# Patient Record
Sex: FEMALE | Race: WHITE | Employment: OTHER
[De-identification: names, ages, dates, MRNs, and addresses within clinical notes are randomized per-mention and may not be internally consistent; named-entity substitution may affect disease eponyms.]

---

## 2017-01-03 RX ORDER — METFORMIN HYDROCHLORIDE 500 MG/1
TABLET, EXTENDED RELEASE ORAL
Qty: 30 TABLET | Refills: 5 | Status: SHIPPED | OUTPATIENT
Start: 2017-01-03 | End: 2017-07-15 | Stop reason: SDUPTHER

## 2017-01-13 DIAGNOSIS — M15.9 PRIMARY OSTEOARTHRITIS INVOLVING MULTIPLE JOINTS: ICD-10-CM

## 2017-01-13 RX ORDER — HYDROCODONE BITARTRATE AND ACETAMINOPHEN 7.5; 325 MG/1; MG/1
1 TABLET ORAL EVERY 4 HOURS PRN
Qty: 120 TABLET | Refills: 0 | Status: SHIPPED | OUTPATIENT
Start: 2017-01-13 | End: 2017-01-30 | Stop reason: SDUPTHER

## 2017-01-18 ENCOUNTER — OFFICE VISIT (OUTPATIENT)
Dept: FAMILY MEDICINE CLINIC | Facility: CLINIC | Age: 72
End: 2017-01-18

## 2017-01-18 VITALS
WEIGHT: 191 LBS | BODY MASS INDEX: 41.33 KG/M2 | OXYGEN SATURATION: 88 % | HEART RATE: 100 BPM | DIASTOLIC BLOOD PRESSURE: 90 MMHG | SYSTOLIC BLOOD PRESSURE: 122 MMHG

## 2017-01-18 DIAGNOSIS — J41.0 SIMPLE CHRONIC BRONCHITIS (HCC): Primary | ICD-10-CM

## 2017-01-18 DIAGNOSIS — M15.9 PRIMARY OSTEOARTHRITIS INVOLVING MULTIPLE JOINTS: ICD-10-CM

## 2017-01-18 DIAGNOSIS — E11.8 TYPE 2 DIABETES MELLITUS WITH COMPLICATION, WITHOUT LONG-TERM CURRENT USE OF INSULIN (HCC): ICD-10-CM

## 2017-01-18 PROCEDURE — 99213 OFFICE O/P EST LOW 20 MIN: CPT | Performed by: FAMILY MEDICINE

## 2017-01-18 RX ORDER — SIMVASTATIN 40 MG
TABLET ORAL
Qty: 90 TABLET | Refills: 3 | Status: SHIPPED | OUTPATIENT
Start: 2017-01-18 | End: 2018-01-26 | Stop reason: SDUPTHER

## 2017-01-18 ASSESSMENT — ENCOUNTER SYMPTOMS
COUGH: 0
SHORTNESS OF BREATH: 1
SORE THROAT: 0
SINUS PRESSURE: 0
DIARRHEA: 0
NAUSEA: 0
BACK PAIN: 0
VOMITING: 0

## 2017-01-30 ENCOUNTER — OFFICE VISIT (OUTPATIENT)
Dept: FAMILY MEDICINE CLINIC | Facility: CLINIC | Age: 72
End: 2017-01-30

## 2017-01-30 VITALS — SYSTOLIC BLOOD PRESSURE: 124 MMHG | HEART RATE: 100 BPM | DIASTOLIC BLOOD PRESSURE: 80 MMHG | OXYGEN SATURATION: 95 %

## 2017-01-30 DIAGNOSIS — M15.9 PRIMARY OSTEOARTHRITIS INVOLVING MULTIPLE JOINTS: ICD-10-CM

## 2017-01-30 DIAGNOSIS — R04.0 EPISTAXIS: Primary | ICD-10-CM

## 2017-01-30 PROCEDURE — 99213 OFFICE O/P EST LOW 20 MIN: CPT | Performed by: FAMILY MEDICINE

## 2017-01-30 RX ORDER — HYDROCODONE BITARTRATE AND ACETAMINOPHEN 7.5; 325 MG/1; MG/1
TABLET ORAL
Qty: 160 TABLET | Refills: 0 | Status: SHIPPED | OUTPATIENT
Start: 2017-01-30 | End: 2017-02-23 | Stop reason: SDUPTHER

## 2017-01-30 ASSESSMENT — ENCOUNTER SYMPTOMS
DIARRHEA: 0
NAUSEA: 0
SHORTNESS OF BREATH: 0
SORE THROAT: 0
BACK PAIN: 1
COUGH: 0
SINUS PRESSURE: 0
VOMITING: 0

## 2017-02-01 ENCOUNTER — TELEPHONE (OUTPATIENT)
Dept: FAMILY MEDICINE CLINIC | Facility: CLINIC | Age: 72
End: 2017-02-01

## 2017-02-07 ENCOUNTER — TELEPHONE (OUTPATIENT)
Dept: FAMILY MEDICINE CLINIC | Facility: CLINIC | Age: 72
End: 2017-02-07

## 2017-02-23 DIAGNOSIS — M15.9 PRIMARY OSTEOARTHRITIS INVOLVING MULTIPLE JOINTS: ICD-10-CM

## 2017-02-24 ENCOUNTER — TELEPHONE (OUTPATIENT)
Dept: FAMILY MEDICINE CLINIC | Facility: CLINIC | Age: 72
End: 2017-02-24

## 2017-02-24 RX ORDER — HYDROCODONE BITARTRATE AND ACETAMINOPHEN 7.5; 325 MG/1; MG/1
TABLET ORAL
Qty: 160 TABLET | Refills: 0 | Status: SHIPPED | OUTPATIENT
Start: 2017-02-24 | End: 2017-03-10 | Stop reason: SDUPTHER

## 2017-03-09 ENCOUNTER — TELEPHONE (OUTPATIENT)
Dept: FAMILY MEDICINE CLINIC | Facility: CLINIC | Age: 72
End: 2017-03-09

## 2017-03-10 DIAGNOSIS — M15.9 PRIMARY OSTEOARTHRITIS INVOLVING MULTIPLE JOINTS: ICD-10-CM

## 2017-03-13 DIAGNOSIS — M15.9 PRIMARY OSTEOARTHRITIS INVOLVING MULTIPLE JOINTS: ICD-10-CM

## 2017-03-13 RX ORDER — HYDROCODONE BITARTRATE AND ACETAMINOPHEN 7.5; 325 MG/1; MG/1
TABLET ORAL
Qty: 160 TABLET | Refills: 0 | Status: SHIPPED | OUTPATIENT
Start: 2017-03-13 | End: 2017-04-06 | Stop reason: SDUPTHER

## 2017-03-13 RX ORDER — HYDROCODONE BITARTRATE AND ACETAMINOPHEN 7.5; 325 MG/1; MG/1
TABLET ORAL
Qty: 160 TABLET | Refills: 0 | Status: SHIPPED | OUTPATIENT
Start: 2017-03-13 | End: 2017-03-13 | Stop reason: SDUPTHER

## 2017-03-14 ENCOUNTER — OFFICE VISIT (OUTPATIENT)
Dept: FAMILY MEDICINE CLINIC | Age: 72
End: 2017-03-14
Payer: COMMERCIAL

## 2017-03-14 VITALS
SYSTOLIC BLOOD PRESSURE: 124 MMHG | HEART RATE: 102 BPM | BODY MASS INDEX: 41.33 KG/M2 | DIASTOLIC BLOOD PRESSURE: 70 MMHG | WEIGHT: 191 LBS

## 2017-03-14 DIAGNOSIS — F41.1 GAD (GENERALIZED ANXIETY DISORDER): ICD-10-CM

## 2017-03-14 DIAGNOSIS — E11.8 TYPE 2 DIABETES MELLITUS WITH COMPLICATION, WITHOUT LONG-TERM CURRENT USE OF INSULIN (HCC): ICD-10-CM

## 2017-03-14 DIAGNOSIS — J44.1 COPD EXACERBATION (HCC): ICD-10-CM

## 2017-03-14 DIAGNOSIS — J41.0 SIMPLE CHRONIC BRONCHITIS (HCC): Primary | ICD-10-CM

## 2017-03-14 PROCEDURE — 99213 OFFICE O/P EST LOW 20 MIN: CPT | Performed by: FAMILY MEDICINE

## 2017-03-14 ASSESSMENT — ENCOUNTER SYMPTOMS
SINUS PRESSURE: 0
SORE THROAT: 0
DIARRHEA: 0
COUGH: 0
NAUSEA: 0
VOMITING: 0
TROUBLE SWALLOWING: 1
SHORTNESS OF BREATH: 1
BACK PAIN: 0

## 2017-03-14 ASSESSMENT — PATIENT HEALTH QUESTIONNAIRE - PHQ9
1. LITTLE INTEREST OR PLEASURE IN DOING THINGS: 0
SUM OF ALL RESPONSES TO PHQ9 QUESTIONS 1 & 2: 1
2. FEELING DOWN, DEPRESSED OR HOPELESS: 1
SUM OF ALL RESPONSES TO PHQ QUESTIONS 1-9: 1

## 2017-04-06 ENCOUNTER — TELEPHONE (OUTPATIENT)
Dept: FAMILY MEDICINE CLINIC | Age: 72
End: 2017-04-06

## 2017-04-06 DIAGNOSIS — M15.9 PRIMARY OSTEOARTHRITIS INVOLVING MULTIPLE JOINTS: ICD-10-CM

## 2017-04-06 RX ORDER — HYDROCODONE BITARTRATE AND ACETAMINOPHEN 7.5; 325 MG/1; MG/1
TABLET ORAL
Qty: 160 TABLET | Refills: 0 | Status: SHIPPED | OUTPATIENT
Start: 2017-04-06 | End: 2017-05-01 | Stop reason: SDUPTHER

## 2017-04-06 RX ORDER — ALPRAZOLAM 1 MG/1
TABLET ORAL
Qty: 90 TABLET | Refills: 3 | Status: SHIPPED | OUTPATIENT
Start: 2017-04-06 | End: 2017-09-21 | Stop reason: SDUPTHER

## 2017-04-06 RX ORDER — IBUPROFEN 800 MG/1
TABLET ORAL
Qty: 90 TABLET | Refills: 3 | Status: SHIPPED | OUTPATIENT
Start: 2017-04-06 | End: 2018-01-26 | Stop reason: SDUPTHER

## 2017-04-19 ENCOUNTER — TELEPHONE (OUTPATIENT)
Dept: FAMILY MEDICINE CLINIC | Age: 72
End: 2017-04-19

## 2017-05-01 DIAGNOSIS — M15.9 PRIMARY OSTEOARTHRITIS INVOLVING MULTIPLE JOINTS: ICD-10-CM

## 2017-05-01 RX ORDER — HYDROCODONE BITARTRATE AND ACETAMINOPHEN 7.5; 325 MG/1; MG/1
TABLET ORAL
Qty: 160 TABLET | Refills: 0 | Status: SHIPPED | OUTPATIENT
Start: 2017-05-01 | End: 2017-05-24 | Stop reason: SDUPTHER

## 2017-05-15 ENCOUNTER — OFFICE VISIT (OUTPATIENT)
Dept: FAMILY MEDICINE CLINIC | Age: 72
End: 2017-05-15
Payer: COMMERCIAL

## 2017-05-15 VITALS
WEIGHT: 192 LBS | SYSTOLIC BLOOD PRESSURE: 124 MMHG | HEART RATE: 84 BPM | DIASTOLIC BLOOD PRESSURE: 76 MMHG | BODY MASS INDEX: 41.55 KG/M2

## 2017-05-15 DIAGNOSIS — E66.01 MORBID OBESITY WITH BMI OF 40.0-44.9, ADULT (HCC): ICD-10-CM

## 2017-05-15 DIAGNOSIS — E11.8 TYPE 2 DIABETES MELLITUS WITH COMPLICATION, WITHOUT LONG-TERM CURRENT USE OF INSULIN (HCC): Primary | ICD-10-CM

## 2017-05-15 LAB
CREATININE URINE POCT: 300
MICROALBUMIN/CREAT 24H UR: 30 MG/G{CREAT}
MICROALBUMIN/CREAT UR-RTO: <30

## 2017-05-15 PROCEDURE — 82044 UR ALBUMIN SEMIQUANTITATIVE: CPT | Performed by: FAMILY MEDICINE

## 2017-05-15 PROCEDURE — 99213 OFFICE O/P EST LOW 20 MIN: CPT | Performed by: FAMILY MEDICINE

## 2017-05-15 ASSESSMENT — ENCOUNTER SYMPTOMS
SORE THROAT: 0
SHORTNESS OF BREATH: 0
NAUSEA: 0
SINUS PRESSURE: 0
DIARRHEA: 0
COUGH: 1
BACK PAIN: 1
VOMITING: 0

## 2017-05-24 ENCOUNTER — TELEPHONE (OUTPATIENT)
Dept: FAMILY MEDICINE CLINIC | Age: 72
End: 2017-05-24

## 2017-05-24 DIAGNOSIS — M15.9 PRIMARY OSTEOARTHRITIS INVOLVING MULTIPLE JOINTS: ICD-10-CM

## 2017-05-24 RX ORDER — HYDROCODONE BITARTRATE AND ACETAMINOPHEN 7.5; 325 MG/1; MG/1
TABLET ORAL
Qty: 160 TABLET | Refills: 0 | Status: SHIPPED | OUTPATIENT
Start: 2017-05-24 | End: 2017-06-19 | Stop reason: SDUPTHER

## 2017-05-26 ENCOUNTER — NURSE ONLY (OUTPATIENT)
Dept: FAMILY MEDICINE CLINIC | Age: 72
End: 2017-05-26
Payer: COMMERCIAL

## 2017-05-26 DIAGNOSIS — R35.0 FREQUENT URINATION: Primary | ICD-10-CM

## 2017-05-26 LAB
BILIRUBIN, POC: NORMAL
BLOOD URINE, POC: NORMAL
CLARITY, POC: CLEAR
COLOR, POC: NORMAL
GLUCOSE URINE, POC: NORMAL
KETONES, POC: NORMAL
LEUKOCYTE EST, POC: NORMAL
NITRITE, POC: NORMAL
PH, POC: 5
PROTEIN, POC: NORMAL
SPECIFIC GRAVITY, POC: >1.03
UROBILINOGEN, POC: 0.2

## 2017-05-26 PROCEDURE — 81003 URINALYSIS AUTO W/O SCOPE: CPT | Performed by: FAMILY MEDICINE

## 2017-05-26 RX ORDER — PHENAZOPYRIDINE HYDROCHLORIDE 200 MG/1
200 TABLET, FILM COATED ORAL 3 TIMES DAILY PRN
Qty: 15 TABLET | Refills: 0 | Status: SHIPPED | OUTPATIENT
Start: 2017-05-26 | End: 2017-05-29

## 2017-05-26 RX ORDER — TAMSULOSIN HYDROCHLORIDE 0.4 MG/1
0.8 CAPSULE ORAL DAILY
Qty: 20 CAPSULE | Refills: 0 | Status: SHIPPED | OUTPATIENT
Start: 2017-05-26 | End: 2018-01-26 | Stop reason: ALTCHOICE

## 2017-05-26 RX ORDER — CIPROFLOXACIN 500 MG/1
500 TABLET, FILM COATED ORAL 2 TIMES DAILY
Qty: 10 TABLET | Refills: 0 | Status: SHIPPED | OUTPATIENT
Start: 2017-05-26 | End: 2017-05-31

## 2017-06-12 ENCOUNTER — TELEPHONE (OUTPATIENT)
Dept: FAMILY MEDICINE CLINIC | Age: 72
End: 2017-06-12

## 2017-06-19 ENCOUNTER — TELEPHONE (OUTPATIENT)
Dept: FAMILY MEDICINE CLINIC | Age: 72
End: 2017-06-19

## 2017-06-19 DIAGNOSIS — M15.9 PRIMARY OSTEOARTHRITIS INVOLVING MULTIPLE JOINTS: ICD-10-CM

## 2017-06-19 RX ORDER — HYDROCODONE BITARTRATE AND ACETAMINOPHEN 7.5; 325 MG/1; MG/1
TABLET ORAL
Qty: 160 TABLET | Refills: 0 | Status: SHIPPED | OUTPATIENT
Start: 2017-06-19 | End: 2017-07-13 | Stop reason: SDUPTHER

## 2017-07-04 DIAGNOSIS — E11.8 TYPE 2 DIABETES MELLITUS WITH COMPLICATION, WITHOUT LONG-TERM CURRENT USE OF INSULIN (HCC): ICD-10-CM

## 2017-07-05 RX ORDER — LISINOPRIL 10 MG/1
TABLET ORAL
Qty: 30 TABLET | Refills: 5 | Status: SHIPPED | OUTPATIENT
Start: 2017-07-05 | End: 2018-01-05 | Stop reason: SDUPTHER

## 2017-07-13 DIAGNOSIS — M15.9 PRIMARY OSTEOARTHRITIS INVOLVING MULTIPLE JOINTS: ICD-10-CM

## 2017-07-13 RX ORDER — HYDROCODONE BITARTRATE AND ACETAMINOPHEN 7.5; 325 MG/1; MG/1
TABLET ORAL
Qty: 160 TABLET | Refills: 0 | Status: SHIPPED | OUTPATIENT
Start: 2017-07-13 | End: 2017-08-08 | Stop reason: SDUPTHER

## 2017-07-17 RX ORDER — METFORMIN HYDROCHLORIDE 500 MG/1
TABLET, EXTENDED RELEASE ORAL
Qty: 30 TABLET | Refills: 5 | Status: SHIPPED | OUTPATIENT
Start: 2017-07-17 | End: 2018-01-26 | Stop reason: SDUPTHER

## 2017-07-20 ENCOUNTER — HOSPITAL ENCOUNTER (OUTPATIENT)
Age: 72
Setting detail: SPECIMEN
Discharge: HOME | End: 2017-07-20
Payer: COMMERCIAL

## 2017-07-20 ENCOUNTER — OFFICE VISIT (OUTPATIENT)
Dept: FAMILY MEDICINE CLINIC | Age: 72
End: 2017-07-20
Payer: COMMERCIAL

## 2017-07-20 VITALS
DIASTOLIC BLOOD PRESSURE: 74 MMHG | BODY MASS INDEX: 42.41 KG/M2 | WEIGHT: 196 LBS | HEART RATE: 121 BPM | SYSTOLIC BLOOD PRESSURE: 130 MMHG | OXYGEN SATURATION: 90 %

## 2017-07-20 DIAGNOSIS — M15.9 PRIMARY OSTEOARTHRITIS INVOLVING MULTIPLE JOINTS: ICD-10-CM

## 2017-07-20 DIAGNOSIS — E11.8 TYPE 2 DIABETES MELLITUS WITH COMPLICATION, WITHOUT LONG-TERM CURRENT USE OF INSULIN (HCC): ICD-10-CM

## 2017-07-20 DIAGNOSIS — J41.0 SIMPLE CHRONIC BRONCHITIS (HCC): Primary | ICD-10-CM

## 2017-07-20 LAB
ABSOLUTE EOS #: 0.55 K/UL (ref 0–0.4)
ABSOLUTE LYMPH #: 10.67 K/UL (ref 1–4.8)
ABSOLUTE MONO #: 1.29 K/UL (ref 0.1–0.8)
ALBUMIN SERPL-MCNC: 4.3 G/DL (ref 3.5–5.2)
ALBUMIN/GLOBULIN RATIO: 1.4 (ref 1–2.5)
ALP BLD-CCNC: 64 U/L (ref 35–104)
ALT SERPL-CCNC: 16 U/L (ref 5–33)
ANION GAP SERPL CALCULATED.3IONS-SCNC: 17 MMOL/L (ref 9–17)
AST SERPL-CCNC: 19 U/L
ATYPICAL LYMPHOCYTE ABSOLUTE COUNT: 0.74 K/UL
ATYPICAL LYMPHOCYTES: 4 %
BASOPHILS # BLD: 0 %
BASOPHILS ABSOLUTE: 0 K/UL (ref 0–0.2)
BILIRUB SERPL-MCNC: 0.2 MG/DL (ref 0.3–1.2)
BUN BLDV-MCNC: 19 MG/DL (ref 8–23)
BUN/CREAT BLD: ABNORMAL (ref 9–20)
CALCIUM SERPL-MCNC: 9.4 MG/DL (ref 8.6–10.4)
CHLORIDE BLD-SCNC: 103 MMOL/L (ref 98–107)
CHOLESTEROL/HDL RATIO: 2.9
CHOLESTEROL: 174 MG/DL
CO2: 23 MMOL/L (ref 20–31)
CREAT SERPL-MCNC: 0.54 MG/DL (ref 0.5–0.9)
DIFFERENTIAL TYPE: ABNORMAL
EOSINOPHILS RELATIVE PERCENT: 3 %
ESTIMATED AVERAGE GLUCOSE: 128 MG/DL
GFR AFRICAN AMERICAN: >60 ML/MIN
GFR NON-AFRICAN AMERICAN: >60 ML/MIN
GFR SERPL CREATININE-BSD FRML MDRD: ABNORMAL ML/MIN/{1.73_M2}
GFR SERPL CREATININE-BSD FRML MDRD: ABNORMAL ML/MIN/{1.73_M2}
GLUCOSE BLD-MCNC: 161 MG/DL (ref 70–99)
HBA1C MFR BLD: 6.1 % (ref 4–6)
HCT VFR BLD CALC: 42.2 % (ref 36–46)
HDLC SERPL-MCNC: 60 MG/DL
HEMOGLOBIN: 13.7 G/DL (ref 12–16)
LDL CHOLESTEROL: 73 MG/DL (ref 0–130)
LYMPHOCYTES # BLD: 58 %
MCH RBC QN AUTO: 29.8 PG (ref 26–34)
MCHC RBC AUTO-ENTMCNC: 32.4 G/DL (ref 31–37)
MCV RBC AUTO: 91.8 FL (ref 80–100)
MONOCYTES # BLD: 7 %
MORPHOLOGY: ABNORMAL
PDW BLD-RTO: 14.9 % (ref 12.5–15.4)
PLATELET # BLD: 78 K/UL (ref 140–450)
PLATELET ESTIMATE: ABNORMAL
PMV BLD AUTO: 9.5 FL (ref 6–12)
POTASSIUM SERPL-SCNC: 5 MMOL/L (ref 3.7–5.3)
RBC # BLD: 4.6 M/UL (ref 4–5.2)
RBC # BLD: ABNORMAL 10*6/UL
SEG NEUTROPHILS: 28 %
SEGMENTED NEUTROPHILS ABSOLUTE COUNT: 5.15 K/UL (ref 1.8–7.7)
SODIUM BLD-SCNC: 143 MMOL/L (ref 135–144)
TOTAL PROTEIN: 7.3 G/DL (ref 6.4–8.3)
TRIGL SERPL-MCNC: 203 MG/DL
VLDLC SERPL CALC-MCNC: ABNORMAL MG/DL (ref 1–30)
WBC # BLD: 18.4 K/UL (ref 3.5–11)
WBC # BLD: ABNORMAL 10*3/UL

## 2017-07-20 PROCEDURE — 99213 OFFICE O/P EST LOW 20 MIN: CPT | Performed by: FAMILY MEDICINE

## 2017-07-20 ASSESSMENT — ENCOUNTER SYMPTOMS
NAUSEA: 0
COUGH: 0
BACK PAIN: 0
SINUS PRESSURE: 0
VOMITING: 0
SHORTNESS OF BREATH: 0
SORE THROAT: 0
DIARRHEA: 0

## 2017-07-26 ENCOUNTER — TELEPHONE (OUTPATIENT)
Dept: FAMILY MEDICINE CLINIC | Age: 72
End: 2017-07-26

## 2017-07-27 ENCOUNTER — TELEPHONE (OUTPATIENT)
Dept: FAMILY MEDICINE CLINIC | Age: 72
End: 2017-07-27

## 2017-08-08 DIAGNOSIS — M15.9 PRIMARY OSTEOARTHRITIS INVOLVING MULTIPLE JOINTS: ICD-10-CM

## 2017-08-08 RX ORDER — HYDROCODONE BITARTRATE AND ACETAMINOPHEN 7.5; 325 MG/1; MG/1
TABLET ORAL
Qty: 160 TABLET | Refills: 0 | Status: SHIPPED | OUTPATIENT
Start: 2017-08-08 | End: 2017-08-31 | Stop reason: SDUPTHER

## 2017-08-22 ENCOUNTER — HOSPITAL ENCOUNTER (OUTPATIENT)
Age: 72
Setting detail: SPECIMEN
Discharge: HOME | End: 2017-08-22
Payer: COMMERCIAL

## 2017-08-22 LAB
ANION GAP SERPL CALCULATED.3IONS-SCNC: 15 MMOL/L (ref 9–17)
CHLORIDE BLD-SCNC: 94 MMOL/L (ref 98–107)
CO2: 23 MMOL/L (ref 20–31)
POTASSIUM SERPL-SCNC: 4.7 MMOL/L (ref 3.7–5.3)
SODIUM BLD-SCNC: 132 MMOL/L (ref 135–144)

## 2017-08-31 DIAGNOSIS — M15.9 PRIMARY OSTEOARTHRITIS INVOLVING MULTIPLE JOINTS: ICD-10-CM

## 2017-08-31 RX ORDER — HYDROCODONE BITARTRATE AND ACETAMINOPHEN 7.5; 325 MG/1; MG/1
TABLET ORAL
Qty: 160 TABLET | Refills: 0 | Status: SHIPPED | OUTPATIENT
Start: 2017-08-31 | End: 2017-09-27 | Stop reason: SDUPTHER

## 2017-09-21 ENCOUNTER — OFFICE VISIT (OUTPATIENT)
Dept: FAMILY MEDICINE CLINIC | Age: 72
End: 2017-09-21
Payer: COMMERCIAL

## 2017-09-21 VITALS
DIASTOLIC BLOOD PRESSURE: 74 MMHG | BODY MASS INDEX: 43.15 KG/M2 | HEIGHT: 57 IN | OXYGEN SATURATION: 95 % | WEIGHT: 200 LBS | HEART RATE: 84 BPM | SYSTOLIC BLOOD PRESSURE: 124 MMHG

## 2017-09-21 DIAGNOSIS — M15.9 PRIMARY OSTEOARTHRITIS INVOLVING MULTIPLE JOINTS: ICD-10-CM

## 2017-09-21 DIAGNOSIS — K21.9 GASTROESOPHAGEAL REFLUX DISEASE, ESOPHAGITIS PRESENCE NOT SPECIFIED: ICD-10-CM

## 2017-09-21 DIAGNOSIS — F41.1 GAD (GENERALIZED ANXIETY DISORDER): ICD-10-CM

## 2017-09-21 DIAGNOSIS — R35.0 URINARY FREQUENCY: ICD-10-CM

## 2017-09-21 DIAGNOSIS — E11.8 TYPE 2 DIABETES MELLITUS WITH COMPLICATION, WITHOUT LONG-TERM CURRENT USE OF INSULIN (HCC): Primary | ICD-10-CM

## 2017-09-21 DIAGNOSIS — Z23 IMMUNIZATION DUE: ICD-10-CM

## 2017-09-21 DIAGNOSIS — J96.00 ACUTE RESPIRATORY FAILURE, UNSPECIFIED WHETHER WITH HYPOXIA OR HYPERCAPNIA (HCC): ICD-10-CM

## 2017-09-21 DIAGNOSIS — J41.0 SIMPLE CHRONIC BRONCHITIS (HCC): ICD-10-CM

## 2017-09-21 LAB
BILIRUBIN, POC: ABNORMAL
BLOOD URINE, POC: ABNORMAL
CLARITY, POC: ABNORMAL
COLOR, POC: YELLOW
GLUCOSE URINE, POC: ABNORMAL
KETONES, POC: ABNORMAL
LEUKOCYTE EST, POC: ABNORMAL
NITRITE, POC: ABNORMAL
PH, POC: 5
PROTEIN, POC: ABNORMAL
SPECIFIC GRAVITY, POC: >1.03
UROBILINOGEN, POC: 0.2

## 2017-09-21 PROCEDURE — 81003 URINALYSIS AUTO W/O SCOPE: CPT | Performed by: FAMILY MEDICINE

## 2017-09-21 PROCEDURE — 99213 OFFICE O/P EST LOW 20 MIN: CPT | Performed by: FAMILY MEDICINE

## 2017-09-21 PROCEDURE — G0008 ADMIN INFLUENZA VIRUS VAC: HCPCS | Performed by: FAMILY MEDICINE

## 2017-09-21 PROCEDURE — 90688 IIV4 VACCINE SPLT 0.5 ML IM: CPT | Performed by: FAMILY MEDICINE

## 2017-09-21 RX ORDER — ALPRAZOLAM 1 MG/1
TABLET ORAL
Qty: 90 TABLET | Refills: 0 | Status: SHIPPED | OUTPATIENT
Start: 2017-09-21 | End: 2017-11-05 | Stop reason: SDUPTHER

## 2017-09-21 RX ORDER — ESOMEPRAZOLE MAGNESIUM 40 MG/1
40 CAPSULE, DELAYED RELEASE ORAL DAILY
Qty: 30 CAPSULE | Refills: 3 | Status: SHIPPED | OUTPATIENT
Start: 2017-09-21 | End: 2018-10-22 | Stop reason: SDUPTHER

## 2017-09-21 ASSESSMENT — ENCOUNTER SYMPTOMS
SHORTNESS OF BREATH: 0
NAUSEA: 0
SORE THROAT: 0
SINUS PRESSURE: 0
VOMITING: 0
COUGH: 0
BACK PAIN: 1
DIARRHEA: 0

## 2017-09-27 DIAGNOSIS — M15.9 PRIMARY OSTEOARTHRITIS INVOLVING MULTIPLE JOINTS: ICD-10-CM

## 2017-09-27 RX ORDER — HYDROCODONE BITARTRATE AND ACETAMINOPHEN 7.5; 325 MG/1; MG/1
TABLET ORAL
Qty: 160 TABLET | Refills: 0 | Status: SHIPPED | OUTPATIENT
Start: 2017-09-27 | End: 2017-10-19 | Stop reason: SDUPTHER

## 2017-10-03 ENCOUNTER — TELEPHONE (OUTPATIENT)
Dept: FAMILY MEDICINE CLINIC | Age: 72
End: 2017-10-03

## 2017-10-03 RX ORDER — DOCUSATE SODIUM 100 MG/1
100 CAPSULE, LIQUID FILLED ORAL 2 TIMES DAILY PRN
Qty: 60 CAPSULE | Refills: 5 | Status: SHIPPED | OUTPATIENT
Start: 2017-10-03 | End: 2018-01-26 | Stop reason: ALTCHOICE

## 2017-10-03 RX ORDER — POLYETHYLENE GLYCOL 3350 17 G/17G
17 POWDER, FOR SOLUTION ORAL DAILY
Qty: 510 G | Refills: 0 | Status: SHIPPED | OUTPATIENT
Start: 2017-10-03 | End: 2017-11-02

## 2017-10-19 DIAGNOSIS — M15.9 PRIMARY OSTEOARTHRITIS INVOLVING MULTIPLE JOINTS: ICD-10-CM

## 2017-10-19 RX ORDER — HYDROCODONE BITARTRATE AND ACETAMINOPHEN 7.5; 325 MG/1; MG/1
TABLET ORAL
Qty: 160 TABLET | Refills: 0 | Status: SHIPPED | OUTPATIENT
Start: 2017-10-19 | End: 2017-11-13 | Stop reason: SDUPTHER

## 2017-10-19 NOTE — TELEPHONE ENCOUNTER
Last refill 9-   Health Maintenance   Topic Date Due    Hepatitis C screen  1945    DTaP/Tdap/Td vaccine (1 - Tdap) 04/30/1966    Zostavax vaccine  04/30/2007    Diabetic retinal exam  04/03/2018    Breast cancer screen  04/20/2018    Diabetic foot exam  05/15/2018    Diabetic microalbuminuria test  05/15/2018    Diabetic hemoglobin A1C test  07/20/2018    Lipid screen  07/20/2018    Colon cancer screen colonoscopy  06/07/2019    DEXA (modify frequency per FRAX score)  Completed    Flu vaccine  Completed    Pneumococcal low/med risk  Completed       Hemoglobin A1C (%)   Date Value   07/20/2017 6.1 (H)   01/18/2017 5.8   12/14/2015 6.4 (H)             ( goal A1C is < 7)   No results found for: LABMICR  LDL Cholesterol (mg/dL)   Date Value   07/20/2017 73       (goal LDL is <100)   AST (U/L)   Date Value   07/20/2017 19     ALT (U/L)   Date Value   07/20/2017 16     BUN (mg/dL)   Date Value   07/20/2017 19     BP Readings from Last 3 Encounters:   09/21/17 124/74   07/20/17 130/74   05/15/17 124/76          (goal 120/80)    All Future Testing planned in [facility]  Lab Frequency Next Occurrence       Next Visit Date:  Future Appointments  Date Time Provider Thad Jose   11/21/2017 1:20 PM MD EVANS Ríos            Patient Active Problem List:     Obesity     COPD (chronic obstructive pulmonary disease) ([facility] [address]     Colon polyps     Post traumatic stress disorder (PTSD)     Osteoarthritis     Diabetes mellitus ([facility] [address]     Acute respiratory failure (HCC)     COPD exacerbation (HCC)     Pneumonia     Tubulovillous adenoma     Oral thrush

## 2017-10-27 ENCOUNTER — HOSPITAL ENCOUNTER (OUTPATIENT)
Age: 72
Setting detail: SPECIMEN
Discharge: HOME | End: 2017-10-27
Payer: COMMERCIAL

## 2017-10-27 LAB
ANION GAP SERPL CALCULATED.3IONS-SCNC: 16 MMOL/L (ref 9–17)
CHLORIDE BLD-SCNC: 94 MMOL/L (ref 98–107)
CO2: 22 MMOL/L (ref 20–31)
POTASSIUM SERPL-SCNC: 4.8 MMOL/L (ref 3.7–5.3)
SODIUM BLD-SCNC: 132 MMOL/L (ref 135–144)

## 2017-11-05 DIAGNOSIS — F41.1 GAD (GENERALIZED ANXIETY DISORDER): ICD-10-CM

## 2017-11-06 RX ORDER — ALPRAZOLAM 1 MG/1
TABLET ORAL
Qty: 90 TABLET | Refills: 0 | Status: SHIPPED | OUTPATIENT
Start: 2017-11-06 | End: 2017-11-29 | Stop reason: SDUPTHER

## 2017-11-06 NOTE — TELEPHONE ENCOUNTER
Last refill 9-   Health Maintenance   Topic Date Due    Hepatitis C screen  1945    DTaP/Tdap/Td vaccine (1 - Tdap) 04/30/1966    Zostavax vaccine  04/30/2007    Diabetic retinal exam  04/03/2018    Breast cancer screen  04/20/2018    Diabetic foot exam  05/15/2018    Diabetic microalbuminuria test  05/15/2018    Diabetic hemoglobin A1C test  07/20/2018    Lipid screen  07/20/2018    Colon cancer screen colonoscopy  06/07/2019    DEXA (modify frequency per FRAX score)  Completed    Flu vaccine  Completed    Pneumococcal low/med risk  Completed       Hemoglobin A1C (%)   Date Value   07/20/2017 6.1 (H)   01/18/2017 5.8   12/14/2015 6.4 (H)             ( goal A1C is < 7)   No results found for: LABMICR  LDL Cholesterol (mg/dL)   Date Value   07/20/2017 73       (goal LDL is <100)   AST (U/L)   Date Value   07/20/2017 19     ALT (U/L)   Date Value   07/20/2017 16     BUN (mg/dL)   Date Value   07/20/2017 19     BP Readings from Last 3 Encounters:   09/21/17 124/74   07/20/17 130/74   05/15/17 124/76          (goal 120/80)    All Future Testing planned in [facility]  Lab Frequency Next Occurrence       Next Visit Date:  Future Appointments  Date Time Provider Thad Jose   11/21/2017 1:20 PM Erendira Brownlee MD [facility] [address] [facility]            Patient Active Problem List:     Obesity     COPD (chronic obstructive pulmonary disease) ([facility] [address]     Colon polyps     Post traumatic stress disorder (PTSD)     Osteoarthritis     Diabetes mellitus ([facility] [address]     Acute respiratory failure (HCC)     COPD exacerbation (HCC)     Pneumonia     Tubulovillous adenoma     Oral thrush

## 2017-11-13 DIAGNOSIS — M15.9 PRIMARY OSTEOARTHRITIS INVOLVING MULTIPLE JOINTS: ICD-10-CM

## 2017-11-13 RX ORDER — HYDROCODONE BITARTRATE AND ACETAMINOPHEN 7.5; 325 MG/1; MG/1
TABLET ORAL
Qty: 160 TABLET | Refills: 0 | Status: SHIPPED | OUTPATIENT
Start: 2017-11-13 | End: 2017-11-29 | Stop reason: SDUPTHER

## 2017-11-13 NOTE — TELEPHONE ENCOUNTER
Last refill 10-   Health Maintenance   Topic Date Due    Hepatitis C screen  1945    DTaP/Tdap/Td vaccine (1 - Tdap) 04/30/1966    Zostavax vaccine  04/30/2007    Diabetic retinal exam  04/03/2018    Breast cancer screen  04/20/2018    Diabetic foot exam  05/15/2018    Diabetic microalbuminuria test  05/15/2018    Diabetic hemoglobin A1C test  07/20/2018    Lipid screen  07/20/2018    Colon cancer screen colonoscopy  06/07/2019    DEXA (modify frequency per FRAX score)  Completed    Flu vaccine  Completed    Pneumococcal low/med risk  Completed       Hemoglobin A1C (%)   Date Value   07/20/2017 6.1 (H)   01/18/2017 5.8   12/14/2015 6.4 (H)             ( goal A1C is < 7)   No results found for: LABMICR  LDL Cholesterol (mg/dL)   Date Value   07/20/2017 73       (goal LDL is <100)   AST (U/L)   Date Value   07/20/2017 19     ALT (U/L)   Date Value   07/20/2017 16     BUN (mg/dL)   Date Value   07/20/2017 19     BP Readings from Last 3 Encounters:   09/21/17 124/74   07/20/17 130/74   05/15/17 124/76          (goal 120/80)    All Future Testing planned in [facility]  Lab Frequency Next Occurrence       Next Visit Date:  Future Appointments  Date Time Provider Thad Jose   11/21/2017 1:20 PM MD EVANS Fermin            Patient Active Problem List:     Obesity     COPD (chronic obstructive pulmonary disease) ([facility] [address]     Colon polyps     Post traumatic stress disorder (PTSD)     Osteoarthritis     Diabetes mellitus ([facility] [address]     Acute respiratory failure (HCC)     COPD exacerbation (HCC)     Pneumonia     Tubulovillous adenoma     Oral thrush

## 2017-11-29 ENCOUNTER — TELEPHONE (OUTPATIENT)
Dept: FAMILY MEDICINE CLINIC | Age: 72
End: 2017-11-29

## 2017-11-29 ENCOUNTER — OFFICE VISIT (OUTPATIENT)
Dept: FAMILY MEDICINE CLINIC | Age: 72
End: 2017-11-29
Payer: COMMERCIAL

## 2017-11-29 VITALS
HEART RATE: 108 BPM | WEIGHT: 195 LBS | BODY MASS INDEX: 42.2 KG/M2 | DIASTOLIC BLOOD PRESSURE: 74 MMHG | SYSTOLIC BLOOD PRESSURE: 130 MMHG

## 2017-11-29 DIAGNOSIS — E11.8 TYPE 2 DIABETES MELLITUS WITH COMPLICATION, WITHOUT LONG-TERM CURRENT USE OF INSULIN (HCC): Primary | ICD-10-CM

## 2017-11-29 DIAGNOSIS — M15.9 PRIMARY OSTEOARTHRITIS INVOLVING MULTIPLE JOINTS: ICD-10-CM

## 2017-11-29 DIAGNOSIS — F41.1 GAD (GENERALIZED ANXIETY DISORDER): ICD-10-CM

## 2017-11-29 PROCEDURE — 99213 OFFICE O/P EST LOW 20 MIN: CPT | Performed by: FAMILY MEDICINE

## 2017-11-29 RX ORDER — HYDROCODONE BITARTRATE AND ACETAMINOPHEN 7.5; 325 MG/1; MG/1
TABLET ORAL
Qty: 180 TABLET | Refills: 0 | Status: SHIPPED | OUTPATIENT
Start: 2017-11-29 | End: 2017-12-22 | Stop reason: SDUPTHER

## 2017-11-29 RX ORDER — ALPRAZOLAM 1 MG/1
TABLET ORAL
Qty: 90 TABLET | Refills: 3 | Status: SHIPPED | OUTPATIENT
Start: 2017-11-29 | End: 2018-05-11 | Stop reason: SDUPTHER

## 2017-11-29 ASSESSMENT — ENCOUNTER SYMPTOMS
DIARRHEA: 0
SORE THROAT: 0
BACK PAIN: 1
VOMITING: 0
COUGH: 0
SINUS PRESSURE: 0
NAUSEA: 0
SHORTNESS OF BREATH: 0

## 2017-11-29 NOTE — PROGRESS NOTES
Lymphadenopathy:     She has no cervical adenopathy. Neurological: She is alert and oriented to person, place, and time. No cranial nerve deficit. She exhibits normal muscle tone. Coordination abnormal.   Skin: No rash noted. She is not diaphoretic. Psychiatric: She has a normal mood and affect. Her behavior is normal. Judgment and thought content normal.       Assessment:      1. Type 2 diabetes mellitus with complication, without long-term current use of insulin ([facility]     2. SUSANA (generalized anxiety disorder)  ALPRAZolam (XANAX) 1 MG tablet   3. Primary osteoarthritis involving multiple joints  HYDROcodone-acetaminophen (NORCO) 7.5-325 MG per tablet         Plan:      No Follow-up on file. No orders of the defined types were placed in this encounter. Orders Placed This Encounter   Medications    ALPRAZolam (XANAX) 1 MG tablet     Sig: TAKE ONE TABLET BY MOUTH THREE TIMES A DAY AS NEEDED. Dispense:  90 tablet     Refill:  3    HYDROcodone-acetaminophen (NORCO) 7.5-325 MG per tablet     Sig: One to two tabs every six hours as needed for pain. Gevena Cheli      Dispense:  180 tablet     Refill:  0

## 2017-11-29 NOTE — TELEPHONE ENCOUNTER
Patient called and said that her oxygen company is     [address] [facility] [place] [address].  [address]    P: [phone]

## 2017-11-29 NOTE — TELEPHONE ENCOUNTER
We need to contact them and see if it can be arranged for her to have a O2 concentrator because of improvement in mobility.

## 2017-11-30 NOTE — TELEPHONE ENCOUNTER
Spoke with the [address] [place] [address]. Patient needs a 6 min walking test, letter of medical necessity and prescription for O2 Concentrator. :) Would you like this as a nurse visit or on the schedule?

## 2017-12-07 RX ORDER — FLUOXETINE HYDROCHLORIDE 20 MG/1
CAPSULE ORAL
Qty: 30 CAPSULE | Refills: 10 | Status: SHIPPED | OUTPATIENT
Start: 2017-12-07 | End: 2018-11-18 | Stop reason: SDUPTHER

## 2017-12-07 NOTE — TELEPHONE ENCOUNTER
Next Visit Date:  Future Appointments  Date Time Provider Thad Magallanesi   1/26/2018 1:20 PM Ed Coles  [address] [facility]   Topic Date Due    Hepatitis C screen  1945    DTaP/Tdap/Td vaccine (1 - Tdap) 04/30/1966    Zostavax vaccine  04/30/2007    Diabetic retinal exam  04/03/2018    Breast cancer screen  04/20/2018    Diabetic foot exam  05/15/2018    Diabetic microalbuminuria test  05/15/2018    Diabetic hemoglobin A1C test  07/20/2018    Lipid screen  07/20/2018    Colon cancer screen colonoscopy  06/07/2019    DEXA (modify frequency per FRAX score)  Completed    Flu vaccine  Completed    Pneumococcal low/med risk  Completed       Hemoglobin A1C (%)   Date Value   07/20/2017 6.1 (H)   01/18/2017 5.8   12/14/2015 6.4 (H)             ( goal A1C is < 7)   No results found for: LABMICR  LDL Cholesterol (mg/dL)   Date Value   07/20/2017 73       (goal LDL is <100)   AST (U/L)   Date Value   07/20/2017 19     ALT (U/L)   Date Value   07/20/2017 16     BUN (mg/dL)   Date Value   07/20/2017 19     BP Readings from Last 3 Encounters:   11/29/17 130/74   09/21/17 124/74   07/20/17 130/74          (goal 120/80)    All Future Testing planned in [facility]  Lab Frequency Next Occurrence               Patient Active Problem List:     Obesity     COPD (chronic obstructive pulmonary disease) (HCC)     Colon polyps     Post traumatic stress disorder (PTSD)     Osteoarthritis     Diabetes mellitus ([address]     Acute respiratory failure (HCC)     COPD exacerbation (HCC)     Pneumonia     Tubulovillous adenoma     Oral thrush

## 2017-12-22 DIAGNOSIS — M15.9 PRIMARY OSTEOARTHRITIS INVOLVING MULTIPLE JOINTS: ICD-10-CM

## 2017-12-22 RX ORDER — HYDROCODONE BITARTRATE AND ACETAMINOPHEN 7.5; 325 MG/1; MG/1
TABLET ORAL
Qty: 180 TABLET | Refills: 0 | Status: SHIPPED | OUTPATIENT
Start: 2017-12-22 | End: 2018-01-15 | Stop reason: SDUPTHER

## 2017-12-22 NOTE — TELEPHONE ENCOUNTER
Lv, 11/29/2017  Oarrs, 11/13/2017    Next Visit Date:  Future Appointments  Date Time Provider Thad Magallanesi   1/26/2018 1:20 PM Braulio Guardado  [address] [facility]   Topic Date Due    Hepatitis C screen  1945    DTaP/Tdap/Td vaccine (1 - Tdap) 04/30/1966    Zostavax vaccine  04/30/2007    Diabetic retinal exam  04/03/2018    Breast cancer screen  04/20/2018    Diabetic foot exam  05/15/2018    Diabetic microalbuminuria test  05/15/2018    Diabetic hemoglobin A1C test  07/20/2018    Lipid screen  07/20/2018    Colon cancer screen colonoscopy  06/07/2019    DEXA (modify frequency per FRAX score)  Completed    Flu vaccine  Completed    Pneumococcal low/med risk  Completed       Hemoglobin A1C (%)   Date Value   07/20/2017 6.1 (H)   01/18/2017 5.8   12/14/2015 6.4 (H)             ( goal A1C is < 7)   No results found for: LABMICR  LDL Cholesterol (mg/dL)   Date Value   07/20/2017 73       (goal LDL is <100)   AST (U/L)   Date Value   07/20/2017 19     ALT (U/L)   Date Value   07/20/2017 16     BUN (mg/dL)   Date Value   07/20/2017 19     BP Readings from Last 3 Encounters:   11/29/17 130/74   09/21/17 124/74   07/20/17 130/74          (goal 120/80)    All Future Testing planned in [facility]  Lab Frequency Next Occurrence               Patient Active Problem List:     Obesity     COPD (chronic obstructive pulmonary disease) (HCC)     Colon polyps     Post traumatic stress disorder (PTSD)     Osteoarthritis     Diabetes mellitus ([facility] [address]     Acute respiratory failure (HCC)     COPD exacerbation (HCC)     Pneumonia     Tubulovillous adenoma     Oral thrush

## 2018-01-04 ENCOUNTER — TELEPHONE (OUTPATIENT)
Dept: FAMILY MEDICINE CLINIC | Age: 73
End: 2018-01-04

## 2018-01-04 RX ORDER — AZITHROMYCIN 250 MG/1
TABLET, FILM COATED ORAL
Qty: 1 PACKET | Refills: 0 | Status: SHIPPED | OUTPATIENT
Start: 2018-01-04 | End: 2018-01-26 | Stop reason: ALTCHOICE

## 2018-01-04 NOTE — TELEPHONE ENCOUNTER
She has a cough, sore throat, headache and a slight fever 100.0, she has had for about 1 week. Fever just came today.  She would like something     Send to [address] [place] [address]

## 2018-01-05 DIAGNOSIS — E11.8 TYPE 2 DIABETES MELLITUS WITH COMPLICATION, WITHOUT LONG-TERM CURRENT USE OF INSULIN (HCC): ICD-10-CM

## 2018-01-08 ENCOUNTER — TELEPHONE (OUTPATIENT)
Dept: FAMILY MEDICINE CLINIC | Age: 73
End: 2018-01-08

## 2018-01-08 RX ORDER — LISINOPRIL 10 MG/1
TABLET ORAL
Qty: 30 TABLET | Refills: 4 | Status: SHIPPED | OUTPATIENT
Start: 2018-01-08 | End: 2018-06-13 | Stop reason: SDUPTHER

## 2018-01-09 ENCOUNTER — TELEPHONE (OUTPATIENT)
Dept: FAMILY MEDICINE CLINIC | Age: 73
End: 2018-01-09

## 2018-01-09 RX ORDER — AZITHROMYCIN 250 MG/1
TABLET, FILM COATED ORAL
Qty: 1 PACKET | Refills: 0 | Status: SHIPPED | OUTPATIENT
Start: 2018-01-09 | End: 2018-01-26 | Stop reason: ALTCHOICE

## 2018-01-10 ENCOUNTER — TELEPHONE (OUTPATIENT)
Dept: FAMILY MEDICINE CLINIC | Age: 73
End: 2018-01-10

## 2018-01-15 DIAGNOSIS — M15.9 PRIMARY OSTEOARTHRITIS INVOLVING MULTIPLE JOINTS: ICD-10-CM

## 2018-01-15 RX ORDER — HYDROCODONE BITARTRATE AND ACETAMINOPHEN 7.5; 325 MG/1; MG/1
TABLET ORAL
Qty: 180 TABLET | Refills: 0 | Status: SHIPPED | OUTPATIENT
Start: 2018-01-15 | End: 2018-02-08 | Stop reason: SDUPTHER

## 2018-01-26 ENCOUNTER — OFFICE VISIT (OUTPATIENT)
Dept: FAMILY MEDICINE CLINIC | Age: 73
End: 2018-01-26
Payer: COMMERCIAL

## 2018-01-26 VITALS
BODY MASS INDEX: 41.98 KG/M2 | HEART RATE: 95 BPM | WEIGHT: 194 LBS | SYSTOLIC BLOOD PRESSURE: 126 MMHG | DIASTOLIC BLOOD PRESSURE: 70 MMHG | OXYGEN SATURATION: 92 %

## 2018-01-26 DIAGNOSIS — B96.89 ACUTE BACTERIAL SINUSITIS: ICD-10-CM

## 2018-01-26 DIAGNOSIS — J01.90 ACUTE BACTERIAL SINUSITIS: ICD-10-CM

## 2018-01-26 DIAGNOSIS — J41.0 SIMPLE CHRONIC BRONCHITIS (HCC): ICD-10-CM

## 2018-01-26 DIAGNOSIS — F41.1 GAD (GENERALIZED ANXIETY DISORDER): ICD-10-CM

## 2018-01-26 DIAGNOSIS — E11.8 TYPE 2 DIABETES MELLITUS WITH COMPLICATION, WITHOUT LONG-TERM CURRENT USE OF INSULIN (HCC): Primary | ICD-10-CM

## 2018-01-26 PROCEDURE — 99213 OFFICE O/P EST LOW 20 MIN: CPT | Performed by: FAMILY MEDICINE

## 2018-01-26 RX ORDER — ALPRAZOLAM 1 MG/1
TABLET ORAL
Qty: 90 TABLET | Refills: 3 | Status: CANCELLED | OUTPATIENT
Start: 2018-01-26

## 2018-01-26 RX ORDER — METFORMIN HYDROCHLORIDE 500 MG/1
TABLET, EXTENDED RELEASE ORAL
Qty: 90 TABLET | Refills: 3 | Status: ON HOLD | OUTPATIENT
Start: 2018-01-26 | End: 2018-07-30 | Stop reason: HOSPADM

## 2018-01-26 RX ORDER — IBUPROFEN 800 MG/1
TABLET ORAL
Qty: 90 TABLET | Refills: 3 | Status: ON HOLD | OUTPATIENT
Start: 2018-01-26 | End: 2019-08-25 | Stop reason: HOSPADM

## 2018-01-26 RX ORDER — CEFUROXIME AXETIL 250 MG/1
250 TABLET ORAL 2 TIMES DAILY
Qty: 20 TABLET | Refills: 0 | Status: SHIPPED | OUTPATIENT
Start: 2018-01-26 | End: 2018-02-05

## 2018-01-26 RX ORDER — SIMVASTATIN 40 MG
TABLET ORAL
Qty: 90 TABLET | Refills: 3 | Status: SHIPPED | OUTPATIENT
Start: 2018-01-26 | End: 2018-11-12 | Stop reason: SDUPTHER

## 2018-01-26 ASSESSMENT — ENCOUNTER SYMPTOMS
BACK PAIN: 1
COUGH: 1
SINUS PRESSURE: 1
SINUS PAIN: 1
VOMITING: 0
SHORTNESS OF BREATH: 1
SORE THROAT: 0
NAUSEA: 0
DIARRHEA: 0

## 2018-01-26 NOTE — PROGRESS NOTES
Suppl HARRY use check blood sugar as directed 1 Device 0    COMBIVENT RESPIMAT  MCG/ACT AERS inhaler INHALE ONE INHALATION BY MOUTH FOUR TIMES A DAY 1 Inhaler 5    glucose blood VI test strips (ASCENSIA AUTODISC VI;ONE TOUCH ULTRA TEST VI) strip Indications: [employer] Glucose Meter Test bid 100 strip 3    Lancets MISC 1 each by Other route 2 times daily Indications: [employer] Glucose Meter 100 each 3    albuterol (PROAIR HFA) 108 (90 BASE) MCG/ACT inhaler Inhale 2 puffs into the lungs every 6 hours as needed for Wheezing 1 Inhaler 3    ipratropium-albuterol (DUONEB) 0.5-2.5 (3) MG/3ML SOLN nebulizer solution Inhale 3 mLs into the lungs every 6 hours as needed. 360 mL 6    meclizine (ANTIVERT) 25 MG tablet Take 1 tablet by mouth 3 times daily as needed for Dizziness or Nausea. 30 tablet 1     No current facility-administered medications for this visit. No Known Allergies      Subjective:   Review of Systems   Constitutional: Positive for fatigue. Negative for chills, diaphoresis and fever. HENT: Positive for mouth sores, sinus pain and sinus pressure. Negative for congestion and sore throat. Eyes: Negative for visual disturbance. Respiratory: Positive for cough and shortness of breath. Cardiovascular: Negative for chest pain and leg swelling. Gastrointestinal: Negative for diarrhea, nausea and vomiting. Genitourinary: Negative for dysuria, menstrual problem, urgency and vaginal discharge. Musculoskeletal: Positive for arthralgias, back pain and gait problem. Negative for myalgias. Skin: Negative for rash. Neurological: Negative for weakness, numbness and headaches. Psychiatric/Behavioral: Negative for sleep disturbance. Objective:   /70   Pulse 95   Wt 194 lb (88 kg)   SpO2 92%   BMI 41.98 kg/m²     Physical Exam   Constitutional: She is oriented to person, place, and time. She appears well-developed and well-nourished. No distress.    HENT:   Head: Normocephalic and atraumatic. Mouth/Throat: No oropharyngeal exudate. Eyes: No scleral icterus. Neck: Neck supple. Carotid bruit is not present. Cardiovascular: Exam reveals no gallop and no friction rub. No murmur heard. Pulmonary/Chest: No respiratory distress. She has decreased breath sounds. She has no wheezes. She has no rales. She exhibits no tenderness. Musculoskeletal: She exhibits no edema. Lymphadenopathy:     She has no cervical adenopathy. Neurological: She is alert and oriented to person, place, and time. No cranial nerve deficit. Skin: No rash noted. She is not diaphoretic. Psychiatric: She has a normal mood and affect. Her behavior is normal. Judgment and thought content normal.       Assessment:      1. Type 2 diabetes mellitus with complication, without long-term current use of insulin ([facility] [address]     2. SUSANA (generalized anxiety disorder)     3. Simple chronic bronchitis ([facility] [address]     4. Acute bacterial sinusitis  cefUROXime (CEFTIN) 250 MG tablet         Plan:      Return in about 2 months (around 3/26/2018). No orders of the defined types were placed in this encounter.     Orders Placed This Encounter   Medications    simvastatin (ZOCOR) 40 MG tablet     Sig: TAKE ONE TABLET BY MOUTH DAILY     Dispense:  90 tablet     Refill:  3    metFORMIN (GLUCOPHAGE-XR) 500 MG extended release tablet     Sig: TAKE ONE TABLET BY MOUTH DAILY     Dispense:  90 tablet     Refill:  3    ibuprofen (ADVIL;MOTRIN) 800 MG tablet     Sig: TAKE ONE TABLET BY MOUTH EVERY 8 HOURS AS NEEDED FOR PAIN     Dispense:  90 tablet     Refill:  3    cefUROXime (CEFTIN) 250 MG tablet     Sig: Take 1 tablet by mouth 2 times daily for 10 days     Dispense:  20 tablet     Refill:  0

## 2018-02-07 ENCOUNTER — TELEPHONE (OUTPATIENT)
Dept: FAMILY MEDICINE CLINIC | Age: 73
End: 2018-02-07

## 2018-02-07 NOTE — TELEPHONE ENCOUNTER
Calling with sinus issues, Lots of drainage, facial pressure on right side, headaches. Her right eye aches. Also has the burning mouth syndrome again. Will you send a antibiotic to her kassandra Jorgensen/Maria Luisa.

## 2018-02-08 DIAGNOSIS — M15.9 PRIMARY OSTEOARTHRITIS INVOLVING MULTIPLE JOINTS: ICD-10-CM

## 2018-02-08 RX ORDER — HYDROCODONE BITARTRATE AND ACETAMINOPHEN 7.5; 325 MG/1; MG/1
TABLET ORAL
Qty: 180 TABLET | Refills: 0 | Status: SHIPPED | OUTPATIENT
Start: 2018-02-08 | End: 2018-02-28 | Stop reason: SDUPTHER

## 2018-02-23 ENCOUNTER — OFFICE VISIT (OUTPATIENT)
Dept: FAMILY MEDICINE CLINIC | Age: 73
End: 2018-02-23
Payer: COMMERCIAL

## 2018-02-23 VITALS
DIASTOLIC BLOOD PRESSURE: 70 MMHG | WEIGHT: 197 LBS | HEART RATE: 79 BPM | SYSTOLIC BLOOD PRESSURE: 104 MMHG | BODY MASS INDEX: 42.63 KG/M2 | OXYGEN SATURATION: 92 %

## 2018-02-23 DIAGNOSIS — J44.1 CHRONIC OBSTRUCTIVE PULMONARY DISEASE WITH ACUTE EXACERBATION (HCC): Primary | ICD-10-CM

## 2018-02-23 PROCEDURE — 99214 OFFICE O/P EST MOD 30 MIN: CPT | Performed by: FAMILY MEDICINE

## 2018-02-23 RX ORDER — DOXYCYCLINE 100 MG/1
100 CAPSULE ORAL 2 TIMES DAILY WITH MEALS
Qty: 20 CAPSULE | Refills: 0 | Status: SHIPPED | OUTPATIENT
Start: 2018-02-23 | End: 2018-03-09 | Stop reason: ALTCHOICE

## 2018-02-23 RX ORDER — PREDNISONE 20 MG/1
TABLET ORAL
Qty: 18 TABLET | Refills: 0 | Status: SHIPPED | OUTPATIENT
Start: 2018-02-23 | End: 2018-03-05

## 2018-02-28 ENCOUNTER — TELEPHONE (OUTPATIENT)
Dept: FAMILY MEDICINE CLINIC | Age: 73
End: 2018-02-28

## 2018-02-28 DIAGNOSIS — J44.1 COPD WITH ACUTE EXACERBATION (HCC): Primary | ICD-10-CM

## 2018-02-28 DIAGNOSIS — M15.9 PRIMARY OSTEOARTHRITIS INVOLVING MULTIPLE JOINTS: ICD-10-CM

## 2018-02-28 RX ORDER — HYDROCODONE BITARTRATE AND ACETAMINOPHEN 7.5; 325 MG/1; MG/1
TABLET ORAL
Qty: 180 TABLET | Refills: 0 | Status: SHIPPED | OUTPATIENT
Start: 2018-02-28 | End: 2018-03-26 | Stop reason: SDUPTHER

## 2018-02-28 NOTE — TELEPHONE ENCOUNTER
Last oarrs 11/13/17            Next Visit Date:  Future Appointments  Date Time Provider Thad Rebeca   3/9/2018 2:30 PM MD EVANS Chacon   3/23/2018 1:00 PM Antonella Elaine  [address] [facility]   Topic Date Due    Hepatitis C screen  1945    DTaP/Tdap/Td vaccine (1 - Tdap) 04/30/1966    Shingles Vaccine (1 of 2 - 2 Dose Series) 04/30/1997    Diabetic retinal exam  04/03/2018    Breast cancer screen  04/20/2018    Diabetic foot exam  05/15/2018    Diabetic microalbuminuria test  05/15/2018    A1C test (Diabetic or Prediabetic)  07/20/2018    Lipid screen  07/20/2018    Creatinine monitoring  07/20/2018    Potassium monitoring  10/27/2018    Colon cancer screen colonoscopy  06/07/2019    DEXA (modify frequency per FRAX score)  Completed    Flu vaccine  Completed    Pneumococcal low/med risk  Completed       Hemoglobin A1C (%)   Date Value   07/20/2017 6.1 (H)   01/18/2017 5.8   12/14/2015 6.4 (H)             ( goal A1C is < 7)   No results found for: LABMICR  LDL Cholesterol (mg/dL)   Date Value   07/20/2017 73       (goal LDL is <100)   AST (U/L)   Date Value   07/20/2017 19     ALT (U/L)   Date Value   07/20/2017 16     BUN (mg/dL)   Date Value   07/20/2017 19     BP Readings from Last 3 Encounters:   02/23/18 104/70   01/26/18 126/70   11/29/17 130/74          (goal 120/80)    All Future Testing planned in [facility]  Lab Frequency Next Occurrence               Patient Active Problem List:     Obesity     COPD (chronic obstructive pulmonary disease) (HCC)     Colon polyps     Post traumatic stress disorder (PTSD)     Osteoarthritis     Diabetes mellitus ([address]     Acute respiratory failure (HCC)     COPD exacerbation (HCC)     Pneumonia     Tubulovillous adenoma     Oral thrush

## 2018-03-01 DIAGNOSIS — J44.1 COPD WITH ACUTE EXACERBATION (HCC): ICD-10-CM

## 2018-03-08 ENCOUNTER — TELEPHONE (OUTPATIENT)
Dept: FAMILY MEDICINE CLINIC | Age: 73
End: 2018-03-08

## 2018-03-09 ENCOUNTER — OFFICE VISIT (OUTPATIENT)
Dept: FAMILY MEDICINE CLINIC | Age: 73
End: 2018-03-09
Payer: COMMERCIAL

## 2018-03-09 VITALS
BODY MASS INDEX: 41.76 KG/M2 | DIASTOLIC BLOOD PRESSURE: 70 MMHG | HEART RATE: 117 BPM | SYSTOLIC BLOOD PRESSURE: 126 MMHG | WEIGHT: 193 LBS | OXYGEN SATURATION: 93 %

## 2018-03-09 DIAGNOSIS — J41.0 SIMPLE CHRONIC BRONCHITIS (HCC): Primary | ICD-10-CM

## 2018-03-09 DIAGNOSIS — E11.8 TYPE 2 DIABETES MELLITUS WITH COMPLICATION, WITHOUT LONG-TERM CURRENT USE OF INSULIN (HCC): ICD-10-CM

## 2018-03-09 DIAGNOSIS — M15.9 PRIMARY OSTEOARTHRITIS INVOLVING MULTIPLE JOINTS: ICD-10-CM

## 2018-03-09 PROCEDURE — 99213 OFFICE O/P EST LOW 20 MIN: CPT | Performed by: FAMILY MEDICINE

## 2018-03-09 RX ORDER — GABAPENTIN 300 MG/1
300 CAPSULE ORAL 4 TIMES DAILY
COMMUNITY
End: 2018-07-11 | Stop reason: SDUPTHER

## 2018-03-09 ASSESSMENT — ENCOUNTER SYMPTOMS
DIARRHEA: 0
NAUSEA: 0
SHORTNESS OF BREATH: 1
VOMITING: 0
COUGH: 1
SORE THROAT: 0
BACK PAIN: 1
SINUS PRESSURE: 0

## 2018-03-09 NOTE — PROGRESS NOTES
times daily Indications: [employer] Glucose Meter 100 each 3    albuterol (PROAIR HFA) 108 (90 BASE) MCG/ACT inhaler Inhale 2 puffs into the lungs every 6 hours as needed for Wheezing 1 Inhaler 3    ipratropium-albuterol (DUONEB) 0.5-2.5 (3) MG/3ML SOLN nebulizer solution Inhale 3 mLs into the lungs every 6 hours as needed. 360 mL 6    meclizine (ANTIVERT) 25 MG tablet Take 1 tablet by mouth 3 times daily as needed for Dizziness or Nausea. 30 tablet 1     No current facility-administered medications for this visit. No Known Allergies      Subjective:   Review of Systems   Constitutional: Positive for fatigue. Negative for chills, diaphoresis and fever. HENT: Positive for mouth sores. Negative for congestion, sinus pressure and sore throat. Eyes: Negative for visual disturbance. Respiratory: Positive for cough and shortness of breath. Cardiovascular: Negative for chest pain and leg swelling. Gastrointestinal: Negative for diarrhea, nausea and vomiting. Genitourinary: Negative for dysuria, menstrual problem, urgency and vaginal discharge. Musculoskeletal: Positive for arthralgias and back pain. Negative for myalgias. Skin: Negative for rash. Neurological: Negative for weakness, numbness and headaches. Psychiatric/Behavioral: Positive for sleep disturbance. Objective:   /70   Pulse 117   Wt 193 lb (87.5 kg)   SpO2 93%   BMI 41.76 kg/m²     Physical Exam   Constitutional: She is oriented to person, place, and time. She appears well-developed and well-nourished. No distress. HENT:   Head: Normocephalic and atraumatic. Mouth/Throat: No oropharyngeal exudate. Eyes: No scleral icterus. Neck: Neck supple. Carotid bruit is not present. Cardiovascular: Exam reveals no gallop and no friction rub. No murmur heard. Pulmonary/Chest: No respiratory distress. She has decreased breath sounds. She has wheezes. She has no rales. She exhibits no tenderness.    Musculoskeletal: She exhibits no edema. Lymphadenopathy:     She has no cervical adenopathy. Neurological: She is alert and oriented to person, place, and time. No cranial nerve deficit. Skin: No rash noted. She is not diaphoretic. Psychiatric: She has a normal mood and affect. Her behavior is normal. Judgment and thought content normal.       Assessment:      1. Simple chronic bronchitis ([facility] [address]     2. Type 2 diabetes mellitus with complication, without long-term current use of insulin ([facility] [address]     3. Primary osteoarthritis involving multiple joints           Plan:      Return in about 2 months (around 5/9/2018), or if symptoms worsen or fail to improve. No orders of the defined types were placed in this encounter. No orders of the defined types were placed in this encounter.

## 2018-03-14 ENCOUNTER — TELEPHONE (OUTPATIENT)
Dept: FAMILY MEDICINE CLINIC | Age: 73
End: 2018-03-14

## 2018-03-22 ENCOUNTER — TELEPHONE (OUTPATIENT)
Dept: FAMILY MEDICINE CLINIC | Age: 73
End: 2018-03-22

## 2018-03-26 DIAGNOSIS — M15.9 PRIMARY OSTEOARTHRITIS INVOLVING MULTIPLE JOINTS: ICD-10-CM

## 2018-03-26 RX ORDER — HYDROCODONE BITARTRATE AND ACETAMINOPHEN 7.5; 325 MG/1; MG/1
TABLET ORAL
Qty: 180 TABLET | Refills: 0 | Status: SHIPPED | OUTPATIENT
Start: 2018-03-26 | End: 2018-04-20 | Stop reason: SDUPTHER

## 2018-04-05 ENCOUNTER — TELEPHONE (OUTPATIENT)
Dept: FAMILY MEDICINE CLINIC | Age: 73
End: 2018-04-05

## 2018-04-10 ENCOUNTER — TELEPHONE (OUTPATIENT)
Dept: FAMILY MEDICINE CLINIC | Age: 73
End: 2018-04-10

## 2018-04-12 ENCOUNTER — TELEPHONE (OUTPATIENT)
Dept: FAMILY MEDICINE CLINIC | Age: 73
End: 2018-04-12

## 2018-04-20 DIAGNOSIS — M15.9 PRIMARY OSTEOARTHRITIS INVOLVING MULTIPLE JOINTS: ICD-10-CM

## 2018-04-20 RX ORDER — HYDROCODONE BITARTRATE AND ACETAMINOPHEN 7.5; 325 MG/1; MG/1
TABLET ORAL
Qty: 180 TABLET | Refills: 0 | Status: SHIPPED | OUTPATIENT
Start: 2018-04-20 | End: 2018-05-07 | Stop reason: SDUPTHER

## 2018-05-07 DIAGNOSIS — M15.9 PRIMARY OSTEOARTHRITIS INVOLVING MULTIPLE JOINTS: ICD-10-CM

## 2018-05-07 RX ORDER — HYDROCODONE BITARTRATE AND ACETAMINOPHEN 7.5; 325 MG/1; MG/1
TABLET ORAL
Qty: 180 TABLET | Refills: 0 | Status: SHIPPED | OUTPATIENT
Start: 2018-05-07 | End: 2018-06-04 | Stop reason: SDUPTHER

## 2018-05-11 ENCOUNTER — OFFICE VISIT (OUTPATIENT)
Dept: FAMILY MEDICINE CLINIC | Age: 73
End: 2018-05-11
Payer: COMMERCIAL

## 2018-05-11 VITALS
HEART RATE: 112 BPM | OXYGEN SATURATION: 94 % | SYSTOLIC BLOOD PRESSURE: 136 MMHG | DIASTOLIC BLOOD PRESSURE: 82 MMHG | BODY MASS INDEX: 41.76 KG/M2 | WEIGHT: 193 LBS

## 2018-05-11 DIAGNOSIS — J41.0 SIMPLE CHRONIC BRONCHITIS (HCC): ICD-10-CM

## 2018-05-11 DIAGNOSIS — F41.1 GAD (GENERALIZED ANXIETY DISORDER): ICD-10-CM

## 2018-05-11 DIAGNOSIS — E11.8 TYPE 2 DIABETES MELLITUS WITH COMPLICATION, WITHOUT LONG-TERM CURRENT USE OF INSULIN (HCC): Primary | ICD-10-CM

## 2018-05-11 DIAGNOSIS — M15.9 PRIMARY OSTEOARTHRITIS INVOLVING MULTIPLE JOINTS: ICD-10-CM

## 2018-05-11 PROCEDURE — 99213 OFFICE O/P EST LOW 20 MIN: CPT | Performed by: FAMILY MEDICINE

## 2018-05-11 RX ORDER — ALPRAZOLAM 1 MG/1
TABLET ORAL
Qty: 90 TABLET | Refills: 3 | Status: SHIPPED | OUTPATIENT
Start: 2018-05-11 | End: 2018-09-18 | Stop reason: SDUPTHER

## 2018-05-11 ASSESSMENT — PATIENT HEALTH QUESTIONNAIRE - PHQ9
SUM OF ALL RESPONSES TO PHQ QUESTIONS 1-9: 3
2. FEELING DOWN, DEPRESSED OR HOPELESS: 3
1. LITTLE INTEREST OR PLEASURE IN DOING THINGS: 0
SUM OF ALL RESPONSES TO PHQ9 QUESTIONS 1 & 2: 3

## 2018-05-11 ASSESSMENT — ENCOUNTER SYMPTOMS
DIARRHEA: 0
SORE THROAT: 0
NAUSEA: 0
VOMITING: 0
SHORTNESS OF BREATH: 0
BACK PAIN: 0
COUGH: 0
SINUS PRESSURE: 0

## 2018-06-13 DIAGNOSIS — E11.8 TYPE 2 DIABETES MELLITUS WITH COMPLICATION, WITHOUT LONG-TERM CURRENT USE OF INSULIN (HCC): ICD-10-CM

## 2018-06-13 RX ORDER — LISINOPRIL 10 MG/1
TABLET ORAL
Qty: 30 TABLET | Refills: 11 | Status: SHIPPED | OUTPATIENT
Start: 2018-06-13 | End: 2018-11-12 | Stop reason: SDUPTHER

## 2018-06-29 DIAGNOSIS — M15.9 PRIMARY OSTEOARTHRITIS INVOLVING MULTIPLE JOINTS: ICD-10-CM

## 2018-06-29 RX ORDER — HYDROCODONE BITARTRATE AND ACETAMINOPHEN 7.5; 325 MG/1; MG/1
TABLET ORAL
Qty: 180 TABLET | Refills: 0 | Status: SHIPPED | OUTPATIENT
Start: 2018-06-29 | End: 2018-07-23 | Stop reason: SDUPTHER

## 2018-07-11 ENCOUNTER — HOSPITAL ENCOUNTER (OUTPATIENT)
Dept: GENERAL RADIOLOGY | Age: 73
Discharge: HOME | End: 2018-07-13
Payer: COMMERCIAL

## 2018-07-11 ENCOUNTER — OFFICE VISIT (OUTPATIENT)
Dept: FAMILY MEDICINE CLINIC | Age: 73
End: 2018-07-11
Payer: COMMERCIAL

## 2018-07-11 ENCOUNTER — HOSPITAL ENCOUNTER (OUTPATIENT)
Age: 73
Setting detail: SPECIMEN
Discharge: HOME | End: 2018-07-11
Payer: COMMERCIAL

## 2018-07-11 ENCOUNTER — HOSPITAL ENCOUNTER (OUTPATIENT)
Age: 73
Discharge: HOME | End: 2018-07-13
Payer: COMMERCIAL

## 2018-07-11 VITALS
OXYGEN SATURATION: 93 % | WEIGHT: 195 LBS | HEART RATE: 113 BPM | BODY MASS INDEX: 42.2 KG/M2 | SYSTOLIC BLOOD PRESSURE: 132 MMHG | DIASTOLIC BLOOD PRESSURE: 76 MMHG

## 2018-07-11 DIAGNOSIS — M15.9 PRIMARY OSTEOARTHRITIS INVOLVING MULTIPLE JOINTS: ICD-10-CM

## 2018-07-11 DIAGNOSIS — E11.8 TYPE 2 DIABETES MELLITUS WITH COMPLICATION, WITHOUT LONG-TERM CURRENT USE OF INSULIN (HCC): Primary | ICD-10-CM

## 2018-07-11 DIAGNOSIS — J41.0 SIMPLE CHRONIC BRONCHITIS (HCC): ICD-10-CM

## 2018-07-11 DIAGNOSIS — G62.9 NEUROPATHY: ICD-10-CM

## 2018-07-11 DIAGNOSIS — K12.1 STOMATITIS: ICD-10-CM

## 2018-07-11 DIAGNOSIS — E11.8 TYPE 2 DIABETES MELLITUS WITH COMPLICATION, WITHOUT LONG-TERM CURRENT USE OF INSULIN (HCC): ICD-10-CM

## 2018-07-11 LAB
ABSOLUTE EOS #: 0.23 K/UL (ref 0–0.4)
ABSOLUTE IMMATURE GRANULOCYTE: 0.23 K/UL (ref 0–0.3)
ABSOLUTE LYMPH #: 12.6 K/UL (ref 1–4.8)
ABSOLUTE MONO #: 1.6 K/UL (ref 0.1–0.8)
ALBUMIN SERPL-MCNC: 4.5 G/DL (ref 3.5–5.2)
ALBUMIN/GLOBULIN RATIO: 1.4 (ref 1–2.5)
ALP BLD-CCNC: 77 U/L (ref 35–104)
ALT SERPL-CCNC: 15 U/L (ref 5–33)
ANION GAP SERPL CALCULATED.3IONS-SCNC: 18 MMOL/L (ref 9–17)
AST SERPL-CCNC: 18 U/L
BASOPHILS # BLD: 0 % (ref 0–2)
BASOPHILS ABSOLUTE: 0 K/UL (ref 0–0.2)
BILIRUB SERPL-MCNC: 0.26 MG/DL (ref 0.3–1.2)
BUN BLDV-MCNC: 17 MG/DL (ref 8–23)
BUN/CREAT BLD: ABNORMAL (ref 9–20)
CALCIUM SERPL-MCNC: 9.5 MG/DL (ref 8.6–10.4)
CHLORIDE BLD-SCNC: 103 MMOL/L (ref 98–107)
CHOLESTEROL/HDL RATIO: 3
CHOLESTEROL: 163 MG/DL
CO2: 20 MMOL/L (ref 20–31)
CREAT SERPL-MCNC: 0.58 MG/DL (ref 0.5–0.9)
DIFFERENTIAL TYPE: ABNORMAL
EOSINOPHILS RELATIVE PERCENT: 1 % (ref 1–4)
ESTIMATED AVERAGE GLUCOSE: 137 MG/DL
GFR AFRICAN AMERICAN: >60 ML/MIN
GFR NON-AFRICAN AMERICAN: >60 ML/MIN
GFR SERPL CREATININE-BSD FRML MDRD: ABNORMAL ML/MIN/{1.73_M2}
GFR SERPL CREATININE-BSD FRML MDRD: ABNORMAL ML/MIN/{1.73_M2}
GLUCOSE BLD-MCNC: 151 MG/DL (ref 70–99)
HBA1C MFR BLD: 6.4 % (ref 4–6)
HCT VFR BLD CALC: 49.2 % (ref 36.3–47.1)
HDLC SERPL-MCNC: 55 MG/DL
HEMOGLOBIN: 14.6 G/DL (ref 11.9–15.1)
IMMATURE GRANULOCYTES: 1 %
LDL CHOLESTEROL: 67 MG/DL (ref 0–130)
LYMPHOCYTES # BLD: 55 % (ref 24–44)
MCH RBC QN AUTO: 27.9 PG (ref 25.2–33.5)
MCHC RBC AUTO-ENTMCNC: 29.7 G/DL (ref 28.4–34.8)
MCV RBC AUTO: 93.9 FL (ref 82.6–102.9)
MONOCYTES # BLD: 7 % (ref 1–7)
MORPHOLOGY: ABNORMAL
NRBC AUTOMATED: 0 PER 100 WBC
PDW BLD-RTO: 15.2 % (ref 11.8–14.4)
PLATELET # BLD: ABNORMAL K/UL (ref 138–453)
PLATELET ESTIMATE: ABNORMAL
PLATELET, FLUORESCENCE: 79 K/UL (ref 138–453)
PLATELET, IMMATURE FRACTION: 13 % (ref 1.1–10.3)
PMV BLD AUTO: ABNORMAL FL (ref 8.1–13.5)
POTASSIUM SERPL-SCNC: 4.7 MMOL/L (ref 3.7–5.3)
RBC # BLD: 5.24 M/UL (ref 3.95–5.11)
RBC # BLD: ABNORMAL 10*6/UL
SEG NEUTROPHILS: 36 % (ref 36–66)
SEGMENTED NEUTROPHILS ABSOLUTE COUNT: 8.24 K/UL (ref 1.8–7.7)
SODIUM BLD-SCNC: 141 MMOL/L (ref 135–144)
TOTAL PROTEIN: 7.7 G/DL (ref 6.4–8.3)
TRIGL SERPL-MCNC: 205 MG/DL
VLDLC SERPL CALC-MCNC: ABNORMAL MG/DL (ref 1–30)
WBC # BLD: 22.9 K/UL (ref 3.5–11.3)
WBC # BLD: ABNORMAL 10*3/UL

## 2018-07-11 PROCEDURE — 99213 OFFICE O/P EST LOW 20 MIN: CPT | Performed by: FAMILY MEDICINE

## 2018-07-11 PROCEDURE — 73562 X-RAY EXAM OF KNEE 3: CPT

## 2018-07-11 RX ORDER — GABAPENTIN 300 MG/1
300 CAPSULE ORAL 4 TIMES DAILY
Qty: 120 CAPSULE | Refills: 2 | Status: SHIPPED | OUTPATIENT
Start: 2018-07-11 | End: 2019-01-14 | Stop reason: CLARIF

## 2018-07-11 ASSESSMENT — ENCOUNTER SYMPTOMS
SHORTNESS OF BREATH: 0
VOMITING: 0
SINUS PRESSURE: 0
COUGH: 0
ABDOMINAL PAIN: 0
DIARRHEA: 0
NAUSEA: 0
BACK PAIN: 1
SORE THROAT: 0

## 2018-07-11 NOTE — PROGRESS NOTES
for myalgias. Skin: Negative for rash. Neurological: Negative for weakness, numbness and headaches. Psychiatric/Behavioral: Negative for sleep disturbance. Objective:   /76   Pulse 113   Wt 195 lb (88.5 kg)   SpO2 93%   BMI 42.20 kg/m²     Physical Exam   Constitutional: She is oriented to person, place, and time. She appears well-developed and well-nourished. No distress. HENT:   Head: Normocephalic and atraumatic. Mouth/Throat: No oropharyngeal exudate. Eyes: No scleral icterus. Neck: Neck supple. Carotid bruit is not present. Cardiovascular: Exam reveals no gallop and no friction rub. No murmur heard. Pulmonary/Chest: No respiratory distress. She has no wheezes. She has no rales. She exhibits no tenderness. Musculoskeletal: She exhibits no edema. Left knee: She exhibits normal range of motion, no swelling, no effusion and normal patellar mobility. Tenderness found. Lateral joint line and patellar tendon tenderness noted. Lymphadenopathy:     She has no cervical adenopathy. Neurological: She is alert and oriented to person, place, and time. No cranial nerve deficit. Skin: No rash noted. She is not diaphoretic. Psychiatric: She has a normal mood and affect. Her behavior is normal. Judgment and thought content normal.       Assessment:       Diagnosis Orders   1. Type 2 diabetes mellitus with complication, without long-term current use of insulin ([facility])   DIABETES FOOT EXAM    CBC Auto Differential    Comprehensive Metabolic Panel    Lipid Panel    Hemoglobin A1C   2. Simple chronic bronchitis ([facility])  CBC Auto Differential    Comprehensive Metabolic Panel    Lipid Panel   3. Neuropathy ([facility])  gabapentin (NEURONTIN) 300 MG capsule   4. Primary osteoarthritis involving multiple joints  XR KNEE LEFT (3 VIEWS)   5. Stomatitis  Magic Mouthwash (MIRACLE MOUTHWASH)         Plan:      Return in about 2 months (around 9/11/2018).     Orders Placed This Encounter

## 2018-07-12 ENCOUNTER — TELEPHONE (OUTPATIENT)
Dept: FAMILY MEDICINE CLINIC | Age: 73
End: 2018-07-12

## 2018-07-16 DIAGNOSIS — R79.89 ABNORMAL CBC: Primary | ICD-10-CM

## 2018-07-23 DIAGNOSIS — M15.9 PRIMARY OSTEOARTHRITIS INVOLVING MULTIPLE JOINTS: ICD-10-CM

## 2018-07-23 RX ORDER — HYDROCODONE BITARTRATE AND ACETAMINOPHEN 7.5; 325 MG/1; MG/1
TABLET ORAL
Qty: 180 TABLET | Refills: 0 | Status: SHIPPED | OUTPATIENT
Start: 2018-07-23 | End: 2018-08-13 | Stop reason: SDUPTHER

## 2018-07-24 ENCOUNTER — HOSPITAL ENCOUNTER (OUTPATIENT)
Facility: MEDICAL CENTER | Age: 73
End: 2018-07-24
Payer: COMMERCIAL

## 2018-07-26 ENCOUNTER — HOSPITAL ENCOUNTER (INPATIENT)
Age: 73
LOS: 4 days | Discharge: HOME | DRG: 193 | End: 2018-07-30
Attending: EMERGENCY MEDICINE | Admitting: INTERNAL MEDICINE
Payer: COMMERCIAL

## 2018-07-26 ENCOUNTER — APPOINTMENT (OUTPATIENT)
Dept: GENERAL RADIOLOGY | Age: 73
DRG: 193 | End: 2018-07-26
Payer: COMMERCIAL

## 2018-07-26 DIAGNOSIS — J44.1 COPD WITH ACUTE EXACERBATION (HCC): ICD-10-CM

## 2018-07-26 DIAGNOSIS — I50.9 ACUTE ON CHRONIC CONGESTIVE HEART FAILURE, UNSPECIFIED CONGESTIVE HEART FAILURE TYPE: ICD-10-CM

## 2018-07-26 DIAGNOSIS — D72.828 GRANULOCYTOSIS: ICD-10-CM

## 2018-07-26 DIAGNOSIS — Z91.89 AT RISK FOR SEPSIS: ICD-10-CM

## 2018-07-26 DIAGNOSIS — R06.89 ACUTE RESPIRATORY INSUFFICIENCY: Primary | ICD-10-CM

## 2018-07-26 LAB
% CKMB: 1.7 % (ref 0–3)
ABSOLUTE EOS #: 0 K/UL (ref 0–0.4)
ABSOLUTE IMMATURE GRANULOCYTE: ABNORMAL K/UL (ref 0–0.3)
ABSOLUTE LYMPH #: 13.07 K/UL (ref 1–4.8)
ABSOLUTE MONO #: 2.01 K/UL (ref 0.2–0.8)
ANION GAP SERPL CALCULATED.3IONS-SCNC: 14 MMOL/L (ref 9–17)
BASOPHILS # BLD: 0 %
BASOPHILS ABSOLUTE: 0 K/UL (ref 0–0.2)
BILIRUBIN URINE: NEGATIVE
BNP INTERPRETATION: ABNORMAL
BUN BLDV-MCNC: 22 MG/DL (ref 8–23)
BUN/CREAT BLD: 36 (ref 9–20)
CALCIUM SERPL-MCNC: 9.3 MG/DL (ref 8.6–10.4)
CHLORIDE BLD-SCNC: 98 MMOL/L (ref 98–107)
CHP ED QC CHECK: YES
CK MB: 2.6 NG/ML
CKMB INTERPRETATION: NORMAL
CO2: 26 MMOL/L (ref 20–31)
COLOR: YELLOW
COMMENT UA: NORMAL
CREAT SERPL-MCNC: 0.61 MG/DL (ref 0.5–0.9)
D-DIMER QUANTITATIVE: 0.69 MG/L FEU
DIFFERENTIAL TYPE: ABNORMAL
EKG ATRIAL RATE: 97 BPM
EKG P AXIS: 52 DEGREES
EKG P-R INTERVAL: 164 MS
EKG Q-T INTERVAL: 330 MS
EKG QRS DURATION: 74 MS
EKG QTC CALCULATION (BAZETT): 419 MS
EKG R AXIS: 59 DEGREES
EKG T AXIS: 62 DEGREES
EKG VENTRICULAR RATE: 97 BPM
EOSINOPHILS RELATIVE PERCENT: 0 % (ref 1–4)
FIO2: 28
FIO2: 36
GFR AFRICAN AMERICAN: >60 ML/MIN
GFR NON-AFRICAN AMERICAN: >60 ML/MIN
GFR SERPL CREATININE-BSD FRML MDRD: ABNORMAL ML/MIN/{1.73_M2}
GFR SERPL CREATININE-BSD FRML MDRD: ABNORMAL ML/MIN/{1.73_M2}
GLUCOSE BLD-MCNC: 139 MG/DL
GLUCOSE BLD-MCNC: 139 MG/DL (ref 65–105)
GLUCOSE BLD-MCNC: 159 MG/DL (ref 70–99)
GLUCOSE BLD-MCNC: 322 MG/DL (ref 65–105)
GLUCOSE BLD-MCNC: 325 MG/DL (ref 65–105)
GLUCOSE BLD-MCNC: 358 MG/DL (ref 65–105)
GLUCOSE URINE: NEGATIVE
HCT VFR BLD CALC: 43.1 % (ref 36–46)
HEMOGLOBIN: 13.8 G/DL (ref 12–16)
IMMATURE GRANULOCYTES: ABNORMAL %
KETONES, URINE: NEGATIVE
LACTIC ACID: 2 MMOL/L (ref 0.5–2.2)
LACTIC ACID: 2.3 MMOL/L (ref 0.5–2.2)
LEUKOCYTE ESTERASE, URINE: NEGATIVE
LV EF: 60 %
LVEF MODALITY: NORMAL
LYMPHOCYTES # BLD: 39 % (ref 24–44)
MAGNESIUM: 2 MG/DL (ref 1.6–2.6)
MCH RBC QN AUTO: 28.9 PG (ref 26–34)
MCHC RBC AUTO-ENTMCNC: 31.9 G/DL (ref 31–37)
MCV RBC AUTO: 90.6 FL (ref 80–100)
MONOCYTES # BLD: 6 % (ref 1–7)
MORPHOLOGY: ABNORMAL
MYOGLOBIN: 201 NG/ML (ref 25–58)
NEGATIVE BASE EXCESS, ART: ABNORMAL (ref 0–2)
NEGATIVE BASE EXCESS, ART: ABNORMAL (ref 0–2)
NITRITE, URINE: NEGATIVE
NRBC AUTOMATED: ABNORMAL PER 100 WBC
O2 DEVICE/FLOW/%: ABNORMAL
O2 DEVICE/FLOW/%: ABNORMAL
PATIENT TEMP: 37
PATIENT TEMP: 38
PDW BLD-RTO: 15.3 % (ref 11.5–14.5)
PH UA: 6 (ref 5–8)
PLATELET # BLD: 69 K/UL (ref 130–400)
PLATELET ESTIMATE: ABNORMAL
PMV BLD AUTO: ABNORMAL FL (ref 6–12)
POC HCO3: 29.6 MMOL/L (ref 22–27)
POC HCO3: 31.9 MMOL/L (ref 22–27)
POC O2 SATURATION: 91 %
POC O2 SATURATION: 95 %
POC PCO2 TEMP: ABNORMAL MM HG
POC PCO2 TEMP: ABNORMAL MM HG
POC PCO2: 56 MM HG (ref 32–45)
POC PCO2: 59 MM HG (ref 32–45)
POC PH TEMP: ABNORMAL
POC PH TEMP: ABNORMAL
POC PH: 7.31 (ref 7.35–7.45)
POC PH: 7.36 (ref 7.35–7.45)
POC PO2 TEMP: ABNORMAL MM HG
POC PO2 TEMP: ABNORMAL MM HG
POC PO2: 65 MM HG (ref 75–95)
POC PO2: 82 MM HG (ref 75–95)
POSITIVE BASE EXCESS, ART: 3 (ref 0–2)
POSITIVE BASE EXCESS, ART: 7 (ref 0–2)
POTASSIUM SERPL-SCNC: 5.4 MMOL/L (ref 3.7–5.3)
PRO-BNP: 432 PG/ML
PROTEIN UA: NEGATIVE
RBC # BLD: 4.76 M/UL (ref 4–5.2)
RBC # BLD: ABNORMAL 10*6/UL
SEG NEUTROPHILS: 55 % (ref 36–66)
SEGMENTED NEUTROPHILS ABSOLUTE COUNT: 18.42 K/UL (ref 1.8–7.7)
SODIUM BLD-SCNC: 138 MMOL/L (ref 135–144)
SPECIFIC GRAVITY UA: 1.01 (ref 1–1.03)
TCO2 (CALC), ART: 31 MMOL/L (ref 23–28)
TCO2 (CALC), ART: 34 MMOL/L (ref 23–28)
TOTAL CK: 156 U/L (ref 26–192)
TROPONIN INTERP: ABNORMAL
TROPONIN T: <0.03 NG/ML
TURBIDITY: CLEAR
URINE HGB: NEGATIVE
UROBILINOGEN, URINE: NORMAL
WBC # BLD: 33.5 K/UL (ref 3.5–11)
WBC # BLD: ABNORMAL 10*3/UL

## 2018-07-26 PROCEDURE — 97110 THERAPEUTIC EXERCISES: CPT

## 2018-07-26 PROCEDURE — 6360000002 HC RX W HCPCS: Performed by: NURSE PRACTITIONER

## 2018-07-26 PROCEDURE — 2580000003 HC RX 258: Performed by: EMERGENCY MEDICINE

## 2018-07-26 PROCEDURE — 82803 BLOOD GASES ANY COMBINATION: CPT

## 2018-07-26 PROCEDURE — 6370000000 HC RX 637 (ALT 250 FOR IP): Performed by: INTERNAL MEDICINE

## 2018-07-26 PROCEDURE — 94660 CPAP INITIATION&MGMT: CPT

## 2018-07-26 PROCEDURE — 6370000000 HC RX 637 (ALT 250 FOR IP): Performed by: NURSE PRACTITIONER

## 2018-07-26 PROCEDURE — 6360000002 HC RX W HCPCS: Performed by: EMERGENCY MEDICINE

## 2018-07-26 PROCEDURE — 87205 SMEAR GRAM STAIN: CPT

## 2018-07-26 PROCEDURE — 82553 CREATINE MB FRACTION: CPT

## 2018-07-26 PROCEDURE — 94761 N-INVAS EAR/PLS OXIMETRY MLT: CPT

## 2018-07-26 PROCEDURE — 87086 URINE CULTURE/COLONY COUNT: CPT

## 2018-07-26 PROCEDURE — 97535 SELF CARE MNGMENT TRAINING: CPT

## 2018-07-26 PROCEDURE — 36600 WITHDRAWAL OF ARTERIAL BLOOD: CPT

## 2018-07-26 PROCEDURE — 87149 DNA/RNA DIRECT PROBE: CPT

## 2018-07-26 PROCEDURE — 2580000003 HC RX 258: Performed by: NURSE PRACTITIONER

## 2018-07-26 PROCEDURE — 97116 GAIT TRAINING THERAPY: CPT

## 2018-07-26 PROCEDURE — 99285 EMERGENCY DEPT VISIT HI MDM: CPT

## 2018-07-26 PROCEDURE — 93306 TTE W/DOPPLER COMPLETE: CPT

## 2018-07-26 PROCEDURE — 83880 ASSAY OF NATRIURETIC PEPTIDE: CPT

## 2018-07-26 PROCEDURE — 82947 ASSAY GLUCOSE BLOOD QUANT: CPT

## 2018-07-26 PROCEDURE — 94640 AIRWAY INHALATION TREATMENT: CPT

## 2018-07-26 PROCEDURE — 97165 OT EVAL LOW COMPLEX 30 MIN: CPT

## 2018-07-26 PROCEDURE — 96367 TX/PROPH/DG ADDL SEQ IV INF: CPT

## 2018-07-26 PROCEDURE — 81003 URINALYSIS AUTO W/O SCOPE: CPT

## 2018-07-26 PROCEDURE — 83874 ASSAY OF MYOGLOBIN: CPT

## 2018-07-26 PROCEDURE — 85025 COMPLETE CBC W/AUTO DIFF WBC: CPT

## 2018-07-26 PROCEDURE — 80048 BASIC METABOLIC PNL TOTAL CA: CPT

## 2018-07-26 PROCEDURE — 97161 PT EVAL LOW COMPLEX 20 MIN: CPT

## 2018-07-26 PROCEDURE — G8988 SELF CARE GOAL STATUS: HCPCS

## 2018-07-26 PROCEDURE — 84484 ASSAY OF TROPONIN QUANT: CPT

## 2018-07-26 PROCEDURE — 83735 ASSAY OF MAGNESIUM: CPT

## 2018-07-26 PROCEDURE — 96375 TX/PRO/DX INJ NEW DRUG ADDON: CPT

## 2018-07-26 PROCEDURE — 96365 THER/PROPH/DIAG IV INF INIT: CPT

## 2018-07-26 PROCEDURE — G8979 MOBILITY GOAL STATUS: HCPCS

## 2018-07-26 PROCEDURE — 2060000000 HC ICU INTERMEDIATE R&B

## 2018-07-26 PROCEDURE — 94664 DEMO&/EVAL PT USE INHALER: CPT

## 2018-07-26 PROCEDURE — 82550 ASSAY OF CK (CPK): CPT

## 2018-07-26 PROCEDURE — 2700000000 HC OXYGEN THERAPY PER DAY

## 2018-07-26 PROCEDURE — 93005 ELECTROCARDIOGRAM TRACING: CPT

## 2018-07-26 PROCEDURE — 83605 ASSAY OF LACTIC ACID: CPT

## 2018-07-26 PROCEDURE — 51702 INSERT TEMP BLADDER CATH: CPT

## 2018-07-26 PROCEDURE — 71045 X-RAY EXAM CHEST 1 VIEW: CPT

## 2018-07-26 PROCEDURE — 87040 BLOOD CULTURE FOR BACTERIA: CPT

## 2018-07-26 PROCEDURE — 97530 THERAPEUTIC ACTIVITIES: CPT

## 2018-07-26 PROCEDURE — 85379 FIBRIN DEGRADATION QUANT: CPT

## 2018-07-26 PROCEDURE — 94150 VITAL CAPACITY TEST: CPT

## 2018-07-26 PROCEDURE — G8987 SELF CARE CURRENT STATUS: HCPCS

## 2018-07-26 PROCEDURE — G8978 MOBILITY CURRENT STATUS: HCPCS

## 2018-07-26 PROCEDURE — 36415 COLL VENOUS BLD VENIPUNCTURE: CPT

## 2018-07-26 PROCEDURE — 99223 1ST HOSP IP/OBS HIGH 75: CPT | Performed by: INTERNAL MEDICINE

## 2018-07-26 RX ORDER — ONDANSETRON 2 MG/ML
4 INJECTION INTRAMUSCULAR; INTRAVENOUS EVERY 6 HOURS PRN
Status: DISCONTINUED | OUTPATIENT
Start: 2018-07-26 | End: 2018-07-26 | Stop reason: SDUPTHER

## 2018-07-26 RX ORDER — ONDANSETRON 4 MG/1
4 TABLET, ORALLY DISINTEGRATING ORAL EVERY 6 HOURS PRN
Status: DISCONTINUED | OUTPATIENT
Start: 2018-07-26 | End: 2018-07-30 | Stop reason: HOSPADM

## 2018-07-26 RX ORDER — HYDROCODONE BITARTRATE AND ACETAMINOPHEN 7.5; 325 MG/1; MG/1
1 TABLET ORAL EVERY 6 HOURS PRN
Status: DISCONTINUED | OUTPATIENT
Start: 2018-07-26 | End: 2018-07-26

## 2018-07-26 RX ORDER — IPRATROPIUM BROMIDE AND ALBUTEROL SULFATE 2.5; .5 MG/3ML; MG/3ML
1 SOLUTION RESPIRATORY (INHALATION) EVERY 4 HOURS PRN
Status: DISCONTINUED | OUTPATIENT
Start: 2018-07-26 | End: 2018-07-30 | Stop reason: HOSPADM

## 2018-07-26 RX ORDER — ALBUTEROL SULFATE 2.5 MG/3ML
5 SOLUTION RESPIRATORY (INHALATION)
Status: DISCONTINUED | OUTPATIENT
Start: 2018-07-26 | End: 2018-07-26

## 2018-07-26 RX ORDER — NICOTINE POLACRILEX 4 MG
15 LOZENGE BUCCAL PRN
Status: DISCONTINUED | OUTPATIENT
Start: 2018-07-26 | End: 2018-07-30 | Stop reason: HOSPADM

## 2018-07-26 RX ORDER — LISINOPRIL 10 MG/1
10 TABLET ORAL DAILY
Status: DISCONTINUED | OUTPATIENT
Start: 2018-07-26 | End: 2018-07-30 | Stop reason: HOSPADM

## 2018-07-26 RX ORDER — METHYLPREDNISOLONE SODIUM SUCCINATE 125 MG/2ML
80 INJECTION, POWDER, LYOPHILIZED, FOR SOLUTION INTRAMUSCULAR; INTRAVENOUS EVERY 8 HOURS
Status: DISCONTINUED | OUTPATIENT
Start: 2018-07-26 | End: 2018-07-29

## 2018-07-26 RX ORDER — AZITHROMYCIN 250 MG/1
500 TABLET, FILM COATED ORAL DAILY
Status: COMPLETED | OUTPATIENT
Start: 2018-07-26 | End: 2018-07-26

## 2018-07-26 RX ORDER — GABAPENTIN 300 MG/1
300 CAPSULE ORAL 4 TIMES DAILY
Status: DISCONTINUED | OUTPATIENT
Start: 2018-07-26 | End: 2018-07-30 | Stop reason: HOSPADM

## 2018-07-26 RX ORDER — ALPRAZOLAM 1 MG/1
1 TABLET ORAL 3 TIMES DAILY PRN
Status: DISCONTINUED | OUTPATIENT
Start: 2018-07-26 | End: 2018-07-30 | Stop reason: HOSPADM

## 2018-07-26 RX ORDER — FUROSEMIDE 10 MG/ML
20 INJECTION INTRAMUSCULAR; INTRAVENOUS ONCE
Status: COMPLETED | OUTPATIENT
Start: 2018-07-26 | End: 2018-07-26

## 2018-07-26 RX ORDER — FAMOTIDINE 20 MG/1
20 TABLET, FILM COATED ORAL 2 TIMES DAILY
Status: DISCONTINUED | OUTPATIENT
Start: 2018-07-26 | End: 2018-07-26 | Stop reason: ALTCHOICE

## 2018-07-26 RX ORDER — AZITHROMYCIN 250 MG/1
250 TABLET, FILM COATED ORAL DAILY
Status: COMPLETED | OUTPATIENT
Start: 2018-07-27 | End: 2018-07-30

## 2018-07-26 RX ORDER — BISACODYL 10 MG
10 SUPPOSITORY, RECTAL RECTAL DAILY PRN
Status: DISCONTINUED | OUTPATIENT
Start: 2018-07-26 | End: 2018-07-30 | Stop reason: HOSPADM

## 2018-07-26 RX ORDER — ACETAMINOPHEN 325 MG/1
650 TABLET ORAL EVERY 6 HOURS PRN
Status: DISCONTINUED | OUTPATIENT
Start: 2018-07-26 | End: 2018-07-30 | Stop reason: HOSPADM

## 2018-07-26 RX ORDER — DEXTROSE MONOHYDRATE 50 MG/ML
100 INJECTION, SOLUTION INTRAVENOUS PRN
Status: DISCONTINUED | OUTPATIENT
Start: 2018-07-26 | End: 2018-07-30 | Stop reason: HOSPADM

## 2018-07-26 RX ORDER — METHYLPREDNISOLONE SODIUM SUCCINATE 125 MG/2ML
125 INJECTION, POWDER, LYOPHILIZED, FOR SOLUTION INTRAMUSCULAR; INTRAVENOUS ONCE
Status: COMPLETED | OUTPATIENT
Start: 2018-07-26 | End: 2018-07-26

## 2018-07-26 RX ORDER — ONDANSETRON 2 MG/ML
4 INJECTION INTRAMUSCULAR; INTRAVENOUS EVERY 6 HOURS PRN
Status: DISCONTINUED | OUTPATIENT
Start: 2018-07-26 | End: 2018-07-30 | Stop reason: HOSPADM

## 2018-07-26 RX ORDER — IPRATROPIUM BROMIDE AND ALBUTEROL SULFATE 2.5; .5 MG/3ML; MG/3ML
1 SOLUTION RESPIRATORY (INHALATION)
Status: DISCONTINUED | OUTPATIENT
Start: 2018-07-26 | End: 2018-07-26

## 2018-07-26 RX ORDER — ALBUTEROL SULFATE 90 UG/1
2 AEROSOL, METERED RESPIRATORY (INHALATION)
Status: DISCONTINUED | OUTPATIENT
Start: 2018-07-26 | End: 2018-07-26

## 2018-07-26 RX ORDER — IBUPROFEN 800 MG/1
800 TABLET ORAL EVERY 8 HOURS PRN
Status: DISCONTINUED | OUTPATIENT
Start: 2018-07-26 | End: 2018-07-26

## 2018-07-26 RX ORDER — ONDANSETRON 2 MG/ML
4 INJECTION INTRAMUSCULAR; INTRAVENOUS ONCE
Status: COMPLETED | OUTPATIENT
Start: 2018-07-26 | End: 2018-07-26

## 2018-07-26 RX ORDER — NICOTINE 21 MG/24HR
1 PATCH, TRANSDERMAL 24 HOURS TRANSDERMAL DAILY PRN
Status: DISCONTINUED | OUTPATIENT
Start: 2018-07-26 | End: 2018-07-30 | Stop reason: HOSPADM

## 2018-07-26 RX ORDER — HYDROCODONE BITARTRATE AND ACETAMINOPHEN 5; 325 MG/1; MG/1
1 TABLET ORAL EVERY 6 HOURS PRN
Status: DISCONTINUED | OUTPATIENT
Start: 2018-07-26 | End: 2018-07-30 | Stop reason: HOSPADM

## 2018-07-26 RX ORDER — SODIUM CHLORIDE 0.9 % (FLUSH) 0.9 %
10 SYRINGE (ML) INJECTION EVERY 12 HOURS SCHEDULED
Status: DISCONTINUED | OUTPATIENT
Start: 2018-07-26 | End: 2018-07-30 | Stop reason: HOSPADM

## 2018-07-26 RX ORDER — FLUOXETINE HYDROCHLORIDE 20 MG/1
20 CAPSULE ORAL DAILY
Status: DISCONTINUED | OUTPATIENT
Start: 2018-07-26 | End: 2018-07-30 | Stop reason: HOSPADM

## 2018-07-26 RX ORDER — SODIUM CHLORIDE 0.9 % (FLUSH) 0.9 %
10 SYRINGE (ML) INJECTION PRN
Status: DISCONTINUED | OUTPATIENT
Start: 2018-07-26 | End: 2018-07-30 | Stop reason: HOSPADM

## 2018-07-26 RX ORDER — 0.9 % SODIUM CHLORIDE 0.9 %
1000 INTRAVENOUS SOLUTION INTRAVENOUS ONCE
Status: DISCONTINUED | OUTPATIENT
Start: 2018-07-26 | End: 2018-07-30 | Stop reason: HOSPADM

## 2018-07-26 RX ORDER — DEXTROSE MONOHYDRATE 25 G/50ML
12.5 INJECTION, SOLUTION INTRAVENOUS PRN
Status: DISCONTINUED | OUTPATIENT
Start: 2018-07-26 | End: 2018-07-30 | Stop reason: HOSPADM

## 2018-07-26 RX ORDER — PANTOPRAZOLE SODIUM 40 MG/1
40 TABLET, DELAYED RELEASE ORAL
Status: DISCONTINUED | OUTPATIENT
Start: 2018-07-26 | End: 2018-07-30 | Stop reason: HOSPADM

## 2018-07-26 RX ORDER — ALBUTEROL SULFATE 2.5 MG/3ML
2.5 SOLUTION RESPIRATORY (INHALATION)
Status: DISCONTINUED | OUTPATIENT
Start: 2018-07-26 | End: 2018-07-30 | Stop reason: HOSPADM

## 2018-07-26 RX ORDER — SIMVASTATIN 40 MG
40 TABLET ORAL DAILY
Status: DISCONTINUED | OUTPATIENT
Start: 2018-07-26 | End: 2018-07-30 | Stop reason: HOSPADM

## 2018-07-26 RX ADMIN — INSULIN LISPRO 5 UNITS: 100 INJECTION, SOLUTION INTRAVENOUS; SUBCUTANEOUS at 21:28

## 2018-07-26 RX ADMIN — GABAPENTIN 300 MG: 300 CAPSULE ORAL at 21:28

## 2018-07-26 RX ADMIN — IPRATROPIUM BROMIDE AND ALBUTEROL SULFATE 1 AMPULE: .5; 3 SOLUTION RESPIRATORY (INHALATION) at 07:34

## 2018-07-26 RX ADMIN — LISINOPRIL 10 MG: 10 TABLET ORAL at 21:28

## 2018-07-26 RX ADMIN — FLUOXETINE 20 MG: 20 CAPSULE ORAL at 21:28

## 2018-07-26 RX ADMIN — INSULIN LISPRO 8 UNITS: 100 INJECTION, SOLUTION INTRAVENOUS; SUBCUTANEOUS at 18:59

## 2018-07-26 RX ADMIN — ONDANSETRON HYDROCHLORIDE 4 MG: 2 INJECTION, SOLUTION INTRAMUSCULAR; INTRAVENOUS at 01:38

## 2018-07-26 RX ADMIN — Medication 10 ML: at 09:20

## 2018-07-26 RX ADMIN — AZITHROMYCIN 500 MG: 250 TABLET, FILM COATED ORAL at 09:19

## 2018-07-26 RX ADMIN — Medication 10 ML: at 21:29

## 2018-07-26 RX ADMIN — METHYLPREDNISOLONE SODIUM SUCCINATE 80 MG: 125 INJECTION, POWDER, FOR SOLUTION INTRAMUSCULAR; INTRAVENOUS at 09:18

## 2018-07-26 RX ADMIN — AZITHROMYCIN MONOHYDRATE 500 MG: 500 INJECTION, POWDER, LYOPHILIZED, FOR SOLUTION INTRAVENOUS at 02:51

## 2018-07-26 RX ADMIN — IPRATROPIUM BROMIDE AND ALBUTEROL SULFATE 1 AMPULE: .5; 3 SOLUTION RESPIRATORY (INHALATION) at 11:47

## 2018-07-26 RX ADMIN — SIMVASTATIN 40 MG: 40 TABLET, FILM COATED ORAL at 21:28

## 2018-07-26 RX ADMIN — IPRATROPIUM BROMIDE AND ALBUTEROL SULFATE 1 AMPULE: .5; 3 SOLUTION RESPIRATORY (INHALATION) at 20:10

## 2018-07-26 RX ADMIN — ALBUTEROL SULFATE 5 MG: 5 SOLUTION RESPIRATORY (INHALATION) at 01:30

## 2018-07-26 RX ADMIN — FUROSEMIDE 20 MG: 10 INJECTION, SOLUTION INTRAVENOUS at 01:38

## 2018-07-26 RX ADMIN — METHYLPREDNISOLONE SODIUM SUCCINATE 125 MG: 125 INJECTION, POWDER, FOR SOLUTION INTRAMUSCULAR; INTRAVENOUS at 01:37

## 2018-07-26 RX ADMIN — METHYLPREDNISOLONE SODIUM SUCCINATE 80 MG: 125 INJECTION, POWDER, FOR SOLUTION INTRAMUSCULAR; INTRAVENOUS at 19:01

## 2018-07-26 RX ADMIN — PANTOPRAZOLE SODIUM 40 MG: 40 TABLET, DELAYED RELEASE ORAL at 09:19

## 2018-07-26 RX ADMIN — ALPRAZOLAM 1 MG: 1 TABLET ORAL at 23:45

## 2018-07-26 RX ADMIN — IPRATROPIUM BROMIDE AND ALBUTEROL SULFATE 1 AMPULE: .5; 3 SOLUTION RESPIRATORY (INHALATION) at 15:44

## 2018-07-26 RX ADMIN — CEFTRIAXONE SODIUM 1 G: 1 INJECTION, POWDER, FOR SOLUTION INTRAMUSCULAR; INTRAVENOUS at 02:18

## 2018-07-26 ASSESSMENT — PAIN SCALES - GENERAL
PAINLEVEL_OUTOF10: 6
PAINLEVEL_OUTOF10: 0
PAINLEVEL_OUTOF10: 6
PAINLEVEL_OUTOF10: 0

## 2018-07-26 ASSESSMENT — PAIN DESCRIPTION - PAIN TYPE: TYPE: ACUTE PAIN

## 2018-07-26 ASSESSMENT — ENCOUNTER SYMPTOMS
CHOKING: 1
VOMITING: 1
WHEEZING: 1
NAUSEA: 1
CHEST TIGHTNESS: 1
COUGH: 1
SHORTNESS OF BREATH: 1

## 2018-07-26 ASSESSMENT — PAIN DESCRIPTION - LOCATION: LOCATION: FOOT;ANKLE

## 2018-07-26 ASSESSMENT — PAIN DESCRIPTION - DESCRIPTORS: DESCRIPTORS: ACHING

## 2018-07-26 ASSESSMENT — PAIN DESCRIPTION - FREQUENCY: FREQUENCY: CONTINUOUS

## 2018-07-26 NOTE — CARE COORDINATION
aggressive. She would say means things to her w/ a smile. Has a niece that checks on her but works long hours  Her niece Pebbles Adkins # [phone]  Will continue to follow    luan initiated    Pt asked to call the registration and cancel her lab work appointment for tomorrow  Called registration and spoke to Muna Morgan who said pt does have a consult w/ Dr Thomas Fortune but no blood work  Notified her pt is inpt.        Electronically signed by Gasper Smith RN on 7/26/18 at 11:32 AM

## 2018-07-26 NOTE — ED NOTES
Pt presents to the er per ems for c/o a two day history of fatigue and shortness of breath pt arrives on a nrm mask pts respirations are tachy and labored pt is audibly wheezy pt lungs are with diminished breath sounds and crackles in both lungs bilaterally pt is flushed and warm to the touch     Divina Pablo RN  07/26/18 [id number]

## 2018-07-26 NOTE — PROGRESS NOTES
is limiting factor with mobility at this time and patient demos significant deficits as compared to prior level of function. At current level of function, patient will benefit from continued inpatient PT services and will likely be safe to go home with assist as needed and home PT to promote improved safety and IND with all functional mobility after discharge. Prognosis: Good  Decision Making: Low Complexity  Patient Education: PT POC, LE exercises  REQUIRES PT FOLLOW UP: Yes  Activity Tolerance  Activity Tolerance: Patient Tolerated treatment well;Patient limited by endurance         Plan   Plan  Times per week: 1-2 times a day, 5-6 days a week  Current Treatment Recommendations: Strengthening, Balance Training, Functional Mobility Training, Endurance Training, Gait Training, Stair training, Home Exercise Program, Safety Education & Training  Safety Devices  Type of devices: All fall risk precautions in place, Call light within reach, Gait belt, Left in bed, Nurse notified    G-Code  PT G-Codes  Functional Assessment Tool Used: [place] Am-PAC  Score: 15   Functional Limitation: Mobility: Walking and moving around  Mobility: Walking and Moving Around Current Status (): At least 60 percent but less than 80 percent impaired, limited or restricted  Mobility: Walking and Moving Around Goal Status ():  At least 40 percent but less than 60 percent impaired, limited or restricted  OutComes Score                                           AM-PAC Score             Goals  Short term goals  Time Frame for Short term goals: 10 visits  Short term goal 1: Patient will be IND with bed mobility   Short term goal 2: Patient will be IND with transfers to promote safe access to bathroom  Short term goal 3: Patient will amb IND with or without device on level surfaces without LOB  Short term goal 4: Patient will demo 'good' sitting and standing balance to promote increased safety with ambulation  Patient Goals   Patient goals :  To get strength back and get back home       Therapy Time   Individual Concurrent Group Co-treatment   Time In 1211         Time Out 1246         Minutes [address] [place], [place]

## 2018-07-26 NOTE — PROGRESS NOTES
Pt transfer to room 1106 per wheelchair from ICU, pt oriented to room and assessment and vitals completed

## 2018-07-26 NOTE — PROGRESS NOTES
· Bronchodilator assessment   [x]    Bronchodilator Assessment    FEV1 % PREDICTED unable(on Bipap)FEV1 actual: unable  PEFR  unabe  PEFR % Predicted na  RR 14  Bronchodilator assessment at level  3  BRONCHODILATOR ASSESSMENT SCORE  Score 1 2 3 4   Breath Sounds   []  Clear []  Mild Wheezing with good aeration [x]  Moderate I/E wheezing with adequate aeration []  Poor Aeration or diffuse wheezing   Respiratory Rate [x]  Less than 20 []  20-25 []  Greater than 25  []  Greater than 35    Dyspnea []  No SOB  [x]  SOB with minimal activity []  Speaking in partial sentences []  Acute/ At rest   Peakflow (asthma) []  80 % or greater predicted/PB  []  Unable []  70% or greater predicted/PB  []  Unable []  51%-70% predicted/PB  []  Unable []  Less than 50% predicted/PB  []  Unable due to distress   FEV1 % Predicted []  Greater than 69%  []  Unable  []  Less than 50%-69%  []  Unable  []  Less than 35%-49%  [x]  Unable  []  Less than 35%  []  Unable due to distress     · Bronchodilator assessment   []    Bronchodilator Assessment    FEV1 % PREDICTED naFEV1 actual: na  PEFR  na  PEFR % Predicted na  RR na  Bronchodilator assessment at level  na  BRONCHODILATOR ASSESSMENT SCORE  Score 1 2 3 4   Breath Sounds   []  Clear []  Mild Wheezing with good aeration []  Moderate I/E wheezing with adequate aeration []  Poor Aeration or diffuse wheezing   Respiratory Rate []  Less than 20 []  20-25 []  Greater than 25  []  Greater than 35    Dyspnea []  No SOB  []  SOB with minimal activity []  Speaking in partial sentences []  Acute/ At rest   Peakflow (asthma) []  80 % or greater predicted/PB  []  Unable []  70% or greater predicted/PB  []  Unable []  51%-70% predicted/PB  []  Unable []  Less than 50% predicted/PB  []  Unable due to distress   FEV1 % Predicted []  Greater than 69%  []  Unable  []  Less than 50%-69%  []  Unable  []  Less than 35%-49%  []  Unable  []  Less than 35%  []  Unable due to distress   · Bronchodilator assessment []    Bronchodilator Assessment    FEV1 % PREDICTED na  FEV1 actual: na  PEFR  na  PEFR % Predicted na  RR na  Bronchodilator assessment at level  na  BRONCHODILATOR ASSESSMENT SCORE  Score 1 2 3 4   Breath Sounds   []  Clear []  Mild Wheezing with good aeration []  Moderate I/E wheezing with adequate aeration []  Poor Aeration or diffuse wheezing   Respiratory Rate []  Less than 20 []  20-25 []  Greater than 25  []  Greater than 35    Dyspnea []  No SOB  []  SOB with minimal activity []  Speaking in partial sentences []  Acute/ At rest   Peakflow (asthma) []  80 % or greater predicted/PB  []  Unable []  70% or greater predicted/PB  []  Unable []  51%-70% predicted/PB  []  Unable []  Less than 50% predicted/PB  []  Unable due to distress   FEV1 % Predicted []  Greater than 69%  []  Unable  []  Less than 50%-69%  []  Unable  []  Less than 35%-49%  []  Unable  []  Less than 35%  []  Unable due to distress     MDI Instruction na

## 2018-07-26 NOTE — PROGRESS NOTES
Occupational Therapy   Occupational Therapy Initial Assessment  Date: 2018   Patient Name: Eros Chauhan  MRN: [id number]     : 1945    RN reports patient is medically stable for therapy treatment this date. Chart reviewed prior to treatment and patient is agreeable for therapy. All lines intact and patient positioned comfortably at end of treatment. All patient needs addressed prior to ending therapy session. Date of Service: 2018    Discharge Recommendations:  Home with assist PRN, Home with nursing aide, Home with Home health OT  OT Equipment Recommendations  Equipment Needed: Yes  Mobility Devices: ADL Assistive Devices  ADL Assistive Devices: Long-handled Sponge;Reacher;Long-handled Shoe Horn;Emergency Alert System      Patient Diagnosis(es): The primary encounter diagnosis was Acute respiratory insufficiency. Diagnoses of COPD with acute exacerbation ([address] Acute on chronic congestive heart failure, unspecified congestive heart failure type ([address] Granulocytosis, and At risk for sepsis were also pertinent to this visit. has a past medical history of COPD (chronic obstructive pulmonary disease) ([address] Depression; Diabetes mellitus ([address] Hyperlipidemia; PTSD (post-traumatic stress disorder); and Tubulovillous adenoma. has a past surgical history that includes Hysterectomy; Colonoscopy; and Colonoscopy (2016). PER ER notes: Eros Chauhan is a 72 y.o. female who presents to the emergency department Via EMS due to increasing shortness of breath. Patient resides in a private dwelling she has a long history of COPD and multiple comorbidities. She has had fevers chills the last 24 hours. She became increasingly short of breath tonight. She called paramedics. On arrival here she is on facemask. I'm told she was quite hypoxic at the scene. She states she just can't move air.   She has had nausea although no vomiting. Restrictions  Restrictions/Precautions  Restrictions/Precautions: Fall Risk, Up Ad Renata  Position Activity Restriction  Other position/activity restrictions: ngo, L hand IV, 2 L O2 per NC    Subjective   General  Chart Reviewed: Yes  Patient assessed for rehabilitation services?: Yes  Family / Caregiver Present: No  Pain Assessment  Patient Currently in Pain: Yes  Pain Assessment: 0-10  Pain Level: 6  Pain Type: Acute pain  Pain Location: Foot, Ankle (B LE's L> R )  Pain Descriptors: Aching  Pain Frequency: Continuous (increases with movement )  *Pt's nurse was informed of pt's c/o pain      Social/Functional History  Social/Functional History  Lives With: Alone (pt has a cat )  Type of Home: Apartment (2nd floor with elevator )  Home Access:  (level entry )  Bathroom Shower/Tub: Tub/Shower unit, Shower chair with back  [employer]: Grab bars in shower, Shower chair, Grab bars around toilet  Bathroom Accessibility: Walker accessible  Home Equipment: Rolling walker, 4 wheeled walker  ADL Assistance: [address] [place] [address] Avenue: Independent  Homemaking Responsibilities: Yes  Ambulation Assistance: Independent  Transfer Assistance: Independent  Active : Yes  Mode of Transportation: Car, Bus  Occupation: Retired  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    Edema: B ankles   Balance  Sitting Balance: Contact guard assistance (CG to SBA )  Standing Balance: Minimal assistance (min to CG with RW

## 2018-07-26 NOTE — ED PROVIDER NOTES
for nausea and vomiting. Genitourinary: Negative. Skin: Positive for pallor. Neurological: Positive for dizziness and weakness. Hematological: Negative. Psychiatric/Behavioral: Positive for agitation and confusion. Except as noted above the remainder of the review of systems was reviewed and negative. PHYSICAL EXAM    (up to 7 for level 4, 8 or more for level 5)     Vitals:    07/26/18 0115 07/26/18 0128 07/26/18 0131 07/26/18 0150   BP: 124/63   106/69   Pulse: 101   100   Resp: 19 25 17 11   Temp: 100.4 °F (38 °C)      SpO2: 95%  94% 94%   Weight: 190 lb (86.2 kg)      Height: 4' 9\" (1.448 m)          Physical exam flex a moderately obese female who does appear uncomfortable. She does have low-grade temp noted. Pulse ox is 97% on facemask at 10 L. Blood gases are pending. She is alert and conversive. Integument currently warm and dry. She does speak in short sentences. Oropharyngeal exam without lesion. Trachea midline no stridor. Mild JVD noted. Heart regular rate and rhythm normal S1-S2 no murmurs rubs gallops. Lungs are very diminished. Abdomen is obese soft and nontender. She has chronic 3+ edema to bilateral lower extremities. Integument otherwise without acute rash or lesion. No neurovascular deficits are noted      DIAGNOSTIC RESULTS     EKG: All EKG's are interpreted by the Emergency Department Physician who either signs or Co-signs this chart in the absence of a cardiologist.    EKG demonstrates a sinus rhythm rate of 97. No ectopy or ischemic change noted. Tracing is consistent with prior.     RADIOLOGY:   Non-plain film images such as CT, Ultrasound and MRI are read by the radiologist. Plain radiographic images are visualized and preliminarily interpreted by the emergency physician with the below findings:    XR CHEST PORTABLE   Status: Preliminary result   Order Providers     Hugo Morales MD          Reading Radiologists     Read Date Phone Range 7/26/2018 01:07 7/26/2018 01:16 7/26/2018 01:25 7/26/2018 01:29 7/26/2018 01:36   Sodium Latest Ref Range: 135 - 144 mmol/L 138       Potassium Latest Ref Range: 3.7 - 5.3 mmol/L 5.4 (H)       Chloride Latest Ref Range: 98 - 107 mmol/L 98       CO2 Latest Ref Range: 20 - 31 mmol/L 26       BUN Latest Ref Range: 8 - 23 mg/dL 22       Creatinine Latest Ref Range: 0.50 - 0.90 mg/dL 0.61       Bun/Cre Ratio Latest Ref Range: 9 - 20  36 (H)       Anion Gap Latest Ref Range: 9 - 17 mmol/L 14       GFR Non- Latest Ref Range: >60 mL/min >60       GFR  Latest Ref Range: >60 mL/min >60       Magnesium Latest Ref Range: 1.6 - 2.6 mg/dL 2.0       Lactic Acid Latest Ref Range: 0.5 - 2.2 mmol/L 2.0       Glucose Latest Units: mg/dL 159 (H)   139    POC Glucose Latest Ref Range: 65 - 105 mg/dL   139 (H)     Calcium Latest Ref Range: 8.6 - 10.4 mg/dL 9.3       GFR Comment Unknown Pend       GFR Staging Unknown NOT REPORTED       POC HCO3 Latest Ref Range: 22 - 27 mmol/L  29.6 (H)      POC O2 SAT Latest Units: %  95      POC pCO2 Latest Ref Range: 32 - 45 mm Hg  59 (H)      POC pCO2 Temp Latest Units: mm Hg  NOT REPORTED      POC pH Latest Ref Range: 7.35 - 7.45   7.31 (L)      POC PO2 Latest Ref Range: 75 - 95 mm Hg  82      QC OK? Unknown    yes    Total CK Latest Ref Range: 26 - 192 U/L 156       % CKMB Latest Ref Range: 0.0 - 3.0 % 1.7       CK-MB Latest Ref Range: <5.4 ng/mL 2.6       CKMB Interpretation Unknown NORMAL ISOENZYME . Lovetta Blaze Lovetta Blaze        Myoglobin Latest Ref Range: 25 - 58 ng/mL 201 (H)       Pro-BNP Latest Ref Range: <300 pg/mL 432 (H)       Troponin T Latest Ref Range: <0.03 ng/mL <0.03       Troponin Interp Unknown Pend       BNP Interpretation Unknown Pend       WBC Latest Ref Range: 3.5 - 11.0 k/uL 33.5 (HH)       RBC Latest Ref Range: 4.0 - 5.2 m/uL 4.76       Hemoglobin Quant Latest Ref Range: 12.0 - 16.0 g/dL 13.8       Hematocrit Latest Ref Range: 36 - 46 % 43.1       MCV Latest Ref Range: 80 - 100 fL 90.6       MCH Latest Ref Range: 26 - 34 pg 28.9       MCHC Latest Ref Range: 31 - 37 g/dL 31.9       MPV Latest Ref Range: 6.0 - 12.0 fL NOT REPORTED       RDW Latest Ref Range: 11.5 - 14.5 % 15.3 (H)       Platelet Count Latest Ref Range: 130 - 400 k/uL 69 (L)       Platelet Estimate Unknown NOT REPORTED       Absolute Mono # Latest Units: k/uL Pending       Eosinophils % Latest Units: % Pending       Basophils # Latest Ref Range: 0.0 - 0.2 k/uL Pending       Differential Type Unknown NOT REPORTED       Seg Neutrophils Latest Units: % Pending       Segs Absolute Latest Units: k/uL Pending       Lymphocytes Latest Units: % Pending       Absolute Lymph # Latest Units: k/uL Pending       Monocytes Latest Units: % Pending       Absolute Eos # Latest Units: k/uL Pending       Basophils Latest Units: % Pending       Immature Granulocytes Latest Ref Range: 0 % NOT REPORTED       WBC Morphology Unknown NOT REPORTED       RBC Morphology Unknown NOT REPORTED       D-Dimer, Quant Latest Units: mg/L FEU 0.69       Results for Sterling Peguero (MRN [id number]) as of 7/26/2018 02:14   Ref. Range 7/26/2018 01:49   Color, UA Latest Ref Range: YEL  YELLOW   Turbidity UA Latest Ref Range: CLEAR  CLEAR   Glucose, UA Latest Ref Range: NEG  NEGATIVE   Bilirubin, Urine Latest Ref Range: NEG  NEGATIVE   Ketones, Urine Latest Ref Range: NEG  NEGATIVE   Specific Gravity, UA Latest Ref Range: 1.005 - 1.030  1.010   pH, UA Latest Ref Range: 5.0 - 8.0  6.0   Protein, UA Latest Ref Range: NEG  NEGATIVE   Urobilinogen, Urine Latest Ref Range: NORM  Normal   Nitrite, Urine Latest Ref Range: NEG  NEGATIVE   Leukocyte Esterase, Urine Latest Ref Range: NEG  NEGATIVE   Urinalysis Comments Unknown Microscopic exam ... Urine Hgb Latest Ref Range: NEG  NEGATIVE     All other labs were within normal range or not returned as of this dictation.     EMERGENCY DEPARTMENT COURSE and DIFFERENTIAL DIAGNOSIS/MDM:   Vitals:    Vitals:

## 2018-07-26 NOTE — H&P
daily 9/21/17  Yes Arik Valero MD   COMBIVENT RESPIMAT  MCG/ACT AERS inhaler INHALE ONE INHALATION BY MOUTH FOUR TIMES A DAY 4/28/16  Yes Arik Valero MD   glucose blood VI test strips (ASCENSIA AUTODISC VI;ONE TOUCH ULTRA TEST VI) strip Indications: [employer] Glucose Meter Test bid 12/30/15  Yes Kenisha Michel MD   Lancets MISC 1 each by Other route 2 times daily Indications: [employer] Glucose Meter 12/30/15  Yes Arik Valero MD   albuterol (PROAIR HFA) 108 (90 BASE) MCG/ACT inhaler Inhale 2 puffs into the lungs every 6 hours as needed for Wheezing 7/30/15  Yes Sophia Miller MD   ipratropium-albuterol (DUONEB) 0.5-2.5 (3) MG/3ML SOLN nebulizer solution Inhale 3 mLs into the lungs every 6 hours as needed. 12/16/14  Yes Arik Lilly MD   meclizine (ANTIVERT) 25 MG tablet Take 1 tablet by mouth 3 times daily as needed for Dizziness or Nausea. 6/12/14  Yes Kenisha Michel MD   Blood Glucose Monitoring Suppl HARRY use check blood sugar as directed 7/19/16 7/19/17  Kenisha Michel MD        Allergies:     Patient has no known allergies. Social History:     Tobacco:    reports that she quit smoking about 10 years ago. She has never used smokeless tobacco.  Alcohol:      reports that she does not drink alcohol. Drug Use:  reports that she does not use drugs. Family History:     Family History   Problem Relation Age of Onset    Heart Disease Mother     Heart Disease Brother        Review of Systems:     Negative except for those highlighted:    CONSTITUTIONAL:  fevers, chills, sweats, fatigue, weight loss, generalized weakness  HEENT:  Vision changes, hearing changes, runny nose, throat pain  RESPIRATORY:  shortness of breath, cough, congestion, wheezing.   CARDIOVASCULAR:  chest pain, palpitations  GASTROINTESTINAL:  nausea, vomiting, diarrhea, constipation, change in bowel habits, abdominal pain   GENITOURINARY:  difficulty of urination, burning with urination, frequency   INTEGUMENT: with palpation  Psych: normal affect    Investigations:      Laboratory Testing:  Recent Results (from the past 24 hour(s))   EKG 12 Lead    Collection Time: 07/26/18  1:00 AM   Result Value Ref Range    Ventricular Rate 97 BPM    Atrial Rate 97 BPM    P-R Interval 164 ms    QRS Duration 74 ms    Q-T Interval 330 ms    QTc Calculation (Bazett) 419 ms    P Axis 52 degrees    R Axis 59 degrees    T Axis 62 degrees   Basic Metabolic Panel    Collection Time: 07/26/18  1:07 AM   Result Value Ref Range    Glucose 159 (H) 70 - 99 mg/dL    BUN 22 8 - 23 mg/dL    CREATININE 0.61 0.50 - 0.90 mg/dL    Bun/Cre Ratio 36 (H) 9 - 20    Calcium 9.3 8.6 - 10.4 mg/dL    Sodium 138 135 - 144 mmol/L    Potassium 5.4 (H) 3.7 - 5.3 mmol/L    Chloride 98 98 - 107 mmol/L    CO2 26 20 - 31 mmol/L    Anion Gap 14 9 - 17 mmol/L    GFR Non-African American >60 >60 mL/min    GFR African American >60 >60 mL/min    GFR Comment          GFR Staging NOT REPORTED    Brain Natriuretic Peptide    Collection Time: 07/26/18  1:07 AM   Result Value Ref Range    Pro- (H) <300 pg/mL    BNP Interpretation         CBC Auto Differential    Collection Time: 07/26/18  1:07 AM   Result Value Ref Range    WBC 33.5 (HH) 3.5 - 11.0 k/uL    RBC 4.76 4.0 - 5.2 m/uL    Hemoglobin 13.8 12.0 - 16.0 g/dL    Hematocrit 43.1 36 - 46 %    MCV 90.6 80 - 100 fL    MCH 28.9 26 - 34 pg    MCHC 31.9 31 - 37 g/dL    RDW 15.3 (H) 11.5 - 14.5 %    Platelets 69 (L) 625 - 400 k/uL    MPV NOT REPORTED 6.0 - 12.0 fL    NRBC Automated NOT REPORTED per 100 WBC    Differential Type NOT REPORTED     Immature Granulocytes NOT REPORTED 0 %    Absolute Immature Granulocyte NOT REPORTED 0.00 - 0.30 k/uL    WBC Morphology NOT REPORTED     RBC Morphology NOT REPORTED     Platelet Estimate NOT REPORTED     Seg Neutrophils 55 36 - 66 %    Lymphocytes 39 24 - 44 %    Monocytes 6 1 - 7 %    Eosinophils % 0 (L) 1 - 4 %    Basophils 0 %    Segs Absolute 18.42 (H) 1.8 - 7.7 k/uL    Absolute Urine Hgb NEGATIVE NEG    pH, UA 6.0 5.0 - 8.0    Protein, UA NEGATIVE NEG    Urobilinogen, Urine Normal NORM    Nitrite, Urine NEGATIVE NEG    Leukocyte Esterase, Urine NEGATIVE NEG    Urinalysis Comments       Microscopic exam not performed based on chemical results unless requested in   Lactic Acid    Collection Time: 07/26/18  4:14 AM   Result Value Ref Range    Lactic Acid 2.3 (H) 0.5 - 2.2 mmol/L       Imaging/Diagnostics:    SINGLE XRAY VIEW OF THE CHEST, 7/26/2018 1:18 am  Impression No significant change compared to prior study. Stable nonspecific interstitial prominence with probable bibasilar atelectasis. Otherwise no definite acute cardiopulmonary findings. Assessment :      Primary Problem  <principal problem not specified>    Active Hospital Problems    Diagnosis Date Noted    COPD exacerbation ([facility] [J44.1] 07/30/2015       Plan:     Patient status Admit as inpatient in the  Medical ICU    Active Problems:    COPD exacerbation ([facility]  Resolved Problems:    * No resolved hospital problems. *    Acute COPD exacerbation: stable Respiratory care, inhalers, oxygen ,continous pulse ox, spirometer , steroids, antibiotics    Elevated ProBNP with mild interstitial prominence. Getting Echo. One dose of IV lasix given. Monitor I/O. Salt and fluid restriction. Possible PNA with Leukocytosis and Lactic Acidosis. Cont cef and azithro. Hyperkalemia. Monitor on tele. If worsens, will treat with insulin/dextrose    DM: Insulin sliding scale, Hypoglycemia protocol, Resume home doses of antidiabetic medications, diabetic diet once patient is not NPO. HTN: stable,resume home meds    Check Electrolytes and Electrolytes replacement as needed     Morbid obesity complicating factor. DVT prophylaxis: Lovenox 40 mg SC    PT, OT as needed.       IV Fluids: ,    Nutrition:  DIET CARDIAC;      Consultations:   IP CONSULT TO INTERNAL MEDICINE    Patient is admitted as inpatient status because of

## 2018-07-27 ENCOUNTER — APPOINTMENT (OUTPATIENT)
Dept: CT IMAGING | Age: 73
DRG: 193 | End: 2018-07-27
Payer: COMMERCIAL

## 2018-07-27 ENCOUNTER — APPOINTMENT (OUTPATIENT)
Dept: GENERAL RADIOLOGY | Age: 73
DRG: 193 | End: 2018-07-27
Payer: COMMERCIAL

## 2018-07-27 LAB
ANION GAP SERPL CALCULATED.3IONS-SCNC: 12 MMOL/L (ref 9–17)
BUN BLDV-MCNC: 19 MG/DL (ref 8–23)
BUN/CREAT BLD: 35 (ref 9–20)
CALCIUM SERPL-MCNC: 9.7 MG/DL (ref 8.6–10.4)
CHLORIDE BLD-SCNC: 100 MMOL/L (ref 98–107)
CO2: 26 MMOL/L (ref 20–31)
CREAT SERPL-MCNC: 0.55 MG/DL (ref 0.5–0.9)
CULTURE: NO GROWTH
GFR AFRICAN AMERICAN: >60 ML/MIN
GFR NON-AFRICAN AMERICAN: >60 ML/MIN
GFR SERPL CREATININE-BSD FRML MDRD: ABNORMAL ML/MIN/{1.73_M2}
GFR SERPL CREATININE-BSD FRML MDRD: ABNORMAL ML/MIN/{1.73_M2}
GLUCOSE BLD-MCNC: 261 MG/DL (ref 65–105)
GLUCOSE BLD-MCNC: 283 MG/DL (ref 65–105)
GLUCOSE BLD-MCNC: 293 MG/DL (ref 70–99)
GLUCOSE BLD-MCNC: 312 MG/DL (ref 65–105)
GLUCOSE BLD-MCNC: 440 MG/DL (ref 65–105)
HCT VFR BLD CALC: 38.1 % (ref 36–46)
HEMOGLOBIN: 12.1 G/DL (ref 12–16)
LACTIC ACID, WHOLE BLOOD: NORMAL MMOL/L (ref 0.7–2.1)
LACTIC ACID: 2.9 MMOL/L (ref 0.5–2.2)
Lab: NORMAL
MCH RBC QN AUTO: 28.5 PG (ref 26–34)
MCHC RBC AUTO-ENTMCNC: 31.8 G/DL (ref 31–37)
MCV RBC AUTO: 89.6 FL (ref 80–100)
NRBC AUTOMATED: ABNORMAL PER 100 WBC
PDW BLD-RTO: 16.1 % (ref 11.5–14.5)
PLATELET # BLD: 106 K/UL (ref 130–400)
PMV BLD AUTO: 10.3 FL (ref 6–12)
POTASSIUM SERPL-SCNC: 4.9 MMOL/L (ref 3.7–5.3)
RBC # BLD: 4.25 M/UL (ref 4–5.2)
SODIUM BLD-SCNC: 138 MMOL/L (ref 135–144)
SPECIMEN DESCRIPTION: NORMAL
STATUS: NORMAL
WBC # BLD: 20.2 K/UL (ref 3.5–11)

## 2018-07-27 PROCEDURE — 94640 AIRWAY INHALATION TREATMENT: CPT

## 2018-07-27 PROCEDURE — 6370000000 HC RX 637 (ALT 250 FOR IP): Performed by: NURSE PRACTITIONER

## 2018-07-27 PROCEDURE — 99232 SBSQ HOSP IP/OBS MODERATE 35: CPT | Performed by: INTERNAL MEDICINE

## 2018-07-27 PROCEDURE — 36415 COLL VENOUS BLD VENIPUNCTURE: CPT

## 2018-07-27 PROCEDURE — 6370000000 HC RX 637 (ALT 250 FOR IP): Performed by: INTERNAL MEDICINE

## 2018-07-27 PROCEDURE — 85027 COMPLETE CBC AUTOMATED: CPT

## 2018-07-27 PROCEDURE — 80048 BASIC METABOLIC PNL TOTAL CA: CPT

## 2018-07-27 PROCEDURE — 94760 N-INVAS EAR/PLS OXIMETRY 1: CPT

## 2018-07-27 PROCEDURE — 97116 GAIT TRAINING THERAPY: CPT

## 2018-07-27 PROCEDURE — 2060000000 HC ICU INTERMEDIATE R&B

## 2018-07-27 PROCEDURE — 82947 ASSAY GLUCOSE BLOOD QUANT: CPT

## 2018-07-27 PROCEDURE — 2580000003 HC RX 258: Performed by: NURSE PRACTITIONER

## 2018-07-27 PROCEDURE — 83605 ASSAY OF LACTIC ACID: CPT

## 2018-07-27 PROCEDURE — 6360000004 HC RX CONTRAST MEDICATION: Performed by: INTERNAL MEDICINE

## 2018-07-27 PROCEDURE — 71045 X-RAY EXAM CHEST 1 VIEW: CPT

## 2018-07-27 PROCEDURE — 71260 CT THORAX DX C+: CPT

## 2018-07-27 PROCEDURE — 97110 THERAPEUTIC EXERCISES: CPT

## 2018-07-27 PROCEDURE — 2700000000 HC OXYGEN THERAPY PER DAY

## 2018-07-27 PROCEDURE — 2580000003 HC RX 258: Performed by: INTERNAL MEDICINE

## 2018-07-27 PROCEDURE — 6360000002 HC RX W HCPCS: Performed by: NURSE PRACTITIONER

## 2018-07-27 RX ORDER — SODIUM CHLORIDE 0.9 % (FLUSH) 0.9 %
10 SYRINGE (ML) INJECTION PRN
Status: DISCONTINUED | OUTPATIENT
Start: 2018-07-27 | End: 2018-07-30 | Stop reason: HOSPADM

## 2018-07-27 RX ORDER — 0.9 % SODIUM CHLORIDE 0.9 %
80 INTRAVENOUS SOLUTION INTRAVENOUS ONCE
Status: COMPLETED | OUTPATIENT
Start: 2018-07-27 | End: 2018-07-27

## 2018-07-27 RX ADMIN — IPRATROPIUM BROMIDE AND ALBUTEROL SULFATE 1 AMPULE: .5; 3 SOLUTION RESPIRATORY (INHALATION) at 11:31

## 2018-07-27 RX ADMIN — ENOXAPARIN SODIUM 40 MG: 100 INJECTION SUBCUTANEOUS at 08:18

## 2018-07-27 RX ADMIN — GABAPENTIN 300 MG: 300 CAPSULE ORAL at 08:19

## 2018-07-27 RX ADMIN — CEFTRIAXONE SODIUM 1 G: 1 INJECTION, POWDER, FOR SOLUTION INTRAMUSCULAR; INTRAVENOUS at 02:20

## 2018-07-27 RX ADMIN — METHYLPREDNISOLONE SODIUM SUCCINATE 80 MG: 125 INJECTION, POWDER, FOR SOLUTION INTRAMUSCULAR; INTRAVENOUS at 08:18

## 2018-07-27 RX ADMIN — GABAPENTIN 300 MG: 300 CAPSULE ORAL at 21:43

## 2018-07-27 RX ADMIN — ALPRAZOLAM 1 MG: 1 TABLET ORAL at 08:34

## 2018-07-27 RX ADMIN — INSULIN LISPRO 6 UNITS: 100 INJECTION, SOLUTION INTRAVENOUS; SUBCUTANEOUS at 12:56

## 2018-07-27 RX ADMIN — SIMVASTATIN 40 MG: 40 TABLET, FILM COATED ORAL at 09:00

## 2018-07-27 RX ADMIN — GABAPENTIN 300 MG: 300 CAPSULE ORAL at 17:02

## 2018-07-27 RX ADMIN — IOPAMIDOL 75 ML: 755 INJECTION, SOLUTION INTRAVENOUS at 13:20

## 2018-07-27 RX ADMIN — AZITHROMYCIN MONOHYDRATE 250 MG: 250 TABLET ORAL at 08:19

## 2018-07-27 RX ADMIN — INSULIN LISPRO 6 UNITS: 100 INJECTION, SOLUTION INTRAVENOUS; SUBCUTANEOUS at 08:42

## 2018-07-27 RX ADMIN — IPRATROPIUM BROMIDE AND ALBUTEROL SULFATE 1 AMPULE: .5; 3 SOLUTION RESPIRATORY (INHALATION) at 21:00

## 2018-07-27 RX ADMIN — METHYLPREDNISOLONE SODIUM SUCCINATE 80 MG: 125 INJECTION, POWDER, FOR SOLUTION INTRAMUSCULAR; INTRAVENOUS at 02:19

## 2018-07-27 RX ADMIN — SODIUM CHLORIDE 50 ML: 9 INJECTION, SOLUTION INTRAVENOUS at 13:12

## 2018-07-27 RX ADMIN — INSULIN LISPRO 6 UNITS: 100 INJECTION, SOLUTION INTRAVENOUS; SUBCUTANEOUS at 21:43

## 2018-07-27 RX ADMIN — LISINOPRIL 10 MG: 10 TABLET ORAL at 08:19

## 2018-07-27 RX ADMIN — Medication 10 ML: at 08:19

## 2018-07-27 RX ADMIN — Medication 10 ML: at 13:18

## 2018-07-27 RX ADMIN — ALPRAZOLAM 1 MG: 1 TABLET ORAL at 23:52

## 2018-07-27 RX ADMIN — INSULIN LISPRO 8 UNITS: 100 INJECTION, SOLUTION INTRAVENOUS; SUBCUTANEOUS at 17:02

## 2018-07-27 RX ADMIN — Medication 10 ML: at 21:44

## 2018-07-27 RX ADMIN — FLUOXETINE 20 MG: 20 CAPSULE ORAL at 08:18

## 2018-07-27 RX ADMIN — IPRATROPIUM BROMIDE AND ALBUTEROL SULFATE 1 AMPULE: .5; 3 SOLUTION RESPIRATORY (INHALATION) at 14:56

## 2018-07-27 RX ADMIN — GABAPENTIN 300 MG: 300 CAPSULE ORAL at 12:56

## 2018-07-27 RX ADMIN — METHYLPREDNISOLONE SODIUM SUCCINATE 80 MG: 125 INJECTION, POWDER, FOR SOLUTION INTRAMUSCULAR; INTRAVENOUS at 17:02

## 2018-07-27 RX ADMIN — PANTOPRAZOLE SODIUM 40 MG: 40 TABLET, DELAYED RELEASE ORAL at 06:21

## 2018-07-27 ASSESSMENT — PAIN DESCRIPTION - ORIENTATION: ORIENTATION: RIGHT;LEFT;POSTERIOR

## 2018-07-27 ASSESSMENT — PAIN DESCRIPTION - PAIN TYPE: TYPE: CHRONIC PAIN;ACUTE PAIN

## 2018-07-27 ASSESSMENT — PAIN SCALES - GENERAL: PAINLEVEL_OUTOF10: 8

## 2018-07-27 NOTE — PROGRESS NOTES
nonspecific interstitial prominence with probable bibasilar atelectasis. Otherwise no definite acute cardiopulmonary findings. Physical Examination:        /64   Pulse 80   Temp 97.5 °F (36.4 °C) (Oral)   Resp 18   Ht 4' 9\" (1.448 m)   Wt 198 lb 12.8 oz (90.2 kg)   SpO2 90%   BMI 43.02 kg/m²   Temp (24hrs), Av.7 °F (36.5 °C), Min:97.5 °F (36.4 °C), Max:98.1 °F (36.7 °C)    Recent Labs      18   1201  18   1659  18   2020  18   0835   POCGLU  322*  325*  358*  283*       Intake/Output Summary (Last 24 hours) at 18 0926  Last data filed at 18 0618   Gross per 24 hour   Intake              550 ml   Output             2900 ml   Net            -2350 ml       General Appearance:  Drowsy but easily awoken and able to answer my questions appropriately. Mental status: oriented to person, place, and time with normal affect  Head:  normocephalic, atraumatic. Eye: no icterus, redness, pupils equal and reactive, extraocular eye movements intact, conjunctiva clear  Ear: normal external ear, no discharge, hearing intact  Nose:  no drainage noted  Mouth: mucous membranes moist  Neck: supple, no carotid bruits, thyroid not palpable  Lungs: Bilateral equal air entry, clear to ausculation, no wheezing, rales or rhonchi, normal effort  Cardiovascular: normal rate, regular rhythm, no murmur, gallop, rub.   Abdomen: Soft, nontender, nondistended, normal bowel sounds, no hepatomegaly or splenomegaly  Neurologic: There are no new focal motor or sensory deficits, normal muscle tone and bulk, no abnormal sensation, normal speech, cranial nerves II through XII grossly intact  Skin: No gross lesions, rashes, bruising or bleeding on exposed skin area  Extremities:  peripheral pulses palpable, no pedal edema or calf pain with palpation  Psych: normal affect      Assessment:        Principal Problem:    COPD exacerbation (HCC)  Active Problems:    Obesity    COPD (chronic obstructive

## 2018-07-27 NOTE — PLAN OF CARE
Problem: Activity Intolerance:  Goal: Ability to tolerate increased activity will improve  Ability to tolerate increased activity will improve  Outcome: Ongoing  VSS. No SOB noted at rest.  Cont on IV antibiotics and steroids.   Lu d/c'd and increased activity encouraged

## 2018-07-27 NOTE — PROGRESS NOTES
goal 4: Patient will demo 'good' sitting and standing balance to promote increased safety with ambulation  Patient Goals   Patient goals : To get strength back and get back home    Plan    Plan  Times per week: 1-2 times a day, 5-6 days a week  Current Treatment Recommendations: Strengthening, Balance Training, Functional Mobility Training, Endurance Training, Gait Training, Stair training, Home Exercise Program, Safety Education & Training  Safety Devices  Type of devices:  All fall risk precautions in place, Call light within reach, Gait belt, Left in bed, Nurse notified     Therapy Time   Individual Concurrent Group Co-treatment   Time In 1540         Time Out 1620         [facility] [place] [place]

## 2018-07-28 LAB
ABSOLUTE EOS #: 0 K/UL (ref 0–0.4)
ABSOLUTE IMMATURE GRANULOCYTE: ABNORMAL K/UL (ref 0–0.3)
ABSOLUTE LYMPH #: 12.7 K/UL (ref 1–4.8)
ABSOLUTE MONO #: 0.51 K/UL (ref 0.2–0.8)
ANION GAP SERPL CALCULATED.3IONS-SCNC: 15 MMOL/L (ref 9–17)
BASOPHILS # BLD: 0 % (ref 0–2)
BASOPHILS ABSOLUTE: 0 K/UL (ref 0–0.2)
BUN BLDV-MCNC: 27 MG/DL (ref 8–23)
BUN/CREAT BLD: 38 (ref 9–20)
CALCIUM SERPL-MCNC: 9.6 MG/DL (ref 8.6–10.4)
CHLORIDE BLD-SCNC: 99 MMOL/L (ref 98–107)
CO2: 24 MMOL/L (ref 20–31)
CREAT SERPL-MCNC: 0.72 MG/DL (ref 0.5–0.9)
DIFFERENTIAL TYPE: ABNORMAL
EOSINOPHILS RELATIVE PERCENT: 0 % (ref 1–4)
GFR AFRICAN AMERICAN: >60 ML/MIN
GFR NON-AFRICAN AMERICAN: >60 ML/MIN
GFR SERPL CREATININE-BSD FRML MDRD: ABNORMAL ML/MIN/{1.73_M2}
GFR SERPL CREATININE-BSD FRML MDRD: ABNORMAL ML/MIN/{1.73_M2}
GLUCOSE BLD-MCNC: 232 MG/DL (ref 65–105)
GLUCOSE BLD-MCNC: 284 MG/DL (ref 65–105)
GLUCOSE BLD-MCNC: 288 MG/DL (ref 65–105)
GLUCOSE BLD-MCNC: 293 MG/DL (ref 70–99)
HCT VFR BLD CALC: 38.6 % (ref 36–46)
HEMOGLOBIN: 12.5 G/DL (ref 12–16)
IMMATURE GRANULOCYTES: ABNORMAL %
LYMPHOCYTES # BLD: 50 % (ref 24–44)
MAGNESIUM: 2.3 MG/DL (ref 1.6–2.6)
MCH RBC QN AUTO: 28.8 PG (ref 26–34)
MCHC RBC AUTO-ENTMCNC: 32.4 G/DL (ref 31–37)
MCV RBC AUTO: 89 FL (ref 80–100)
MONOCYTES # BLD: 2 % (ref 1–7)
MORPHOLOGY: ABNORMAL
NRBC AUTOMATED: ABNORMAL PER 100 WBC
PDW BLD-RTO: 15.9 % (ref 11.5–14.5)
PLATELET # BLD: 118 K/UL (ref 130–400)
PLATELET ESTIMATE: ABNORMAL
PMV BLD AUTO: 8.9 FL (ref 6–12)
POTASSIUM SERPL-SCNC: 4.3 MMOL/L (ref 3.7–5.3)
RBC # BLD: 4.34 M/UL (ref 4–5.2)
RBC # BLD: ABNORMAL 10*6/UL
SEG NEUTROPHILS: 48 % (ref 36–66)
SEGMENTED NEUTROPHILS ABSOLUTE COUNT: 12.19 K/UL (ref 1.8–7.7)
SODIUM BLD-SCNC: 138 MMOL/L (ref 135–144)
THYROXINE, FREE: 0.85 NG/DL (ref 0.93–1.7)
TSH SERPL DL<=0.05 MIU/L-ACNC: 0.26 MIU/L (ref 0.3–5)
WBC # BLD: 25.4 K/UL (ref 3.5–11)
WBC # BLD: ABNORMAL 10*3/UL

## 2018-07-28 PROCEDURE — 6370000000 HC RX 637 (ALT 250 FOR IP): Performed by: INTERNAL MEDICINE

## 2018-07-28 PROCEDURE — 99232 SBSQ HOSP IP/OBS MODERATE 35: CPT | Performed by: INTERNAL MEDICINE

## 2018-07-28 PROCEDURE — 94640 AIRWAY INHALATION TREATMENT: CPT

## 2018-07-28 PROCEDURE — 85025 COMPLETE CBC W/AUTO DIFF WBC: CPT

## 2018-07-28 PROCEDURE — 2580000003 HC RX 258: Performed by: NURSE PRACTITIONER

## 2018-07-28 PROCEDURE — 83735 ASSAY OF MAGNESIUM: CPT

## 2018-07-28 PROCEDURE — 84443 ASSAY THYROID STIM HORMONE: CPT

## 2018-07-28 PROCEDURE — 6370000000 HC RX 637 (ALT 250 FOR IP): Performed by: NURSE PRACTITIONER

## 2018-07-28 PROCEDURE — 82947 ASSAY GLUCOSE BLOOD QUANT: CPT

## 2018-07-28 PROCEDURE — 2060000000 HC ICU INTERMEDIATE R&B

## 2018-07-28 PROCEDURE — 6360000002 HC RX W HCPCS: Performed by: NURSE PRACTITIONER

## 2018-07-28 PROCEDURE — 84439 ASSAY OF FREE THYROXINE: CPT

## 2018-07-28 PROCEDURE — 2700000000 HC OXYGEN THERAPY PER DAY

## 2018-07-28 PROCEDURE — 36415 COLL VENOUS BLD VENIPUNCTURE: CPT

## 2018-07-28 PROCEDURE — 80048 BASIC METABOLIC PNL TOTAL CA: CPT

## 2018-07-28 PROCEDURE — 94760 N-INVAS EAR/PLS OXIMETRY 1: CPT

## 2018-07-28 RX ORDER — ZOLPIDEM TARTRATE 5 MG/1
5 TABLET ORAL NIGHTLY PRN
Status: DISCONTINUED | OUTPATIENT
Start: 2018-07-28 | End: 2018-07-29

## 2018-07-28 RX ADMIN — INSULIN LISPRO 6 UNITS: 100 INJECTION, SOLUTION INTRAVENOUS; SUBCUTANEOUS at 17:54

## 2018-07-28 RX ADMIN — METHYLPREDNISOLONE SODIUM SUCCINATE 80 MG: 125 INJECTION, POWDER, FOR SOLUTION INTRAMUSCULAR; INTRAVENOUS at 09:56

## 2018-07-28 RX ADMIN — ENOXAPARIN SODIUM 40 MG: 100 INJECTION SUBCUTANEOUS at 09:56

## 2018-07-28 RX ADMIN — GABAPENTIN 300 MG: 300 CAPSULE ORAL at 09:56

## 2018-07-28 RX ADMIN — INSULIN LISPRO 9 UNITS: 100 INJECTION, SOLUTION INTRAVENOUS; SUBCUTANEOUS at 13:27

## 2018-07-28 RX ADMIN — SIMVASTATIN 40 MG: 40 TABLET, FILM COATED ORAL at 09:56

## 2018-07-28 RX ADMIN — IPRATROPIUM BROMIDE AND ALBUTEROL SULFATE 1 AMPULE: .5; 3 SOLUTION RESPIRATORY (INHALATION) at 11:50

## 2018-07-28 RX ADMIN — Medication 10 ML: at 09:56

## 2018-07-28 RX ADMIN — IPRATROPIUM BROMIDE AND ALBUTEROL SULFATE 1 AMPULE: .5; 3 SOLUTION RESPIRATORY (INHALATION) at 19:36

## 2018-07-28 RX ADMIN — ALPRAZOLAM 1 MG: 1 TABLET ORAL at 09:56

## 2018-07-28 RX ADMIN — INSULIN LISPRO 9 UNITS: 100 INJECTION, SOLUTION INTRAVENOUS; SUBCUTANEOUS at 09:57

## 2018-07-28 RX ADMIN — Medication 10 ML: at 22:47

## 2018-07-28 RX ADMIN — ALPRAZOLAM 1 MG: 1 TABLET ORAL at 23:58

## 2018-07-28 RX ADMIN — GABAPENTIN 300 MG: 300 CAPSULE ORAL at 17:53

## 2018-07-28 RX ADMIN — GABAPENTIN 300 MG: 300 CAPSULE ORAL at 22:36

## 2018-07-28 RX ADMIN — INSULIN LISPRO 5 UNITS: 100 INJECTION, SOLUTION INTRAVENOUS; SUBCUTANEOUS at 22:36

## 2018-07-28 RX ADMIN — IPRATROPIUM BROMIDE AND ALBUTEROL SULFATE 1 AMPULE: .5; 3 SOLUTION RESPIRATORY (INHALATION) at 15:15

## 2018-07-28 RX ADMIN — FLUOXETINE 20 MG: 20 CAPSULE ORAL at 09:57

## 2018-07-28 RX ADMIN — METHYLPREDNISOLONE SODIUM SUCCINATE 80 MG: 125 INJECTION, POWDER, FOR SOLUTION INTRAMUSCULAR; INTRAVENOUS at 17:53

## 2018-07-28 RX ADMIN — CEFTRIAXONE SODIUM 1 G: 1 INJECTION, POWDER, FOR SOLUTION INTRAMUSCULAR; INTRAVENOUS at 01:39

## 2018-07-28 RX ADMIN — LISINOPRIL 10 MG: 10 TABLET ORAL at 09:56

## 2018-07-28 RX ADMIN — PANTOPRAZOLE SODIUM 40 MG: 40 TABLET, DELAYED RELEASE ORAL at 06:07

## 2018-07-28 RX ADMIN — AZITHROMYCIN MONOHYDRATE 250 MG: 250 TABLET ORAL at 09:56

## 2018-07-28 RX ADMIN — GABAPENTIN 300 MG: 300 CAPSULE ORAL at 13:27

## 2018-07-28 RX ADMIN — IPRATROPIUM BROMIDE AND ALBUTEROL SULFATE 1 AMPULE: .5; 3 SOLUTION RESPIRATORY (INHALATION) at 07:51

## 2018-07-28 RX ADMIN — METHYLPREDNISOLONE SODIUM SUCCINATE 80 MG: 125 INJECTION, POWDER, FOR SOLUTION INTRAMUSCULAR; INTRAVENOUS at 01:39

## 2018-07-28 NOTE — PROGRESS NOTES
Tubulovillous adenoma. Social History:   reports that she quit smoking about 10 years ago. She has never used smokeless tobacco. She reports that she does not drink alcohol or use drugs. Family History:   Family History   Problem Relation Age of Onset    Heart Disease Mother     Heart Disease Brother        Vitals:  /67   Pulse 55   Temp 97.7 °F (36.5 °C) (Oral)   Resp 16   Ht 4' 9\" (1.448 m)   Wt 198 lb 12.8 oz (90.2 kg)   SpO2 94%   BMI 43.02 kg/m²   Temp (24hrs), Av.6 °F (36.4 °C), Min:97.3 °F (36.3 °C), Max:98.3 °F (36.8 °C)    Recent Labs      18   1208  18   1651  18   2108  18   0824   POCGLU  261*  312*  440*  288*       I/O (24Hr): Intake/Output Summary (Last 24 hours) at 18 0846  Last data filed at 18 7778   Gross per 24 hour   Intake              148 ml   Output                0 ml   Net              148 ml       Labs:    Hematology:  Recent Labs      18   0107  18   0535  18   0551   WBC  33.5*  20.2*  25.4*   HGB  13.8  12.1  12.5   HCT  43.1  38.1  38.6   PLT  69*  106*  118*     Chemistry:  Recent Labs      18   0107  18   0129  18   0535  18   0551   NA  138   --   138  138   K  5.4*   --   4.9  4.3   CL  98   --   100  99   CO2  26   --   26  24   GLUCOSE  159*  139  293*  293*   BUN  22   --   19  27*   CREATININE  0.61   --   0.55  0.72   MG  2.0   --    --   2.3   ANIONGAP  14   --   12  15   LABGLOM  >60   --   >60  >60   GFRAA  >60   --   >60  >60   CALCIUM  9.3   --   9.7  9.6   CKTOTAL  156   --    --    --    CKMB  2.6   --    --    --    MYOGLOBIN  201*   --    --    --      No results for input(s): PROT, LABALBU, LABA1C, X2DJXYT, L4GSGMR, FT4, TSH, AST, ALT, LDH, GGT, ALKPHOS, BILITOT, BILIDIR, AMMONIA, AMYLASE, LIPASE, LACTATE, CHOL, HDL, LDLCHOLESTEROL, CHOLHDLRATIO, TRIG, VLDL, PHENYTOIN, PHENYF in the last 72 hours.       Lab Results   Component Value Date/Time    SPECIAL 12ML LT [facility], [place] 07/26/2018 01:36 AM     Lab Results   Component Value Date/Time    CULTURE (A) 07/26/2018 01:36 AM     POSITIVE BLOOD CULTURE, RN NOTIFIED: José Miguel Monahan DALI 7/27/18 0735    CULTURE (A) 07/26/2018 01:36 AM     DIRECT GRAM STAIN FROM BOTTLE: GRAM POSITIVE COCCI IN CLUSTERS    CULTURE (A) 07/26/2018 01:36 AM     ID by PNAFISH: STAPHYLOCOCCUS SPECIES, COAGULASE NEGATIVE    CULTURE (A) 07/26/2018 01:36 AM     STAPHYLOCOCCUS SPECIES, COAGULASE NEGATIVE A single positive blood culture of    CULTURE (A) 07/26/2018 01:36 AM      coagulase negative staphylococci, micrococci, diphtheroids or Bacillus species    CULTURE (A) 07/26/2018 01:36 AM      should be interpreted with caution and viewed as likely a skin contaminant. Lab Results   Component Value Date    POCPH 7.36 07/26/2018    POCPCO2 56 07/26/2018    POCPO2 65 07/26/2018    POCHCO3 31.9 07/26/2018    NBEA NOT REPORTED 07/26/2018    PBEA 7 07/26/2018    PIP6RQJ 34 07/26/2018    HTJF2QES 91 07/26/2018    FIO2 28.0 07/26/2018       Radiology:  CTA chest  1. There is no evidence of pulmonary AVM or post treatment findings   consistent with previous AVM.  If there is comparison outside imaging,   correlation can be made and an addendum added. 2. Subsegmental atelectasis in the right upper lobe anteriorly and right   middle lobe.  Recommend follow-up plain film or CT evaluation to ensure   resolution. 3. Moderate atherosclerotic changes in the aorta and coronary circulation. Incidental note of an aberrant origin of the right subclavian artery from the   distal aortic arch. Echocardiogram  Technically difficult study. Left ventricle is normal in size  Global left ventricular systolic function is normal  Estimated ejection fraction is 60 % . No significant valvular regurgitation or stenosis seen. No pericardial effusion seen. Normal aortic root dimension.         Physical Examination:        General appearance:  alert, cooperative and Mild distress  Mental Status:  oriented to person, place and time and Anxious  Lungs:  Air entry is diminished bilaterally, expiratory phase is prolonged, scattered wheezing all over  Heart:  regular rate and rhythm, no murmur  Abdomen:  soft, nontender, nondistended, normal bowel sounds, no masses, hepatomegaly, splenomegaly  Extremities:  no edema, redness, tenderness in the calves  Skin:  no gross lesions, rashes, induration    Assessment:        Primary Problem  COPD exacerbation (HCC)-Slow improvement    Active Hospital Problems    Diagnosis Date Noted    COPD exacerbation ([facility] [J44.1] 07/30/2015    Acute respiratory failure ([facility] [J96.00] 07/30/2015    Pneumonia [J18.9] 07/30/2015    Obesity [E66.9] 05/22/2012    COPD (chronic obstructive pulmonary disease) ([facility] [J44.9] 05/22/2012    Diabetes mellitus ([facility] [E11.9] 05/22/2012       Plan:        1. Continue same plan of treatment  2. Change insulin sliding scale to high intensity  3. Check TSH  4. Ambien 5 mg daily at bedtime when necessary for sleep  5.  Monitor vitals and labs    Paulo Zaafr MD  7/28/2018  8:46 AM

## 2018-07-29 LAB
ABSOLUTE EOS #: 0 K/UL (ref 0–0.4)
ABSOLUTE IMMATURE GRANULOCYTE: ABNORMAL K/UL (ref 0–0.3)
ABSOLUTE LYMPH #: 8.41 K/UL (ref 1–4.8)
ABSOLUTE MONO #: 1.16 K/UL (ref 0.2–0.8)
ANION GAP SERPL CALCULATED.3IONS-SCNC: 13 MMOL/L (ref 9–17)
BASOPHILS # BLD: 0 % (ref 0–2)
BASOPHILS ABSOLUTE: 0 K/UL (ref 0–0.2)
BUN BLDV-MCNC: 27 MG/DL (ref 8–23)
BUN/CREAT BLD: 44 (ref 9–20)
CALCIUM SERPL-MCNC: 9.6 MG/DL (ref 8.6–10.4)
CHLORIDE BLD-SCNC: 100 MMOL/L (ref 98–107)
CO2: 25 MMOL/L (ref 20–31)
CREAT SERPL-MCNC: 0.62 MG/DL (ref 0.5–0.9)
CULTURE: ABNORMAL
DIFFERENTIAL TYPE: ABNORMAL
EOSINOPHILS RELATIVE PERCENT: 0 % (ref 1–4)
GFR AFRICAN AMERICAN: >60 ML/MIN
GFR NON-AFRICAN AMERICAN: >60 ML/MIN
GFR SERPL CREATININE-BSD FRML MDRD: ABNORMAL ML/MIN/{1.73_M2}
GFR SERPL CREATININE-BSD FRML MDRD: ABNORMAL ML/MIN/{1.73_M2}
GLUCOSE BLD-MCNC: 201 MG/DL (ref 65–105)
GLUCOSE BLD-MCNC: 270 MG/DL (ref 65–105)
GLUCOSE BLD-MCNC: 277 MG/DL (ref 65–105)
GLUCOSE BLD-MCNC: 279 MG/DL (ref 65–105)
GLUCOSE BLD-MCNC: 282 MG/DL (ref 70–99)
GLUCOSE BLD-MCNC: 391 MG/DL (ref 65–105)
HCT VFR BLD CALC: 37 % (ref 36–46)
HEMOGLOBIN: 12.6 G/DL (ref 12–16)
IMMATURE GRANULOCYTES: ABNORMAL %
LYMPHOCYTES # BLD: 51 % (ref 24–44)
Lab: ABNORMAL
MAGNESIUM: 2.2 MG/DL (ref 1.6–2.6)
MCH RBC QN AUTO: 30.2 PG (ref 26–34)
MCHC RBC AUTO-ENTMCNC: 34.1 G/DL (ref 31–37)
MCV RBC AUTO: 88.7 FL (ref 80–100)
MONOCYTES # BLD: 7 % (ref 1–7)
MORPHOLOGY: ABNORMAL
NRBC AUTOMATED: ABNORMAL PER 100 WBC
PDW BLD-RTO: 14.9 % (ref 11.5–14.5)
PLATELET # BLD: 103 K/UL (ref 130–400)
PLATELET ESTIMATE: ABNORMAL
PMV BLD AUTO: ABNORMAL FL (ref 6–12)
POTASSIUM SERPL-SCNC: 4.5 MMOL/L (ref 3.7–5.3)
RBC # BLD: 4.18 M/UL (ref 4–5.2)
RBC # BLD: ABNORMAL 10*6/UL
SEG NEUTROPHILS: 42 % (ref 36–66)
SEGMENTED NEUTROPHILS ABSOLUTE COUNT: 6.93 K/UL (ref 1.8–7.7)
SODIUM BLD-SCNC: 138 MMOL/L (ref 135–144)
SPECIMEN DESCRIPTION: ABNORMAL
STATUS: ABNORMAL
WBC # BLD: 16.5 K/UL (ref 3.5–11)
WBC # BLD: ABNORMAL 10*3/UL

## 2018-07-29 PROCEDURE — 2060000000 HC ICU INTERMEDIATE R&B

## 2018-07-29 PROCEDURE — 82947 ASSAY GLUCOSE BLOOD QUANT: CPT

## 2018-07-29 PROCEDURE — 6360000002 HC RX W HCPCS: Performed by: NURSE PRACTITIONER

## 2018-07-29 PROCEDURE — 36415 COLL VENOUS BLD VENIPUNCTURE: CPT

## 2018-07-29 PROCEDURE — 6370000000 HC RX 637 (ALT 250 FOR IP): Performed by: NURSE PRACTITIONER

## 2018-07-29 PROCEDURE — 99232 SBSQ HOSP IP/OBS MODERATE 35: CPT | Performed by: INTERNAL MEDICINE

## 2018-07-29 PROCEDURE — 94640 AIRWAY INHALATION TREATMENT: CPT

## 2018-07-29 PROCEDURE — 2700000000 HC OXYGEN THERAPY PER DAY

## 2018-07-29 PROCEDURE — 85025 COMPLETE CBC W/AUTO DIFF WBC: CPT

## 2018-07-29 PROCEDURE — 6370000000 HC RX 637 (ALT 250 FOR IP): Performed by: INTERNAL MEDICINE

## 2018-07-29 PROCEDURE — 94760 N-INVAS EAR/PLS OXIMETRY 1: CPT

## 2018-07-29 PROCEDURE — 2580000003 HC RX 258: Performed by: NURSE PRACTITIONER

## 2018-07-29 PROCEDURE — 6360000002 HC RX W HCPCS: Performed by: INTERNAL MEDICINE

## 2018-07-29 PROCEDURE — 83735 ASSAY OF MAGNESIUM: CPT

## 2018-07-29 PROCEDURE — 80048 BASIC METABOLIC PNL TOTAL CA: CPT

## 2018-07-29 RX ORDER — METFORMIN HYDROCHLORIDE 750 MG/1
750 TABLET, EXTENDED RELEASE ORAL
Status: DISCONTINUED | OUTPATIENT
Start: 2018-07-29 | End: 2018-07-30 | Stop reason: HOSPADM

## 2018-07-29 RX ORDER — DIPHENHYDRAMINE HCL 25 MG
25 TABLET ORAL NIGHTLY PRN
Status: DISCONTINUED | OUTPATIENT
Start: 2018-07-29 | End: 2018-07-30 | Stop reason: HOSPADM

## 2018-07-29 RX ORDER — METHYLPREDNISOLONE SODIUM SUCCINATE 40 MG/ML
20 INJECTION, POWDER, LYOPHILIZED, FOR SOLUTION INTRAMUSCULAR; INTRAVENOUS EVERY 8 HOURS
Status: DISCONTINUED | OUTPATIENT
Start: 2018-07-29 | End: 2018-07-30

## 2018-07-29 RX ADMIN — IPRATROPIUM BROMIDE AND ALBUTEROL SULFATE 1 AMPULE: .5; 3 SOLUTION RESPIRATORY (INHALATION) at 19:37

## 2018-07-29 RX ADMIN — ENOXAPARIN SODIUM 40 MG: 100 INJECTION SUBCUTANEOUS at 08:32

## 2018-07-29 RX ADMIN — METFORMIN HYDROCHLORIDE 750 MG: 750 TABLET, EXTENDED RELEASE ORAL at 08:32

## 2018-07-29 RX ADMIN — METHYLPREDNISOLONE SODIUM SUCCINATE 20 MG: 40 INJECTION, POWDER, FOR SOLUTION INTRAMUSCULAR; INTRAVENOUS at 17:00

## 2018-07-29 RX ADMIN — GABAPENTIN 300 MG: 300 CAPSULE ORAL at 20:35

## 2018-07-29 RX ADMIN — SIMVASTATIN 40 MG: 40 TABLET, FILM COATED ORAL at 08:32

## 2018-07-29 RX ADMIN — METHYLPREDNISOLONE SODIUM SUCCINATE 20 MG: 40 INJECTION, POWDER, FOR SOLUTION INTRAMUSCULAR; INTRAVENOUS at 08:33

## 2018-07-29 RX ADMIN — DIPHENHYDRAMINE HCL 25 MG: 25 TABLET ORAL at 23:06

## 2018-07-29 RX ADMIN — ALPRAZOLAM 1 MG: 1 TABLET ORAL at 20:45

## 2018-07-29 RX ADMIN — LISINOPRIL 10 MG: 10 TABLET ORAL at 08:32

## 2018-07-29 RX ADMIN — CEFTRIAXONE SODIUM 1 G: 1 INJECTION, POWDER, FOR SOLUTION INTRAMUSCULAR; INTRAVENOUS at 02:40

## 2018-07-29 RX ADMIN — Medication 10 ML: at 20:36

## 2018-07-29 RX ADMIN — IPRATROPIUM BROMIDE AND ALBUTEROL SULFATE 1 AMPULE: .5; 3 SOLUTION RESPIRATORY (INHALATION) at 11:07

## 2018-07-29 RX ADMIN — ALPRAZOLAM 1 MG: 1 TABLET ORAL at 08:53

## 2018-07-29 RX ADMIN — INSULIN LISPRO 15 UNITS: 100 INJECTION, SOLUTION INTRAVENOUS; SUBCUTANEOUS at 12:17

## 2018-07-29 RX ADMIN — GABAPENTIN 300 MG: 300 CAPSULE ORAL at 17:00

## 2018-07-29 RX ADMIN — ALBUTEROL SULFATE 2.5 MG: 2.5 SOLUTION RESPIRATORY (INHALATION) at 08:28

## 2018-07-29 RX ADMIN — GABAPENTIN 300 MG: 300 CAPSULE ORAL at 12:17

## 2018-07-29 RX ADMIN — Medication 10 ML: at 08:33

## 2018-07-29 RX ADMIN — GABAPENTIN 300 MG: 300 CAPSULE ORAL at 08:32

## 2018-07-29 RX ADMIN — Medication 10 ML: at 02:43

## 2018-07-29 RX ADMIN — INSULIN LISPRO 6 UNITS: 100 INJECTION, SOLUTION INTRAVENOUS; SUBCUTANEOUS at 17:01

## 2018-07-29 RX ADMIN — INSULIN LISPRO 5 UNITS: 100 INJECTION, SOLUTION INTRAVENOUS; SUBCUTANEOUS at 20:45

## 2018-07-29 RX ADMIN — PANTOPRAZOLE SODIUM 40 MG: 40 TABLET, DELAYED RELEASE ORAL at 06:37

## 2018-07-29 RX ADMIN — METHYLPREDNISOLONE SODIUM SUCCINATE 80 MG: 125 INJECTION, POWDER, FOR SOLUTION INTRAMUSCULAR; INTRAVENOUS at 02:41

## 2018-07-29 RX ADMIN — FLUOXETINE 20 MG: 20 CAPSULE ORAL at 08:32

## 2018-07-29 RX ADMIN — INSULIN LISPRO 9 UNITS: 100 INJECTION, SOLUTION INTRAVENOUS; SUBCUTANEOUS at 08:33

## 2018-07-29 RX ADMIN — IPRATROPIUM BROMIDE AND ALBUTEROL SULFATE 1 AMPULE: .5; 3 SOLUTION RESPIRATORY (INHALATION) at 15:16

## 2018-07-29 RX ADMIN — AZITHROMYCIN MONOHYDRATE 250 MG: 250 TABLET ORAL at 08:32

## 2018-07-29 NOTE — PROGRESS NOTES
Spoke with patient about home O2  Pt wears 3L at all times  Does not remember what company it is through.  Pt was instructed to have someone bring in her O2 concentrator in case she is D/C'ed tomorrow and also look to see what company her O2 concentrator is through

## 2018-07-29 NOTE — PROGRESS NOTES
antibiotics with regard to pneumonia in conjunction with her COPD exacerbation.  Cath'd urinalysis did not demonstrate abnormality on initial dip.  D-dimer returns negative. Freddy Gallagher is discussed with internal medicine to facilitate admission for further care and support with regard to her COPD exacerbation, congestive heart disease and likely pneumonia presentation\"     Patient was admitted for further care. Review of Systems:     Constitutional:  negative for chills, fevers, sweats  Respiratory:  negative for cough, +dyspnea on exertion,  shortness of breath, wheezing-all improved  Cardiovascular:  negative for chest pain, chest pressure/discomfort, lower extremity edema, palpitations  Gastrointestinal:  negative for abdominal pain, constipation, diarrhea, nausea, vomiting  Neurological:  negative for dizziness, headache    Medications: Allergies:  No Known Allergies    Current Meds:   Scheduled Meds:    insulin lispro  0-18 Units Subcutaneous TID WC    insulin lispro  0-9 Units Subcutaneous Nightly    pantoprazole  40 mg Oral QAM AC    sodium chloride flush  10 mL Intravenous 2 times per day    enoxaparin  40 mg Subcutaneous Daily    methylPREDNISolone  80 mg Intravenous Q8H    azithromycin  250 mg Oral Daily    cefTRIAXone (ROCEPHIN) IV  1 g Intravenous Q24H    sodium chloride  1,000 mL Intravenous Once    FLUoxetine  20 mg Oral Daily    gabapentin  300 mg Oral 4x Daily    lisinopril  10 mg Oral Daily    simvastatin  40 mg Oral Daily     Continuous Infusions:    dextrose       PRN Meds: zolpidem, sodium chloride flush, sodium chloride flush, magnesium hydroxide, bisacodyl, nicotine, albuterol, ipratropium-albuterol, ondansetron **OR** ondansetron, acetaminophen, glucose, dextrose, glucagon (rDNA), dextrose, ALPRAZolam, HYDROcodone 5 mg - acetaminophen    Data:     Past Medical History:   has a past medical history of COPD (chronic obstructive pulmonary disease) ([facility]  Depression; Diabetes single positive blood culture of    CULTURE (A) 07/26/2018 01:36 AM      coagulase negative staphylococci, micrococci, diphtheroids or Bacillus species    CULTURE (A) 07/26/2018 01:36 AM      should be interpreted with caution and viewed as likely a skin contaminant. Lab Results   Component Value Date    POCPH 7.36 07/26/2018    POCPCO2 56 07/26/2018    POCPO2 65 07/26/2018    POCHCO3 31.9 07/26/2018    NBEA NOT REPORTED 07/26/2018    PBEA 7 07/26/2018    KVJ9IAX 34 07/26/2018    GJOW6BCB 91 07/26/2018    FIO2 28.0 07/26/2018       Radiology:  CTA chest  1. There is no evidence of pulmonary AVM or post treatment findings   consistent with previous AVM.  If there is comparison outside imaging,   correlation can be made and an addendum added. 2. Subsegmental atelectasis in the right upper lobe anteriorly and right   middle lobe.  Recommend follow-up plain film or CT evaluation to ensure   resolution. 3. Moderate atherosclerotic changes in the aorta and coronary circulation. Incidental note of an aberrant origin of the right subclavian artery from the   distal aortic arch. Echocardiogram  Technically difficult study. Left ventricle is normal in size  Global left ventricular systolic function is normal  Estimated ejection fraction is 60 % . No significant valvular regurgitation or stenosis seen. No pericardial effusion seen. Normal aortic root dimension.         Physical Examination:        General appearance:  alert, cooperative and No distress  Mental Status:  oriented to person, place and time and Anxious  Lungs:  Air entry is diminished bilaterally, expiratory phase is prolonged, scattered wheezing all over but much better  Heart:  regular rate and rhythm, no murmur  Abdomen:  soft, nontender, nondistended, normal bowel sounds, no masses, hepatomegaly, splenomegaly  Extremities:  no edema, redness, tenderness in the calves  Skin:  no gross lesions, rashes, induration    Assessment:        Primary Problem  COPD exacerbation (HCC)-With bronchitis -Slow improvement  Leukocytosis-trending down  Sick euthyroid  Active Hospital Problems    Diagnosis Date Noted    [facility] exacerbation ([email or web address] [J44.1] 07/30/2015    Acute respiratory failure ([place] [address] [J96.00] 07/30/2015    Pneumonia [J18.9] 07/30/2015    Obesity [E66.9] 05/22/2012    COPD (chronic obstructive pulmonary disease) ([email or web address] [J44.9] 05/22/2012    Diabetes mellitus ([email or web address] [E11.9] 05/22/2012     Less likely to be sepsis, radiology: No pneumonia    Plan:        1. Restart Glucophage  mg daily. 2. Continue insulin sliding scale high intensity  3. Reduce IV steroids to 20 mg every 8 hours, will change to oral tomorrow  4. Benadryl 25 mg daily at bedtime when necessary for sleep  5.  Monitor vitals and labs, she will need repeat thyroid function test after 1 month    We will plan to discharge tomorrow on oral antibiotics and oral steroids  She uses 3 L oxygen at home    Leonor Vazquez MD  7/29/2018  7:17 AM

## 2018-07-29 NOTE — PLAN OF CARE
Problem: Falls - Risk of:  Goal: Will remain free from falls  Will remain free from falls   Outcome: Ongoing  Patient will use call light prior to ambulating. Bed in my lowest position, wheels locked, call light within reach.  RN will continue to monitor on hourly rounds and as needed    Comments: Careplan reviewed with patient

## 2018-07-30 VITALS
DIASTOLIC BLOOD PRESSURE: 58 MMHG | OXYGEN SATURATION: 97 % | HEIGHT: 57 IN | BODY MASS INDEX: 42.89 KG/M2 | TEMPERATURE: 97.7 F | HEART RATE: 62 BPM | SYSTOLIC BLOOD PRESSURE: 125 MMHG | WEIGHT: 198.8 LBS | RESPIRATION RATE: 18 BRPM

## 2018-07-30 LAB
ABSOLUTE EOS #: 0 K/UL (ref 0–0.4)
ABSOLUTE IMMATURE GRANULOCYTE: ABNORMAL K/UL (ref 0–0.3)
ABSOLUTE LYMPH #: 9.76 K/UL (ref 1–4.8)
ABSOLUTE MONO #: 0.16 K/UL (ref 0.2–0.8)
ANION GAP SERPL CALCULATED.3IONS-SCNC: 11 MMOL/L (ref 9–17)
BASOPHILS # BLD: 0 %
BASOPHILS ABSOLUTE: 0 K/UL (ref 0–0.2)
BUN BLDV-MCNC: 25 MG/DL (ref 8–23)
BUN/CREAT BLD: 40 (ref 9–20)
CALCIUM SERPL-MCNC: 9.2 MG/DL (ref 8.6–10.4)
CHLORIDE BLD-SCNC: 102 MMOL/L (ref 98–107)
CO2: 27 MMOL/L (ref 20–31)
CREAT SERPL-MCNC: 0.63 MG/DL (ref 0.5–0.9)
DIFFERENTIAL TYPE: ABNORMAL
EOSINOPHILS RELATIVE PERCENT: 0 % (ref 1–4)
GFR AFRICAN AMERICAN: >60 ML/MIN
GFR NON-AFRICAN AMERICAN: >60 ML/MIN
GFR SERPL CREATININE-BSD FRML MDRD: ABNORMAL ML/MIN/{1.73_M2}
GFR SERPL CREATININE-BSD FRML MDRD: ABNORMAL ML/MIN/{1.73_M2}
GLUCOSE BLD-MCNC: 215 MG/DL (ref 70–99)
GLUCOSE BLD-MCNC: 236 MG/DL (ref 65–105)
GLUCOSE BLD-MCNC: 255 MG/DL (ref 65–105)
HCT VFR BLD CALC: 38 % (ref 36–46)
HEMOGLOBIN: 12.4 G/DL (ref 12–16)
IMMATURE GRANULOCYTES: ABNORMAL %
LYMPHOCYTES # BLD: 61 % (ref 24–44)
MAGNESIUM: 2.2 MG/DL (ref 1.6–2.6)
MCH RBC QN AUTO: 29 PG (ref 26–34)
MCHC RBC AUTO-ENTMCNC: 32.6 G/DL (ref 31–37)
MCV RBC AUTO: 89 FL (ref 80–100)
MONOCYTES # BLD: 1 % (ref 1–7)
MORPHOLOGY: ABNORMAL
NRBC AUTOMATED: ABNORMAL PER 100 WBC
PDW BLD-RTO: 15.9 % (ref 11.5–14.5)
PLATELET # BLD: 89 K/UL (ref 130–400)
PLATELET ESTIMATE: ABNORMAL
PMV BLD AUTO: 8.4 FL (ref 6–12)
POTASSIUM SERPL-SCNC: 4.1 MMOL/L (ref 3.7–5.3)
RBC # BLD: 4.27 M/UL (ref 4–5.2)
RBC # BLD: ABNORMAL 10*6/UL
SEG NEUTROPHILS: 38 % (ref 36–66)
SEGMENTED NEUTROPHILS ABSOLUTE COUNT: 6.08 K/UL (ref 1.8–7.7)
SODIUM BLD-SCNC: 140 MMOL/L (ref 135–144)
WBC # BLD: 16 K/UL (ref 3.5–11)
WBC # BLD: ABNORMAL 10*3/UL

## 2018-07-30 PROCEDURE — 2700000000 HC OXYGEN THERAPY PER DAY

## 2018-07-30 PROCEDURE — 82947 ASSAY GLUCOSE BLOOD QUANT: CPT

## 2018-07-30 PROCEDURE — 6370000000 HC RX 637 (ALT 250 FOR IP): Performed by: NURSE PRACTITIONER

## 2018-07-30 PROCEDURE — 85025 COMPLETE CBC W/AUTO DIFF WBC: CPT

## 2018-07-30 PROCEDURE — 94760 N-INVAS EAR/PLS OXIMETRY 1: CPT

## 2018-07-30 PROCEDURE — 99239 HOSP IP/OBS DSCHRG MGMT >30: CPT | Performed by: INTERNAL MEDICINE

## 2018-07-30 PROCEDURE — 94640 AIRWAY INHALATION TREATMENT: CPT

## 2018-07-30 PROCEDURE — 6360000002 HC RX W HCPCS: Performed by: NURSE PRACTITIONER

## 2018-07-30 PROCEDURE — 6360000002 HC RX W HCPCS: Performed by: INTERNAL MEDICINE

## 2018-07-30 PROCEDURE — 2580000003 HC RX 258: Performed by: NURSE PRACTITIONER

## 2018-07-30 PROCEDURE — 83735 ASSAY OF MAGNESIUM: CPT

## 2018-07-30 PROCEDURE — 36415 COLL VENOUS BLD VENIPUNCTURE: CPT

## 2018-07-30 PROCEDURE — 6370000000 HC RX 637 (ALT 250 FOR IP): Performed by: INTERNAL MEDICINE

## 2018-07-30 PROCEDURE — 80048 BASIC METABOLIC PNL TOTAL CA: CPT

## 2018-07-30 RX ORDER — PREDNISONE 20 MG/1
20 TABLET ORAL DAILY
Qty: 3 TABLET | Refills: 0 | Status: SHIPPED | OUTPATIENT
Start: 2018-08-05 | End: 2018-08-08

## 2018-07-30 RX ORDER — METFORMIN HYDROCHLORIDE 750 MG/1
750 TABLET, EXTENDED RELEASE ORAL
Qty: 30 TABLET | Refills: 3 | Status: SHIPPED | OUTPATIENT
Start: 2018-07-31 | End: 2018-11-29 | Stop reason: SDUPTHER

## 2018-07-30 RX ORDER — PREDNISONE 10 MG/1
10 TABLET ORAL DAILY
Status: DISCONTINUED | OUTPATIENT
Start: 2018-08-08 | End: 2018-07-30 | Stop reason: HOSPADM

## 2018-07-30 RX ORDER — PREDNISONE 10 MG/1
10 TABLET ORAL DAILY
Qty: 3 TABLET | Refills: 0 | Status: SHIPPED | OUTPATIENT
Start: 2018-08-08 | End: 2018-08-11

## 2018-07-30 RX ORDER — PREDNISONE 10 MG/1
30 TABLET ORAL DAILY
Qty: 9 TABLET | Refills: 0 | Status: SHIPPED | OUTPATIENT
Start: 2018-08-02 | End: 2018-08-05

## 2018-07-30 RX ORDER — PREDNISONE 20 MG/1
40 TABLET ORAL DAILY
Status: DISCONTINUED | OUTPATIENT
Start: 2018-07-30 | End: 2018-07-30 | Stop reason: HOSPADM

## 2018-07-30 RX ORDER — PREDNISONE 20 MG/1
20 TABLET ORAL DAILY
Status: DISCONTINUED | OUTPATIENT
Start: 2018-08-05 | End: 2018-07-30 | Stop reason: HOSPADM

## 2018-07-30 RX ORDER — DIPHENHYDRAMINE HCL 25 MG
25 TABLET ORAL NIGHTLY PRN
Qty: 14 TABLET | Refills: 0 | Status: SHIPPED | OUTPATIENT
Start: 2018-07-30 | End: 2018-08-13

## 2018-07-30 RX ORDER — PREDNISONE 20 MG/1
40 TABLET ORAL DAILY
Qty: 6 TABLET | Refills: 0 | Status: SHIPPED | OUTPATIENT
Start: 2018-07-30 | End: 2018-08-02

## 2018-07-30 RX ADMIN — PANTOPRAZOLE SODIUM 40 MG: 40 TABLET, DELAYED RELEASE ORAL at 05:49

## 2018-07-30 RX ADMIN — ENOXAPARIN SODIUM 40 MG: 100 INJECTION SUBCUTANEOUS at 08:17

## 2018-07-30 RX ADMIN — INSULIN LISPRO 9 UNITS: 100 INJECTION, SOLUTION INTRAVENOUS; SUBCUTANEOUS at 12:23

## 2018-07-30 RX ADMIN — ALPRAZOLAM 1 MG: 1 TABLET ORAL at 08:17

## 2018-07-30 RX ADMIN — METHYLPREDNISOLONE SODIUM SUCCINATE 20 MG: 40 INJECTION, POWDER, FOR SOLUTION INTRAMUSCULAR; INTRAVENOUS at 08:09

## 2018-07-30 RX ADMIN — AZITHROMYCIN MONOHYDRATE 250 MG: 250 TABLET ORAL at 08:08

## 2018-07-30 RX ADMIN — INSULIN LISPRO 6 UNITS: 100 INJECTION, SOLUTION INTRAVENOUS; SUBCUTANEOUS at 08:09

## 2018-07-30 RX ADMIN — CEFTRIAXONE SODIUM 1 G: 1 INJECTION, POWDER, FOR SOLUTION INTRAMUSCULAR; INTRAVENOUS at 01:39

## 2018-07-30 RX ADMIN — SIMVASTATIN 40 MG: 40 TABLET, FILM COATED ORAL at 08:09

## 2018-07-30 RX ADMIN — GABAPENTIN 300 MG: 300 CAPSULE ORAL at 08:09

## 2018-07-30 RX ADMIN — FLUOXETINE 20 MG: 20 CAPSULE ORAL at 08:09

## 2018-07-30 RX ADMIN — IPRATROPIUM BROMIDE AND ALBUTEROL SULFATE 1 AMPULE: .5; 3 SOLUTION RESPIRATORY (INHALATION) at 07:20

## 2018-07-30 RX ADMIN — GABAPENTIN 300 MG: 300 CAPSULE ORAL at 12:23

## 2018-07-30 RX ADMIN — METFORMIN HYDROCHLORIDE 750 MG: 750 TABLET, EXTENDED RELEASE ORAL at 08:17

## 2018-07-30 RX ADMIN — Medication 10 ML: at 08:11

## 2018-07-30 RX ADMIN — LISINOPRIL 10 MG: 10 TABLET ORAL at 08:08

## 2018-07-30 RX ADMIN — METHYLPREDNISOLONE SODIUM SUCCINATE 20 MG: 40 INJECTION, POWDER, FOR SOLUTION INTRAMUSCULAR; INTRAVENOUS at 01:39

## 2018-07-30 RX ADMIN — IPRATROPIUM BROMIDE AND ALBUTEROL SULFATE 1 AMPULE: .5; 3 SOLUTION RESPIRATORY (INHALATION) at 11:22

## 2018-07-30 NOTE — PROGRESS NOTES
0 ml   Net              208 ml       General Appearance:  Alert, in no acute distress  Mental status: oriented to person, place, and time with normal affect  Head:  normocephalic, atraumatic. Eye: no icterus, redness, pupils equal and reactive, extraocular eye movements intact, conjunctiva clear  Ear: normal external ear, no discharge, hearing intact  Nose:  no drainage noted  Mouth: mucous membranes moist  Neck: supple, no carotid bruits, thyroid not palpable  Lungs: Bilateral equal air entry, clear to ausculation, no wheezing, rales or rhonchi, normal effort  Cardiovascular: normal rate, regular rhythm, no murmur, gallop, rub. Abdomen: Soft, nontender, nondistended, normal bowel sounds, no hepatomegaly or splenomegaly  Neurologic: There are no new focal motor or sensory deficits, normal muscle tone and bulk, no abnormal sensation, normal speech, cranial nerves II through XII grossly intact  Skin: No gross lesions, rashes, bruising or bleeding on exposed skin area  Extremities:  peripheral pulses palpable, no pedal edema or calf pain with palpation  Psych: normal affect      Assessment:        Principal Problem:    COPD exacerbation (HCC)  Active Problems:    Obesity    COPD (chronic obstructive pulmonary disease) (HCC)    Diabetes mellitus ([facility]    Acute respiratory failure (HCC)    Pneumonia  Resolved Problems:    * No resolved hospital problems. *      Plan:        Principal Problem:    COPD exacerbation ([facility]  Active Problems:    Obesity    COPD (chronic obstructive pulmonary disease) (HCC)    Diabetes mellitus ([facility]    Ac[facility] ([facility]    Pneumonia  Resolved Problems:    * No resolved hospital problems. *    Acute COPD exacerbation: stable Respiratory care, inhalers, oxygen ,continous pulse ox, spirometer , steroids, antibiotics    Acute SOB on presentation with mildly increased D Dimer. CTA Chest with no PE.      Elevated ProBNP with mild interstitial prominence.  One dose of IV lasix given. Monitor I/O. Salt and fluid restriction.      Possible PNA with Leukocytosis and Lactic Acidosis. Completed course of azithro.      Hyperkalemia, resolved. Monitor on tele. If worsens, will treat with insulin/dextrose     DM, uncontrolled: Insulin sliding scale, Hypoglycemia protocol, Resume home doses of antidiabetic medications, diabetic diet once patient is not NPO. Increase SSI.      HTN: stable,resume home meds     Check Electrolytes and Electrolytes replacement as needed      Morbid obesity complicating factor.      DVT prophylaxis: Lovenox 40 mg SC     PT, OT as needed. Discharge with home health tonight.      IV Fluids: dextrose,    Nutrition:  DIET CARDIAC;      Consultations:   IP CONSULT TO INTERNAL MEDICINE      Roby العراقي MD  7/30/2018  7:18 AM

## 2018-07-31 ENCOUNTER — CARE COORDINATION (OUTPATIENT)
Dept: CASE MANAGEMENT | Age: 73
End: 2018-07-31

## 2018-07-31 ENCOUNTER — TELEPHONE (OUTPATIENT)
Dept: FAMILY MEDICINE CLINIC | Age: 73
End: 2018-07-31

## 2018-07-31 DIAGNOSIS — J96.00 ACUTE RESPIRATORY FAILURE, UNSPECIFIED WHETHER WITH HYPOXIA OR HYPERCAPNIA (HCC): ICD-10-CM

## 2018-07-31 DIAGNOSIS — J44.1 COPD EXACERBATION (HCC): ICD-10-CM

## 2018-07-31 DIAGNOSIS — J44.9 CHRONIC OBSTRUCTIVE PULMONARY DISEASE, UNSPECIFIED COPD TYPE (HCC): Primary | ICD-10-CM

## 2018-07-31 PROCEDURE — 1111F DSCHRG MED/CURRENT MED MERGE: CPT | Performed by: FAMILY MEDICINE

## 2018-07-31 NOTE — DISCHARGE SUMMARY
07/30/2018    RDW 15.9 07/30/2018    PLT 89 07/30/2018     02/21/2012     BMP:    Lab Results   Component Value Date    GLUCOSE 215 07/30/2018    GLUCOSE 149 02/21/2012     07/30/2018    K 4.1 07/30/2018     07/30/2018    CO2 27 07/30/2018    ANIONGAP 11 07/30/2018    BUN 25 07/30/2018    CREATININE 0.63 07/30/2018    BUNCRER 40 07/30/2018    CALCIUM 9.2 07/30/2018    LABGLOM >60 07/30/2018    GFRAA >60 07/30/2018    GFR      07/30/2018    GFR NOT REPORTED 07/30/2018     HFP:    Lab Results   Component Value Date    PROT 7.7 07/11/2018     CMP:    Lab Results   Component Value Date    GLUCOSE 215 07/30/2018    GLUCOSE 149 02/21/2012     07/30/2018    K 4.1 07/30/2018     07/30/2018    CO2 27 07/30/2018    BUN 25 07/30/2018    CREATININE 0.63 07/30/2018    ANIONGAP 11 07/30/2018    ALKPHOS 77 07/11/2018    ALT 15 07/11/2018    AST 18 07/11/2018    BILITOT 0.26 07/11/2018    LABALBU 4.5 07/11/2018    LABALBU 4.4 02/21/2012    ALBUMIN 1.4 07/11/2018    LABGLOM >60 07/30/2018    GFRAA >60 07/30/2018    GFR      07/30/2018    GFR NOT REPORTED 07/30/2018    PROT 7.7 07/11/2018    CALCIUM 9.2 07/30/2018     PT/INR:  No results found for: PTINR  PTT: No results found for: APTT  FLP:    Lab Results   Component Value Date    CHOL 163 07/11/2018    TRIG 205 07/11/2018    HDL 55 07/11/2018     U/A:    Lab Results   Component Value Date    COLORU YELLOW 07/26/2018    TURBIDITY CLEAR 07/26/2018    SPECGRAV 1.010 07/26/2018    HGBUR NEGATIVE 07/26/2018    PHUR 6.0 07/26/2018    PROTEINU NEGATIVE 07/26/2018    GLUCOSEU NEGATIVE 07/26/2018    KETUA NEGATIVE 07/26/2018    BILIRUBINUR NEGATIVE 07/26/2018    BILIRUBINUR neg 09/21/2017    UROBILINOGEN Normal 07/26/2018    NITRU NEGATIVE 07/26/2018    LEUKOCYTESUR NEGATIVE 07/26/2018     TSH:    Lab Results   Component Value Date    TSH 0.26 07/28/2018       Radiology:    Xr Knee Left (3 Views)    Result Date: 7/11/2018  EXAMINATION: 3 XRAY VIEWS OF THE

## 2018-07-31 NOTE — TELEPHONE ENCOUNTER
was following up to see if you had an order for the portable oxygen and medical necessity letter for the PT.

## 2018-07-31 NOTE — CARE COORDINATION
services the patient is currently using-call to [facility] about O2 delivery  Assessment and support for treatment adherence and medication management-med rec and 1111F complete    Care Transitions 24 Hour Call    Do you have any ongoing symptoms?:  No  Do you have a copy of your discharge instructions?:  Yes  Do you have all of your prescriptions and are they filled?:  Yes  Have you been contacted by a [facility] Pharmacist?:  No  Have you scheduled your follow up appointment?:  Yes  Were you discharged with any Home Care or Post Acute Services:  Yes  Post Acute Services:  Home Health (Comment:  [address])  Do you feel like you have everything you need to keep you well at home?:  Yes  Care Transitions Interventions         Follow Up  Future Appointments  Date Time Provider Thad Jose   8/3/2018 2:45 MD EVANS Jacobsen [facility]   9/11/2018 1:15 PM MD Jefry Salazar RN

## 2018-08-01 LAB
CULTURE: NORMAL
Lab: NORMAL
SPECIMEN DESCRIPTION: NORMAL
STATUS: NORMAL

## 2018-08-03 ENCOUNTER — OFFICE VISIT (OUTPATIENT)
Dept: FAMILY MEDICINE CLINIC | Age: 73
End: 2018-08-03
Payer: COMMERCIAL

## 2018-08-03 VITALS
BODY MASS INDEX: 43.93 KG/M2 | OXYGEN SATURATION: 90 % | DIASTOLIC BLOOD PRESSURE: 60 MMHG | HEART RATE: 114 BPM | SYSTOLIC BLOOD PRESSURE: 122 MMHG | WEIGHT: 203 LBS

## 2018-08-03 DIAGNOSIS — J44.9 CHRONIC OBSTRUCTIVE PULMONARY DISEASE, UNSPECIFIED COPD TYPE (HCC): Primary | ICD-10-CM

## 2018-08-03 PROCEDURE — 1111F DSCHRG MED/CURRENT MED MERGE: CPT | Performed by: FAMILY MEDICINE

## 2018-08-03 PROCEDURE — 99495 TRANSJ CARE MGMT MOD F2F 14D: CPT | Performed by: FAMILY MEDICINE

## 2018-08-03 NOTE — PROGRESS NOTES
Post-Discharge Transitional Care Management Services      Kiana Beard   YOB: 1945    Date of Office Visit:  8/3/2018  Date of Hospital Admission: 7/26/18  Date of Hospital Discharge: 7/30/18  Readmission Risk Score(high >=14%. Medium >=10%):Readmission Risk Score: 12    Care management risk score Rising risk (score 2-5) and Complex Care (Scores >=6): 6     Non face to face  following discharge, date last encounter closed (first attempt may have been earlier): 7/31/2018  3:19 PM 7/31/2018  3:19 PM    Call initiated 2 business days of discharge: Yes     Patient Active Problem List   Diagnosis    Obesity    [facility]) [facility]    Colon polyps    Post traumatic stress disorder (PTSD)    Osteoarthritis    Diabetes mellitus ([facility]    Acute respiratory failure ([facility]    COPD exacerbation ([facility]    Pneumonia    Tubulovillous adenoma    Oral thrush       No Known Allergies    Medications listed as ordered at the time of discharge from hospital   [facility], [address] [place] [address] Medication Instructions LUC:    Printed on:08/03/18 9898   Medication Information                      albuterol (PROAIR HFA) 108 (90 BASE) MCG/ACT inhaler  Inhale 2 puffs into the lungs every 6 hours as needed for Wheezing             ALPRAZolam (XANAX) 1 MG tablet  TAKE ONE TABLET BY MOUTH THREE TIMES A DAY AS NEEDED. COMBIVENT RESPIMAT  MCG/ACT AERS inhaler  INHALE ONE INHALATION BY MOUTH FOUR TIMES A DAY             diphenhydrAMINE (BENADRYL) 25 MG tablet  Take 1 tablet by mouth nightly as needed for Itching             esomeprazole (NEXIUM) 40 MG delayed release capsule  Take 1 capsule by mouth daily             FLUoxetine (PROZAC) 20 MG capsule  TAKE ONE CAPSULE BY MOUTH DAILY             gabapentin (NEURONTIN) 300 MG capsule  Take 1 capsule by mouth 4 times daily for 31 days. Marta Esquivel              glucose blood VI test strips (ASCENSIA AUTODISC VI;ONE TOUCH ULTRA TEST VI) index is 43.93 kg/m². Wt Readings from Last 3 Encounters:   08/03/18 203 lb (92.1 kg)   07/26/18 198 lb 12.8 oz (90.2 kg)   07/11/18 195 lb (88.5 kg)     BP Readings from Last 3 Encounters:   08/03/18 122/60   07/30/18 (!) 125/58   07/11/18 132/76       Physical Exam  Initially low o2 sat then quickly returned to 90%    HHENT normal  Lungs decrease in BS, prolonged exp phase no wheeze  Cardiac RRR without murmur  Abd soft non tender  Trace ankle edema  Assessment/Plan:  There are no diagnoses linked to this encounter.       Medical Decision Making: moderate complexity

## 2018-08-06 ENCOUNTER — CARE COORDINATION (OUTPATIENT)
Dept: CASE MANAGEMENT | Age: 73
End: 2018-08-06

## 2018-08-06 NOTE — CARE COORDINATION
[facility] Transitions Follow Up Call    2018    Patient: Angelina Mckeon  Patient : 1945   MRN: [id number]    Reason for Admission: acute respiratory insufficiency, COPD, CHF  Discharge Date: 18   RARS: Readmission Risk Score: 12, CMRS 6       Spoke with: Nayan Osborn     Call to pt who states she is doing better- states she is breathing easier. States she has thrown up her [employer] lunch today (has not had Sn[employer] in a very long time and just didn't sit well in her stomach). States she is just queasy now but better- advised to eat bland foods until the queasiness subsides- v/u  States she did receive the smaller oxygen tanks (states she does not qualify for portable oxygen concentrator with her insurance) but states the smaller tank is just right. States her blood sugars have been better (mornings before breakfast ). States PT/ OT going well  Denies needs  Encouraged call writer/ CTC or providers if questions or concerns- v/u       Care Transitions Subsequent and Final Call    Subsequent and Final Calls  Do you have any ongoing symptoms?:  No  Have your medications changed?:  No  Do you have any questions related to your medications?:  No  Do you currently have any active services?:  Yes  Are you currently active with any services?:  Home Health  Do you have any needs or concerns that I can assist you with?:  No  Identified Barriers:  Lack of Education  Care Transitions Interventions  Other Interventions:             Follow Up  Future Appointments  Date Time Provider Thad Jose   2018 3:00 PM Donna Villanueva MD  [address] [facility]   2018 1:15 PM MD Kel Pearce RN

## 2018-08-07 ENCOUNTER — HOSPITAL ENCOUNTER (OUTPATIENT)
Facility: MEDICAL CENTER | Age: 73
End: 2018-08-07
Payer: COMMERCIAL

## 2018-08-09 ENCOUNTER — TELEPHONE (OUTPATIENT)
Dept: ONCOLOGY | Age: 73
End: 2018-08-09

## 2018-08-09 ENCOUNTER — HOSPITAL ENCOUNTER (OUTPATIENT)
Age: 73
Discharge: HOME | End: 2018-08-09
Payer: COMMERCIAL

## 2018-08-09 ENCOUNTER — INITIAL CONSULT (OUTPATIENT)
Dept: ONCOLOGY | Age: 73
End: 2018-08-09
Payer: COMMERCIAL

## 2018-08-09 VITALS
BODY MASS INDEX: 43.5 KG/M2 | HEART RATE: 77 BPM | RESPIRATION RATE: 18 BRPM | TEMPERATURE: 97.7 F | SYSTOLIC BLOOD PRESSURE: 104 MMHG | WEIGHT: 201 LBS | DIASTOLIC BLOOD PRESSURE: 66 MMHG

## 2018-08-09 DIAGNOSIS — D69.6 THROMBOCYTOPENIA (HCC): ICD-10-CM

## 2018-08-09 DIAGNOSIS — R74.02 NONSPECIFIC ELEVATION OF LEVELS OF TRANSAMINASE AND LACTIC ACID DEHYDROGENASE (LDH): ICD-10-CM

## 2018-08-09 DIAGNOSIS — D72.825 BANDEMIA: Primary | ICD-10-CM

## 2018-08-09 DIAGNOSIS — R74.01 NONSPECIFIC ELEVATION OF LEVELS OF TRANSAMINASE AND LACTIC ACID DEHYDROGENASE (LDH): ICD-10-CM

## 2018-08-09 DIAGNOSIS — D72.825 BANDEMIA: ICD-10-CM

## 2018-08-09 LAB
ALBUMIN SERPL-MCNC: 4.2 G/DL (ref 3.5–5.2)
ALBUMIN/GLOBULIN RATIO: NORMAL (ref 1–2.5)
ALP BLD-CCNC: 47 U/L (ref 35–104)
ALT SERPL-CCNC: 25 U/L (ref 5–33)
AST SERPL-CCNC: 18 U/L
BILIRUB SERPL-MCNC: 0.34 MG/DL (ref 0.3–1.2)
BILIRUBIN DIRECT: <0.08 MG/DL
BILIRUBIN, INDIRECT: NORMAL MG/DL (ref 0–1)
GLOBULIN: NORMAL G/DL (ref 1.5–3.8)
HAV IGM SER IA-ACNC: NONREACTIVE
HEPATITIS B CORE IGM ANTIBODY: NONREACTIVE
HEPATITIS B SURFACE ANTIGEN: NONREACTIVE
HEPATITIS C ANTIBODY: NONREACTIVE
HIV AG/AB: NONREACTIVE
LACTATE DEHYDROGENASE: 194 U/L (ref 135–214)
TOTAL PROTEIN: 6.5 G/DL (ref 6.4–8.3)

## 2018-08-09 PROCEDURE — 80074 ACUTE HEPATITIS PANEL: CPT

## 2018-08-09 PROCEDURE — 88185 FLOWCYTOMETRY/TC ADD-ON: CPT

## 2018-08-09 PROCEDURE — 88184 FLOWCYTOMETRY/ TC 1 MARKER: CPT

## 2018-08-09 PROCEDURE — 86225 DNA ANTIBODY NATIVE: CPT

## 2018-08-09 PROCEDURE — 88319 ENZYME HISTOCHEMISTRY: CPT

## 2018-08-09 PROCEDURE — 86334 IMMUNOFIX E-PHORESIS SERUM: CPT

## 2018-08-09 PROCEDURE — 81270 JAK2 GENE: CPT

## 2018-08-09 PROCEDURE — 87389 HIV-1 AG W/HIV-1&-2 AB AG IA: CPT

## 2018-08-09 PROCEDURE — 36415 COLL VENOUS BLD VENIPUNCTURE: CPT

## 2018-08-09 PROCEDURE — 86038 ANTINUCLEAR ANTIBODIES: CPT

## 2018-08-09 PROCEDURE — 86235 NUCLEAR ANTIGEN ANTIBODY: CPT

## 2018-08-09 PROCEDURE — 85025 COMPLETE CBC W/AUTO DIFF WBC: CPT

## 2018-08-09 PROCEDURE — 99205 OFFICE O/P NEW HI 60 MIN: CPT | Performed by: INTERNAL MEDICINE

## 2018-08-09 PROCEDURE — 80076 HEPATIC FUNCTION PANEL: CPT

## 2018-08-09 PROCEDURE — 99201 HC NEW PT, E/M LEVEL 1: CPT | Performed by: INTERNAL MEDICINE

## 2018-08-09 PROCEDURE — 83615 LACTATE (LD) (LDH) ENZYME: CPT

## 2018-08-09 NOTE — LETTER
Trachea is centrally located. Chest:  Clear to auscultation. No wheezes or crepitations. Heart: Regular sinus rhythm. Abdomen: Soft, nontender. No hepatosplenomegaly. No masses. Extremities:  With no edema. Lymph Nodes:  No cervical, axillary or inguinal lymph node enlargement. Neurologic:  Conscious and oriented. No focal neurological deficits. Psychosocial: No depression, anxiety or stress. Skin: No rashes, bruises or ecchymoses. Review of Diagnostic data:   Recent Labs      08/09/18   1654  07/30/18   0635  07/29/18   0600   WBC  19.5*  16.0*  16.5*   HGB  12.9  12.4  12.6   HCT  40.3  38.0  37.0   MCV  91.4  89.0  88.7   PLT  62*  89*  103*         IMPRESSION:   Persistent leukocytosis  Persistent thrombocytopenia  Multiple comorbidities as listed    PLAN: I reviewed the labs available to me and discussed with the patient. For more than 60 minutes of face to face discussion, I explained to the patient the nature of this hematologic problem. I explained the significance of these abnormalities in layman language. Lab tests were reviewed and discussed with the patient. Her leukocytosis can be explained by the use of steroids and in part chronic tobacco abuse. However, there is no clear explanation for the persistent thrombocytopenia. Further workup is needed to look at possible underlying etiology. We'll check liver function and hepatitis Jimenez. I'll check LDH and blood flow cytometry and FANNY. We will look for possible underlying viral etiology or immune related thrombocytopenia. Although we expect improvement in platelets with the use of steroids when the etiology is immune mediated thrombocytopenia however this is still a consideration would make further recommendations based on the results of these tests. If necessary we will do bone marrow test.  Patient's questions were answered to the best of her satisfaction and she verbalized full understanding and agreement.

## 2018-08-10 LAB
ABSOLUTE EOS #: 0 K/UL (ref 0–0.4)
ABSOLUTE IMMATURE GRANULOCYTE: ABNORMAL K/UL (ref 0–0.3)
ABSOLUTE LYMPH #: 9.56 K/UL (ref 1–4.8)
ABSOLUTE MONO #: 0 K/UL (ref 0.2–0.8)
ANA REFERENCE RANGE:: ABNORMAL
ANTI DNA DOUBLE STRANDED: 14 IU/ML
ANTI JO-1 IGG: 50 U/ML
ANTI RNP AB: 12 U/ML
ANTI SSA: 73 U/ML
ANTI SSB: 28 U/ML
ANTI-CENTROMERE: 38 U/ML
ANTI-NUCLEAR ANTIBODY (ANA): POSITIVE
ANTI-SCLERODERMA: 10 U/ML
ANTI-SMITH: 12 U/ML
BASOPHILS # BLD: 0 % (ref 0–2)
BASOPHILS ABSOLUTE: 0 K/UL (ref 0–0.2)
BLASTS ABSOLUTE COUNT: 0 K/UL
BLASTS: 0 %
DIFFERENTIAL TYPE: ABNORMAL
EOSINOPHILS RELATIVE PERCENT: 0 % (ref 1–4)
HCT VFR BLD CALC: 40.3 % (ref 36–46)
HEMOGLOBIN: 12.9 G/DL (ref 12–16)
HISTONE ANTIBODY: 513 U/ML
IMMATURE GRANULOCYTES: ABNORMAL %
LYMPHOCYTES # BLD: 49 % (ref 24–44)
MCH RBC QN AUTO: 29.2 PG (ref 26–34)
MCHC RBC AUTO-ENTMCNC: 31.9 G/DL (ref 31–37)
MCV RBC AUTO: 91.4 FL (ref 80–100)
MONOCYTES # BLD: 0 % (ref 1–7)
MORPHOLOGY: ABNORMAL
NRBC AUTOMATED: ABNORMAL PER 100 WBC
NUCLEATED RED BLOOD CELLS: 0 PER 100 WBC
PATHOLOGIST: NORMAL
PDW BLD-RTO: 17.1 % (ref 11.5–14.5)
PLATELET # BLD: 62 K/UL (ref 130–400)
PLATELET ESTIMATE: ABNORMAL
PMV BLD AUTO: 8.5 FL (ref 6–12)
RBC # BLD: 4.41 M/UL (ref 4–5.2)
RBC # BLD: ABNORMAL 10*6/UL
SEG NEUTROPHILS: 51 % (ref 36–66)
SEGMENTED NEUTROPHILS ABSOLUTE COUNT: 9.94 K/UL (ref 1.8–7.7)
SERUM IFX INTERP: NORMAL
WBC # BLD: 19.5 K/UL (ref 3.5–11)
WBC # BLD: ABNORMAL 10*3/UL

## 2018-08-10 NOTE — PROGRESS NOTES
lymph node enlargement. Neurologic:  Conscious and oriented. No focal neurological deficits. Psychosocial: No depression, anxiety or stress. Skin: No rashes, bruises or ecchymoses. Review of Diagnostic data:   Recent Labs      08/09/18   1654  07/30/18   0635  07/29/18   0600   WBC  19.5*  16.0*  16.5*   HGB  12.9  12.4  12.6   HCT  40.3  38.0  37.0   MCV  91.4  89.0  88.7   PLT  62*  89*  103*         IMPRESSION:   Persistent leukocytosis  Persistent thrombocytopenia  Multiple comorbidities as listed    PLAN: I reviewed the labs available to me and discussed with the patient. For more than 60 minutes of face to face discussion, I explained to the patient the nature of this hematologic problem. I explained the significance of these abnormalities in layman language. Lab tests were reviewed and discussed with the patient. Her leukocytosis can be explained by the use of steroids and in part chronic tobacco abuse. However, there is no clear explanation for the persistent thrombocytopenia. Further workup is needed to look at possible underlying etiology. We'll check liver function and hepatitis Jimenez. I'll check LDH and blood flow cytometry and FANNY. We will look for possible underlying viral etiology or immune related thrombocytopenia. Although we expect improvement in platelets with the use of steroids when the etiology is immune mediated thrombocytopenia however this is still a consideration would make further recommendations based on the results of these tests. If necessary we will do bone marrow test.  Patient's questions were answered to the best of her satisfaction and she verbalized full understanding and agreement.

## 2018-08-13 ENCOUNTER — CARE COORDINATION (OUTPATIENT)
Dept: CASE MANAGEMENT | Age: 73
End: 2018-08-13

## 2018-08-13 DIAGNOSIS — M15.9 PRIMARY OSTEOARTHRITIS INVOLVING MULTIPLE JOINTS: ICD-10-CM

## 2018-08-13 LAB — V617F MUTATION, QNT: 0 %

## 2018-08-13 RX ORDER — HYDROCODONE BITARTRATE AND ACETAMINOPHEN 7.5; 325 MG/1; MG/1
TABLET ORAL
Qty: 180 TABLET | Refills: 0 | Status: SHIPPED | OUTPATIENT
Start: 2018-08-13 | End: 2018-09-06 | Stop reason: SDUPTHER

## 2018-08-13 NOTE — TELEPHONE ENCOUNTER
oarrs 6/4/18      Last visit:8/3/18  Last Med refill:  Next Visit Date: 7/23/18  Future Appointments  Date Time Provider Thad Jose   8/21/2018 11:00 AM Moon Urbina MD [facility]   9/11/2018 1:15 PM Noah Ascencio  [address] [facility] Maintenance   Topic Date Due    DTaP/Tdap/Td vaccine (1 - Tdap) 04/30/1966    Shingles Vaccine (1 of 2 - 2 Dose Series) 04/30/1997    Diabetic retinal exam  04/03/2018    Breast cancer screen  04/20/2018    Diabetic microalbuminuria test  05/15/2018    Flu vaccine (1) 09/01/2018    Colon cancer screen colonoscopy  06/07/2019    Diabetic foot exam  07/11/2019    A1C test (Diabetic or Prediabetic)  07/11/2019    Lipid screen  07/11/2019    Potassium monitoring  07/30/2019    Creatinine monitoring  07/30/2019    DEXA (modify frequency per FRAX score)  Completed    Pneumococcal low/med risk  Completed    Hepatitis C screen  Completed       Hemoglobin A1C (%)   Date Value   07/11/2018 6.4 (H)   07/20/2017 6.1 (H)   01/18/2017 5.8             ( goal A1C is < 7)   No results found for: LABMICR  LDL Cholesterol (mg/dL)   Date Value   07/11/2018 67   07/20/2017 73       (goal LDL is <100)   AST (U/L)   Date Value   08/09/2018 18     ALT (U/L)   Date Value   08/09/2018 25     BUN (mg/dL)   Date Value   07/30/2018 25 (H)     BP Readings from Last 3 Encounters:   08/09/18 104/66   08/03/18 122/60   07/30/18 (!) 125/58          (goal 120/80)    All Future Testing planned in [facility]  Lab Frequency Next Occurrence               Patient Active Problem List:     Obesity     COPD (chronic obstructive pulmonary disease) (HCC)     Colon polyps     Post traumatic stress disorder (PTSD)     Osteoarthritis     Diabetes mellitus ([address]     Acute respiratory failure (HCC)     COPD exacerbation (HCC)     Pneumonia     Tubulovillous adenoma     Oral thrush     Bandemia     Thrombocytopenia (HCC)

## 2018-08-13 NOTE — CARE COORDINATION
[facility] Transitions Follow Up Call- final call     2018    Patient: Moni Senior  Patient : 1945   MRN: [id number]    Reason for Admission: acute respiratory insufficiency, COPD, CHF   Discharge Date: 18   RARS: Readmission Risk Score: 12, CMRS 6       Spoke with: Nancy CLEMENTE    Call to pt who states her breathing has been about the same- still short of breath with activity and improves with rest. States she did call EMS the night before last because she had trouble getting out of bed/ getting up. States EMS stayed with her for a while and felt she was better and left. States she hasn't had any problems since. States she forgot to tell Dr Sea Mcqueen at appt last week that she had a bad nose bleed soaking her pillow- called EMS and it stopped while they were there so she did not go to ED for this as well. States she did have vomiting up dark what she thought was chocolate but now thinks this may have been old blood in her stomach- writer encourages her to notify Dr Sea Mcqueen next week at her next appt (18 at 80)- v/u  Advised to call 911 or go to ER if symptoms return- v/u   Denies needs  Agreeable to [facility] referral  Writer informs this is final ([facility]) phone call- v/u. Encouraged call writer/ [facility] or providers if questions or concerns- v/u. Episode closed      Message to [facility] lead for referral         Care Transitions Subsequent and Final Call    Subsequent and Final Calls  Do you have any ongoing symptoms?:  No  Have your medications changed?:  No  Do you have any questions related to your medications?:  No  Do you currently have any active services?:  Yes  Are you currently active with any services?:  Home Health  Do you have any needs or concerns that I can assist you with?:  No  Identified Barriers:  Lack of Education, Impairment  Care Transitions Interventions  Other Interventions:             Follow Up  Future Appointments  Date Time Provider Thad Jose   2018 11:00 AM Billy DÍAZ MD [facility]   9/11/2018 1:15 PM MD Carolina Hand RN

## 2018-08-14 LAB
FLOW CYTOMETRY BL: NORMAL
Lab: NORMAL
SURGICAL PATHOLOGY REPORT: NORMAL

## 2018-08-15 ENCOUNTER — HOSPITAL ENCOUNTER (OUTPATIENT)
Facility: MEDICAL CENTER | Age: 73
End: 2018-08-15
Payer: COMMERCIAL

## 2018-08-16 ENCOUNTER — CARE COORDINATION (OUTPATIENT)
Dept: CARE COORDINATION | Age: 73
End: 2018-08-16

## 2018-08-16 ENCOUNTER — OFFICE VISIT (OUTPATIENT)
Dept: FAMILY MEDICINE CLINIC | Age: 73
End: 2018-08-16
Payer: COMMERCIAL

## 2018-08-16 VITALS
HEART RATE: 73 BPM | DIASTOLIC BLOOD PRESSURE: 80 MMHG | OXYGEN SATURATION: 93 % | SYSTOLIC BLOOD PRESSURE: 126 MMHG | BODY MASS INDEX: 42.2 KG/M2 | WEIGHT: 195 LBS

## 2018-08-16 DIAGNOSIS — R07.89 RIGHT-SIDED CHEST WALL PAIN: Primary | ICD-10-CM

## 2018-08-16 DIAGNOSIS — J44.9 CHRONIC OBSTRUCTIVE PULMONARY DISEASE, UNSPECIFIED COPD TYPE (HCC): ICD-10-CM

## 2018-08-16 DIAGNOSIS — J96.00 ACUTE RESPIRATORY FAILURE, UNSPECIFIED WHETHER WITH HYPOXIA OR HYPERCAPNIA (HCC): ICD-10-CM

## 2018-08-16 PROCEDURE — 99213 OFFICE O/P EST LOW 20 MIN: CPT | Performed by: FAMILY MEDICINE

## 2018-08-16 ASSESSMENT — ENCOUNTER SYMPTOMS
NAUSEA: 0
COUGH: 0
SHORTNESS OF BREATH: 1
VOMITING: 0
DIARRHEA: 0
BACK PAIN: 0
SINUS PRESSURE: 0
SORE THROAT: 0

## 2018-08-16 NOTE — CARE COORDINATION
Take 1 capsule by mouth 4 times daily for 31 days. . 7/11/18 8/11/18  Arik Valero MD   lisinopril (PRINIVIL;ZESTRIL) 10 MG tablet TAKE ONE TABLET BY MOUTH DAILY 6/13/18   Shira Kinney MD   ALPRAZolam (XANAX) 1 MG tablet TAKE ONE TABLET BY MOUTH THREE TIMES A DAY AS NEEDED. 5/11/18 9/11/18  Arik Valero MD   simvastatin (ZOCOR) 40 MG tablet TAKE ONE TABLET BY MOUTH DAILY 1/26/18   Arki Valero MD   ibuprofen (ADVIL;MOTRIN) 800 MG tablet TAKE ONE TABLET BY MOUTH EVERY 8 HOURS AS NEEDED FOR PAIN 1/26/18   Ector Valero MD   FLUoxetine (PROZAC) 20 MG capsule TAKE ONE CAPSULE BY MOUTH DAILY 12/7/17   Shira Kinney MD   esomeprazole (NEXIUM) 40 MG delayed release capsule Take 1 capsule by mouth daily 9/21/17   Arik Valero MD   COMBIVENT RESPIMAT  MCG/ACT AERS inhaler INHALE ONE INHALATION BY MOUTH FOUR TIMES A DAY 4/28/16   Arik Valero MD   glucose blood VI test strips (ASCENSIA AUTODISC VI;ONE TOUCH ULTRA TEST VI) strip Indications: [employer] Glucose Meter Test bid 12/30/15   Shira Kinney MD   Lancets MISC 1 each by Other route 2 times daily Indications: [employer] Glucose Meter 12/30/15   Ector Valero MD   albuterol (PROAIR HFA) 108 (90 BASE) MCG/ACT inhaler Inhale 2 puffs into the lungs every 6 hours as needed for Wheezing 7/30/15   Livan Olea MD   ipratropium-albuterol (DUONEB) 0.5-2.5 (3) MG/3ML SOLN nebulizer solution Inhale 3 mLs into the lungs every 6 hours as needed. 12/16/14   Shira Kinney MD   meclizine (ANTIVERT) 25 MG tablet Take 1 tablet by mouth 3 times daily as needed for Dizziness or Nausea.  6/12/14   Shira Kinney MD       Future Appointments  Date Time Provider Thad Magallanesi   8/16/2018 1:15 PM MD EVANS [facility]   8/21/2018 11:00 AM [facility]   9/11/2018 1:15 PM Shira Kinney MD [address] [facility]     ,   Diabetes Assessment    Medic Alert ID:  No  Meal Planning:  Avoidance of concentrated

## 2018-08-16 NOTE — PROGRESS NOTES
[employer] Glucose Meter Test bid 100 strip 3    Lancets MISC 1 each by Other route 2 times daily Indications: [employer] Glucose Meter 100 each 3    albuterol (PROAIR HFA) 108 (90 BASE) MCG/ACT inhaler Inhale 2 puffs into the lungs every 6 hours as needed for Wheezing 1 Inhaler 3    ipratropium-albuterol (DUONEB) 0.5-2.5 (3) MG/3ML SOLN nebulizer solution Inhale 3 mLs into the lungs every 6 hours as needed. 360 mL 6    meclizine (ANTIVERT) 25 MG tablet Take 1 tablet by mouth 3 times daily as needed for Dizziness or Nausea. 30 tablet 1     No current facility-administered medications for this visit. No Known Allergies      Subjective:   Review of Systems   Constitutional: Negative for chills, diaphoresis and fever. HENT: Negative for congestion, sinus pressure and sore throat. Eyes: Negative for visual disturbance. Respiratory: Positive for shortness of breath. Negative for cough. Cardiovascular: Positive for chest pain. Negative for leg swelling. Gastrointestinal: Negative for diarrhea, nausea and vomiting. Genitourinary: Negative for dysuria, menstrual problem, urgency and vaginal discharge. Musculoskeletal: Negative for arthralgias, back pain and myalgias. Skin: Negative for rash. Neurological: Negative for weakness, numbness and headaches. Psychiatric/Behavioral: Positive for sleep disturbance. Objective:   /80   Pulse 73   Wt 195 lb (88.5 kg)   SpO2 93%   BMI 42.20 kg/m²     Physical Exam   Constitutional: She is oriented to person, place, and time. She appears well-developed and well-nourished. No distress. HENT:   Head: Normocephalic and atraumatic. Mouth/Throat: No oropharyngeal exudate. Eyes: No scleral icterus. Neck: Neck supple. Carotid bruit is not present. Cardiovascular: Exam reveals no gallop and no friction rub. No murmur heard. Pulmonary/Chest: No respiratory distress. She has no wheezes. She has no rales.  She exhibits tenderness (some chest wall pain no bruising etc.  right arm bruising. ). Musculoskeletal: She exhibits no edema. Lymphadenopathy:     She has no cervical adenopathy. Neurological: She is alert and oriented to person, place, and time. No cranial nerve deficit. Skin: No rash noted. She is not diaphoretic. Psychiatric: She has a normal mood and affect. Her behavior is normal. Judgment and thought content normal.       Assessment:       Diagnosis Orders   1. Right-sided chest wall pain     2. Chronic obstructive pulmonary disease, unspecified COPD type ([address]     3. Acute respiratory failure, unspecified whether with hypoxia or hypercapnia ([address]           Plan:      Return if symptoms worsen or fail to improve. No orders of the defined types were placed in this encounter. No orders of the defined types were placed in this encounter. Observe pain patterns.

## 2018-08-20 RX ORDER — IPRATROPIUM BROMIDE AND ALBUTEROL SULFATE 2.5; .5 MG/3ML; MG/3ML
1 SOLUTION RESPIRATORY (INHALATION) EVERY 6 HOURS PRN
Qty: 360 ML | Refills: 6 | Status: SHIPPED | OUTPATIENT
Start: 2018-08-20 | End: 2019-09-05 | Stop reason: ALTCHOICE

## 2018-08-21 ENCOUNTER — TELEPHONE (OUTPATIENT)
Dept: INFUSION THERAPY | Facility: MEDICAL CENTER | Age: 73
End: 2018-08-21

## 2018-08-21 NOTE — TELEPHONE ENCOUNTER
I RECEIVED A CALL FROM [place] STATING THAT SHE TOOK SOME MEDICINE LAST NIGHT AND SHE JUST WOKE UP AND MISSED HER APPT. I RESCHEDULED HER FOR Thursday.

## 2018-08-22 ENCOUNTER — HOSPITAL ENCOUNTER (OUTPATIENT)
Facility: MEDICAL CENTER | Age: 73
End: 2018-08-22
Payer: COMMERCIAL

## 2018-08-23 ENCOUNTER — TELEPHONE (OUTPATIENT)
Dept: ONCOLOGY | Age: 73
End: 2018-08-23

## 2018-08-23 ENCOUNTER — OFFICE VISIT (OUTPATIENT)
Dept: ONCOLOGY | Age: 73
End: 2018-08-23
Payer: COMMERCIAL

## 2018-08-23 VITALS
BODY MASS INDEX: 42.2 KG/M2 | TEMPERATURE: 98.4 F | WEIGHT: 195 LBS | HEART RATE: 89 BPM | DIASTOLIC BLOOD PRESSURE: 57 MMHG | RESPIRATION RATE: 20 BRPM | SYSTOLIC BLOOD PRESSURE: 111 MMHG

## 2018-08-23 DIAGNOSIS — C85.80 MARGINAL ZONE B-CELL LYMPHOMA (HCC): Primary | ICD-10-CM

## 2018-08-23 DIAGNOSIS — C85.80 MARGINAL ZONE LYMPHOMA (HCC): ICD-10-CM

## 2018-08-23 DIAGNOSIS — D69.6 THROMBOCYTOPENIA (HCC): ICD-10-CM

## 2018-08-23 PROCEDURE — 99212 OFFICE O/P EST SF 10 MIN: CPT | Performed by: INTERNAL MEDICINE

## 2018-08-23 PROCEDURE — 99214 OFFICE O/P EST MOD 30 MIN: CPT | Performed by: INTERNAL MEDICINE

## 2018-08-24 RX ORDER — SODIUM CHLORIDE 0.9 % (FLUSH) 0.9 %
10 SYRINGE (ML) INJECTION PRN
Status: CANCELLED | OUTPATIENT
Start: 2018-08-24 | End: 2019-08-24

## 2018-08-24 RX ORDER — SODIUM CHLORIDE 9 MG/ML
INJECTION, SOLUTION INTRAVENOUS CONTINUOUS
Status: CANCELLED | OUTPATIENT
Start: 2018-08-24 | End: 2019-08-24

## 2018-08-27 ENCOUNTER — HOSPITAL ENCOUNTER (OUTPATIENT)
Dept: CT IMAGING | Age: 73
Discharge: HOME | End: 2018-08-29
Payer: COMMERCIAL

## 2018-08-27 VITALS
DIASTOLIC BLOOD PRESSURE: 59 MMHG | RESPIRATION RATE: 15 BRPM | TEMPERATURE: 97.9 F | OXYGEN SATURATION: 99 % | HEART RATE: 61 BPM | WEIGHT: 192 LBS | SYSTOLIC BLOOD PRESSURE: 96 MMHG | HEIGHT: 57 IN | BODY MASS INDEX: 41.42 KG/M2

## 2018-08-27 DIAGNOSIS — D69.6 THROMBOCYTOPENIA (HCC): ICD-10-CM

## 2018-08-27 LAB
ABSOLUTE EOS #: 0.14 K/UL (ref 0–0.4)
ABSOLUTE IMMATURE GRANULOCYTE: ABNORMAL K/UL (ref 0–0.3)
ABSOLUTE LYMPH #: 10.3 K/UL (ref 1–4.8)
ABSOLUTE MONO #: 0.42 K/UL (ref 0.2–0.8)
ABSOLUTE RETIC #: 0.08 M/UL (ref 0.02–0.1)
BASOPHILS # BLD: 0 %
BASOPHILS ABSOLUTE: 0 K/UL (ref 0–0.2)
DIFFERENTIAL TYPE: ABNORMAL
EOSINOPHILS RELATIVE PERCENT: 1 % (ref 1–4)
GLUCOSE BLD-MCNC: 140 MG/DL (ref 65–105)
HCT VFR BLD CALC: 40.6 % (ref 36–46)
HEMOGLOBIN: 13 G/DL (ref 12–16)
IMMATURE GRANULOCYTES: ABNORMAL %
IMMATURE RETIC FRACT: NORMAL %
INR BLD: 1
LYMPHOCYTES # BLD: 73 % (ref 24–44)
MCH RBC QN AUTO: 29.1 PG (ref 26–34)
MCHC RBC AUTO-ENTMCNC: 32.1 G/DL (ref 31–37)
MCV RBC AUTO: 90.7 FL (ref 80–100)
MONOCYTES # BLD: 3 % (ref 1–7)
MORPHOLOGY: ABNORMAL
NRBC AUTOMATED: ABNORMAL PER 100 WBC
PARTIAL THROMBOPLASTIN TIME: 24.9 SEC (ref 23–31)
PDW BLD-RTO: 16.5 % (ref 11.5–14.5)
PLATELET # BLD: 86 K/UL (ref 130–400)
PLATELET ESTIMATE: ABNORMAL
PMV BLD AUTO: 8.1 FL (ref 6–12)
PROTHROMBIN TIME: 10.3 SEC (ref 9.7–11.6)
RBC # BLD: 4.48 M/UL (ref 4–5.2)
RBC # BLD: ABNORMAL 10*6/UL
RETIC %: 1.7 % (ref 0.5–2)
RETIC HEMOGLOBIN: NORMAL PG (ref 28.2–35.7)
SEG NEUTROPHILS: 23 % (ref 36–66)
SEGMENTED NEUTROPHILS ABSOLUTE COUNT: 3.24 K/UL (ref 1.8–7.7)
WBC # BLD: 14.1 K/UL (ref 3.5–11)
WBC # BLD: ABNORMAL 10*3/UL

## 2018-08-27 PROCEDURE — 85025 COMPLETE CBC W/AUTO DIFF WBC: CPT

## 2018-08-27 PROCEDURE — 88280 CHROMOSOME KARYOTYPE STUDY: CPT

## 2018-08-27 PROCEDURE — 2580000003 HC RX 258: Performed by: RADIOLOGY

## 2018-08-27 PROCEDURE — 77012 CT SCAN FOR NEEDLE BIOPSY: CPT

## 2018-08-27 PROCEDURE — 6360000002 HC RX W HCPCS: Performed by: RADIOLOGY

## 2018-08-27 PROCEDURE — 88264 CHROMOSOME ANALYSIS 20-25: CPT

## 2018-08-27 PROCEDURE — 88311 DECALCIFY TISSUE: CPT

## 2018-08-27 PROCEDURE — 2500000003 HC RX 250 WO HCPCS: Performed by: RADIOLOGY

## 2018-08-27 PROCEDURE — 88185 FLOWCYTOMETRY/TC ADD-ON: CPT

## 2018-08-27 PROCEDURE — 38221 DX BONE MARROW BIOPSIES: CPT

## 2018-08-27 PROCEDURE — 88184 FLOWCYTOMETRY/ TC 1 MARKER: CPT

## 2018-08-27 PROCEDURE — 82947 ASSAY GLUCOSE BLOOD QUANT: CPT

## 2018-08-27 PROCEDURE — 88342 IMHCHEM/IMCYTCHM 1ST ANTB: CPT

## 2018-08-27 PROCEDURE — 88305 TISSUE EXAM BY PATHOLOGIST: CPT

## 2018-08-27 PROCEDURE — 88237 TISSUE CULTURE BONE MARROW: CPT

## 2018-08-27 PROCEDURE — 88341 IMHCHEM/IMCYTCHM EA ADD ANTB: CPT

## 2018-08-27 PROCEDURE — 85610 PROTHROMBIN TIME: CPT

## 2018-08-27 PROCEDURE — 88313 SPECIAL STAINS GROUP 2: CPT

## 2018-08-27 PROCEDURE — 7100000011 HC PHASE II RECOVERY - ADDTL 15 MIN

## 2018-08-27 PROCEDURE — 85045 AUTOMATED RETICULOCYTE COUNT: CPT

## 2018-08-27 PROCEDURE — 85730 THROMBOPLASTIN TIME PARTIAL: CPT

## 2018-08-27 PROCEDURE — 7100000010 HC PHASE II RECOVERY - FIRST 15 MIN

## 2018-08-27 RX ORDER — 0.9 % SODIUM CHLORIDE 0.9 %
250 INTRAVENOUS SOLUTION INTRAVENOUS ONCE
Status: DISCONTINUED | OUTPATIENT
Start: 2018-08-27 | End: 2018-08-30 | Stop reason: HOSPADM

## 2018-08-27 RX ORDER — 0.9 % SODIUM CHLORIDE 0.9 %
500 INTRAVENOUS SOLUTION INTRAVENOUS ONCE
Status: DISCONTINUED | OUTPATIENT
Start: 2018-08-27 | End: 2018-08-30 | Stop reason: HOSPADM

## 2018-08-27 RX ORDER — ACETAMINOPHEN 325 MG/1
650 TABLET ORAL EVERY 4 HOURS PRN
Status: DISCONTINUED | OUTPATIENT
Start: 2018-08-27 | End: 2018-08-30 | Stop reason: HOSPADM

## 2018-08-27 RX ORDER — FENTANYL CITRATE 50 UG/ML
INJECTION, SOLUTION INTRAMUSCULAR; INTRAVENOUS
Status: COMPLETED | OUTPATIENT
Start: 2018-08-27 | End: 2018-08-27

## 2018-08-27 RX ORDER — SODIUM CHLORIDE 0.9 % (FLUSH) 0.9 %
10 SYRINGE (ML) INJECTION PRN
Status: DISCONTINUED | OUTPATIENT
Start: 2018-08-27 | End: 2018-08-30 | Stop reason: HOSPADM

## 2018-08-27 RX ORDER — LIDOCAINE HYDROCHLORIDE 10 MG/ML
INJECTION, SOLUTION EPIDURAL; INFILTRATION; INTRACAUDAL; PERINEURAL
Status: COMPLETED | OUTPATIENT
Start: 2018-08-27 | End: 2018-08-27

## 2018-08-27 RX ORDER — SODIUM CHLORIDE 9 MG/ML
INJECTION, SOLUTION INTRAVENOUS CONTINUOUS
Status: DISCONTINUED | OUTPATIENT
Start: 2018-08-27 | End: 2018-08-30 | Stop reason: HOSPADM

## 2018-08-27 RX ORDER — MIDAZOLAM HYDROCHLORIDE 1 MG/ML
INJECTION INTRAMUSCULAR; INTRAVENOUS
Status: COMPLETED | OUTPATIENT
Start: 2018-08-27 | End: 2018-08-27

## 2018-08-27 RX ADMIN — LIDOCAINE HYDROCHLORIDE 5 ML: 10 INJECTION, SOLUTION EPIDURAL; INFILTRATION; INTRACAUDAL; PERINEURAL at 11:01

## 2018-08-27 RX ADMIN — MIDAZOLAM 1 MG: 1 INJECTION INTRAMUSCULAR; INTRAVENOUS at 11:00

## 2018-08-27 RX ADMIN — SODIUM CHLORIDE: 9 INJECTION, SOLUTION INTRAVENOUS at 09:46

## 2018-08-27 RX ADMIN — FENTANYL CITRATE 50 MCG: 50 INJECTION, SOLUTION INTRAMUSCULAR; INTRAVENOUS at 11:00

## 2018-08-27 ASSESSMENT — PAIN DESCRIPTION - DESCRIPTORS: DESCRIPTORS: ACHING

## 2018-08-27 ASSESSMENT — PAIN - FUNCTIONAL ASSESSMENT: PAIN_FUNCTIONAL_ASSESSMENT: 0-10

## 2018-08-27 ASSESSMENT — PAIN SCALES - GENERAL
PAINLEVEL_OUTOF10: 1
PAINLEVEL_OUTOF10: 0
PAINLEVEL_OUTOF10: 0

## 2018-08-27 ASSESSMENT — PAIN DESCRIPTION - PAIN TYPE: TYPE: CHRONIC PAIN

## 2018-08-27 NOTE — H&P
simvastatin (ZOCOR) 40 MG tablet TAKE ONE TABLET BY MOUTH DAILY 1/26/18  Yes Theresa Armas MD   ibuprofen (ADVIL;MOTRIN) 800 MG tablet TAKE ONE TABLET BY MOUTH EVERY 8 HOURS AS NEEDED FOR PAIN 1/26/18  Yes Arik Valero MD   FLUoxetine (PROZAC) 20 MG capsule TAKE ONE CAPSULE BY MOUTH DAILY 12/7/17  Yes Theresa Armas MD   esomeprazole (NEXIUM) 40 MG delayed release capsule Take 1 capsule by mouth daily 9/21/17  Yes Arik Valero MD   COMBIVENT RESPIMAT  MCG/ACT AERS inhaler INHALE ONE INHALATION BY MOUTH FOUR TIMES A DAY 4/28/16  Yes Arik Valero MD   albuterol (PROAIR HFA) 108 (90 BASE) MCG/ACT inhaler Inhale 2 puffs into the lungs every 6 hours as needed for Wheezing 7/30/15  Yes Master Daniels MD   meclizine (ANTIVERT) 25 MG tablet Take 1 tablet by mouth 3 times daily as needed for Dizziness or Nausea. 6/12/14  Yes Arik Valero MD   glucose blood VI test strips (ASCENSIA AUTODISC VI;ONE TOUCH ULTRA TEST VI) strip Indications: [employer] Glucose Meter Test bid 12/30/15   Theresa Armas MD   Lancets MISC 1 each by Other route 2 times daily Indications: [employer] Glucose Meter 12/30/15   Theresa Armas MD       This is a 72 y.o. female who is pleasant, cooperative, alert and oriented x3, in no acute distress. Heart: Distant heart sounds. HR 79 regular rate and rhythm without obvious murmur, gallop or rub. Lungs: On O2 @3L/nc with mild dyspnea at rest.  Equal expansion with diminished breath sounds, no use of accessory muscles. Bibasilar crackles No wheezing bilaterally    Abdomen: Round, obese, soft, nontender, nondistended with bowel sounds .   Extremities: No peripheral edema No calf tenderness  Pulses intact       Labs:  Recent Labs      08/09/18   1654  07/30/18   0635   HGB  12.9  12.4   HCT  40.3  38.0   WBC  19.5*  16.0*   MCV  91.4  89.0   PLT  62*  89*   NA   --   140   K   --   4.1   CL   --   102   CO2   --   27   BUN   --   25*   CREATININE   --   0.63   GLUCOSE   -- 215*   AST  18   --    ALT  25   --    LABALBU  4.2   --        Pablito Lunch  KIRAN, MEL-BC  Electronically signed 8/27/2018 at 9:44 AM      Progress Notes  Encounter Date: 8/9/2018  Valdo Piedra MD   Oncology      []Manual[]Template  []Copied                            _               Ms. Carina Ladd is a very pleasant 72 y.o. female with history of multiple comorbidities including COPD and diabetes. She quit smoking years ago. The patient was recently hospitalized with chest infection. The patient was noted to have leukocytosis. She also had persistent thrombocytopenia. She is referred for further evaluation. Patient denies any active bleeding. She has easy bruising. No fever or night sweats. No weight loss or decreased appetite. No palpable lymph nodes. No history of repeated infections. Patient has history of smoking for more than 50 years. Denies alcohol drinking        PAST MEDICAL HISTORY: has a past medical history of COPD (chronic obstructive pulmonary disease) ([facility] Depression; Diabetes mellitus ([facility] Hyperlipidemia; PTSD (post-traumatic stress disorder); and Tubulovillous adenoma.     PAST SURGICAL HISTORY: has a past surgical history that includes Hysterectomy; Colonoscopy; and Colonoscopy (06/07/2016).    CURRENT MEDICATIONS:  has a current medication list which includes the following prescription(s): metformin, diphenhydramine, prednisone, hydrocodone-acetaminophen, gabapentin, lisinopril, alprazolam, simvastatin, ibuprofen, fluoxetine, esomeprazole, combivent respimat, blood glucose test strips, lancets, albuterol sulfate hfa, ipratropium-albuterol, and meclizine.     ALLERGIES:  has No Known Allergies.     FAMILY HISTORY: Negative for any hematological or oncological conditions.      SOCIAL HISTORY:  reports that she quit smoking about 10 years ago.  She has never used smokeless tobacco. She reports that she does not drink alcohol or use drugs.     REVIEW OF SYSTEMS: · General: Positive for weakness and fatigue. No unanticipated weight loss or decreased appetite. No fever or chills. · Eyes: No blurred vision, eye pain or double vision. · Ears: No hearing problems or drainage. No tinnitus. · Throat: No sore throat, problems with swallowing or dysphagia. · Respiratory: No cough, sputum or hemoptysis. No shortness of breath. No pleuritic chest pain. · Cardiovascular: No chest pain, orthopnea or PND. No lower extremity edema. No palpitation. · Gastrointestinal: No problems with swallowing. No abdominal pain or bloating. No nausea or vomiting. No diarrhea or constipation. No GI bleeding. · Genitourinary: No dysuria, hematuria, frequency or urgency. · Musculoskeletal: No muscle aches or pains. No limitation of movement. No back pain. No gait disturbance, No joint complaints. · Dermatologic: No skin rashes or pruritus. No skin lesions or discolorations. · Psychiatric: No depression, anxiety, or stress or signs of schizophrenia. No change in mood or affect. · Hematologic: No history of bleeding tendency. Easy bruises no ecchymosis. No history of clotting problems. · Infectious disease: No fever, chills or frequent infections. · Endocrine: No problems with obesity. No polydipsia or polyuria. No temperature intolerance. · Neurologic: No headaches or dizziness. No weakness or numbness of the extremities. No changes in balance, coordination,  memory, mentation, behavior. · Allergic/Immunologic: No nasal congestion or hives. No repeated infections.         PHYSICAL EXAM:  The patient is not in acute distress. Vital signs: Blood pressure 104/66, pulse 77, temperature 97.7 °F (36.5 °C), temperature source Oral, resp. rate 18, weight 201 lb (91.2 kg), not currently breastfeeding. HEENT:  Eyes are normal. Ears, nose and throat are normal.  Neck: Supple. No lymph node enlargement. No thyroid enlargement. Trachea is centrally located.   Chest:  Clear to auscultation. No wheezes or crepitations. Heart: Regular sinus rhythm. Abdomen: Soft, nontender. No hepatosplenomegaly. No masses. Extremities:  With no edema. Lymph Nodes:  No cervical, axillary or inguinal lymph node enlargement. Neurologic:  Conscious and oriented. No focal neurological deficits. Psychosocial: No depression, anxiety or stress. Skin: No rashes, bruises or ecchymoses.        Review of Diagnostic data:         Recent Labs      08/09/18   1654  07/30/18   0635  07/29/18   0600   WBC  19.5*  16.0*  16.5*   HGB  12.9  12.4  12.6   HCT  40.3  38.0  37.0   MCV  91.4  89.0  88.7   PLT  62*  89*  103*            IMPRESSION:   Persistent leukocytosis  Persistent thrombocytopenia  Multiple comorbidities as listed     PLAN: I reviewed the labs available to me and discussed with the patient. For more than 60 minutes of face to face discussion, I explained to the patient the nature of this hematologic problem. I explained the significance of these abnormalities in layman language. Lab tests were reviewed and discussed with the patient. Her leukocytosis can be explained by the use of steroids and in part chronic tobacco abuse. However, there is no clear explanation for the persistent thrombocytopenia. Further workup is needed to look at possible underlying etiology. We'll check liver function and hepatitis Jimenez. I'll check LDH and blood flow cytometry and FANNY. We will look for possible underlying viral etiology or immune related thrombocytopenia. Although we expect improvement in platelets with the use of steroids when the etiology is immune mediated thrombocytopenia however this is still a consideration would make further recommendations based on the results of these tests.   If necessary we will do bone marrow test.  Patient's questions were answered to the best of her satisfaction and she verbalized full understanding and agreement.                Initial consult on 8/9/2018

## 2018-08-28 PROBLEM — C85.80 MARGINAL ZONE B-CELL LYMPHOMA (HCC): Status: ACTIVE | Noted: 2018-08-28

## 2018-08-28 LAB — BONE MARROW REPORT: NORMAL

## 2018-08-28 NOTE — PROGRESS NOTES
and gabapentin, and the following Facility-Administered Medications: sodium chloride, sodium chloride flush, acetaminophen, sodium chloride, and sodium chloride. ALLERGIES:  has No Known Allergies. FAMILY HISTORY: Negative for any hematological or oncological conditions. SOCIAL HISTORY:  reports that she quit smoking about 10 years ago. She has never used smokeless tobacco. She reports that she does not drink alcohol or use drugs. REVIEW OF SYSTEMS:     · General: Positive for weakness and fatigue. No unanticipated weight loss or decreased appetite. No fever or chills. · Eyes: No blurred vision, eye pain or double vision. · Ears: No hearing problems or drainage. No tinnitus. · Throat: No sore throat, problems with swallowing or dysphagia. · Respiratory: No cough, sputum or hemoptysis. No shortness of breath. No pleuritic chest pain. · Cardiovascular: No chest pain, orthopnea or PND. No lower extremity edema. No palpitation. · Gastrointestinal: No problems with swallowing. No abdominal pain or bloating. No nausea or vomiting. No diarrhea or constipation. No GI bleeding. · Genitourinary: No dysuria, hematuria, frequency or urgency. · Musculoskeletal: No muscle aches or pains. No limitation of movement. No back pain. No gait disturbance, No joint complaints. · Dermatologic: No skin rashes or pruritus. No skin lesions or discolorations. · Psychiatric: No depression, anxiety, or stress or signs of schizophrenia. No change in mood or affect. · Hematologic: No history of bleeding tendency. Easy bruises no ecchymosis. No history of clotting problems. · Infectious disease: No fever, chills or frequent infections. · Endocrine: No problems with obesity. No polydipsia or polyuria. No temperature intolerance. · Neurologic: No headaches or dizziness. No weakness or numbness of the extremities. No changes in balance, coordination,  memory, mentation, behavior.    · Allergic/Immunologic: No nasal congestion or hives. No repeated infections. PHYSICAL EXAM:  The patient is not in acute distress. Vital signs: Blood pressure (!) 111/57, pulse 89, temperature 98.4 °F (36.9 °C), temperature source Oral, resp. rate 20, weight 195 lb (88.5 kg), not currently breastfeeding. HEENT:  Eyes are normal. Ears, nose and throat are normal.  Neck: Supple. No lymph node enlargement. No thyroid enlargement. Trachea is centrally located. Chest:  Clear to auscultation. No wheezes or crepitations. Heart: Regular sinus rhythm. Abdomen: Soft, nontender. No hepatosplenomegaly. No masses. Extremities:  With no edema. Lymph Nodes:  No cervical, axillary or inguinal lymph node enlargement. Neurologic:  Conscious and oriented. No focal neurological deficits. Psychosocial: No depression, anxiety or stress. Skin: No rashes, bruises or ecchymoses. Review of Diagnostic data:   Recent Labs      08/27/18   0945  08/09/18   1654  07/30/18   0635   WBC  14.1*  19.5*  16.0*   HGB  13.0  12.9  12.4   HCT  40.6  40.3  38.0   MCV  90.7  91.4  89.0   PLT  86*  62*  89*     Peripheral blood flow cytometry:  Peripheral blood flow cytometric immunophenotyping:         Peripheral blood is positive for a monoclonal B-cell population,   positive for CD19, CD20, CD22,   CD45, FMC7 and lambda light chain. Comment:  Morphology and immunophenotype of circulating monoclonal   B-cells are most consistent with a marginal zone lymphoma.  Bone   marrow evaluation and/or lymph node biopsy if possible are recommended   for confirmation and additional characterization. IMPRESSION:  Marginal Zone lymphoma  Persistent leukocytosis  Persistent thrombocytopenia  Positive FANNY with elevated antihistone. Multiple comorbidities as listed    PLAN: I reviewed the labs as above and discussed with the patient. I explained to the patient the nature of this hematologic problem.  I explained the significance of these abnormalities in layman

## 2018-08-30 ENCOUNTER — TELEPHONE (OUTPATIENT)
Dept: INFUSION THERAPY | Facility: MEDICAL CENTER | Age: 73
End: 2018-08-30

## 2018-08-30 ENCOUNTER — CARE COORDINATION (OUTPATIENT)
Dept: CARE COORDINATION | Age: 73
End: 2018-08-30

## 2018-08-30 LAB — FLOW CYTOMETRY, BM: NORMAL

## 2018-09-04 ENCOUNTER — HOSPITAL ENCOUNTER (OUTPATIENT)
Age: 73
Setting detail: SPECIMEN
Discharge: HOME | End: 2018-09-04
Payer: COMMERCIAL

## 2018-09-04 LAB
ABSOLUTE EOS #: 0 K/UL (ref 0–0.4)
ABSOLUTE IMMATURE GRANULOCYTE: 0 K/UL (ref 0–0.3)
ABSOLUTE LYMPH #: 9.22 K/UL (ref 1–4.8)
ABSOLUTE MONO #: 1.15 K/UL (ref 0.1–0.8)
ALBUMIN SERPL-MCNC: 4 G/DL (ref 3.5–5.2)
ALBUMIN/GLOBULIN RATIO: 1.7 (ref 1–2.5)
ALP BLD-CCNC: 64 U/L (ref 35–104)
ALT SERPL-CCNC: 12 U/L (ref 5–33)
AST SERPL-CCNC: 18 U/L
BASOPHILS # BLD: 0 % (ref 0–2)
BASOPHILS ABSOLUTE: 0 K/UL (ref 0–0.2)
BILIRUB SERPL-MCNC: 0.16 MG/DL (ref 0.3–1.2)
BILIRUBIN DIRECT: <0.08 MG/DL
BILIRUBIN, INDIRECT: ABNORMAL MG/DL (ref 0–1)
CHROMOSOME STUDY: NORMAL
DIFFERENTIAL TYPE: ABNORMAL
EOSINOPHILS RELATIVE PERCENT: 0 % (ref 1–4)
GLOBULIN: ABNORMAL G/DL (ref 1.5–3.8)
HAV IGM SER IA-ACNC: ABNORMAL
HCT VFR BLD CALC: 44.1 % (ref 36.3–47.1)
HEMOGLOBIN: 13.1 G/DL (ref 11.9–15.1)
HEPATITIS B CORE IGM ANTIBODY: NONREACTIVE
HEPATITIS B SURFACE ANTIGEN: NONREACTIVE
HEPATITIS C ANTIBODY: NONREACTIVE
HIV AG/AB: NONREACTIVE
IMMATURE GRANULOCYTES: 0 %
LACTATE DEHYDROGENASE: 190 U/L (ref 135–214)
LYMPHOCYTES # BLD: 64 % (ref 24–44)
MCH RBC QN AUTO: 29 PG (ref 25.2–33.5)
MCHC RBC AUTO-ENTMCNC: 29.7 G/DL (ref 28.4–34.8)
MCV RBC AUTO: 97.6 FL (ref 82.6–102.9)
MONOCYTES # BLD: 8 % (ref 1–7)
MORPHOLOGY: ABNORMAL
NRBC AUTOMATED: 0 PER 100 WBC
PDW BLD-RTO: 16.5 % (ref 11.8–14.4)
PLATELET # BLD: 50 K/UL (ref 138–453)
PLATELET ESTIMATE: ABNORMAL
PMV BLD AUTO: 12 FL (ref 8.1–13.5)
RBC # BLD: 4.52 M/UL (ref 3.95–5.11)
RBC # BLD: ABNORMAL 10*6/UL
SEG NEUTROPHILS: 28 % (ref 36–66)
SEGMENTED NEUTROPHILS ABSOLUTE COUNT: 4.03 K/UL (ref 1.8–7.7)
TOTAL PROTEIN: 6.3 G/DL (ref 6.4–8.3)
WBC # BLD: 14.4 K/UL (ref 3.5–11.3)
WBC # BLD: ABNORMAL 10*3/UL

## 2018-09-05 ENCOUNTER — HOSPITAL ENCOUNTER (OUTPATIENT)
Facility: MEDICAL CENTER | Age: 73
End: 2018-09-05
Payer: COMMERCIAL

## 2018-09-05 LAB
% NK (CD56): 3 % (ref 6–29)
AB NK (CD56): 276 /UL (ref 90–600)
ABSOLUTE CD 3: 4608 /UL (ref 411–2061)
ABSOLUTE CD 4 HELPER: 2857 /UL (ref 309–1571)
ABSOLUTE CD 8 (SUPP): 1567 /UL (ref 282–999)
ABSOLUTE CD19 B CELL: 4239 /UL (ref 71–567)
ANA REFERENCE RANGE:: ABNORMAL
ANTI DNA DOUBLE STRANDED: 55 IU/ML
ANTI JO-1 IGG: 60 U/ML
ANTI RNP AB: 17 U/ML
ANTI SSA: 71 U/ML
ANTI SSB: 31 U/ML
ANTI-CENTROMERE: 23 U/ML
ANTI-NUCLEAR ANTIBODY (ANA): POSITIVE
ANTI-SCLERODERMA: 16 U/ML
ANTI-SMITH: 16 U/ML
CD19 B CELL: 46 % (ref 5–25)
CD3 % TOTAL T CELLS: 50 % (ref 57–94)
CD4 % HELPER T CELL: 31 % (ref 27–64)
CD4:CD8: 1.82 (ref 0.6–2.8)
CD8 % SUPPRESSOR T CELL: 17 % (ref 17–48)
HISTONE ANTIBODY: 321 U/ML
LYMPHOCYTES # BLD: 64 % (ref 24–44)
PATHOLOGIST: NORMAL
SERUM IFX INTERP: NORMAL
WBC # BLD: 14.4 K/UL (ref 3.5–11)

## 2018-09-06 DIAGNOSIS — M15.9 PRIMARY OSTEOARTHRITIS INVOLVING MULTIPLE JOINTS: ICD-10-CM

## 2018-09-06 RX ORDER — HYDROCODONE BITARTRATE AND ACETAMINOPHEN 7.5; 325 MG/1; MG/1
2 TABLET ORAL EVERY 6 HOURS PRN
Qty: 180 TABLET | Refills: 0 | Status: SHIPPED | OUTPATIENT
Start: 2018-09-06 | End: 2018-09-28 | Stop reason: SDUPTHER

## 2018-09-06 NOTE — TELEPHONE ENCOUNTER
Health Maintenance   Topic Date Due    DTaP/Tdap/Td vaccine (1 - Tdap) 04/30/1966    Shingles Vaccine (1 of 2 - 2 Dose Series) 04/30/1997    Breast cancer screen  04/20/2018    Diabetic microalbuminuria test  05/15/2018    Flu vaccine (1) 09/01/2018    Colon cancer screen colonoscopy  06/07/2019    Diabetic foot exam  07/11/2019    A1C test (Diabetic or Prediabetic)  07/11/2019    Lipid screen  07/11/2019    Potassium monitoring  07/30/2019    Creatinine monitoring  07/30/2019    Diabetic retinal exam  08/15/2019    DEXA (modify frequency per FRAX score)  Completed    Pneumococcal low/med risk  Completed    Hepatitis C screen  Completed       Hemoglobin A1C (%)   Date Value   07/11/2018 6.4 (H)   07/20/2017 6.1 (H)   01/18/2017 5.8             ( goal A1C is < 7)   No results found for: LABMICR  LDL Cholesterol (mg/dL)   Date Value   07/11/2018 67       (goal LDL is <100)   AST (U/L)   Date Value   09/04/2018 18     ALT (U/L)   Date Value   09/04/2018 12     BUN (mg/dL)   Date Value   07/30/2018 25 (H)     BP Readings from Last 3 Encounters:   08/27/18 (!) 96/59   08/23/18 (!) 111/57   08/16/18 126/80          (goal 120/80)    All Future Testing planned in [facility]  Lab Frequency Next Occurrence   CT ABDOMEN PELVIS W IV CONTRAST Additional Contrast? Oral Once 08/23/2018       Next Visit Date:  Future Appointments  Date Time Provider Thad Magallanesi   9/11/2018 1:15 PM MD EVANS Campbell  [facility]   9/11/2018 4:20 PM Whitney Boone MD [facility]            Patient Active Problem List:     Obesity     COPD (chronic obstructive pulmonary disease) ([address]     Colon polyps     Post traumatic stress disorder (PTSD)     Osteoarthritis     Diabetes mellitus ([address]     Acute respiratory failure (HCC)     COPD exacerbation (HCC)     Pneumonia     Tubulovillous adenoma     Oral thrush     Bandemia     Thrombocytopenia (HCC)     Marginal zone B-cell lymphoma ([address]

## 2018-09-07 LAB
SEND OUT REPORT: NORMAL
TEST NAME: NORMAL

## 2018-09-08 LAB — V617F MUTATION, QNT: 0 %

## 2018-09-11 ENCOUNTER — OFFICE VISIT (OUTPATIENT)
Dept: ONCOLOGY | Age: 73
End: 2018-09-11
Payer: COMMERCIAL

## 2018-09-11 ENCOUNTER — TELEPHONE (OUTPATIENT)
Dept: ONCOLOGY | Age: 73
End: 2018-09-11

## 2018-09-11 ENCOUNTER — OFFICE VISIT (OUTPATIENT)
Dept: FAMILY MEDICINE CLINIC | Age: 73
End: 2018-09-11
Payer: COMMERCIAL

## 2018-09-11 VITALS
SYSTOLIC BLOOD PRESSURE: 118 MMHG | OXYGEN SATURATION: 93 % | DIASTOLIC BLOOD PRESSURE: 66 MMHG | BODY MASS INDEX: 41.98 KG/M2 | HEART RATE: 72 BPM | WEIGHT: 194 LBS

## 2018-09-11 VITALS
BODY MASS INDEX: 41.98 KG/M2 | RESPIRATION RATE: 18 BRPM | HEART RATE: 87 BPM | WEIGHT: 194 LBS | SYSTOLIC BLOOD PRESSURE: 78 MMHG | TEMPERATURE: 94.6 F | DIASTOLIC BLOOD PRESSURE: 45 MMHG

## 2018-09-11 DIAGNOSIS — E11.8 TYPE 2 DIABETES MELLITUS WITH COMPLICATION, WITHOUT LONG-TERM CURRENT USE OF INSULIN (HCC): ICD-10-CM

## 2018-09-11 DIAGNOSIS — C85.80 MARGINAL ZONE B-CELL LYMPHOMA (HCC): Primary | ICD-10-CM

## 2018-09-11 DIAGNOSIS — Z23 IMMUNIZATION DUE: ICD-10-CM

## 2018-09-11 DIAGNOSIS — M15.9 PRIMARY OSTEOARTHRITIS INVOLVING MULTIPLE JOINTS: ICD-10-CM

## 2018-09-11 DIAGNOSIS — J44.9 CHRONIC OBSTRUCTIVE PULMONARY DISEASE, UNSPECIFIED COPD TYPE (HCC): Primary | ICD-10-CM

## 2018-09-11 DIAGNOSIS — C85.80 MARGINAL ZONE B-CELL LYMPHOMA (HCC): ICD-10-CM

## 2018-09-11 PROCEDURE — 90662 IIV NO PRSV INCREASED AG IM: CPT | Performed by: FAMILY MEDICINE

## 2018-09-11 PROCEDURE — 99212 OFFICE O/P EST SF 10 MIN: CPT

## 2018-09-11 PROCEDURE — G0008 ADMIN INFLUENZA VIRUS VAC: HCPCS | Performed by: FAMILY MEDICINE

## 2018-09-11 PROCEDURE — 99213 OFFICE O/P EST LOW 20 MIN: CPT | Performed by: FAMILY MEDICINE

## 2018-09-11 PROCEDURE — 99214 OFFICE O/P EST MOD 30 MIN: CPT | Performed by: INTERNAL MEDICINE

## 2018-09-11 ASSESSMENT — ENCOUNTER SYMPTOMS
SORE THROAT: 0
NAUSEA: 0
SHORTNESS OF BREATH: 1
SINUS PRESSURE: 0
VOMITING: 0
COUGH: 1
BACK PAIN: 1
DIARRHEA: 0

## 2018-09-11 NOTE — PROGRESS NOTES
Kiana Beard is a 72 y.o. female who presents today for her medical conditions/complaints as noted below. Kiana Beard is c/o of Diabetes and Health Maintenance (flu, micro, stuart)  Routine follow up on PL she is under a lot of personal stress related to her recent dx provided by [facility]. She is anxious and feels this is leading to poor non restorative sleep. HPI:     Diabetic visit information    BP Readings from Last 3 Encounters:   09/11/18 118/66   08/27/18 (!) 96/59   08/23/18 (!) 111/57       Hemoglobin A1C (%)   Date Value   07/11/2018 6.4 (H)   07/20/2017 6.1 (H)   01/18/2017 5.8     LDL Cholesterol (mg/dL)   Date Value   07/11/2018 67               Have you changed or started any medications since your last visit including any over-the-counter medicines, vitamins, or herbal medicines? no   Have you stopped taking any of your medications? Is so, why? -  no  Are you having any side effects from any of your medications? - no    Have you seen any other physician or provider since your last visit?  no   Have you had any other diagnostic tests since your last visit?  no   Have you been seen in the emergency room and/or had an admission in a hospital since we last saw you?  no     Have you had your annual diabetic retinal (eye) exam? No   (ensure copy of exam is in the chart)    Have you had your routine dental cleaning in the past 6 months? no    Do you have an active MyChart account? If not, what are your barriers? No:     Patient Care Team:  Kaitlin Guerrero MD as PCP - Brian Denise MD as PCP - [facility] Attributed Provider  Onesimo Espino MD as Consulting Physician (Otolaryngology)  Erick Rodriguez, RN as [address] [place] [address], RN as Nurse Navigator (Oncology)    Medical history Review  Past Medical, Family, and Social History reviewed and  contribute to the patient presenting condition.     Health Maintenance   Topic Date Due    DTaP/Tdap/Td vaccine (1 - Tdap) 04/30/1966    Shingles Vaccine (1 of 2 - 2 Dose Series) 04/30/1997    Breast cancer screen  04/20/2018    Diabetic microalbuminuria test  05/15/2018    Flu vaccine (1) 09/01/2018    Colon cancer screen colonoscopy  06/07/2019    Diabetic foot exam  07/11/2019    A1C test (Diabetic or Prediabetic)  07/11/2019    Lipid screen  07/11/2019    Potassium monitoring  07/30/2019    Creatinine monitoring  07/30/2019    Diabetic retinal exam  08/15/2019    DEXA (modify frequency per FRAX score)  Completed    Pneumococcal low/med risk  Completed    Hepatitis C screen  Completed       Past Medical History:   Diagnosis Date    [facility]     Depression     Diabetes mellitus ([facility]     Hyperlipidemia     PTSD (post-traumatic stress disorder)     Tubulovillous adenoma       Past Surgical History:   Procedure Laterality Date    COLONOSCOPY      colon polyps    COLONOSCOPY  06/07/2016    severe diverticulosis TUBULOVILLOUS ADENOMA.       HYSTERECTOMY       Family History   Problem Relation Age of Onset    Heart Disease Mother     Heart Disease Brother      Social History   Substance Use Topics    Smoking status: Former Smoker     Quit date: 11/23/2007    Smokeless tobacco: Never Used    Alcohol use No      Current Outpatient Prescriptions   Medication Sig Dispense Refill    HYDROcodone-acetaminophen (NORCO) 7.5-325 MG per tablet Take 2 tablets by mouth every 6 hours as needed for Pain for up to 59 days. . 180 tablet 0    ipratropium-albuterol (DUONEB) 0.5-2.5 (3) MG/3ML SOLN nebulizer solution Inhale 3 mLs into the lungs every 6 hours as needed for Shortness of Breath 360 mL 6    metFORMIN (GLUCOPHAGE-XR) 750 MG extended release tablet Take 1 tablet by mouth daily (with breakfast) 30 tablet 3    gabapentin (NEURONTIN) 300 MG capsule Take 1 capsule by mouth 4 times daily for 31 days. . 120 capsule 2    lisinopril (PRINIVIL;ZESTRIL) 10 MG tablet TAKE ONE TABLET BY

## 2018-09-11 NOTE — PROGRESS NOTES
_           Chief Complaint   Patient presents with    Follow-up     patient would like to review status of disease     DIAGNOSIS:        Marginal Zone lymphoma  Persistent leukocytosis  Persistent thrombocytopenia  Multiple comorbidities as listed     CURRENT THERAPY:         Patient is seen in follow up work up for leukocytosis. BRIEF CASE HISTORY:      Ms. Satish Wright is a very pleasant 72 y.o. female with history of multiple comorbidities including COPD and diabetes. She quit smoking years ago. The patient was recently hospitalized with chest infection. The patient was noted to have leukocytosis. She also had persistent thrombocytopenia. She is referred for further evaluation. Patient denies any active bleeding. She has easy bruising. No fever or night sweats. No weight loss or decreased appetite. No palpable lymph nodes. No history of repeated infections. Patient has history of smoking for more than 50 years. Denies alcohol drinking    INTERIM HISTORY:   Patient is seen for follow up leukocytosis. She had flow cytometry And bone marrow test. Results showed evidence of marginal zone lymphoma. No fever. No lymphadenopathy. No weight loss or decreased appetite. PAST MEDICAL HISTORY: has a past medical history of COPD (chronic obstructive pulmonary disease) ([facility] Depression; Diabetes mellitus ([facility] Hyperlipidemia; PTSD (post-traumatic stress disorder); and Tubulovillous adenoma. PAST SURGICAL HISTORY: has a past surgical history that includes Hysterectomy; Colonoscopy; and Colonoscopy (06/07/2016).      CURRENT MEDICATIONS:  has a current medication list which includes the following prescription(s): hydrocodone-acetaminophen, ipratropium-albuterol, metformin, lisinopril, alprazolam, simvastatin, ibuprofen, fluoxetine, esomeprazole, combivent respimat, blood glucose test strips, lancets, albuterol signs: Blood pressure (!) 78/45, pulse 87, temperature 94.6 °F (34.8 °C), temperature source Oral, resp. rate 18, weight 194 lb (88 kg), not currently breastfeeding. HEENT:  Eyes are normal. Ears, nose and throat are normal.  Neck: Supple. No lymph node enlargement. No thyroid enlargement. Trachea is centrally located. Chest:  Clear to auscultation. No wheezes or crepitations. Heart: Regular sinus rhythm. Abdomen: Soft, nontender. No hepatosplenomegaly. No masses. Extremities:  With no edema. Lymph Nodes:  No cervical, axillary or inguinal lymph node enlargement. Neurologic:  Conscious and oriented. No focal neurological deficits. Psychosocial: No depression, anxiety or stress. Skin: No rashes, bruises or ecchymoses. Review of Diagnostic data:   Recent Labs      09/04/18   0845  08/27/18   0945   WBC  14.4*  14.4*  14.1*   HGB  13.1  13.0   HCT  44.1  40.6   MCV  97.6  90.7   PLT  50*  86*     Peripheral blood flow cytometry:  Peripheral blood flow cytometric immunophenotyping:         Peripheral blood is positive for a monoclonal B-cell population,   positive for CD19, CD20, CD22,   CD45, FMC7 and lambda light chain. Comment:  Morphology and immunophenotype of circulating monoclonal   B-cells are most consistent with a marginal zone lymphoma.  Bone   marrow evaluation and/or lymph node biopsy if possible are recommended   for confirmation and additional characterization. Bone marrow test August 27, 2018: Final Diagnosis   PERIPHERAL BLOOD:   -LYMPHOCYTOSIS WITH \" VILLOUS\" LYMPHOCYTES. -MODERATE THROMBOCYTOPENIA. BONE MARROW BIOPSY, ASPIRATE SMEAR AND CLOT SECTION:   - MARGINAL ZONE LYMPHOMA. -NORMOCELLULAR TRILINEAGE HEMATOPOIETIC MARROW WITH MARKEDLY   DECREASED IRON STORES. IMPRESSION:  Marginal Zone lymphoma  Persistent leukocytosis  Persistent thrombocytopenia  Positive FANNY with elevated antihistone.    Multiple comorbidities as listed    PLAN: I reviewed the labs and

## 2018-09-12 ENCOUNTER — TELEPHONE (OUTPATIENT)
Dept: INFUSION THERAPY | Facility: MEDICAL CENTER | Age: 73
End: 2018-09-12

## 2018-09-18 DIAGNOSIS — F41.1 GAD (GENERALIZED ANXIETY DISORDER): ICD-10-CM

## 2018-09-18 RX ORDER — ALPRAZOLAM 1 MG/1
TABLET ORAL
Qty: 90 TABLET | Refills: 2 | Status: SHIPPED | OUTPATIENT
Start: 2018-09-18 | End: 2019-04-08 | Stop reason: SDUPTHER

## 2018-09-18 NOTE — TELEPHONE ENCOUNTER
Th[facility]ocytopenia ([facility]     Marginal zone B-cell lymphoma ([facility]

## 2018-09-21 ENCOUNTER — CARE COORDINATION (OUTPATIENT)
Dept: CARE COORDINATION | Age: 73
End: 2018-09-21

## 2018-09-24 ENCOUNTER — HOSPITAL ENCOUNTER (OUTPATIENT)
Dept: CT IMAGING | Age: 73
Discharge: HOME | End: 2018-09-26
Payer: COMMERCIAL

## 2018-09-24 DIAGNOSIS — D69.6 THROMBOCYTOPENIA (HCC): ICD-10-CM

## 2018-09-24 DIAGNOSIS — C85.80 MARGINAL ZONE B-CELL LYMPHOMA (HCC): ICD-10-CM

## 2018-09-24 LAB
BUN BLDV-MCNC: 11 MG/DL (ref 8–23)
CREAT SERPL-MCNC: 0.55 MG/DL (ref 0.5–0.9)
GFR AFRICAN AMERICAN: >60 ML/MIN
GFR NON-AFRICAN AMERICAN: >60 ML/MIN
GFR SERPL CREATININE-BSD FRML MDRD: NORMAL ML/MIN/{1.73_M2}
GFR SERPL CREATININE-BSD FRML MDRD: NORMAL ML/MIN/{1.73_M2}

## 2018-09-24 PROCEDURE — 36415 COLL VENOUS BLD VENIPUNCTURE: CPT

## 2018-09-24 PROCEDURE — 6360000004 HC RX CONTRAST MEDICATION: Performed by: INTERNAL MEDICINE

## 2018-09-24 PROCEDURE — 74177 CT ABD & PELVIS W/CONTRAST: CPT

## 2018-09-24 PROCEDURE — 84520 ASSAY OF UREA NITROGEN: CPT

## 2018-09-24 PROCEDURE — 82565 ASSAY OF CREATININE: CPT

## 2018-09-24 PROCEDURE — 2580000003 HC RX 258: Performed by: INTERNAL MEDICINE

## 2018-09-24 RX ORDER — SODIUM CHLORIDE 0.9 % (FLUSH) 0.9 %
10 SYRINGE (ML) INJECTION PRN
Status: DISCONTINUED | OUTPATIENT
Start: 2018-09-24 | End: 2018-09-27 | Stop reason: HOSPADM

## 2018-09-24 RX ADMIN — SODIUM CHLORIDE, PRESERVATIVE FREE 10 ML: 5 INJECTION INTRAVENOUS at 17:26

## 2018-09-24 RX ADMIN — IOHEXOL 50 ML: 240 INJECTION, SOLUTION INTRATHECAL; INTRAVASCULAR; INTRAVENOUS; ORAL at 17:26

## 2018-09-24 RX ADMIN — IOPAMIDOL 100 ML: 755 INJECTION, SOLUTION INTRAVENOUS at 17:26

## 2018-09-28 ENCOUNTER — CARE COORDINATION (OUTPATIENT)
Dept: CARE COORDINATION | Age: 73
End: 2018-09-28

## 2018-09-28 DIAGNOSIS — M15.9 PRIMARY OSTEOARTHRITIS INVOLVING MULTIPLE JOINTS: ICD-10-CM

## 2018-09-28 RX ORDER — HYDROCODONE BITARTRATE AND ACETAMINOPHEN 7.5; 325 MG/1; MG/1
2 TABLET ORAL EVERY 6 HOURS PRN
Qty: 180 TABLET | Refills: 0 | Status: SHIPPED | OUTPATIENT
Start: 2018-09-28 | End: 2018-10-22 | Stop reason: SDUPTHER

## 2018-09-28 NOTE — CARE COORDINATION
Ambulatory Care Coordination Note  9/28/2018  CM Risk Score: 6  Kenneth Mortality Risk Score: 18.25    ACC: Christ Arteaga RN    Summary Note: RNCC received call from Nohelia Sultana requesting information regarding medications refills for xanax and narco, stated \"the pharmacy told me that I need to talk to Dr. Angelina Zambrano. \" Rosalba SHORT,  placed call to pharmacy and informed RNCC that the Xanax is 5 days early to fill. May notified and verbalizes understanding. Informed May, as well, that Standard Patrick has been received by pharmacy. May verbalizes understanding. Sophia Mckenzie informs [facility] that she has been recently diagnosed with \"bone marrow cancer\" and is surprised by this new diagnosis, but has support through family and friends, yanira base is strong. RNCC encourages May to call for any questions or concerns and informs that [facility] is also here for supportive measures, May verbalizes appreciation. RNCC Plan: Will follow up with Sophia Mckenzie for updates with DM and COPD management, and well-being, coping skills for new diagnosis        Care Coordination Interventions    Program Enrollment:  Complex Care  Referral from Primary Care Provider:  No  Suggested Interventions and [address]:  Completed  Zone Management Tools:  Not Started         Goals Addressed             Most Recent     Conditions and Symptoms   Improving (9/28/2018)             I will schedule office visits, as directed by my provider. I will keep my appointment or reschedule if I have to cancel. I will notify my provider of any barriers to my plan of care. I will follow my Zone Management tool to seek urgent or emergent care. I will notify my provider of any symptoms that indicate a worsening of my condition. Barriers: none  Plan for overcoming my barriers: N/A  Confidence: 8/10  Anticipated Goal Completion Date: 2/2019              Prior to Admission medications    Medication Sig Start Date End Date Taking?  Authorizing

## 2018-09-28 NOTE — TELEPHONE ENCOUNTER
oarrs 6/4/18      Last visit: 9/11/18  Last Med refill:9/6/18      Next Visit Date:  Future Appointments  Date Time Provider Thad Rebeca   11/12/2018 3:00 PM Zaria Ordoñez MD [facility]   12/11/2018 4:00 PM Vasyl Varner MD [address] [facility] Maintenance   Topic Date Due    DTaP/Tdap/Td vaccine (1 - Tdap) 04/30/1966    Shingles Vaccine (1 of 2 - 2 Dose Series) 04/30/1997    Breast cancer screen  04/20/2018    Diabetic microalbuminuria test  05/15/2018    Colon cancer screen colonoscopy  06/07/2019    Diabetic foot exam  07/11/2019    A1C test (Diabetic or Prediabetic)  07/11/2019    Lipid screen  07/11/2019    Potassium monitoring  07/30/2019    Diabetic retinal exam  08/15/2019    Creatinine monitoring  09/24/2019    DEXA (modify frequency per FRAX score)  Completed    Flu vaccine  Completed    Pneumococcal low/med risk  Completed    Hepatitis C screen  Completed       Hemoglobin A1C (%)   Date Value   07/11/2018 6.4 (H)   07/20/2017 6.1 (H)   01/18/2017 5.8             ( goal A1C is < 7)   No results found for: LABMICR  LDL Cholesterol (mg/dL)   Date Value   07/11/2018 67   07/20/2017 73       (goal LDL is <100)   AST (U/L)   Date Value   09/04/2018 18     ALT (U/L)   Date Value   09/04/2018 12     BUN (mg/dL)   Date Value   09/24/2018 11     BP Readings from Last 3 Encounters:   09/11/18 (!) 78/45   09/11/18 118/66   08/27/18 (!) 96/59          (goal 120/80)    All Future Testing planned in [facility]  Lab Frequency Next Occurrence   CBC Auto Differential                 Patient Active Problem List:     Obesity     COPD (chronic obstructive pulmonary disease) (HCC)     Colon polyps     Post traumatic stress disorder (PTSD)     Osteoarthritis     Diabetes mellitus (HCC)     Acute respiratory failure (HCC)     COPD exacerbation (HCC)     Pneumonia     Tubulovillous adenoma     Oral thrush     Bandemia     Thrombocytopenia (HCC)     Marginal zone B-cell lymphoma ([facility]

## 2018-10-11 ENCOUNTER — TELEPHONE (OUTPATIENT)
Dept: FAMILY MEDICINE CLINIC | Age: 73
End: 2018-10-11

## 2018-10-11 DIAGNOSIS — K12.1 STOMATITIS: ICD-10-CM

## 2018-10-11 RX ORDER — FERROUS SULFATE 325(65) MG
325 TABLET ORAL DAILY
Qty: 30 TABLET | Refills: 11 | Status: SHIPPED | OUTPATIENT
Start: 2018-10-11 | End: 2018-11-12 | Stop reason: SDUPTHER

## 2018-10-11 NOTE — TELEPHONE ENCOUNTER
Patient was told by her oncologist that she needs to take iron and zinc. She says she cannot afford vitamins. She wanted to know if you would order them so that her  Insurance will pay for them?

## 2018-10-11 NOTE — TELEPHONE ENCOUNTER
Pharmacy is calling for the magic mouthwash they need to know the exact ingredient breakdown you would like ordered and they said do you want the 240 ml?

## 2018-10-11 NOTE — TELEPHONE ENCOUNTER
I have no idea replacement of zinc dosage for replacement I would like to see any labs that indicate a need for supplementation if available.   I am ok with Fe Rx FeSO4 325 mg #30 one a day x 11 refill

## 2018-10-22 ENCOUNTER — CARE COORDINATION (OUTPATIENT)
Dept: CARE COORDINATION | Age: 73
End: 2018-10-22

## 2018-10-22 DIAGNOSIS — M15.9 PRIMARY OSTEOARTHRITIS INVOLVING MULTIPLE JOINTS: ICD-10-CM

## 2018-10-22 DIAGNOSIS — K21.9 GASTROESOPHAGEAL REFLUX DISEASE, ESOPHAGITIS PRESENCE NOT SPECIFIED: ICD-10-CM

## 2018-10-22 RX ORDER — HYDROCODONE BITARTRATE AND ACETAMINOPHEN 7.5; 325 MG/1; MG/1
2 TABLET ORAL EVERY 6 HOURS PRN
Qty: 180 TABLET | Refills: 0 | Status: SHIPPED | OUTPATIENT
Start: 2018-10-22 | End: 2018-11-12 | Stop reason: SDUPTHER

## 2018-10-22 NOTE — CARE COORDINATION
[facility]     ,   Diabetes Assessment    Medic Alert ID:  No  Meal Planning:  Avoidance of concentrated sweets   How often do you test your blood sugar?:  Daily   Do you have barriers with adherence to non-pharmacologic self-management interventions?  (Nutrition/Exercise/Self-Monitoring):  No   Have you ever had to go to the ED for symptoms of low blood sugar?:  No       No patient-reported symptoms   Do you have hyperglycemia symptoms?:  No   Do you have hypoglycemia symptoms?:  No   Last Blood Sugar Value:  100   Blood Sugar Monitoring Regimen:  Once a Day      ,   COPD Assessment    Does the patient understand envrionmental exposure?:  Yes  Is the patient able to verbalize Rescue vs. Long Acting medications?:  Yes  Does the patient use a space with inhaled medications?:  No     No patient-reported symptoms         Symptoms:          and   General Assessment    Do you have any symptoms that are causing concern?:  Yes  Progression since Onset:  Gradually Worsening  Reported Symptoms:  (Comment: Buring mouth syndrome, loss of appetite)

## 2018-10-22 NOTE — TELEPHONE ENCOUNTER
Last visit:9/11/18  Last Med refill:9/28/18    Next Visit Date:11/12/18  Future Appointments  Date Time Provider Thad Jose   11/12/2018 3:00 PM MD EVANS Augustine [facility]   12/11/2018 4:00 PM [place]chana Sevilla [place] [address] [facility] Maintenance   Topic Date Due    DTaP/Tdap/Td vaccine (1 - Tdap) 04/30/1966    Shingles Vaccine (1 of 2 - 2 Dose Series) 04/30/1997    Breast cancer screen  04/20/2018    Diabetic microalbuminuria test  05/15/2018    Colon cancer screen colonoscopy  06/07/2019    Diabetic foot exam  07/11/2019    A1C test (Diabetic or Prediabetic)  07/11/2019    Lipid screen  07/11/2019    Potassium monitoring  07/30/2019    Diabetic retinal exam  08/15/2019    Creatinine monitoring  09/24/2019    DEXA (modify frequency per FRAX score)  Completed    Flu vaccine  Completed    Pneumococcal low/med risk  Completed    Hepatitis C screen  Completed       Hemoglobin A1C (%)   Date Value   07/11/2018 6.4 (H)   07/20/2017 6.1 (H)   01/18/2017 5.8             ( goal A1C is < 7)   No results found for: LABMICR  LDL Cholesterol (mg/dL)   Date Value   07/11/2018 67   07/20/2017 73       (goal LDL is <100)   AST (U/L)   Date Value   09/04/2018 18     ALT (U/L)   Date Value   09/04/2018 12     BUN (mg/dL)   Date Value   09/24/2018 11     BP Readings from Last 3 Encounters:   09/11/18 (!) 78/45   09/11/18 118/66   08/27/18 (!) 96/59          (goal 120/80)    All Future Testing planned in [facility]  Lab Frequency Next Occurrence   CBC Auto Differential                 Patient Active Problem List:     Obesity     COPD (chronic obstructive pulmonary disease) (HCC)     Colon polyps     Post traumatic stress disorder (PTSD)     Osteoarthritis     Diabetes mellitus (HCC)     Acute respiratory failure (HCC)     COPD exacerbation (HCC)     Pneumonia     Tubulovillous adenoma     Oral thrush     Bandemia     Thrombocytopenia (HCC)     Marginal zone B-cell lymphoma ([address]

## 2018-10-23 RX ORDER — ESOMEPRAZOLE MAGNESIUM 40 MG/1
CAPSULE, DELAYED RELEASE ORAL
Qty: 30 CAPSULE | Refills: 11 | Status: SHIPPED | OUTPATIENT
Start: 2018-10-23 | End: 2019-01-14 | Stop reason: SDUPTHER

## 2018-11-06 ENCOUNTER — CARE COORDINATION (OUTPATIENT)
Dept: CARE COORDINATION | Age: 73
End: 2018-11-06

## 2018-11-12 ENCOUNTER — CARE COORDINATION (OUTPATIENT)
Dept: CARE COORDINATION | Age: 73
End: 2018-11-12

## 2018-11-12 ENCOUNTER — OFFICE VISIT (OUTPATIENT)
Dept: FAMILY MEDICINE CLINIC | Age: 73
End: 2018-11-12
Payer: COMMERCIAL

## 2018-11-12 VITALS
HEART RATE: 90 BPM | BODY MASS INDEX: 42.41 KG/M2 | DIASTOLIC BLOOD PRESSURE: 60 MMHG | OXYGEN SATURATION: 94 % | SYSTOLIC BLOOD PRESSURE: 110 MMHG | WEIGHT: 196 LBS

## 2018-11-12 DIAGNOSIS — G62.9 NEUROPATHY: ICD-10-CM

## 2018-11-12 DIAGNOSIS — M15.9 PRIMARY OSTEOARTHRITIS INVOLVING MULTIPLE JOINTS: ICD-10-CM

## 2018-11-12 DIAGNOSIS — E66.01 MORBID OBESITY WITH BMI OF 40.0-44.9, ADULT (HCC): ICD-10-CM

## 2018-11-12 DIAGNOSIS — E11.8 TYPE 2 DIABETES MELLITUS WITH COMPLICATION, WITHOUT LONG-TERM CURRENT USE OF INSULIN (HCC): Primary | ICD-10-CM

## 2018-11-12 DIAGNOSIS — F41.9 ANXIETY: ICD-10-CM

## 2018-11-12 PROCEDURE — 99213 OFFICE O/P EST LOW 20 MIN: CPT | Performed by: FAMILY MEDICINE

## 2018-11-12 RX ORDER — ALPRAZOLAM 1 MG/1
1 TABLET ORAL 3 TIMES DAILY PRN
Qty: 90 TABLET | Refills: 2 | Status: SHIPPED | OUTPATIENT
Start: 2018-11-12 | End: 2019-02-10

## 2018-11-12 RX ORDER — LISINOPRIL 10 MG/1
TABLET ORAL
Qty: 90 TABLET | Refills: 3 | Status: SHIPPED | OUTPATIENT
Start: 2018-11-12 | End: 2019-08-15 | Stop reason: SDUPTHER

## 2018-11-12 RX ORDER — SIMVASTATIN 40 MG
TABLET ORAL
Qty: 90 TABLET | Refills: 3 | Status: SHIPPED | OUTPATIENT
Start: 2018-11-12 | End: 2019-01-14 | Stop reason: SDUPTHER

## 2018-11-12 RX ORDER — ALPRAZOLAM 1 MG/1
1 TABLET ORAL 3 TIMES DAILY PRN
COMMUNITY
End: 2018-11-12 | Stop reason: SDUPTHER

## 2018-11-12 RX ORDER — FERROUS SULFATE 325(65) MG
325 TABLET ORAL DAILY
Qty: 90 TABLET | Refills: 3 | Status: SHIPPED | OUTPATIENT
Start: 2018-11-12 | End: 2019-03-19 | Stop reason: ALTCHOICE

## 2018-11-12 RX ORDER — HYDROCODONE BITARTRATE AND ACETAMINOPHEN 7.5; 325 MG/1; MG/1
2 TABLET ORAL EVERY 6 HOURS PRN
Qty: 180 TABLET | Refills: 0 | Status: SHIPPED | OUTPATIENT
Start: 2018-11-12 | End: 2018-12-03 | Stop reason: SDUPTHER

## 2018-11-12 ASSESSMENT — ENCOUNTER SYMPTOMS
COUGH: 0
DIARRHEA: 0
SHORTNESS OF BREATH: 1
NAUSEA: 0
SINUS PRESSURE: 0
SORE THROAT: 0
VOMITING: 0
BACK PAIN: 0

## 2018-11-12 NOTE — PROGRESS NOTES
Diagnosis Date    [facility]) [facility]     Depression     Diabetes mellitus ([facility]     Hyperlipidemia     PTSD (post-traumatic stress disorder)     Tubulovillous adenoma       Past Surgical History:   Procedure Laterality Date    COLONOSCOPY      colon polyps    COLONOSCOPY  06/07/2016    severe diverticulosis TUBULOVILLOUS ADENOMA.       HYSTERECTOMY       Family History   Problem Relation Age of Onset    Heart Disease Mother     Heart Disease Brother      Social History   Substance Use Topics    Smoking status: Former Smoker     Quit date: 11/23/2007    Smokeless tobacco: Never Used    Alcohol use No      Current Outpatient Prescriptions   Medication Sig Dispense Refill    HYDROcodone-acetaminophen (NORCO) 7.5-325 MG per tablet Take 2 tablets by mouth every 6 hours as needed for Pain for up to 22 days. . 180 tablet 0    lisinopril (PRINIVIL;ZESTRIL) 10 MG tablet TAKE ONE TABLET BY MOUTH DAILY 90 tablet 3    ferrous sulfate (FEI-EMILY) 325 (65 Fe) MG tablet Take 1 tablet by mouth daily 90 tablet 3    simvastatin (ZOCOR) 40 MG tablet TAKE ONE TABLET BY MOUTH DAILY 90 tablet 3    ALPRAZolam (XANAX) 1 MG tablet Take 1 tablet by mouth 3 times daily as needed for Sleep or Anxiety for up to 90 days. . 90 tablet 2    esomeprazole (NEXIUM) 40 MG delayed release capsule TAKE ONE CAPSULE BY MOUTH DAILY 30 capsule 11    ipratropium-albuterol (DUONEB) 0.5-2.5 (3) MG/3ML SOLN nebulizer solution Inhale 3 mLs into the lungs every 6 hours as needed for Shortness of Breath 360 mL 6    metFORMIN (GLUCOPHAGE-XR) 750 MG extended release tablet Take 1 tablet by mouth daily (with breakfast) 30 tablet 3    gabapentin (NEURONTIN) 300 MG capsule Take 1 capsule by mouth 4 times daily for 31 days. . 120 capsule 2    ibuprofen (ADVIL;MOTRIN) 800 MG tablet TAKE ONE TABLET BY MOUTH EVERY 8 HOURS AS NEEDED FOR PAIN 90 tablet 3    FLUoxetine (PROZAC) 20 MG capsule TAKE ONE CAPSULE BY MOUTH

## 2018-11-12 NOTE — CARE COORDINATION
Diabetes Assessment    Medic Alert ID:  No  Meal Planning:  Avoidance of concentrated sweets   How often do you test your blood sugar?:  Daily   Do you have barriers with adherence to non-pharmacologic self-management interventions?  (Nutrition/Exercise/Self-Monitoring):  No   Have you ever had to go to the ED for symptoms of low blood sugar?:  No          ,   COPD Assessment    Does the patient understand envrionmental exposure?:  Yes  Is the patient able to verbalize Rescue vs. Long Acting medications?:  Yes  Does the patient use a space with inhaled medications?:  No            Symptoms:          and   General Assessment    Do you have any symptoms that are causing concern?:  Yes  Progression since Onset:  Unchanged  Reported Symptoms:  (Comment: Anxiety, insomnia)

## 2018-11-14 ENCOUNTER — TELEPHONE (OUTPATIENT)
Dept: INFUSION THERAPY | Facility: MEDICAL CENTER | Age: 73
End: 2018-11-14

## 2018-11-14 ENCOUNTER — TELEPHONE (OUTPATIENT)
Dept: FAMILY MEDICINE CLINIC | Age: 73
End: 2018-11-14

## 2018-11-19 RX ORDER — FLUOXETINE HYDROCHLORIDE 20 MG/1
CAPSULE ORAL
Qty: 30 CAPSULE | Refills: 9 | Status: SHIPPED | OUTPATIENT
Start: 2018-11-19 | End: 2019-08-15 | Stop reason: SDUPTHER

## 2018-11-29 RX ORDER — METFORMIN HYDROCHLORIDE 750 MG/1
750 TABLET, EXTENDED RELEASE ORAL
Qty: 30 TABLET | Refills: 11 | Status: SHIPPED | OUTPATIENT
Start: 2018-11-29 | End: 2019-01-14 | Stop reason: SDUPTHER

## 2018-11-29 NOTE — TELEPHONE ENCOUNTER
Health Maintenance   Topic Date Due    DTaP/Tdap/Td vaccine (1 - Tdap) 04/30/1966    Shingles Vaccine (1 of 2 - 2 Dose Series) 04/30/1997    Breast cancer screen  04/20/2018    Diabetic microalbuminuria test  05/15/2018    Colon cancer screen colonoscopy  06/07/2019    Diabetic foot exam  07/11/2019    A1C test (Diabetic or Prediabetic)  07/11/2019    Lipid screen  07/11/2019    Potassium monitoring  07/30/2019    Diabetic retinal exam  08/15/2019    Creatinine monitoring  09/24/2019    DEXA (modify frequency per FRAX score)  Completed    Flu vaccine  Completed    Pneumococcal low/med risk  Completed    Hepatitis C screen  Completed       Hemoglobin A1C (%)   Date Value   07/11/2018 6.4 (H)   07/20/2017 6.1 (H)   01/18/2017 5.8             ( goal A1C is < 7)   No results found for: LABMICR  LDL Cholesterol (mg/dL)   Date Value   07/11/2018 67       (goal LDL is <100)   AST (U/L)   Date Value   09/04/2018 18     ALT (U/L)   Date Value   09/04/2018 12     BUN (mg/dL)   Date Value   09/24/2018 11     BP Readings from Last 3 Encounters:   11/12/18 110/60   09/11/18 (!) 78/45   09/11/18 118/66          (goal 120/80)    All Future Testing planned in [facility]  Lab Frequency Next Occurrence   CBC Auto Differential         Next Visit Date:  Future Appointments  Date Time Provider Thad Jose   12/11/2018 4:00 PM Cristina Wan MD [facility]   1/14/2019 2:00 PM Hugo Ferrera MD [facility]            Patient Active Problem List:     Obesity     COPD (chronic obstructive pulmonary disease) ([facility]     Colon polyps     Post traumatic stress disorder (PTSD)     Osteoarthritis     Diabetes mellitus ([address]     Acute respiratory failure (HCC)     COPD exacerbation (HCC)     Pneumonia     Tubulovillous adenoma     Oral thrush     Bandemia     Thrombocytopenia (HCC)     Marginal zone B-cell lymphoma ([address]

## 2018-12-03 DIAGNOSIS — K12.1 STOMATITIS: ICD-10-CM

## 2018-12-03 DIAGNOSIS — M15.9 PRIMARY OSTEOARTHRITIS INVOLVING MULTIPLE JOINTS: ICD-10-CM

## 2018-12-03 RX ORDER — HYDROCODONE BITARTRATE AND ACETAMINOPHEN 7.5; 325 MG/1; MG/1
2 TABLET ORAL EVERY 6 HOURS PRN
Qty: 180 TABLET | Refills: 0 | Status: SHIPPED | OUTPATIENT
Start: 2018-12-03 | End: 2018-12-20 | Stop reason: SDUPTHER

## 2018-12-04 ENCOUNTER — HOSPITAL ENCOUNTER (OUTPATIENT)
Facility: MEDICAL CENTER | Age: 73
End: 2018-12-04
Payer: COMMERCIAL

## 2018-12-10 ENCOUNTER — CARE COORDINATION (OUTPATIENT)
Dept: CARE COORDINATION | Age: 73
End: 2018-12-10

## 2018-12-11 ENCOUNTER — HOSPITAL ENCOUNTER (OUTPATIENT)
Facility: MEDICAL CENTER | Age: 73
Discharge: HOME | End: 2018-12-11
Payer: COMMERCIAL

## 2018-12-11 ENCOUNTER — OFFICE VISIT (OUTPATIENT)
Dept: ONCOLOGY | Age: 73
End: 2018-12-11
Payer: COMMERCIAL

## 2018-12-11 ENCOUNTER — TELEPHONE (OUTPATIENT)
Dept: ONCOLOGY | Age: 73
End: 2018-12-11

## 2018-12-11 VITALS
RESPIRATION RATE: 18 BRPM | SYSTOLIC BLOOD PRESSURE: 98 MMHG | HEIGHT: 57 IN | WEIGHT: 187.2 LBS | BODY MASS INDEX: 40.39 KG/M2 | DIASTOLIC BLOOD PRESSURE: 53 MMHG | TEMPERATURE: 97.4 F | HEART RATE: 80 BPM

## 2018-12-11 DIAGNOSIS — C85.80 MARGINAL ZONE B-CELL LYMPHOMA (HCC): Primary | ICD-10-CM

## 2018-12-11 DIAGNOSIS — D69.6 THROMBOCYTOPENIA (HCC): ICD-10-CM

## 2018-12-11 DIAGNOSIS — C85.80 MARGINAL ZONE B-CELL LYMPHOMA (HCC): ICD-10-CM

## 2018-12-11 LAB
ABSOLUTE EOS #: 0.36 K/UL (ref 0–0.4)
ABSOLUTE IMMATURE GRANULOCYTE: ABNORMAL K/UL (ref 0–0.3)
ABSOLUTE LYMPH #: 5.91 K/UL (ref 1–4.8)
ABSOLUTE MONO #: 1.07 K/UL (ref 0.2–0.8)
ATYPICAL LYMPHOCYTE ABSOLUTE COUNT: 5.19 K/UL
ATYPICAL LYMPHOCYTES: 29 %
BASOPHILS # BLD: 0 %
BASOPHILS ABSOLUTE: 0 K/UL (ref 0–0.2)
DIFFERENTIAL TYPE: ABNORMAL
EOSINOPHILS RELATIVE PERCENT: 2 % (ref 1–4)
HCT VFR BLD CALC: 43.9 % (ref 36–46)
HEMOGLOBIN: 14.5 G/DL (ref 12–16)
IMMATURE GRANULOCYTES: ABNORMAL %
LYMPHOCYTES # BLD: 33 % (ref 24–44)
MCH RBC QN AUTO: 30.5 PG (ref 26–34)
MCHC RBC AUTO-ENTMCNC: 33.1 G/DL (ref 31–37)
MCV RBC AUTO: 91.9 FL (ref 80–100)
MONOCYTES # BLD: 6 % (ref 1–7)
NRBC AUTOMATED: ABNORMAL PER 100 WBC
PDW BLD-RTO: 15.8 % (ref 11.5–14.5)
PLATELET # BLD: 77 K/UL (ref 130–400)
PLATELET ESTIMATE: ABNORMAL
PMV BLD AUTO: 9 FL (ref 6–12)
RBC # BLD: 4.77 M/UL (ref 4–5.2)
RBC # BLD: ABNORMAL 10*6/UL
SEG NEUTROPHILS: 30 % (ref 36–66)
SEGMENTED NEUTROPHILS ABSOLUTE COUNT: 5.37 K/UL (ref 1.8–7.7)
WBC # BLD: 17.9 K/UL (ref 3.5–11)
WBC # BLD: ABNORMAL 10*3/UL

## 2018-12-11 PROCEDURE — 36415 COLL VENOUS BLD VENIPUNCTURE: CPT

## 2018-12-11 PROCEDURE — 99214 OFFICE O/P EST MOD 30 MIN: CPT | Performed by: INTERNAL MEDICINE

## 2018-12-11 PROCEDURE — 99211 OFF/OP EST MAY X REQ PHY/QHP: CPT

## 2018-12-11 PROCEDURE — 85025 COMPLETE CBC W/AUTO DIFF WBC: CPT

## 2018-12-12 NOTE — PROGRESS NOTES
_           Chief Complaint   Patient presents with    Follow-up     pt wants to review disease status, leg swelling      DIAGNOSIS:        Marginal Zone lymphoma  Persistent leukocytosis  Persistent thrombocytopenia  Multiple comorbidities as listed     CURRENT THERAPY:         Close monitoring and surveillance for leukocytosis and marginal zone lymphoma. BRIEF CASE HISTORY:      Ms. Claudia Vo is a very pleasant 72 y.o. female with history of multiple comorbidities including COPD and diabetes. She quit smoking years ago. The patient was recently hospitalized with chest infection. The patient was noted to have leukocytosis. She also had persistent thrombocytopenia. She is referred for further evaluation. Patient denies any active bleeding. She has easy bruising. No fever or night sweats. No weight loss or decreased appetite. No palpable lymph nodes. No history of repeated infections. Patient has history of smoking for more than 50 years. Denies alcohol drinking    INTERIM HISTORY:   Patient is seen for follow up leukocytosis and marginal zone lymphoma. She had flow cytometry and bone marrow test. Results showed evidence of marginal zone lymphoma. No fever. No lymphadenopathy. No weight loss or decreased appetite. PAST MEDICAL HISTORY: has a past medical history of COPD (chronic obstructive pulmonary disease) ([facility] [address] Depression; Diabetes mellitus ([facility] [address] Hyperlipidemia; PTSD (post-traumatic stress disorder); and Tubulovillous adenoma. PAST SURGICAL HISTORY: has a past surgical history that includes Hysterectomy; Colonoscopy; and Colonoscopy (06/07/2016).      CURRENT MEDICATIONS:  has a current medication list which includes the following prescription(s): magic mouthwash, hydrocodone-acetaminophen, metformin, fluoxetine, lisinopril, ferrous sulfate, simvastatin, alprazolam, esomeprazole,

## 2018-12-18 DIAGNOSIS — K12.1 STOMATITIS: ICD-10-CM

## 2018-12-20 ENCOUNTER — TELEPHONE (OUTPATIENT)
Dept: FAMILY MEDICINE CLINIC | Age: 73
End: 2018-12-20

## 2018-12-20 DIAGNOSIS — M15.9 PRIMARY OSTEOARTHRITIS INVOLVING MULTIPLE JOINTS: ICD-10-CM

## 2018-12-20 DIAGNOSIS — K12.1 STOMATITIS: ICD-10-CM

## 2018-12-20 RX ORDER — HYDROCODONE BITARTRATE AND ACETAMINOPHEN 7.5; 325 MG/1; MG/1
2 TABLET ORAL EVERY 6 HOURS PRN
Qty: 180 TABLET | Refills: 0 | Status: CANCELLED | OUTPATIENT
Start: 2018-12-20 | End: 2019-01-11

## 2018-12-20 NOTE — TELEPHONE ENCOUNTER
Health Maintenance   Topic Date Due    DTaP/Tdap/Td vaccine (1 - Tdap) 04/30/1966    Shingles Vaccine (1 of 2 - 2 Dose Series) 04/30/1997    Breast cancer screen  04/20/2018    Diabetic microalbuminuria test  05/15/2018    Colon cancer screen colonoscopy  06/07/2019    Diabetic foot exam  07/11/2019    A1C test (Diabetic or Prediabetic)  07/11/2019    Lipid screen  07/11/2019    Potassium monitoring  07/30/2019    Diabetic retinal exam  08/15/2019    Creatinine monitoring  09/24/2019    DEXA (modify frequency per FRAX score)  Completed    Flu vaccine  Completed    Pneumococcal high/highest risk  Completed    Hepatitis C screen  Completed       Hemoglobin A1C (%)   Date Value   07/11/2018 6.4 (H)   07/20/2017 6.1 (H)   01/18/2017 5.8             ( goal A1C is < 7)   No results found for: LABMICR  LDL Cholesterol (mg/dL)   Date Value   07/11/2018 67       (goal LDL is <100)   AST (U/L)   Date Value   09/04/2018 18     ALT (U/L)   Date Value   09/04/2018 12     BUN (mg/dL)   Date Value   09/24/2018 11     BP Readings from Last 3 Encounters:   12/11/18 (!) 98/53   11/12/18 110/60   09/11/18 (!) 78/45          (goal 120/80)    All Future Testing planned in [facility]  Lab Frequency Next Occurrence   CBC Auto Differential         Next Visit Date:  Future Appointments  Date Time Provider Thad Jose   1/14/2019 2:00 PM Dearl MD [facility]   3/19/2019 3:00 PM Bharat Herzog MD [id number]            Patient Active Problem List:     Obesity     COPD (chronic obstructive pulmonary disease) ([address]     Colon polyps     Post traumatic stress disorder (PTSD)     Osteoarthritis     Diabetes mellitus ([address]     Acute respiratory failure (HCC)     COPD exacerbation (HCC)     Pneumonia     Tubulovillous adenoma     Oral thrush     Bandemia     Thrombocytopenia (HCC)     Marginal zone B-cell lymphoma ([address]

## 2018-12-21 RX ORDER — HYDROCODONE BITARTRATE AND ACETAMINOPHEN 7.5; 325 MG/1; MG/1
2 TABLET ORAL EVERY 6 HOURS PRN
Qty: 180 TABLET | Refills: 0 | Status: SHIPPED | OUTPATIENT
Start: 2018-12-21 | End: 2019-01-11 | Stop reason: SDUPTHER

## 2018-12-26 ENCOUNTER — CARE COORDINATION (OUTPATIENT)
Dept: CARE COORDINATION | Age: 73
End: 2018-12-26

## 2019-01-03 ENCOUNTER — TELEPHONE (OUTPATIENT)
Dept: FAMILY MEDICINE CLINIC | Age: 74
End: 2019-01-03

## 2019-01-09 DIAGNOSIS — K12.1 STOMATITIS: ICD-10-CM

## 2019-01-11 DIAGNOSIS — K12.1 STOMATITIS: ICD-10-CM

## 2019-01-11 DIAGNOSIS — M15.9 PRIMARY OSTEOARTHRITIS INVOLVING MULTIPLE JOINTS: ICD-10-CM

## 2019-01-11 RX ORDER — HYDROCODONE BITARTRATE AND ACETAMINOPHEN 7.5; 325 MG/1; MG/1
2 TABLET ORAL EVERY 6 HOURS PRN
Qty: 180 TABLET | Refills: 0 | Status: SHIPPED | OUTPATIENT
Start: 2019-01-11 | End: 2019-01-16 | Stop reason: SDUPTHER

## 2019-01-14 ENCOUNTER — OFFICE VISIT (OUTPATIENT)
Dept: FAMILY MEDICINE CLINIC | Age: 74
End: 2019-01-14
Payer: COMMERCIAL

## 2019-01-14 VITALS
BODY MASS INDEX: 42.2 KG/M2 | DIASTOLIC BLOOD PRESSURE: 68 MMHG | SYSTOLIC BLOOD PRESSURE: 116 MMHG | HEART RATE: 120 BPM | OXYGEN SATURATION: 93 % | WEIGHT: 195 LBS

## 2019-01-14 DIAGNOSIS — E11.8 TYPE 2 DIABETES MELLITUS WITH COMPLICATION, WITHOUT LONG-TERM CURRENT USE OF INSULIN (HCC): Primary | ICD-10-CM

## 2019-01-14 DIAGNOSIS — K21.9 GASTROESOPHAGEAL REFLUX DISEASE, ESOPHAGITIS PRESENCE NOT SPECIFIED: ICD-10-CM

## 2019-01-14 DIAGNOSIS — J44.9 CHRONIC OBSTRUCTIVE PULMONARY DISEASE, UNSPECIFIED COPD TYPE (HCC): ICD-10-CM

## 2019-01-14 PROCEDURE — 99213 OFFICE O/P EST LOW 20 MIN: CPT | Performed by: FAMILY MEDICINE

## 2019-01-14 RX ORDER — GABAPENTIN 600 MG/1
600 TABLET ORAL 3 TIMES DAILY
COMMUNITY
End: 2021-02-04 | Stop reason: SDUPTHER

## 2019-01-14 RX ORDER — DIPHENHYDRAMINE HCL 25 MG
25 TABLET ORAL EVERY 6 HOURS PRN
COMMUNITY
End: 2019-03-19 | Stop reason: ALTCHOICE

## 2019-01-14 RX ORDER — SIMVASTATIN 40 MG
TABLET ORAL
Qty: 90 TABLET | Refills: 3 | Status: SHIPPED | OUTPATIENT
Start: 2019-01-14 | End: 2019-08-15 | Stop reason: SDUPTHER

## 2019-01-14 RX ORDER — ESOMEPRAZOLE MAGNESIUM 40 MG/1
CAPSULE, DELAYED RELEASE ORAL
Qty: 90 CAPSULE | Refills: 3 | Status: SHIPPED | OUTPATIENT
Start: 2019-01-14 | End: 2021-11-04 | Stop reason: SDUPTHER

## 2019-01-14 RX ORDER — METFORMIN HYDROCHLORIDE 750 MG/1
750 TABLET, EXTENDED RELEASE ORAL
Qty: 90 TABLET | Refills: 3 | Status: SHIPPED | OUTPATIENT
Start: 2019-01-14 | End: 2020-04-08

## 2019-01-14 ASSESSMENT — ENCOUNTER SYMPTOMS
VOMITING: 0
COUGH: 0
SHORTNESS OF BREATH: 0
NAUSEA: 0
BACK PAIN: 1
SORE THROAT: 0
SINUS PRESSURE: 0
DIARRHEA: 0

## 2019-01-15 ENCOUNTER — TELEPHONE (OUTPATIENT)
Dept: FAMILY MEDICINE CLINIC | Age: 74
End: 2019-01-15

## 2019-01-16 DIAGNOSIS — M15.9 PRIMARY OSTEOARTHRITIS INVOLVING MULTIPLE JOINTS: ICD-10-CM

## 2019-01-16 RX ORDER — HYDROCODONE BITARTRATE AND ACETAMINOPHEN 7.5; 325 MG/1; MG/1
2 TABLET ORAL EVERY 6 HOURS PRN
Qty: 180 TABLET | Refills: 0 | Status: SHIPPED | OUTPATIENT
Start: 2019-01-16 | End: 2019-02-11 | Stop reason: SDUPTHER

## 2019-01-17 ENCOUNTER — TELEPHONE (OUTPATIENT)
Dept: FAMILY MEDICINE CLINIC | Age: 74
End: 2019-01-17

## 2019-01-18 ENCOUNTER — CARE COORDINATION (OUTPATIENT)
Dept: FAMILY MEDICINE CLINIC | Age: 74
End: 2019-01-18

## 2019-02-11 DIAGNOSIS — M15.9 PRIMARY OSTEOARTHRITIS INVOLVING MULTIPLE JOINTS: ICD-10-CM

## 2019-02-11 RX ORDER — HYDROCODONE BITARTRATE AND ACETAMINOPHEN 7.5; 325 MG/1; MG/1
2 TABLET ORAL EVERY 6 HOURS PRN
Qty: 180 TABLET | Refills: 0 | Status: SHIPPED | OUTPATIENT
Start: 2019-02-11 | End: 2019-03-06 | Stop reason: SDUPTHER

## 2019-02-18 ENCOUNTER — CARE COORDINATION (OUTPATIENT)
Dept: FAMILY MEDICINE CLINIC | Age: 74
End: 2019-02-18

## 2019-03-06 DIAGNOSIS — M15.9 PRIMARY OSTEOARTHRITIS INVOLVING MULTIPLE JOINTS: ICD-10-CM

## 2019-03-06 RX ORDER — HYDROCODONE BITARTRATE AND ACETAMINOPHEN 7.5; 325 MG/1; MG/1
2 TABLET ORAL EVERY 6 HOURS PRN
Qty: 180 TABLET | Refills: 0 | Status: SHIPPED | OUTPATIENT
Start: 2019-03-06 | End: 2019-03-27 | Stop reason: SDUPTHER

## 2019-03-14 ENCOUNTER — HOSPITAL ENCOUNTER (OUTPATIENT)
Facility: MEDICAL CENTER | Age: 74
End: 2019-03-14
Payer: COMMERCIAL

## 2019-03-19 ENCOUNTER — CARE COORDINATION (OUTPATIENT)
Dept: FAMILY MEDICINE CLINIC | Age: 74
End: 2019-03-19

## 2019-03-19 ENCOUNTER — HOSPITAL ENCOUNTER (OUTPATIENT)
Facility: MEDICAL CENTER | Age: 74
Discharge: HOME | End: 2019-03-19
Payer: COMMERCIAL

## 2019-03-19 ENCOUNTER — OFFICE VISIT (OUTPATIENT)
Dept: ONCOLOGY | Age: 74
End: 2019-03-19
Payer: COMMERCIAL

## 2019-03-19 ENCOUNTER — TELEPHONE (OUTPATIENT)
Dept: ONCOLOGY | Age: 74
End: 2019-03-19

## 2019-03-19 VITALS
WEIGHT: 198 LBS | BODY MASS INDEX: 42.85 KG/M2 | HEART RATE: 65 BPM | TEMPERATURE: 97.4 F | SYSTOLIC BLOOD PRESSURE: 121 MMHG | DIASTOLIC BLOOD PRESSURE: 60 MMHG | RESPIRATION RATE: 18 BRPM

## 2019-03-19 DIAGNOSIS — C85.80 MARGINAL ZONE B-CELL LYMPHOMA (HCC): Primary | ICD-10-CM

## 2019-03-19 DIAGNOSIS — D69.6 THROMBOCYTOPENIA (HCC): ICD-10-CM

## 2019-03-19 DIAGNOSIS — C85.80 MARGINAL ZONE B-CELL LYMPHOMA (HCC): ICD-10-CM

## 2019-03-19 LAB
ABSOLUTE EOS #: 0 K/UL (ref 0–0.4)
ABSOLUTE IMMATURE GRANULOCYTE: ABNORMAL K/UL (ref 0–0.3)
ABSOLUTE LYMPH #: 7.43 K/UL (ref 1–4.8)
ABSOLUTE MONO #: 0.29 K/UL (ref 0.2–0.8)
ATYPICAL LYMPHOCYTE ABSOLUTE COUNT: 2.86 K/UL
ATYPICAL LYMPHOCYTES: 20 %
BASOPHILS # BLD: 0 %
BASOPHILS ABSOLUTE: 0 K/UL (ref 0–0.2)
DIFFERENTIAL TYPE: ABNORMAL
EOSINOPHILS RELATIVE PERCENT: 0 % (ref 1–4)
HCT VFR BLD CALC: 43.8 % (ref 36–46)
HEMOGLOBIN: 14.4 G/DL (ref 12–16)
IMMATURE GRANULOCYTES: ABNORMAL %
LYMPHOCYTES # BLD: 52 % (ref 24–44)
MCH RBC QN AUTO: 30.3 PG (ref 26–34)
MCHC RBC AUTO-ENTMCNC: 32.9 G/DL (ref 31–37)
MCV RBC AUTO: 92 FL (ref 80–100)
MONOCYTES # BLD: 2 % (ref 1–7)
NRBC AUTOMATED: ABNORMAL PER 100 WBC
PDW BLD-RTO: 15.6 % (ref 11.5–14.5)
PLATELET # BLD: 53 K/UL (ref 130–400)
PLATELET ESTIMATE: ABNORMAL
PMV BLD AUTO: 9.4 FL (ref 6–12)
RBC # BLD: 4.76 M/UL (ref 4–5.2)
RBC # BLD: ABNORMAL 10*6/UL
SEG NEUTROPHILS: 26 % (ref 36–66)
SEGMENTED NEUTROPHILS ABSOLUTE COUNT: 3.72 K/UL (ref 1.8–7.7)
WBC # BLD: 14.3 K/UL (ref 3.5–11)
WBC # BLD: ABNORMAL 10*3/UL

## 2019-03-19 PROCEDURE — 99215 OFFICE O/P EST HI 40 MIN: CPT | Performed by: INTERNAL MEDICINE

## 2019-03-19 PROCEDURE — 36415 COLL VENOUS BLD VENIPUNCTURE: CPT

## 2019-03-19 PROCEDURE — 99212 OFFICE O/P EST SF 10 MIN: CPT | Performed by: INTERNAL MEDICINE

## 2019-03-19 PROCEDURE — 85025 COMPLETE CBC W/AUTO DIFF WBC: CPT

## 2019-03-19 RX ORDER — 0.9 % SODIUM CHLORIDE 0.9 %
10 VIAL (ML) INJECTION ONCE
Status: CANCELLED | OUTPATIENT
Start: 2019-05-23 | End: 2019-04-16

## 2019-03-19 RX ORDER — SODIUM CHLORIDE 9 MG/ML
INJECTION, SOLUTION INTRAVENOUS CONTINUOUS
Status: CANCELLED | OUTPATIENT
Start: 2019-05-09

## 2019-03-19 RX ORDER — DIPHENHYDRAMINE HYDROCHLORIDE 50 MG/ML
50 INJECTION INTRAMUSCULAR; INTRAVENOUS ONCE
Status: CANCELLED | OUTPATIENT
Start: 2019-05-16 | End: 2019-04-09

## 2019-03-19 RX ORDER — SODIUM CHLORIDE 0.9 % (FLUSH) 0.9 %
10 SYRINGE (ML) INJECTION PRN
Status: CANCELLED | OUTPATIENT
Start: 2019-05-23

## 2019-03-19 RX ORDER — HEPARIN SODIUM (PORCINE) LOCK FLUSH IV SOLN 100 UNIT/ML 100 UNIT/ML
500 SOLUTION INTRAVENOUS PRN
Status: CANCELLED | OUTPATIENT
Start: 2019-05-09

## 2019-03-19 RX ORDER — DIPHENHYDRAMINE HYDROCHLORIDE 50 MG/ML
25 INJECTION INTRAMUSCULAR; INTRAVENOUS ONCE
Status: CANCELLED | OUTPATIENT
Start: 2019-05-23

## 2019-03-19 RX ORDER — HEPARIN SODIUM (PORCINE) LOCK FLUSH IV SOLN 100 UNIT/ML 100 UNIT/ML
500 SOLUTION INTRAVENOUS PRN
Status: CANCELLED | OUTPATIENT
Start: 2019-05-23

## 2019-03-19 RX ORDER — MEPERIDINE HYDROCHLORIDE 50 MG/ML
12.5 INJECTION INTRAMUSCULAR; INTRAVENOUS; SUBCUTANEOUS ONCE
Status: CANCELLED | OUTPATIENT
Start: 2019-05-16 | End: 2019-04-09

## 2019-03-19 RX ORDER — SODIUM CHLORIDE 0.9 % (FLUSH) 0.9 %
5 SYRINGE (ML) INJECTION PRN
Status: CANCELLED | OUTPATIENT
Start: 2019-05-23

## 2019-03-19 RX ORDER — SODIUM CHLORIDE 9 MG/ML
INJECTION, SOLUTION INTRAVENOUS CONTINUOUS
Status: CANCELLED | OUTPATIENT
Start: 2019-05-23

## 2019-03-19 RX ORDER — ACETAMINOPHEN 325 MG/1
650 TABLET ORAL ONCE
Status: CANCELLED | OUTPATIENT
Start: 2019-05-16

## 2019-03-19 RX ORDER — ALPRAZOLAM 1 MG/1
1 TABLET ORAL NIGHTLY PRN
COMMUNITY
End: 2019-05-09 | Stop reason: SDUPTHER

## 2019-03-19 RX ORDER — SODIUM CHLORIDE 9 MG/ML
INJECTION, SOLUTION INTRAVENOUS CONTINUOUS
Status: CANCELLED | OUTPATIENT
Start: 2019-05-02

## 2019-03-19 RX ORDER — METHYLPREDNISOLONE SODIUM SUCCINATE 125 MG/2ML
125 INJECTION, POWDER, LYOPHILIZED, FOR SOLUTION INTRAMUSCULAR; INTRAVENOUS ONCE
Status: CANCELLED | OUTPATIENT
Start: 2019-05-16 | End: 2019-04-09

## 2019-03-19 RX ORDER — SODIUM CHLORIDE 0.9 % (FLUSH) 0.9 %
10 SYRINGE (ML) INJECTION PRN
Status: CANCELLED | OUTPATIENT
Start: 2019-05-16

## 2019-03-19 RX ORDER — MEPERIDINE HYDROCHLORIDE 50 MG/ML
12.5 INJECTION INTRAMUSCULAR; INTRAVENOUS; SUBCUTANEOUS ONCE
Status: CANCELLED | OUTPATIENT
Start: 2019-05-09 | End: 2019-04-02

## 2019-03-19 RX ORDER — SODIUM CHLORIDE 0.9 % (FLUSH) 0.9 %
5 SYRINGE (ML) INJECTION PRN
Status: CANCELLED | OUTPATIENT
Start: 2019-05-09

## 2019-03-19 RX ORDER — SODIUM CHLORIDE 9 MG/ML
INJECTION, SOLUTION INTRAVENOUS ONCE
Status: CANCELLED | OUTPATIENT
Start: 2019-05-16 | End: 2019-04-09

## 2019-03-19 RX ORDER — SODIUM CHLORIDE 9 MG/ML
INJECTION, SOLUTION INTRAVENOUS ONCE
Status: CANCELLED | OUTPATIENT
Start: 2019-05-23 | End: 2019-04-16

## 2019-03-19 RX ORDER — MEPERIDINE HYDROCHLORIDE 50 MG/ML
12.5 INJECTION INTRAMUSCULAR; INTRAVENOUS; SUBCUTANEOUS ONCE
Status: CANCELLED | OUTPATIENT
Start: 2019-05-23 | End: 2019-04-16

## 2019-03-19 RX ORDER — MEPERIDINE HYDROCHLORIDE 50 MG/ML
12.5 INJECTION INTRAMUSCULAR; INTRAVENOUS; SUBCUTANEOUS ONCE
Status: CANCELLED | OUTPATIENT
Start: 2019-05-02 | End: 2019-03-26

## 2019-03-19 RX ORDER — DIPHENHYDRAMINE HYDROCHLORIDE 50 MG/ML
50 INJECTION INTRAMUSCULAR; INTRAVENOUS ONCE
Status: CANCELLED | OUTPATIENT
Start: 2019-05-02 | End: 2019-03-26

## 2019-03-19 RX ORDER — HEPARIN SODIUM (PORCINE) LOCK FLUSH IV SOLN 100 UNIT/ML 100 UNIT/ML
500 SOLUTION INTRAVENOUS PRN
Status: CANCELLED | OUTPATIENT
Start: 2019-05-16

## 2019-03-19 RX ORDER — METHYLPREDNISOLONE SODIUM SUCCINATE 125 MG/2ML
125 INJECTION, POWDER, LYOPHILIZED, FOR SOLUTION INTRAMUSCULAR; INTRAVENOUS ONCE
Status: CANCELLED | OUTPATIENT
Start: 2019-05-23 | End: 2019-04-16

## 2019-03-19 RX ORDER — 0.9 % SODIUM CHLORIDE 0.9 %
10 VIAL (ML) INJECTION ONCE
Status: CANCELLED | OUTPATIENT
Start: 2019-05-02 | End: 2019-03-26

## 2019-03-19 RX ORDER — HEPARIN SODIUM (PORCINE) LOCK FLUSH IV SOLN 100 UNIT/ML 100 UNIT/ML
500 SOLUTION INTRAVENOUS PRN
Status: CANCELLED | OUTPATIENT
Start: 2019-05-02

## 2019-03-19 RX ORDER — DIPHENHYDRAMINE HYDROCHLORIDE 50 MG/ML
50 INJECTION INTRAMUSCULAR; INTRAVENOUS ONCE
Status: CANCELLED | OUTPATIENT
Start: 2019-05-23 | End: 2019-04-16

## 2019-03-19 RX ORDER — SODIUM CHLORIDE 0.9 % (FLUSH) 0.9 %
5 SYRINGE (ML) INJECTION PRN
Status: CANCELLED | OUTPATIENT
Start: 2019-05-02

## 2019-03-19 RX ORDER — SODIUM CHLORIDE 9 MG/ML
INJECTION, SOLUTION INTRAVENOUS CONTINUOUS
Status: CANCELLED | OUTPATIENT
Start: 2019-05-16

## 2019-03-19 RX ORDER — 0.9 % SODIUM CHLORIDE 0.9 %
10 VIAL (ML) INJECTION ONCE
Status: CANCELLED | OUTPATIENT
Start: 2019-05-09 | End: 2019-04-02

## 2019-03-19 RX ORDER — ACETAMINOPHEN 325 MG/1
650 TABLET ORAL ONCE
Status: CANCELLED | OUTPATIENT
Start: 2019-05-09

## 2019-03-19 RX ORDER — SODIUM CHLORIDE 0.9 % (FLUSH) 0.9 %
5 SYRINGE (ML) INJECTION PRN
Status: CANCELLED | OUTPATIENT
Start: 2019-05-16

## 2019-03-19 RX ORDER — SODIUM CHLORIDE 9 MG/ML
INJECTION, SOLUTION INTRAVENOUS ONCE
Status: CANCELLED | OUTPATIENT
Start: 2019-05-09 | End: 2019-04-02

## 2019-03-19 RX ORDER — ACETAMINOPHEN 325 MG/1
650 TABLET ORAL ONCE
Status: CANCELLED | OUTPATIENT
Start: 2019-05-02

## 2019-03-19 RX ORDER — METHYLPREDNISOLONE SODIUM SUCCINATE 125 MG/2ML
125 INJECTION, POWDER, LYOPHILIZED, FOR SOLUTION INTRAMUSCULAR; INTRAVENOUS ONCE
Status: CANCELLED | OUTPATIENT
Start: 2019-05-09 | End: 2019-04-02

## 2019-03-19 RX ORDER — DIPHENHYDRAMINE HYDROCHLORIDE 50 MG/ML
50 INJECTION INTRAMUSCULAR; INTRAVENOUS ONCE
Status: CANCELLED | OUTPATIENT
Start: 2019-05-09 | End: 2019-04-02

## 2019-03-19 RX ORDER — SODIUM CHLORIDE 9 MG/ML
INJECTION, SOLUTION INTRAVENOUS ONCE
Status: CANCELLED | OUTPATIENT
Start: 2019-05-02 | End: 2019-03-26

## 2019-03-19 RX ORDER — ACETAMINOPHEN 325 MG/1
650 TABLET ORAL ONCE
Status: CANCELLED | OUTPATIENT
Start: 2019-05-23

## 2019-03-19 RX ORDER — METHYLPREDNISOLONE SODIUM SUCCINATE 125 MG/2ML
125 INJECTION, POWDER, LYOPHILIZED, FOR SOLUTION INTRAMUSCULAR; INTRAVENOUS ONCE
Status: CANCELLED | OUTPATIENT
Start: 2019-05-02 | End: 2019-03-26

## 2019-03-19 RX ORDER — SODIUM CHLORIDE 0.9 % (FLUSH) 0.9 %
10 SYRINGE (ML) INJECTION PRN
Status: CANCELLED | OUTPATIENT
Start: 2019-05-09

## 2019-03-19 RX ORDER — DIPHENHYDRAMINE HYDROCHLORIDE 50 MG/ML
25 INJECTION INTRAMUSCULAR; INTRAVENOUS ONCE
Status: CANCELLED | OUTPATIENT
Start: 2019-05-16

## 2019-03-19 RX ORDER — DIPHENHYDRAMINE HYDROCHLORIDE 50 MG/ML
25 INJECTION INTRAMUSCULAR; INTRAVENOUS ONCE
Status: CANCELLED | OUTPATIENT
Start: 2019-05-09

## 2019-03-19 RX ORDER — DIPHENHYDRAMINE HYDROCHLORIDE 50 MG/ML
25 INJECTION INTRAMUSCULAR; INTRAVENOUS ONCE
Status: CANCELLED | OUTPATIENT
Start: 2019-05-02

## 2019-03-19 RX ORDER — 0.9 % SODIUM CHLORIDE 0.9 %
10 VIAL (ML) INJECTION ONCE
Status: CANCELLED | OUTPATIENT
Start: 2019-05-16 | End: 2019-04-09

## 2019-03-19 RX ORDER — SODIUM CHLORIDE 0.9 % (FLUSH) 0.9 %
10 SYRINGE (ML) INJECTION PRN
Status: CANCELLED | OUTPATIENT
Start: 2019-05-02

## 2019-03-20 ENCOUNTER — TELEPHONE (OUTPATIENT)
Dept: ONCOLOGY | Age: 74
End: 2019-03-20

## 2019-03-20 ENCOUNTER — TELEPHONE (OUTPATIENT)
Dept: INTERVENTIONAL RADIOLOGY/VASCULAR | Age: 74
End: 2019-03-20

## 2019-03-20 ENCOUNTER — OFFICE VISIT (OUTPATIENT)
Dept: FAMILY MEDICINE CLINIC | Age: 74
End: 2019-03-20
Payer: COMMERCIAL

## 2019-03-20 VITALS
BODY MASS INDEX: 42.5 KG/M2 | HEART RATE: 114 BPM | SYSTOLIC BLOOD PRESSURE: 112 MMHG | WEIGHT: 196.4 LBS | OXYGEN SATURATION: 96 % | DIASTOLIC BLOOD PRESSURE: 70 MMHG

## 2019-03-20 DIAGNOSIS — C85.80 MARGINAL ZONE B-CELL LYMPHOMA (HCC): Primary | ICD-10-CM

## 2019-03-20 DIAGNOSIS — J44.9 CHRONIC OBSTRUCTIVE PULMONARY DISEASE, UNSPECIFIED COPD TYPE (HCC): ICD-10-CM

## 2019-03-20 DIAGNOSIS — E11.8 TYPE 2 DIABETES MELLITUS WITH COMPLICATION, WITHOUT LONG-TERM CURRENT USE OF INSULIN (HCC): Primary | ICD-10-CM

## 2019-03-20 DIAGNOSIS — Z12.39 BREAST CANCER SCREENING: ICD-10-CM

## 2019-03-20 DIAGNOSIS — C85.80 MARGINAL ZONE B-CELL LYMPHOMA (HCC): ICD-10-CM

## 2019-03-20 DIAGNOSIS — E66.01 MORBID OBESITY WITH BMI OF 40.0-44.9, ADULT (HCC): ICD-10-CM

## 2019-03-20 PROCEDURE — 99213 OFFICE O/P EST LOW 20 MIN: CPT | Performed by: FAMILY MEDICINE

## 2019-03-20 PROCEDURE — 82044 UR ALBUMIN SEMIQUANTITATIVE: CPT | Performed by: FAMILY MEDICINE

## 2019-03-20 ASSESSMENT — PATIENT HEALTH QUESTIONNAIRE - PHQ9
1. LITTLE INTEREST OR PLEASURE IN DOING THINGS: 0
SUM OF ALL RESPONSES TO PHQ9 QUESTIONS 1 & 2: 0
2. FEELING DOWN, DEPRESSED OR HOPELESS: 0
SUM OF ALL RESPONSES TO PHQ QUESTIONS 1-9: 0
SUM OF ALL RESPONSES TO PHQ QUESTIONS 1-9: 0

## 2019-03-20 ASSESSMENT — ENCOUNTER SYMPTOMS
NAUSEA: 0
COUGH: 0
DIARRHEA: 0
SORE THROAT: 0
BACK PAIN: 1
SINUS PRESSURE: 0
VOMITING: 0
SHORTNESS OF BREATH: 0

## 2019-03-27 ENCOUNTER — HOSPITAL ENCOUNTER (OUTPATIENT)
Dept: MAMMOGRAPHY | Age: 74
Discharge: HOME | End: 2019-03-29
Payer: COMMERCIAL

## 2019-03-27 DIAGNOSIS — M15.9 PRIMARY OSTEOARTHRITIS INVOLVING MULTIPLE JOINTS: ICD-10-CM

## 2019-03-27 DIAGNOSIS — Z12.39 BREAST CANCER SCREENING: ICD-10-CM

## 2019-03-27 PROCEDURE — 77063 BREAST TOMOSYNTHESIS BI: CPT

## 2019-03-27 RX ORDER — HYDROCODONE BITARTRATE AND ACETAMINOPHEN 7.5; 325 MG/1; MG/1
2 TABLET ORAL EVERY 6 HOURS PRN
Qty: 180 TABLET | Refills: 0 | Status: SHIPPED | OUTPATIENT
Start: 2019-03-27 | End: 2019-04-18 | Stop reason: SDUPTHER

## 2019-04-02 ENCOUNTER — TELEPHONE (OUTPATIENT)
Dept: FAMILY MEDICINE CLINIC | Age: 74
End: 2019-04-02

## 2019-04-02 ENCOUNTER — TELEPHONE (OUTPATIENT)
Dept: INFUSION THERAPY | Facility: MEDICAL CENTER | Age: 74
End: 2019-04-02

## 2019-04-02 RX ORDER — NYSTATIN 100000 U/G
CREAM TOPICAL
Qty: 15 G | Refills: 0 | Status: SHIPPED | OUTPATIENT
Start: 2019-04-02 | End: 2019-04-29 | Stop reason: SDUPTHER

## 2019-04-02 NOTE — TELEPHONE ENCOUNTER
I TRIED TO CALL [facility] TO SCHEDULE HER TREATMENT & MD VISIT & CHEMO EDUCATION AND HAD TO LEAVE A MESSAGE TO CALL THE OFFICE TO SCHEDULE.

## 2019-04-04 ENCOUNTER — CARE COORDINATION (OUTPATIENT)
Dept: FAMILY MEDICINE CLINIC | Age: 74
End: 2019-04-04

## 2019-04-04 NOTE — CARE COORDINATION
RNCC reviewed chart and previous encounters, placed call to [facility] for CC needs and updates with DM and COPD management, no answer and LVM to return call to [phone] or [phone] for CC updates, questions or concerns.

## 2019-04-05 ENCOUNTER — TELEPHONE (OUTPATIENT)
Dept: FAMILY MEDICINE CLINIC | Age: 74
End: 2019-04-05

## 2019-04-05 NOTE — TELEPHONE ENCOUNTER
Patient needs to have a 6 minute walk test done at her next office visit to re qualify for her oxygen

## 2019-04-08 ENCOUNTER — HOSPITAL ENCOUNTER (OUTPATIENT)
Dept: INTERVENTIONAL RADIOLOGY/VASCULAR | Age: 74
Discharge: HOME | End: 2019-04-10
Payer: COMMERCIAL

## 2019-04-08 VITALS
BODY MASS INDEX: 40.78 KG/M2 | SYSTOLIC BLOOD PRESSURE: 97 MMHG | DIASTOLIC BLOOD PRESSURE: 53 MMHG | WEIGHT: 189 LBS | RESPIRATION RATE: 15 BRPM | HEIGHT: 57 IN | OXYGEN SATURATION: 97 % | HEART RATE: 66 BPM | TEMPERATURE: 96.8 F

## 2019-04-08 DIAGNOSIS — F41.1 GAD (GENERALIZED ANXIETY DISORDER): ICD-10-CM

## 2019-04-08 DIAGNOSIS — C85.80 OTHER SPECIFIED TYPES OF NON-HODGKIN LYMPHOMA, UNSPECIFIED SITE (HCC): ICD-10-CM

## 2019-04-08 LAB
INR BLD: 1
PARTIAL THROMBOPLASTIN TIME: 25.5 SEC (ref 23–31)
PLATELET # BLD: 62 K/UL (ref 130–400)
PROTHROMBIN TIME: 10.1 SEC (ref 9.7–11.6)

## 2019-04-08 PROCEDURE — 99152 MOD SED SAME PHYS/QHP 5/>YRS: CPT | Performed by: RADIOLOGY

## 2019-04-08 PROCEDURE — 99153 MOD SED SAME PHYS/QHP EA: CPT | Performed by: RADIOLOGY

## 2019-04-08 PROCEDURE — 85730 THROMBOPLASTIN TIME PARTIAL: CPT

## 2019-04-08 PROCEDURE — 6360000002 HC RX W HCPCS: Performed by: RADIOLOGY

## 2019-04-08 PROCEDURE — 2580000003 HC RX 258: Performed by: RADIOLOGY

## 2019-04-08 PROCEDURE — 85610 PROTHROMBIN TIME: CPT

## 2019-04-08 PROCEDURE — C1788 PORT, INDWELLING, IMP: HCPCS

## 2019-04-08 PROCEDURE — 77001 FLUOROGUIDE FOR VEIN DEVICE: CPT | Performed by: RADIOLOGY

## 2019-04-08 PROCEDURE — 2500000003 HC RX 250 WO HCPCS: Performed by: RADIOLOGY

## 2019-04-08 PROCEDURE — 7100000010 HC PHASE II RECOVERY - FIRST 15 MIN

## 2019-04-08 PROCEDURE — 7100000011 HC PHASE II RECOVERY - ADDTL 15 MIN

## 2019-04-08 PROCEDURE — 36561 INSERT TUNNELED CV CATH: CPT | Performed by: RADIOLOGY

## 2019-04-08 PROCEDURE — 85049 AUTOMATED PLATELET COUNT: CPT

## 2019-04-08 PROCEDURE — 76937 US GUIDE VASCULAR ACCESS: CPT | Performed by: RADIOLOGY

## 2019-04-08 RX ORDER — SODIUM CHLORIDE 0.9 % (FLUSH) 0.9 %
10 SYRINGE (ML) INJECTION PRN
Status: DISCONTINUED | OUTPATIENT
Start: 2019-04-08 | End: 2019-04-11 | Stop reason: HOSPADM

## 2019-04-08 RX ORDER — SODIUM CHLORIDE 0.9 % (FLUSH) 0.9 %
10 SYRINGE (ML) INJECTION PRN
Status: CANCELLED | OUTPATIENT
Start: 2019-04-08

## 2019-04-08 RX ORDER — ACETAMINOPHEN 325 MG/1
650 TABLET ORAL EVERY 4 HOURS PRN
Status: DISCONTINUED | OUTPATIENT
Start: 2019-04-08 | End: 2019-04-11 | Stop reason: HOSPADM

## 2019-04-08 RX ORDER — MIDAZOLAM HYDROCHLORIDE 1 MG/ML
INJECTION INTRAMUSCULAR; INTRAVENOUS
Status: COMPLETED | OUTPATIENT
Start: 2019-04-08 | End: 2019-04-08

## 2019-04-08 RX ORDER — LIDOCAINE HYDROCHLORIDE 10 MG/ML
INJECTION, SOLUTION EPIDURAL; INFILTRATION; INTRACAUDAL; PERINEURAL
Status: COMPLETED | OUTPATIENT
Start: 2019-04-08 | End: 2019-04-08

## 2019-04-08 RX ORDER — CEFAZOLIN SODIUM 1 G/50ML
1 INJECTION, SOLUTION INTRAVENOUS
Status: COMPLETED | OUTPATIENT
Start: 2019-04-08 | End: 2019-04-08

## 2019-04-08 RX ORDER — CEFAZOLIN SODIUM 1 G/50ML
1 INJECTION, SOLUTION INTRAVENOUS
Status: CANCELLED | OUTPATIENT
Start: 2019-04-08 | End: 2019-04-08

## 2019-04-08 RX ORDER — FENTANYL CITRATE 50 UG/ML
INJECTION, SOLUTION INTRAMUSCULAR; INTRAVENOUS
Status: COMPLETED | OUTPATIENT
Start: 2019-04-08 | End: 2019-04-08

## 2019-04-08 RX ORDER — SODIUM CHLORIDE 9 MG/ML
INJECTION, SOLUTION INTRAVENOUS CONTINUOUS
Status: CANCELLED | OUTPATIENT
Start: 2019-04-08

## 2019-04-08 RX ORDER — SODIUM CHLORIDE 9 MG/ML
INJECTION, SOLUTION INTRAVENOUS CONTINUOUS
Status: DISCONTINUED | OUTPATIENT
Start: 2019-04-08 | End: 2019-04-11 | Stop reason: HOSPADM

## 2019-04-08 RX ADMIN — LIDOCAINE HYDROCHLORIDE 10 ML: 10 INJECTION, SOLUTION EPIDURAL; INFILTRATION; INTRACAUDAL; PERINEURAL at 12:29

## 2019-04-08 RX ADMIN — SODIUM CHLORIDE: 9 INJECTION, SOLUTION INTRAVENOUS at 10:21

## 2019-04-08 RX ADMIN — Medication 50 MCG: at 12:27

## 2019-04-08 RX ADMIN — MIDAZOLAM 0.5 MG: 1 INJECTION INTRAMUSCULAR; INTRAVENOUS at 12:36

## 2019-04-08 RX ADMIN — CEFAZOLIN SODIUM 1 G: 1 INJECTION, SOLUTION INTRAVENOUS at 12:20

## 2019-04-08 RX ADMIN — Medication 500 UNITS: at 12:48

## 2019-04-08 RX ADMIN — MIDAZOLAM 0.5 MG: 1 INJECTION INTRAMUSCULAR; INTRAVENOUS at 12:27

## 2019-04-08 ASSESSMENT — PAIN - FUNCTIONAL ASSESSMENT: PAIN_FUNCTIONAL_ASSESSMENT: 0-10

## 2019-04-08 ASSESSMENT — PAIN DESCRIPTION - PAIN TYPE: TYPE: ACUTE PAIN;SURGICAL PAIN

## 2019-04-08 ASSESSMENT — PAIN DESCRIPTION - LOCATION: LOCATION: BACK;CHEST

## 2019-04-08 ASSESSMENT — PAIN SCALES - GENERAL: PAINLEVEL_OUTOF10: 5

## 2019-04-08 NOTE — H&P
History and Physical Update    Pt Name: Olamide Cruz  MRN: [id number]  YOB: 1945  Date of evaluation: 4/8/2019      [x] I have reviewed the progress note by Dr. Angie Kuhn on 3/19/19  which meets the criteria for an Interval History and Physical note and is attached below. [x] I have examined  Olamide Cruz  There are no changes to the patient who is scheduled for a port placement by Dr. Iram Murillo. The patient denies health changes, fever, chills, productive cough, SOB, skin changes or chest pain. Vital signs: /70   Pulse 72   Temp 97.3 °F (36.3 °C) (Oral)   Resp 18   Ht 4' 9\" (1.448 m)   Wt 189 lb (85.7 kg)   SpO2 94%   BMI 40.90 kg/m²     Allergies:  Patient has no known allergies. Medications:    Prior to Admission medications    Medication Sig Start Date End Date Taking? Authorizing Provider   nystatin (MYCOSTATIN) 442504 UNIT/GM cream Apply topically 2 times daily. 4/2/19  Yes Kamryn Gamez MD   HYDROcodone-acetaminophen (NORCO) 7.5-325 MG per tablet Take 2 tablets by mouth every 6 hours as needed for Pain for up to 22 days. Do not fill until 12- 3/27/19 4/18/19 Yes Arik Valero MD   ALPRAZolam (XANAX) 1 MG tablet Take 1 mg by mouth nightly as needed for Sleep. Yes Historical Provider, MD   gabapentin (NEURONTIN) 600 MG tablet Take 600 mg by mouth 3 times daily. .   Yes Historical Provider, MD   albuterol-ipratropium (COMBIVENT RESPIMAT)  MCG/ACT AERS inhaler INHALE ONE INHALATION BY MOUTH FOUR TIMES A DAY 1/14/19  Yes Kamryn Gamez MD   esomeprazole (NEXIUM) 40 MG delayed release capsule TAKE ONE CAPSULE BY MOUTH DAILY 1/14/19  Yes Kamryn Gamez MD   metFORMIN (GLUCOPHAGE-XR) 750 MG extended release tablet Take 1 tablet by mouth daily (with breakfast) 1/14/19  Yes Arik Valero MD   simvastatin (ZOCOR) 40 MG tablet TAKE ONE TABLET BY MOUTH DAILY 1/14/19  Yes Arik Valero MD   FLUoxetine (PROZAC) 20 MG capsule TAKE ONE CAPSULE BY MOUTH DAILY 11/19/18  Yes Angy Christianson MD   lisinopril (PRINIVIL;ZESTRIL) 10 MG tablet TAKE ONE TABLET BY MOUTH DAILY 11/12/18  Yes Herman Gbison MD   ipratropium-albuterol (DUONEB) 0.5-2.5 (3) MG/3ML SOLN nebulizer solution Inhale 3 mLs into the lungs every 6 hours as needed for Shortness of Breath 8/20/18  Yes Arik Valero MD   ibuprofen (ADVIL;MOTRIN) 800 MG tablet TAKE ONE TABLET BY MOUTH EVERY 8 HOURS AS NEEDED FOR PAIN 1/26/18   Arik Valero MD   glucose blood VI test strips (ASCENSIA AUTODISC VI;ONE TOUCH ULTRA TEST VI) strip Indications: [employer] Glucose Meter Test bid 12/30/15   Herman Gibson MD   Lancets MISC 1 each by Other route 2 times daily Indications: [employer] Glucose Meter 12/30/15   Herman Gibson MD   meclizine (ANTIVERT) 25 MG tablet Take 1 tablet by mouth 3 times daily as needed for Dizziness or Nausea. 6/12/14   Herman Gibson MD       This is a 72 y.o. female who is pleasant, cooperative, alert and oriented x3, in no acute distress. Heart: Heart sounds are normal.  HR regular rate and rhythm without murmur, gallop or rub. Lungs: Normal respiratory effort with good air exchange and clear to auscultation without wheezes or rales bilaterally   Abdomen: soft, nontender, nondistended with bowel sounds .        Labs:  Recent Labs     03/19/19  1514   HGB 14.4   HCT 43.8   WBC 14.3*   MCV 92.0   PLT 53*       AHYDEE Dominguez  APRN, ANP-BC  Electronically signed 4/8/2019 at 10:31 Emily Sanders MD   Physician   Oncology   Progress Notes   Signed   Encounter Date:  3/19/2019          Related encounter: Office Visit from 3/19/2019 in [facility]         Signed                   Show:Clear all  [x]Manual[x]Template[x]Copied    Added by:  Sanjana Pettit MD      []Mikaelver for details                                                                                            _                Chief Complaint   Patient presents with    Follow-up       review esomeprazole, metformin, simvastatin, fluoxetine, lisinopril, ipratropium-albuterol, ibuprofen, blood glucose test strips, lancets, and meclizine.     ALLERGIES:  has No Known Allergies.     FAMILY HISTORY: Negative for any hematological or oncological conditions.      SOCIAL HISTORY:  reports that she quit smoking about 11 years ago. She has never used smokeless tobacco. She reports that she does not drink alcohol or use drugs.     REVIEW OF SYSTEMS:     · General: Positive for weakness and fatigue. No unanticipated weight loss or decreased appetite. No fever or chills. · Eyes: No blurred vision, eye pain or double vision. · Ears: No hearing problems or drainage. No tinnitus. · Throat: No sore throat, problems with swallowing or dysphagia. · Respiratory: No cough, sputum or hemoptysis. No shortness of breath. No pleuritic chest pain. · Cardiovascular: No chest pain, orthopnea or PND. No lower extremity edema. No palpitation. · Gastrointestinal: No problems with swallowing. No abdominal pain or bloating. No nausea or vomiting. No diarrhea or constipation. No GI bleeding. · Genitourinary: No dysuria, hematuria, frequency or urgency. · Musculoskeletal: No muscle aches or pains. No limitation of movement. No back pain. No gait disturbance, No joint complaints. · Dermatologic: No skin rashes or pruritus. No skin lesions or discolorations. · Psychiatric: No depression, anxiety, or stress or signs of schizophrenia. No change in mood or affect. · Hematologic: No history of bleeding tendency. Easy bruises no ecchymosis. No history of clotting problems. · Infectious disease: No fever, chills or frequent infections. · Endocrine: No problems with obesity. No polydipsia or polyuria. No temperature intolerance. · Neurologic: No headaches or dizziness. No weakness or numbness of the extremities. No changes in balance, coordination,  memory, mentation, behavior.    · Allergic/Immunologic: No nasal congestion or hives. No repeated infections.         PHYSICAL EXAM:  The patient is not in acute distress. Vital signs: Blood pressure 121/60, pulse 65, temperature 97.4 °F (36.3 °C), temperature source Oral, resp. rate 18, weight 198 lb (89.8 kg), not currently breastfeeding. HEENT:  Eyes are normal. Ears, nose and throat are normal.  Neck: Supple. No lymph node enlargement. No thyroid enlargement. Trachea is centrally located. Chest:  Clear to auscultation. No wheezes or crepitations. Heart: Regular sinus rhythm. Abdomen: Soft, nontender. No hepatosplenomegaly. No masses. Extremities:  With no edema. Lymph Nodes:  No cervical, axillary or inguinal lymph node enlargement. Neurologic:  Conscious and oriented. No focal neurological deficits. Psychosocial: No depression, anxiety or stress. Skin: No rashes, bruises or ecchymoses.        Review of Diagnostic data:       Recent Labs     03/19/19  1514   WBC 14.3*   HGB 14.4   HCT 43.8   MCV 92.0   PLT 53*      Peripheral blood flow cytometry:  Peripheral blood flow cytometric immunophenotyping:         Peripheral blood is positive for a monoclonal B-cell population,   positive for CD19, CD20, CD22,   CD45, FMC7 and lambda light chain. Comment:  Morphology and immunophenotype of circulating monoclonal   B-cells are most consistent with a marginal zone lymphoma.  Bone   marrow evaluation and/or lymph node biopsy if possible are recommended   for confirmation and additional characterization.      Bone marrow test August 27, 2018: Final Diagnosis   PERIPHERAL BLOOD:   -LYMPHOCYTOSIS WITH \" VILLOUS\" LYMPHOCYTES. -MODERATE THROMBOCYTOPENIA. BONE MARROW BIOPSY, ASPIRATE SMEAR AND CLOT SECTION:   - MARGINAL ZONE LYMPHOMA. -NORMOCELLULAR TRILINEAGE HEMATOPOIETIC MARROW WITH MARKEDLY   DECREASED IRON STORES.          IMPRESSION:  Marginal Zone lymphoma  Persistent leukocytosis  Persistent thrombocytopenia  Positive FANNY with elevated antihistone. Multiple comorbidities as listed     PLAN: I reviewed the labs as above and discussed with the patient. I explained to the patient the nature of this hematologic problem. I explained the significance of these abnormalities in layman language. I explained to patient the nature of low grade NHL. CT scan showed no significant lymphadenopathy. Patient is having increasing night sweats sweating. She has increasing weakness and fatigue. Platelets are getting worse. I recommended treatment with immunotherapy using rituximab. Explained the benefits and side effects and she agreed. We will arrange for MediPort insertion. Monitor labs closely and monitor side effects. Patient's questions were answered to the best of her satisfaction and she verbalized full understanding and agreement.   Time spent during this visit included 40 minutes of face to face discussion.

## 2019-04-08 NOTE — PRE SEDATION
Sedation Pre-Procedure Note    Patient Name: Perla Carvajal   YOB: 1945  Room/Bed: Room/bed info not found  Medical Record Number: [id number]  Date: 4/8/2019   Time: 11:50 AM       Indication:  Port placement    Consent: I have discussed with the patient and/or the patient representative the indication, alternatives, and the possible risks and/or complications of the planned procedure and the anesthesia methods. The patient and/or patient representative appear to understand and agree to proceed. Vital Signs:   Vitals:    04/08/19 1010   BP: 116/70   Pulse: 72   Resp: 18   Temp: 97.3 °F (36.3 °C)   SpO2: 94%       Past Medical History:   has a past medical history of COPD (chronic obstructive pulmonary disease) ([facility] [address] Depression, Diabetes mellitus ([facility] [address] Hyperlipidemia, PTSD (post-traumatic stress disorder), and Tubulovillous adenoma. Past Surgical History:   has a past surgical history that includes Hysterectomy; Colonoscopy; and Colonoscopy (06/07/2016). Medications:   Scheduled Meds:    ceFAZolin  1 g Intravenous On Call to OR     Continuous Infusions:    sodium chloride 75 mL/hr at 04/08/19 1021     PRN Meds: sodium chloride flush  Home Meds:   Prior to Admission medications    Medication Sig Start Date End Date Taking? Authorizing Provider   nystatin (MYCOSTATIN) 775624 UNIT/GM cream Apply topically 2 times daily. 4/2/19  Yes Ирина Henry MD   HYDROcodone-acetaminophen (NORCO) 7.5-325 MG per tablet Take 2 tablets by mouth every 6 hours as needed for Pain for up to 22 days. Do not fill until 12- 3/27/19 4/18/19 Yes Arik Valero MD   ALPRAZolam (XANAX) 1 MG tablet Take 1 mg by mouth nightly as needed for Sleep. Yes Historical Provider, MD   gabapentin (NEURONTIN) 600 MG tablet Take 600 mg by mouth 3 times daily. .   Yes Historical Provider, MD   albuterol-ipratropium (COMBIVENT RESPIMAT)  MCG/ACT AERS inhaler INHALE ONE INHALATION BY MOUTH FOUR TIMES A DAY 1/14/19  Yes Gris Elise MD   esomeprazole (NEXIUM) 40 MG delayed release capsule TAKE ONE CAPSULE BY MOUTH DAILY 1/14/19  Yes Gris Elise MD   metFORMIN (GLUCOPHAGE-XR) 750 MG extended release tablet Take 1 tablet by mouth daily (with breakfast) 1/14/19  Yes Arik Valero MD   simvastatin (ZOCOR) 40 MG tablet TAKE ONE TABLET BY MOUTH DAILY 1/14/19  Yes Gris Elise MD   FLUoxetine (PROZAC) 20 MG capsule TAKE ONE CAPSULE BY MOUTH DAILY 11/19/18  Yes Genny Koyanagi, MD   lisinopril (PRINIVIL;ZESTRIL) 10 MG tablet TAKE ONE TABLET BY MOUTH DAILY 11/12/18  Yes Gris Elise MD   ipratropium-albuterol (DUONEB) 0.5-2.5 (3) MG/3ML SOLN nebulizer solution Inhale 3 mLs into the lungs every 6 hours as needed for Shortness of Breath 8/20/18  Yes Arik Valero MD   ibuprofen (ADVIL;MOTRIN) 800 MG tablet TAKE ONE TABLET BY MOUTH EVERY 8 HOURS AS NEEDED FOR PAIN 1/26/18   Arik Valero MD   glucose blood VI test strips (ASCENSIA AUTODISC VI;ONE TOUCH ULTRA TEST VI) strip Indications: [employer] Glucose Meter Test bid 12/30/15   Gris Elise MD   Lancets MISC 1 each by Other route 2 times daily Indications: [employer] Glucose Meter 12/30/15   Gris Elise MD   meclizine (ANTIVERT) 25 MG tablet Take 1 tablet by mouth 3 times daily as needed for Dizziness or Nausea. 6/12/14   Gris Elise MD     Coumadin Use Last 7 Days:  no  Antiplatelet drug therapy use last 7 days: no  Other anticoagulant use last 7 days: no  Additional Medication Information:  See [facility]      Pre-Sedation Documentation and Exam:   I have reviewed the patient's history and review of systems.     Mallampati Airway Assessment:  Mallampati Class II - (soft palate, fauces & uvula are visible)    Prior History of Anesthesia Complications:   none    ASA Classification:  Class 3 - A patient with severe systemic disease that limits activity but is not incapacitating    Sedation/ Anesthesia Plan:   intravenous sedation    Medications Planned:

## 2019-04-08 NOTE — BRIEF OP NOTE
Brief Postoperative Note    Olamide Cruz  YOB: 1945  [id number]    Pre-operative Diagnosis: Lymphoma    Post-operative Diagnosis: Same    Procedure: Port placement    Anesthesia: Moderate Sedation    Surgeons/Assistants: Francisco    Estimated Blood Loss: less than 50     Complications: None    Specimens: Was Not Obtained    Electronically signed by Tung Morales MD on 4/8/2019 at 1:01 PM

## 2019-04-08 NOTE — POST SEDATION
Sedation Post Procedure Note    Patient Name: Will Vazquez   YOB: 1945  Room/Bed: Room/bed info not found  Medical Record Number: [id number]  Date: 4/8/2019   Time: 1:01 PM         Physicians/Assistants: James Gipson MD    Procedure Performed:  Port placement    Post-Sedation Vital Signs:  Vitals:    04/08/19 1252   BP: 101/65   Pulse: 68   Resp: 15   Temp:    SpO2: 99%      Vital signs were reviewed and were stable after the procedure (see flow sheet for vitals)            Complications: none    Electronically signed by James Gipson MD on 4/8/2019 at 1:01 PM

## 2019-04-09 RX ORDER — ALPRAZOLAM 1 MG/1
TABLET ORAL
Qty: 90 TABLET | Refills: 1 | Status: SHIPPED | OUTPATIENT
Start: 2019-04-09 | End: 2019-07-08 | Stop reason: SDUPTHER

## 2019-04-17 ENCOUNTER — CARE COORDINATION (OUTPATIENT)
Dept: FAMILY MEDICINE CLINIC | Age: 74
End: 2019-04-17

## 2019-04-18 DIAGNOSIS — M15.9 PRIMARY OSTEOARTHRITIS INVOLVING MULTIPLE JOINTS: ICD-10-CM

## 2019-04-18 RX ORDER — HYDROCODONE BITARTRATE AND ACETAMINOPHEN 7.5; 325 MG/1; MG/1
2 TABLET ORAL EVERY 6 HOURS PRN
Qty: 180 TABLET | Refills: 0 | Status: SHIPPED | OUTPATIENT
Start: 2019-04-18 | End: 2019-05-10

## 2019-04-18 NOTE — CARE COORDINATION
Ambulatory Care Coordination Note  4/18/2019  CM Risk Score: 6  Kenneth Mortality Risk Score: 18.25    ACC: Dominguez De Leon, RN    Summary Note: RNCC reviewed chart and previous encounters, spoke with Fahad Barrera for CC needs and updates with DM and COPD management, current treatment for NHL, has chest port placed 2 weeks ago. Continues with sever mouth pain, has follow up with Dr. Iesha Koo at the end of this month for mouth irritation. Fahad Barrera is requesting refill for \"Magic Mouth Wash\" , continues to use Mycostatin cream on face for yeast, does have some improvement, but not resolved. Refill encounters for Magic Mouth Wash-May requests that [address] call Elsi Susanna on [address] to have delivered to her house, when refill approved. DM-denies any DM symptoms at this time, blood sugars verbalized as stable    COPD-denies any COPD symptoms at this time. RNCC Plan: Will send separate refill encounter for Magic Mouth Wash,         Care Coordination Interventions    Program Enrollment:  Complex Care  Referral from Primary Care Provider:  No  Suggested Interventions and [address]:  Completed  Zone Management Tools: In Process (Comment: DM and COPD)         Goals Addressed                 This Visit's Progress     Conditions and Symptoms   Improving     I will schedule office visits, as directed by my provider. I will keep my appointment or reschedule if I have to cancel. I will notify my provider of any barriers to my plan of care. I will follow my Zone Management tool to seek urgent or emergent care. I will notify my provider of any symptoms that indicate a worsening of my condition. Barriers: none  Plan for overcoming my barriers: N/A  Confidence: 8/10  Anticipated Goal Completion Date: 2/2019              Prior to Admission medications    Medication Sig Start Date End Date Taking?  Authorizing Provider   ALPRAZolam (XANAX) 1 MG tablet TAKE ONE TABLET BY MOUTH THREE

## 2019-04-25 ENCOUNTER — HOSPITAL ENCOUNTER (OUTPATIENT)
Facility: MEDICAL CENTER | Age: 74
End: 2019-04-25
Payer: COMMERCIAL

## 2019-04-26 NOTE — TELEPHONE ENCOUNTER
Patient requesting medication refill. Pharmacy on file correct. Please contact patient with any questions.

## 2019-04-29 ENCOUNTER — HOSPITAL ENCOUNTER (OUTPATIENT)
Facility: MEDICAL CENTER | Age: 74
End: 2019-04-29
Payer: COMMERCIAL

## 2019-04-29 RX ORDER — NYSTATIN 100000 U/G
CREAM TOPICAL
Qty: 15 G | Refills: 0 | Status: SHIPPED | OUTPATIENT
Start: 2019-04-29 | End: 2020-02-06 | Stop reason: SDUPTHER

## 2019-04-30 ENCOUNTER — INITIAL CONSULT (OUTPATIENT)
Dept: ONCOLOGY | Age: 74
End: 2019-04-30
Payer: COMMERCIAL

## 2019-04-30 DIAGNOSIS — C85.80 MARGINAL ZONE B-CELL LYMPHOMA (HCC): Primary | ICD-10-CM

## 2019-04-30 PROCEDURE — 99999 PR OFFICE/OUTPT VISIT,PROCEDURE ONLY: CPT | Performed by: INTERNAL MEDICINE

## 2019-04-30 PROCEDURE — 99214 OFFICE O/P EST MOD 30 MIN: CPT

## 2019-04-30 RX ORDER — LIDOCAINE AND PRILOCAINE 25; 25 MG/G; MG/G
CREAM TOPICAL
Qty: 1 TUBE | Refills: 3 | OUTPATIENT
Start: 2019-04-30 | End: 2019-08-22

## 2019-04-30 RX ORDER — PROCHLORPERAZINE MALEATE 10 MG
10 TABLET ORAL EVERY 6 HOURS PRN
Qty: 120 TABLET | Refills: 3 | OUTPATIENT
Start: 2019-04-30

## 2019-04-30 NOTE — PROGRESS NOTES
WRITER MET WITH [facility] IN THE LIBRARY FOR CHEMO EDUCATION R/T RITUXAN. PLEASE REFER TO THE CHEMO CHECKLIST. DISTRESS TOOL WAS COMPLETED AND ENTERED. FUNCTIONAL ASSESSMENT TOOL COMPLETED. SHE HAS MULTIPLE AGENCIES IN THE HOME CURRENTLY. ANTIEMETICS AND EMLA ORDERED. REFERRAL FOR [facility].

## 2019-05-02 ENCOUNTER — HOSPITAL ENCOUNTER (OUTPATIENT)
Dept: INFUSION THERAPY | Facility: MEDICAL CENTER | Age: 74
Discharge: HOME | End: 2019-05-02
Payer: COMMERCIAL

## 2019-05-02 VITALS
RESPIRATION RATE: 20 BRPM | SYSTOLIC BLOOD PRESSURE: 148 MMHG | OXYGEN SATURATION: 97 % | DIASTOLIC BLOOD PRESSURE: 81 MMHG | TEMPERATURE: 98 F | HEART RATE: 68 BPM

## 2019-05-02 DIAGNOSIS — C85.80 MARGINAL ZONE B-CELL LYMPHOMA (HCC): Primary | ICD-10-CM

## 2019-05-02 LAB
ABSOLUTE EOS #: 0.13 K/UL (ref 0–0.44)
ABSOLUTE IMMATURE GRANULOCYTE: ABNORMAL K/UL (ref 0–0.3)
ABSOLUTE LYMPH #: 8.19 K/UL (ref 1.1–3.7)
ABSOLUTE MONO #: 1.28 K/UL (ref 0.1–1.2)
ALBUMIN SERPL-MCNC: 3.7 G/DL (ref 3.5–5.2)
ALBUMIN/GLOBULIN RATIO: ABNORMAL (ref 1–2.5)
ALP BLD-CCNC: 63 U/L (ref 35–104)
ALT SERPL-CCNC: 9 U/L (ref 5–33)
ANION GAP SERPL CALCULATED.3IONS-SCNC: 12 MMOL/L (ref 9–17)
AST SERPL-CCNC: 11 U/L
BASOPHILS # BLD: 1 % (ref 0–2)
BASOPHILS ABSOLUTE: 0.13 K/UL (ref 0–0.2)
BILIRUB SERPL-MCNC: 0.24 MG/DL (ref 0.3–1.2)
BUN BLDV-MCNC: 19 MG/DL (ref 8–23)
BUN/CREAT BLD: 30 (ref 9–20)
CALCIUM SERPL-MCNC: 8.7 MG/DL (ref 8.6–10.4)
CHLORIDE BLD-SCNC: 107 MMOL/L (ref 98–107)
CO2: 26 MMOL/L (ref 20–31)
CREAT SERPL-MCNC: 0.64 MG/DL (ref 0.5–0.9)
DIFFERENTIAL TYPE: ABNORMAL
EOSINOPHILS RELATIVE PERCENT: 1 % (ref 1–4)
GFR AFRICAN AMERICAN: >60 ML/MIN
GFR NON-AFRICAN AMERICAN: >60 ML/MIN
GFR SERPL CREATININE-BSD FRML MDRD: ABNORMAL ML/MIN/{1.73_M2}
GFR SERPL CREATININE-BSD FRML MDRD: ABNORMAL ML/MIN/{1.73_M2}
GLUCOSE BLD-MCNC: 167 MG/DL (ref 70–99)
HCT VFR BLD CALC: 43.1 % (ref 36.3–47.1)
HEMOGLOBIN: 13.4 G/DL (ref 11.9–15.1)
IMMATURE GRANULOCYTES: ABNORMAL %
LYMPHOCYTES # BLD: 64 % (ref 24–43)
MCH RBC QN AUTO: 30.2 PG (ref 25.2–33.5)
MCHC RBC AUTO-ENTMCNC: 31.1 G/DL (ref 28.4–34.8)
MCV RBC AUTO: 97.3 FL (ref 82.6–102.9)
MONOCYTES # BLD: 10 % (ref 3–12)
MORPHOLOGY: ABNORMAL
NRBC AUTOMATED: 0 PER 100 WBC
PDW BLD-RTO: 14.9 % (ref 11.8–14.4)
PLATELET # BLD: ABNORMAL K/UL (ref 138–453)
PLATELET ESTIMATE: ABNORMAL
PLATELET, FLUORESCENCE: 47 K/UL (ref 138–453)
PLATELET, IMMATURE FRACTION: 16.2 % (ref 1.1–10.3)
PMV BLD AUTO: ABNORMAL FL (ref 8.1–13.5)
POTASSIUM SERPL-SCNC: 3.7 MMOL/L (ref 3.7–5.3)
RBC # BLD: 4.43 M/UL (ref 3.95–5.11)
RBC # BLD: ABNORMAL 10*6/UL
SEG NEUTROPHILS: 24 % (ref 36–65)
SEGMENTED NEUTROPHILS ABSOLUTE COUNT: 3.07 K/UL (ref 1.5–8.1)
SODIUM BLD-SCNC: 145 MMOL/L (ref 135–144)
TOTAL PROTEIN: 6.4 G/DL (ref 6.4–8.3)
WBC # BLD: 12.8 K/UL (ref 3.5–11.3)
WBC # BLD: ABNORMAL 10*3/UL

## 2019-05-02 PROCEDURE — 85055 RETICULATED PLATELET ASSAY: CPT

## 2019-05-02 PROCEDURE — 6370000000 HC RX 637 (ALT 250 FOR IP): Performed by: INTERNAL MEDICINE

## 2019-05-02 PROCEDURE — 36591 DRAW BLOOD OFF VENOUS DEVICE: CPT

## 2019-05-02 PROCEDURE — 80053 COMPREHEN METABOLIC PANEL: CPT

## 2019-05-02 PROCEDURE — 96375 TX/PRO/DX INJ NEW DRUG ADDON: CPT

## 2019-05-02 PROCEDURE — 2580000003 HC RX 258: Performed by: INTERNAL MEDICINE

## 2019-05-02 PROCEDURE — 85025 COMPLETE CBC W/AUTO DIFF WBC: CPT

## 2019-05-02 PROCEDURE — 96415 CHEMO IV INFUSION ADDL HR: CPT

## 2019-05-02 PROCEDURE — 96413 CHEMO IV INFUSION 1 HR: CPT

## 2019-05-02 PROCEDURE — 6360000002 HC RX W HCPCS: Performed by: INTERNAL MEDICINE

## 2019-05-02 RX ORDER — HEPARIN SODIUM (PORCINE) LOCK FLUSH IV SOLN 100 UNIT/ML 100 UNIT/ML
500 SOLUTION INTRAVENOUS PRN
Status: DISCONTINUED | OUTPATIENT
Start: 2019-05-02 | End: 2019-05-03 | Stop reason: HOSPADM

## 2019-05-02 RX ORDER — SODIUM CHLORIDE 9 MG/ML
INJECTION, SOLUTION INTRAVENOUS ONCE
Status: COMPLETED | OUTPATIENT
Start: 2019-05-02 | End: 2019-05-02

## 2019-05-02 RX ORDER — SODIUM CHLORIDE 0.9 % (FLUSH) 0.9 %
10 SYRINGE (ML) INJECTION PRN
Status: DISCONTINUED | OUTPATIENT
Start: 2019-05-02 | End: 2019-05-03 | Stop reason: HOSPADM

## 2019-05-02 RX ORDER — DIPHENHYDRAMINE HYDROCHLORIDE 50 MG/ML
25 INJECTION INTRAMUSCULAR; INTRAVENOUS ONCE
Status: COMPLETED | OUTPATIENT
Start: 2019-05-02 | End: 2019-05-02

## 2019-05-02 RX ORDER — ACETAMINOPHEN 325 MG/1
650 TABLET ORAL ONCE
Status: COMPLETED | OUTPATIENT
Start: 2019-05-02 | End: 2019-05-02

## 2019-05-02 RX ADMIN — DIPHENHYDRAMINE HYDROCHLORIDE 25 MG: 50 INJECTION, SOLUTION INTRAMUSCULAR; INTRAVENOUS at 11:14

## 2019-05-02 RX ADMIN — ACETAMINOPHEN 650 MG: 325 TABLET ORAL at 11:14

## 2019-05-02 RX ADMIN — SODIUM CHLORIDE: 9 INJECTION, SOLUTION INTRAVENOUS at 09:55

## 2019-05-02 RX ADMIN — Medication 10 ML: at 16:05

## 2019-05-02 RX ADMIN — RITUXIMAB 700 MG: 10 INJECTION, SOLUTION INTRAVENOUS at 12:10

## 2019-05-02 RX ADMIN — Medication 500 UNITS: at 16:05

## 2019-05-02 ASSESSMENT — PAIN DESCRIPTION - LOCATION
LOCATION: MOUTH;BACK
LOCATION: BACK;MOUTH

## 2019-05-02 ASSESSMENT — PAIN DESCRIPTION - DESCRIPTORS
DESCRIPTORS: ACHING;BURNING
DESCRIPTORS: BURNING;ACHING

## 2019-05-02 ASSESSMENT — PAIN DESCRIPTION - PAIN TYPE
TYPE: CHRONIC PAIN
TYPE: CHRONIC PAIN

## 2019-05-02 ASSESSMENT — PAIN SCALES - GENERAL
PAINLEVEL_OUTOF10: 8
PAINLEVEL_OUTOF10: 8
PAINLEVEL_OUTOF10: 10

## 2019-05-02 ASSESSMENT — PAIN DESCRIPTION - ORIENTATION
ORIENTATION: INNER;MID
ORIENTATION: INNER;MID

## 2019-05-02 ASSESSMENT — PAIN DESCRIPTION - ONSET
ONSET: ON-GOING
ONSET: ON-GOING

## 2019-05-02 ASSESSMENT — PAIN DESCRIPTION - FREQUENCY
FREQUENCY: CONTINUOUS
FREQUENCY: CONTINUOUS

## 2019-05-02 ASSESSMENT — PAIN DESCRIPTION - PROGRESSION
CLINICAL_PROGRESSION: NOT CHANGED
CLINICAL_PROGRESSION: NOT CHANGED

## 2019-05-02 NOTE — PROGRESS NOTES
1553:  Pt arrives per wheelchair transport on own portable [employer] via nasal cannula for RN draw and C1D1. Orders reviewed. MD visit note orders from 3/19/19 reviewed. Chemo education completed on 4/30/19 per Dfane Wilkins RN in the [id number] [facility]. S.W. VS obtained. SpO2=97% on [employer] via nasal cannula connected to wall setup. Pt feels well today. Baseline Toxicity Assessment completed. 1743:  Pt's port accessed as charted in LDA's. Labs drawn/sent to lab pending. IVF's hung as per MAR. Lab results reviewed and okay to proceed with today's treatment as planned. Pt pre-medicated as per MAR. Rituxan hung and titrated as per MAR. Rate initiated at 17.9 ml/hr (50mg/hr) for 30 minutes. Rate increased to 35.7 ml/hr (100mg/hr) for 30 minutes. Rate increased to 53.6 ml/hr (150mg/hr) for 30 minutes. Rate increased to 71.4 ml/hr (200mg/hr) for 30 minutes. 1340:  Pt reports taking her own home meds Norco, Xanax and Metformin as scheduled at home. Rate increased to 89.3 ml/hr (250mg/hr) for 30 minutes. Rate increased to 107.1 ml/hr (300mg/hr) for 30 minutes. Pt had x1 episode of bile-like emesis contents which pt reports came on all of a sudden. Rate increased to 125 ml/hr (350mg/hr) until completion time of 1540 when bag noted to be empty. Pt observed for 25 minutes post Rituxan while 150 ml NS line flush given. No adverse reactions noted. Pt had some dry heaving in the bathroom but denied other complaints. Pt's port flushed and heparinized as per MAR. Pt's port de-accessed as charted in LDA's. Appointment calendar given to the patient. RTC on 5/9/19. Discharged per wheelchair transport accompanied by writer to the front lobby to [facility]. Pt still feels nauseated and dry heaving at times but refused to go to the ER. Pt felt better as writer helped her into her car. Pt instructed to go pick her EMLA cream and Compazine ordered for her on 4/30/19 since she hadn't picked it up yet.

## 2019-05-03 ENCOUNTER — TELEPHONE (OUTPATIENT)
Dept: ONCOLOGY | Age: 74
End: 2019-05-03

## 2019-05-06 ENCOUNTER — HOSPITAL ENCOUNTER (OUTPATIENT)
Facility: MEDICAL CENTER | Age: 74
End: 2019-05-06
Payer: COMMERCIAL

## 2019-05-09 ENCOUNTER — OFFICE VISIT (OUTPATIENT)
Dept: ONCOLOGY | Age: 74
End: 2019-05-09
Payer: COMMERCIAL

## 2019-05-09 ENCOUNTER — HOSPITAL ENCOUNTER (OUTPATIENT)
Dept: INFUSION THERAPY | Age: 74
Discharge: HOME | End: 2019-05-09
Payer: COMMERCIAL

## 2019-05-09 ENCOUNTER — CARE COORDINATION (OUTPATIENT)
Dept: FAMILY MEDICINE CLINIC | Age: 74
End: 2019-05-09

## 2019-05-09 VITALS
DIASTOLIC BLOOD PRESSURE: 72 MMHG | RESPIRATION RATE: 18 BRPM | TEMPERATURE: 97.9 F | BODY MASS INDEX: 42.41 KG/M2 | WEIGHT: 196 LBS | SYSTOLIC BLOOD PRESSURE: 107 MMHG | OXYGEN SATURATION: 96 % | HEART RATE: 62 BPM

## 2019-05-09 VITALS
SYSTOLIC BLOOD PRESSURE: 116 MMHG | HEART RATE: 69 BPM | BODY MASS INDEX: 42.41 KG/M2 | DIASTOLIC BLOOD PRESSURE: 70 MMHG | TEMPERATURE: 97.9 F | WEIGHT: 196 LBS

## 2019-05-09 DIAGNOSIS — C85.80 MARGINAL ZONE B-CELL LYMPHOMA (HCC): Primary | ICD-10-CM

## 2019-05-09 LAB
ABSOLUTE EOS #: 0.2 K/UL (ref 0–0.4)
ABSOLUTE IMMATURE GRANULOCYTE: ABNORMAL K/UL (ref 0–0.3)
ABSOLUTE LYMPH #: 3.4 K/UL (ref 1–4.8)
ABSOLUTE MONO #: 0.7 K/UL (ref 0.1–1.2)
ALBUMIN SERPL-MCNC: 3.9 G/DL (ref 3.5–5.2)
ALBUMIN/GLOBULIN RATIO: 1.4 (ref 1–2.5)
ALP BLD-CCNC: 65 U/L (ref 35–104)
ALT SERPL-CCNC: 15 U/L (ref 5–33)
ANION GAP SERPL CALCULATED.3IONS-SCNC: 11 MMOL/L (ref 9–17)
AST SERPL-CCNC: 13 U/L
BASOPHILS # BLD: 1 % (ref 0–2)
BASOPHILS ABSOLUTE: 0.1 K/UL (ref 0–0.2)
BILIRUB SERPL-MCNC: 0.39 MG/DL (ref 0.3–1.2)
BUN BLDV-MCNC: 24 MG/DL (ref 8–23)
BUN/CREAT BLD: ABNORMAL (ref 9–20)
CALCIUM SERPL-MCNC: 8.8 MG/DL (ref 8.6–10.4)
CHLORIDE BLD-SCNC: 102 MMOL/L (ref 98–107)
CO2: 25 MMOL/L (ref 20–31)
CREAT SERPL-MCNC: 0.56 MG/DL (ref 0.5–0.9)
DIFFERENTIAL TYPE: ABNORMAL
EOSINOPHILS RELATIVE PERCENT: 2 % (ref 1–4)
GFR AFRICAN AMERICAN: >60 ML/MIN
GFR NON-AFRICAN AMERICAN: >60 ML/MIN
GFR SERPL CREATININE-BSD FRML MDRD: ABNORMAL ML/MIN/{1.73_M2}
GFR SERPL CREATININE-BSD FRML MDRD: ABNORMAL ML/MIN/{1.73_M2}
GLUCOSE BLD-MCNC: 120 MG/DL (ref 70–99)
HCT VFR BLD CALC: 42.3 % (ref 36–46)
HEMOGLOBIN: 14.1 G/DL (ref 12–16)
IMMATURE GRANULOCYTES: ABNORMAL %
LYMPHOCYTES # BLD: 40 % (ref 24–44)
MCH RBC QN AUTO: 31.2 PG (ref 26–34)
MCHC RBC AUTO-ENTMCNC: 33.3 G/DL (ref 31–37)
MCV RBC AUTO: 93.5 FL (ref 80–100)
MONOCYTES # BLD: 9 % (ref 2–11)
NRBC AUTOMATED: ABNORMAL PER 100 WBC
PDW BLD-RTO: 15.5 % (ref 12.5–15.4)
PLATELET # BLD: 277 K/UL (ref 140–450)
PLATELET ESTIMATE: ABNORMAL
PMV BLD AUTO: 7.2 FL (ref 6–12)
POTASSIUM SERPL-SCNC: 4.4 MMOL/L (ref 3.7–5.3)
RBC # BLD: 4.53 M/UL (ref 4–5.2)
RBC # BLD: ABNORMAL 10*6/UL
SEG NEUTROPHILS: 48 % (ref 36–66)
SEGMENTED NEUTROPHILS ABSOLUTE COUNT: 4.1 K/UL (ref 1.8–7.7)
SODIUM BLD-SCNC: 138 MMOL/L (ref 135–144)
TOTAL PROTEIN: 6.6 G/DL (ref 6.4–8.3)
WBC # BLD: 8.5 K/UL (ref 3.5–11)
WBC # BLD: ABNORMAL 10*3/UL

## 2019-05-09 PROCEDURE — 96415 CHEMO IV INFUSION ADDL HR: CPT

## 2019-05-09 PROCEDURE — 80053 COMPREHEN METABOLIC PANEL: CPT

## 2019-05-09 PROCEDURE — 96413 CHEMO IV INFUSION 1 HR: CPT

## 2019-05-09 PROCEDURE — 6370000000 HC RX 637 (ALT 250 FOR IP): Performed by: INTERNAL MEDICINE

## 2019-05-09 PROCEDURE — 96375 TX/PRO/DX INJ NEW DRUG ADDON: CPT

## 2019-05-09 PROCEDURE — 85025 COMPLETE CBC W/AUTO DIFF WBC: CPT

## 2019-05-09 PROCEDURE — 2580000003 HC RX 258: Performed by: INTERNAL MEDICINE

## 2019-05-09 PROCEDURE — 99214 OFFICE O/P EST MOD 30 MIN: CPT | Performed by: INTERNAL MEDICINE

## 2019-05-09 PROCEDURE — 6360000002 HC RX W HCPCS: Performed by: INTERNAL MEDICINE

## 2019-05-09 RX ORDER — SODIUM CHLORIDE 0.9 % (FLUSH) 0.9 %
10 SYRINGE (ML) INJECTION PRN
Status: DISCONTINUED | OUTPATIENT
Start: 2019-05-09 | End: 2019-05-10 | Stop reason: HOSPADM

## 2019-05-09 RX ORDER — ACETAMINOPHEN 325 MG/1
650 TABLET ORAL ONCE
Status: COMPLETED | OUTPATIENT
Start: 2019-05-09 | End: 2019-05-09

## 2019-05-09 RX ORDER — SODIUM CHLORIDE 9 MG/ML
INJECTION, SOLUTION INTRAVENOUS ONCE
Status: COMPLETED | OUTPATIENT
Start: 2019-05-09 | End: 2019-05-09

## 2019-05-09 RX ORDER — DIPHENHYDRAMINE HYDROCHLORIDE 50 MG/ML
25 INJECTION INTRAMUSCULAR; INTRAVENOUS ONCE
Status: COMPLETED | OUTPATIENT
Start: 2019-05-09 | End: 2019-05-09

## 2019-05-09 RX ORDER — HEPARIN SODIUM (PORCINE) LOCK FLUSH IV SOLN 100 UNIT/ML 100 UNIT/ML
500 SOLUTION INTRAVENOUS PRN
Status: DISCONTINUED | OUTPATIENT
Start: 2019-05-09 | End: 2019-05-10 | Stop reason: HOSPADM

## 2019-05-09 RX ADMIN — Medication 500 UNITS: at 11:33

## 2019-05-09 RX ADMIN — ACETAMINOPHEN 650 MG: 325 TABLET ORAL at 08:56

## 2019-05-09 RX ADMIN — Medication 10 ML: at 08:14

## 2019-05-09 RX ADMIN — Medication 10 ML: at 11:34

## 2019-05-09 RX ADMIN — Medication 10 ML: at 08:15

## 2019-05-09 RX ADMIN — DIPHENHYDRAMINE HYDROCHLORIDE 25 MG: 50 INJECTION, SOLUTION INTRAMUSCULAR; INTRAVENOUS at 08:57

## 2019-05-09 RX ADMIN — Medication 10 ML: at 08:13

## 2019-05-09 RX ADMIN — RITUXIMAB 700 MG: 10 INJECTION, SOLUTION INTRAVENOUS at 09:16

## 2019-05-09 RX ADMIN — SODIUM CHLORIDE: 9 INJECTION, SOLUTION INTRAVENOUS at 08:48

## 2019-05-09 NOTE — PROGRESS NOTES
Patient here for c1d8 of her rituxan. She stated she was very ill during and after her first chemo. She stated she was then sick for 4 days. N/V and chills. She stated they did not give her anything for this. They did give her a warm blanket. vidhi is normally seen at [place] [id number].

## 2019-05-09 NOTE — PROGRESS NOTES
_           Chief Complaint   Patient presents with    Follow-up     Review status of disease     Other     very bad reaction to first treatment / took 4 days recover      DIAGNOSIS:        Marginal Zone lymphoma  Persistent leukocytosis  Persistent thrombocytopenia  Multiple comorbidities as listed     CURRENT THERAPY:         Started treatment with rituximab 5/2/2019    BRIEF CASE HISTORY:      Ms. Talya Vincent is a very pleasant 69 y.o. female with history of multiple comorbidities including COPD and diabetes. She quit smoking years ago. The patient was recently hospitalized with chest infection. The patient was noted to have leukocytosis. She also had persistent thrombocytopenia. She is referred for further evaluation. Patient denies any active bleeding. She has easy bruising. No fever or night sweats. No weight loss or decreased appetite. No palpable lymph nodes. No history of repeated infections. Patient has history of smoking for more than 50 years. Denies alcohol drinking    INTERIM HISTORY:   Patient is seen for follow up leukocytosis and marginal zone lymphoma. She had flow cytometry and bone marrow test. Results showed evidence of marginal zone lymphoma. Due to increasing symptoms and further problems related to lymphoma, patient was started on single agent rituximab on 5/20/19. Treatment is tolerated fairly well so far. No side effects. Allergic reaction. No fever or chills. Patient is having increasing weakness and fatigue. She is having increasing Night sweating. No documented fever. No lymphadenopathy. No weight loss or decreased appetite. PAST MEDICAL HISTORY: has a past medical history of COPD (chronic obstructive pulmonary disease) ([address] Depression, Diabetes mellitus ([address] Hyperlipidemia, PTSD (post-traumatic stress disorder), and Tubulovillous adenoma.     PAST SURGICAL HISTORY: complaints. · Dermatologic: No skin rashes or pruritus. No skin lesions or discolorations. · Psychiatric: No depression, anxiety, or stress or signs of schizophrenia. No change in mood or affect. · Hematologic: No history of bleeding tendency. Easy bruises no ecchymosis. No history of clotting problems. · Infectious disease: No fever, chills or frequent infections. · Endocrine: No problems with obesity. No polydipsia or polyuria. No temperature intolerance. · Neurologic: No headaches or dizziness. No weakness or numbness of the extremities. No changes in balance, coordination,  memory, mentation, behavior. · Allergic/Immunologic: No nasal congestion or hives. No repeated infections. PHYSICAL EXAM:  The patient is not in acute distress. Vital signs: Blood pressure 116/70, pulse 69, temperature 97.9 °F (36.6 °C), temperature source Oral, weight 196 lb (88.9 kg), not currently breastfeeding. HEENT:  Eyes are normal. Ears, nose and throat are normal.  Neck: Supple. No lymph node enlargement. No thyroid enlargement. Trachea is centrally located. Chest:  Clear to auscultation. No wheezes or crepitations. Heart: Regular sinus rhythm. Abdomen: Soft, nontender. No hepatosplenomegaly. No masses. Extremities:  With no edema. Lymph Nodes:  No cervical, axillary or inguinal lymph node enlargement. Neurologic:  Conscious and oriented. No focal neurological deficits. Psychosocial: No depression, anxiety or stress. Skin: No rashes, bruises or ecchymoses. Review of Diagnostic data:   Recent Labs     05/09/19  0810 05/02/19  0955   WBC 8.5 12.8*   HGB 14.1 13.4   HCT 42.3 43.1   MCV 93.5 97.3    See Reflexed IPF Result     Peripheral blood flow cytometry:  Peripheral blood flow cytometric immunophenotyping:         Peripheral blood is positive for a monoclonal B-cell population,   positive for CD19, CD20, CD22,   CD45, FMC7 and lambda light chain.      Comment:  Morphology and immunophenotype of circulating monoclonal   B-cells are most consistent with a marginal zone lymphoma.  Bone   marrow evaluation and/or lymph node biopsy if possible are recommended   for confirmation and additional characterization. Bone marrow test August 27, 2018: Final Diagnosis   PERIPHERAL BLOOD:   -LYMPHOCYTOSIS WITH \" VILLOUS\" LYMPHOCYTES. -MODERATE THROMBOCYTOPENIA. BONE MARROW BIOPSY, ASPIRATE SMEAR AND CLOT SECTION:   - MARGINAL ZONE LYMPHOMA. -NORMOCELLULAR TRILINEAGE HEMATOPOIETIC MARROW WITH MARKEDLY   DECREASED IRON STORES. IMPRESSION:  Marginal Zone lymphoma  Persistent leukocytosis  Persistent thrombocytopenia  Positive FANNY with elevated antihistone. Multiple comorbidities as listed    PLAN: I reviewed the labs as above and discussed with the patient. I explained the significance of these abnormalities in layman language. I explained to patient the nature of low grade NHL. CT scan showed no significant lymphadenopathy. Patient is having increasing night sweats sweating. She has increasing weakness and fatigue. Platelets are getting worse. I recommended treatment with immunotherapy using rituximab. Treatment was started 5/20/19. Tolerated well so far. No significant side effects. We will proceed with treatment today as planned. Monitor labs closely and monitor side effects. Patient's questions were answered to the best of her satisfaction and she verbalized full understanding and agreement.

## 2019-05-09 NOTE — FLOWSHEET NOTE
Situation:   escorted Ms. Smith from the infusion clinic to the [facility] where she waited for her transportation. Assessment:  Ms. Tierra Turner is a 68 y/o male whom  met for the first time today. She is receiving treatment for lymphoma. Ms. Tierra Turner engaged in conversation, sharing about her first round of chemotherapy and how difficult it was. She expressed gratitude for this experience going better today. Ms. Tierra Turner identified \"the Lord\" as her source of strength. She shared stories from her life that illustrated how the [place] has been there for her, including during some very difficult times. Pt does not belong to a [employer] but does have strong spiritual beliefs and values. She voiced her belief that the purpose of life is to [place]. \"    Ms. Tierra Turner spoke of her sister and brother and their close relationship. She and her sister, who lives in [place], talk almost daily. Brother has been helping Pt, as Pt lives alone in a senior living community. Pt does not have children but described herself as a \"magnet for children and animals. \" Pt shared that as the oldest child of 9 she assisted with caring for her sisters and brothers. Ms. Tierra Turner accessed her humor and story telling throughout the conversation. Intervention:   provided supportive presence and explored Pt's coping.  listened as Pt shared stories and memories from her life.  affirmed Pt's strengths and offered words of encouragement.  gave Pt a copy of \"Guideposts. \"    Outcome:  Ms. Tierra Turner expressed gratitude for the support from family and for her beliefs. She voiced hopes of being able to receive God's great love for her. She expressed thanks for the reading resource and visit.      05/09/19 1543   Encounter Summary   Services provided to: Patient   Referral/Consult From: [address] Family members;Friends/neighbors   Continue Visiting   (5/9/19)   Complexity of Encounter Moderate   Length of Encounter 30 minutes   Spiritual Assessment Completed Yes   Spiritual/Yazidism   Type Spiritual support   Assessment Approachable;Calm;Coping; Hopeful   Intervention Active listening;Explored feelings, thoughts, concerns;Explored coping resources;Provided reading materials/devotional materials;Sustaining presence/ Ministry of presence; Discussed relationship with God   Outcome Connection/belonging;Expressed gratitude;Engaged in conversation;Expressed feelings/needs/concerns;Coping;Encouraged; Hopeful;Receptive; Shared reminiscences     Electronically signed by Chele Murphy, [facility], [address] [facility] Radiation Oncology  5/9/2019  3:47 PM

## 2019-05-14 NOTE — FLOWSHEET NOTE
[x] [address]  [address]    [phone]  F: [phone]         Physical Therapy Cancel/No Show note    Date: 2019  Patient: Dyan Guerrier  : 1945  MRN: [id number]    Cancels/No Shows to date:     For today's appointment patient:    [x]  Cancelled    [] Rescheduled appointment    [] No-show     Reason given by patient:    [x]  Patient ill    []  Conflicting appointment    [] No transportation      [] Conflict with work    [] No reason given    [] Weather related    [x] Other:      Comments:   Pt cancelled for initial evaluation for Physical Therapy for Oncology Rehab. Pt reports she is too sick and weak from chemo treatments.         [] Next appointment was confirmed    Electronically signed by: Bentia Cordova PT

## 2019-05-15 ENCOUNTER — HOSPITAL ENCOUNTER (OUTPATIENT)
Dept: PHYSICAL THERAPY | Age: 74
Setting detail: THERAPIES SERIES
Discharge: HOME | End: 2019-05-15
Payer: COMMERCIAL

## 2019-05-15 DIAGNOSIS — M15.9 PRIMARY OSTEOARTHRITIS INVOLVING MULTIPLE JOINTS: ICD-10-CM

## 2019-05-16 ENCOUNTER — HOSPITAL ENCOUNTER (OUTPATIENT)
Dept: INFUSION THERAPY | Age: 74
Discharge: HOME | End: 2019-05-16
Payer: COMMERCIAL

## 2019-05-16 ENCOUNTER — OFFICE VISIT (OUTPATIENT)
Dept: ONCOLOGY | Age: 74
End: 2019-05-16
Payer: COMMERCIAL

## 2019-05-16 VITALS
WEIGHT: 195 LBS | HEART RATE: 63 BPM | SYSTOLIC BLOOD PRESSURE: 102 MMHG | DIASTOLIC BLOOD PRESSURE: 69 MMHG | TEMPERATURE: 97.6 F | BODY MASS INDEX: 42.2 KG/M2

## 2019-05-16 DIAGNOSIS — C85.80 MARGINAL ZONE B-CELL LYMPHOMA (HCC): Primary | ICD-10-CM

## 2019-05-16 LAB
ABSOLUTE EOS #: 0.1 K/UL (ref 0–0.4)
ABSOLUTE IMMATURE GRANULOCYTE: ABNORMAL K/UL (ref 0–0.3)
ABSOLUTE LYMPH #: 1.6 K/UL (ref 1–4.8)
ABSOLUTE MONO #: 0.6 K/UL (ref 0.1–1.2)
ALBUMIN SERPL-MCNC: 4 G/DL (ref 3.5–5.2)
ALBUMIN/GLOBULIN RATIO: 1.3 (ref 1–2.5)
ALP BLD-CCNC: 72 U/L (ref 35–104)
ALT SERPL-CCNC: 12 U/L (ref 5–33)
ANION GAP SERPL CALCULATED.3IONS-SCNC: 14 MMOL/L (ref 9–17)
AST SERPL-CCNC: 14 U/L
BASOPHILS # BLD: 1 % (ref 0–2)
BASOPHILS ABSOLUTE: 0.1 K/UL (ref 0–0.2)
BILIRUB SERPL-MCNC: 0.47 MG/DL (ref 0.3–1.2)
BUN BLDV-MCNC: 19 MG/DL (ref 8–23)
BUN/CREAT BLD: ABNORMAL (ref 9–20)
CALCIUM SERPL-MCNC: 9.6 MG/DL (ref 8.6–10.4)
CHLORIDE BLD-SCNC: 104 MMOL/L (ref 98–107)
CO2: 25 MMOL/L (ref 20–31)
CREAT SERPL-MCNC: 0.61 MG/DL (ref 0.5–0.9)
DIFFERENTIAL TYPE: ABNORMAL
EOSINOPHILS RELATIVE PERCENT: 1 % (ref 1–4)
GFR AFRICAN AMERICAN: >60 ML/MIN
GFR NON-AFRICAN AMERICAN: >60 ML/MIN
GFR SERPL CREATININE-BSD FRML MDRD: ABNORMAL ML/MIN/{1.73_M2}
GFR SERPL CREATININE-BSD FRML MDRD: ABNORMAL ML/MIN/{1.73_M2}
GLUCOSE BLD-MCNC: 133 MG/DL (ref 70–99)
HCT VFR BLD CALC: 42.9 % (ref 36–46)
HEMOGLOBIN: 14.4 G/DL (ref 12–16)
IMMATURE GRANULOCYTES: ABNORMAL %
LYMPHOCYTES # BLD: 19 % (ref 24–44)
MCH RBC QN AUTO: 31.6 PG (ref 26–34)
MCHC RBC AUTO-ENTMCNC: 33.6 G/DL (ref 31–37)
MCV RBC AUTO: 94.1 FL (ref 80–100)
MONOCYTES # BLD: 7 % (ref 2–11)
NRBC AUTOMATED: ABNORMAL PER 100 WBC
PDW BLD-RTO: 15 % (ref 12.5–15.4)
PLATELET # BLD: 235 K/UL (ref 140–450)
PLATELET ESTIMATE: ABNORMAL
PMV BLD AUTO: 7.4 FL (ref 6–12)
POTASSIUM SERPL-SCNC: 4.1 MMOL/L (ref 3.7–5.3)
RBC # BLD: 4.56 M/UL (ref 4–5.2)
RBC # BLD: ABNORMAL 10*6/UL
SEG NEUTROPHILS: 72 % (ref 36–66)
SEGMENTED NEUTROPHILS ABSOLUTE COUNT: 6.4 K/UL (ref 1.8–7.7)
SODIUM BLD-SCNC: 143 MMOL/L (ref 135–144)
TOTAL PROTEIN: 7.1 G/DL (ref 6.4–8.3)
WBC # BLD: 8.7 K/UL (ref 3.5–11)
WBC # BLD: ABNORMAL 10*3/UL

## 2019-05-16 PROCEDURE — 96413 CHEMO IV INFUSION 1 HR: CPT

## 2019-05-16 PROCEDURE — 96415 CHEMO IV INFUSION ADDL HR: CPT

## 2019-05-16 PROCEDURE — 6360000002 HC RX W HCPCS: Performed by: INTERNAL MEDICINE

## 2019-05-16 PROCEDURE — 85025 COMPLETE CBC W/AUTO DIFF WBC: CPT

## 2019-05-16 PROCEDURE — 6370000000 HC RX 637 (ALT 250 FOR IP): Performed by: INTERNAL MEDICINE

## 2019-05-16 PROCEDURE — 96375 TX/PRO/DX INJ NEW DRUG ADDON: CPT

## 2019-05-16 PROCEDURE — 36591 DRAW BLOOD OFF VENOUS DEVICE: CPT

## 2019-05-16 PROCEDURE — 80053 COMPREHEN METABOLIC PANEL: CPT

## 2019-05-16 PROCEDURE — 99214 OFFICE O/P EST MOD 30 MIN: CPT | Performed by: INTERNAL MEDICINE

## 2019-05-16 PROCEDURE — 2580000003 HC RX 258: Performed by: INTERNAL MEDICINE

## 2019-05-16 RX ORDER — DIPHENHYDRAMINE HYDROCHLORIDE 50 MG/ML
50 INJECTION INTRAMUSCULAR; INTRAVENOUS ONCE
Status: CANCELLED | OUTPATIENT
Start: 2019-06-06

## 2019-05-16 RX ORDER — DIPHENHYDRAMINE HYDROCHLORIDE 50 MG/ML
25 INJECTION INTRAMUSCULAR; INTRAVENOUS ONCE
Status: CANCELLED | OUTPATIENT
Start: 2019-05-30

## 2019-05-16 RX ORDER — METHYLPREDNISOLONE SODIUM SUCCINATE 125 MG/2ML
125 INJECTION, POWDER, LYOPHILIZED, FOR SOLUTION INTRAMUSCULAR; INTRAVENOUS ONCE
Status: CANCELLED | OUTPATIENT
Start: 2019-06-13

## 2019-05-16 RX ORDER — SODIUM CHLORIDE 0.9 % (FLUSH) 0.9 %
10 SYRINGE (ML) INJECTION PRN
Status: CANCELLED | OUTPATIENT
Start: 2019-06-21

## 2019-05-16 RX ORDER — ACETAMINOPHEN 325 MG/1
650 TABLET ORAL ONCE
Status: CANCELLED | OUTPATIENT
Start: 2019-06-21

## 2019-05-16 RX ORDER — SODIUM CHLORIDE 9 MG/ML
INJECTION, SOLUTION INTRAVENOUS ONCE
Status: CANCELLED | OUTPATIENT
Start: 2019-06-13

## 2019-05-16 RX ORDER — DIPHENHYDRAMINE HYDROCHLORIDE 50 MG/ML
50 INJECTION INTRAMUSCULAR; INTRAVENOUS ONCE
Status: CANCELLED | OUTPATIENT
Start: 2019-05-30

## 2019-05-16 RX ORDER — MEPERIDINE HYDROCHLORIDE 50 MG/ML
12.5 INJECTION INTRAMUSCULAR; INTRAVENOUS; SUBCUTANEOUS ONCE
Status: CANCELLED | OUTPATIENT
Start: 2019-06-06

## 2019-05-16 RX ORDER — 0.9 % SODIUM CHLORIDE 0.9 %
10 VIAL (ML) INJECTION ONCE
Status: CANCELLED | OUTPATIENT
Start: 2019-06-21

## 2019-05-16 RX ORDER — ACETAMINOPHEN 325 MG/1
650 TABLET ORAL ONCE
Status: CANCELLED | OUTPATIENT
Start: 2019-06-06

## 2019-05-16 RX ORDER — MEPERIDINE HYDROCHLORIDE 50 MG/ML
12.5 INJECTION INTRAMUSCULAR; INTRAVENOUS; SUBCUTANEOUS ONCE
Status: CANCELLED | OUTPATIENT
Start: 2019-06-13

## 2019-05-16 RX ORDER — DIPHENHYDRAMINE HYDROCHLORIDE 50 MG/ML
25 INJECTION INTRAMUSCULAR; INTRAVENOUS ONCE
Status: CANCELLED | OUTPATIENT
Start: 2019-06-06

## 2019-05-16 RX ORDER — MEPERIDINE HYDROCHLORIDE 50 MG/ML
12.5 INJECTION INTRAMUSCULAR; INTRAVENOUS; SUBCUTANEOUS ONCE
Status: CANCELLED | OUTPATIENT
Start: 2019-05-30

## 2019-05-16 RX ORDER — METHYLPREDNISOLONE SODIUM SUCCINATE 125 MG/2ML
125 INJECTION, POWDER, LYOPHILIZED, FOR SOLUTION INTRAMUSCULAR; INTRAVENOUS ONCE
Status: CANCELLED | OUTPATIENT
Start: 2019-05-30

## 2019-05-16 RX ORDER — DIPHENHYDRAMINE HYDROCHLORIDE 50 MG/ML
50 INJECTION INTRAMUSCULAR; INTRAVENOUS ONCE
Status: CANCELLED | OUTPATIENT
Start: 2019-06-21

## 2019-05-16 RX ORDER — METHYLPREDNISOLONE SODIUM SUCCINATE 125 MG/2ML
125 INJECTION, POWDER, LYOPHILIZED, FOR SOLUTION INTRAMUSCULAR; INTRAVENOUS ONCE
Status: CANCELLED | OUTPATIENT
Start: 2019-06-21

## 2019-05-16 RX ORDER — SODIUM CHLORIDE 9 MG/ML
INJECTION, SOLUTION INTRAVENOUS CONTINUOUS
Status: CANCELLED | OUTPATIENT
Start: 2019-06-13

## 2019-05-16 RX ORDER — DIPHENHYDRAMINE HYDROCHLORIDE 50 MG/ML
50 INJECTION INTRAMUSCULAR; INTRAVENOUS ONCE
Status: CANCELLED | OUTPATIENT
Start: 2019-06-13

## 2019-05-16 RX ORDER — SODIUM CHLORIDE 0.9 % (FLUSH) 0.9 %
5 SYRINGE (ML) INJECTION PRN
Status: CANCELLED | OUTPATIENT
Start: 2019-06-06

## 2019-05-16 RX ORDER — MEPERIDINE HYDROCHLORIDE 50 MG/ML
12.5 INJECTION INTRAMUSCULAR; INTRAVENOUS; SUBCUTANEOUS ONCE
Status: CANCELLED | OUTPATIENT
Start: 2019-06-21

## 2019-05-16 RX ORDER — METHYLPREDNISOLONE SODIUM SUCCINATE 125 MG/2ML
125 INJECTION, POWDER, LYOPHILIZED, FOR SOLUTION INTRAMUSCULAR; INTRAVENOUS ONCE
Status: CANCELLED | OUTPATIENT
Start: 2019-06-06

## 2019-05-16 RX ORDER — SODIUM CHLORIDE 9 MG/ML
INJECTION, SOLUTION INTRAVENOUS ONCE
Status: COMPLETED | OUTPATIENT
Start: 2019-05-16 | End: 2019-05-16

## 2019-05-16 RX ORDER — SODIUM CHLORIDE 0.9 % (FLUSH) 0.9 %
10 SYRINGE (ML) INJECTION PRN
Status: DISCONTINUED | OUTPATIENT
Start: 2019-05-16 | End: 2019-05-17 | Stop reason: HOSPADM

## 2019-05-16 RX ORDER — SODIUM CHLORIDE 0.9 % (FLUSH) 0.9 %
5 SYRINGE (ML) INJECTION PRN
Status: CANCELLED | OUTPATIENT
Start: 2019-06-21

## 2019-05-16 RX ORDER — SODIUM CHLORIDE 0.9 % (FLUSH) 0.9 %
10 SYRINGE (ML) INJECTION PRN
Status: CANCELLED | OUTPATIENT
Start: 2019-06-06

## 2019-05-16 RX ORDER — SODIUM CHLORIDE 9 MG/ML
INJECTION, SOLUTION INTRAVENOUS CONTINUOUS
Status: CANCELLED | OUTPATIENT
Start: 2019-06-06

## 2019-05-16 RX ORDER — DIPHENHYDRAMINE HYDROCHLORIDE 50 MG/ML
25 INJECTION INTRAMUSCULAR; INTRAVENOUS ONCE
Status: CANCELLED | OUTPATIENT
Start: 2019-06-21

## 2019-05-16 RX ORDER — 0.9 % SODIUM CHLORIDE 0.9 %
10 VIAL (ML) INJECTION ONCE
Status: CANCELLED | OUTPATIENT
Start: 2019-06-13

## 2019-05-16 RX ORDER — SODIUM CHLORIDE 0.9 % (FLUSH) 0.9 %
5 SYRINGE (ML) INJECTION PRN
Status: CANCELLED | OUTPATIENT
Start: 2019-06-13

## 2019-05-16 RX ORDER — ACETAMINOPHEN 325 MG/1
650 TABLET ORAL ONCE
Status: CANCELLED | OUTPATIENT
Start: 2019-05-30

## 2019-05-16 RX ORDER — HYDROCODONE BITARTRATE AND ACETAMINOPHEN 7.5; 325 MG/1; MG/1
2 TABLET ORAL EVERY 6 HOURS PRN
Qty: 180 TABLET | Refills: 0 | Status: SHIPPED | OUTPATIENT
Start: 2019-05-16 | End: 2019-06-07

## 2019-05-16 RX ORDER — HEPARIN SODIUM (PORCINE) LOCK FLUSH IV SOLN 100 UNIT/ML 100 UNIT/ML
500 SOLUTION INTRAVENOUS PRN
Status: CANCELLED | OUTPATIENT
Start: 2019-06-06

## 2019-05-16 RX ORDER — HEPARIN SODIUM (PORCINE) LOCK FLUSH IV SOLN 100 UNIT/ML 100 UNIT/ML
500 SOLUTION INTRAVENOUS PRN
Status: CANCELLED | OUTPATIENT
Start: 2019-06-13

## 2019-05-16 RX ORDER — SODIUM CHLORIDE 9 MG/ML
INJECTION, SOLUTION INTRAVENOUS ONCE
Status: CANCELLED | OUTPATIENT
Start: 2019-06-06

## 2019-05-16 RX ORDER — 0.9 % SODIUM CHLORIDE 0.9 %
10 VIAL (ML) INJECTION ONCE
Status: CANCELLED | OUTPATIENT
Start: 2019-06-06

## 2019-05-16 RX ORDER — SODIUM CHLORIDE 9 MG/ML
INJECTION, SOLUTION INTRAVENOUS ONCE
Status: CANCELLED | OUTPATIENT
Start: 2019-06-21

## 2019-05-16 RX ORDER — SODIUM CHLORIDE 0.9 % (FLUSH) 0.9 %
10 SYRINGE (ML) INJECTION PRN
Status: CANCELLED | OUTPATIENT
Start: 2019-06-13

## 2019-05-16 RX ORDER — ACETAMINOPHEN 325 MG/1
650 TABLET ORAL ONCE
Status: CANCELLED | OUTPATIENT
Start: 2019-06-13

## 2019-05-16 RX ORDER — HEPARIN SODIUM (PORCINE) LOCK FLUSH IV SOLN 100 UNIT/ML 100 UNIT/ML
500 SOLUTION INTRAVENOUS PRN
Status: CANCELLED | OUTPATIENT
Start: 2019-05-30

## 2019-05-16 RX ORDER — SODIUM CHLORIDE 0.9 % (FLUSH) 0.9 %
5 SYRINGE (ML) INJECTION PRN
Status: CANCELLED | OUTPATIENT
Start: 2019-05-30

## 2019-05-16 RX ORDER — SODIUM CHLORIDE 0.9 % (FLUSH) 0.9 %
10 SYRINGE (ML) INJECTION PRN
Status: CANCELLED | OUTPATIENT
Start: 2019-05-30

## 2019-05-16 RX ORDER — SODIUM CHLORIDE 9 MG/ML
INJECTION, SOLUTION INTRAVENOUS CONTINUOUS
Status: CANCELLED | OUTPATIENT
Start: 2019-05-30

## 2019-05-16 RX ORDER — DIPHENHYDRAMINE HYDROCHLORIDE 50 MG/ML
25 INJECTION INTRAMUSCULAR; INTRAVENOUS ONCE
Status: COMPLETED | OUTPATIENT
Start: 2019-05-16 | End: 2019-05-16

## 2019-05-16 RX ORDER — HEPARIN SODIUM (PORCINE) LOCK FLUSH IV SOLN 100 UNIT/ML 100 UNIT/ML
500 SOLUTION INTRAVENOUS PRN
Status: DISCONTINUED | OUTPATIENT
Start: 2019-05-16 | End: 2019-05-17 | Stop reason: HOSPADM

## 2019-05-16 RX ORDER — HEPARIN SODIUM (PORCINE) LOCK FLUSH IV SOLN 100 UNIT/ML 100 UNIT/ML
500 SOLUTION INTRAVENOUS PRN
Status: CANCELLED | OUTPATIENT
Start: 2019-06-21

## 2019-05-16 RX ORDER — DIPHENHYDRAMINE HYDROCHLORIDE 50 MG/ML
25 INJECTION INTRAMUSCULAR; INTRAVENOUS ONCE
Status: CANCELLED | OUTPATIENT
Start: 2019-06-13

## 2019-05-16 RX ORDER — SODIUM CHLORIDE 9 MG/ML
INJECTION, SOLUTION INTRAVENOUS CONTINUOUS
Status: CANCELLED | OUTPATIENT
Start: 2019-06-21

## 2019-05-16 RX ORDER — SODIUM CHLORIDE 9 MG/ML
INJECTION, SOLUTION INTRAVENOUS ONCE
Status: CANCELLED | OUTPATIENT
Start: 2019-05-30

## 2019-05-16 RX ORDER — ACETAMINOPHEN 325 MG/1
650 TABLET ORAL ONCE
Status: COMPLETED | OUTPATIENT
Start: 2019-05-16 | End: 2019-05-16

## 2019-05-16 RX ORDER — 0.9 % SODIUM CHLORIDE 0.9 %
10 VIAL (ML) INJECTION ONCE
Status: CANCELLED | OUTPATIENT
Start: 2019-05-30

## 2019-05-16 RX ADMIN — ACETAMINOPHEN 650 MG: 325 TABLET ORAL at 08:57

## 2019-05-16 RX ADMIN — Medication 10 ML: at 11:03

## 2019-05-16 RX ADMIN — Medication 500 UNITS: at 11:03

## 2019-05-16 RX ADMIN — RITUXIMAB 700 MG: 10 INJECTION, SOLUTION INTRAVENOUS at 09:29

## 2019-05-16 RX ADMIN — DIPHENHYDRAMINE HYDROCHLORIDE 25 MG: 50 INJECTION, SOLUTION INTRAMUSCULAR; INTRAVENOUS at 08:59

## 2019-05-16 RX ADMIN — Medication 10 ML: at 08:13

## 2019-05-16 RX ADMIN — Medication 10 ML: at 08:12

## 2019-05-16 RX ADMIN — SODIUM CHLORIDE: 9 INJECTION, SOLUTION INTRAVENOUS at 08:57

## 2019-05-16 ASSESSMENT — PAIN SCALES - GENERAL: PAINLEVEL_OUTOF10: 7

## 2019-05-16 NOTE — PROGRESS NOTES
_           Chief Complaint   Patient presents with    Follow-up     review status of disease     DIAGNOSIS:        Marginal Zone lymphoma  Persistent leukocytosis  Persistent thrombocytopenia  Multiple comorbidities as listed     CURRENT THERAPY:         Started treatment with rituximab 5/2/2019    BRIEF CASE HISTORY:      Ms. Radha Agee is a very pleasant 69 y.o. female with history of multiple comorbidities including COPD and diabetes. She quit smoking years ago. The patient was recently hospitalized with chest infection. The patient was noted to have leukocytosis. She also had persistent thrombocytopenia. She is referred for further evaluation. Patient denies any active bleeding. She has easy bruising. No fever or night sweats. No weight loss or decreased appetite. No palpable lymph nodes. No history of repeated infections. Patient has history of smoking for more than 50 years. Denies alcohol drinking  She had flow cytometry and bone marrow test. Results showed evidence of marginal zone lymphoma. Start the rituximab with good results. INTERIM HISTORY:   Patient is seen for follow up leukocytosis and marginal zone lymphoma. Due to increasing symptoms and further problems related to lymphoma, patient was started on single agent rituximab on 5/20/19. Patient is doing fairly well. Tolerating treatment fairly well with no side effects. She denies any fever or chills. No night sweats. No palpable lymph nodes. No weight loss or decreased appetite. PAST MEDICAL HISTORY: has a past medical history of COPD (chronic obstructive pulmonary disease) ([address] Depression, Diabetes mellitus ([address] Hyperlipidemia, PTSD (post-traumatic stress disorder), and Tubulovillous adenoma. PAST SURGICAL HISTORY: has a past surgical history that includes Hysterectomy; Colonoscopy;  Colonoscopy (06/07/2016); and Tunneled venous port placement (04/08/2019). CURRENT MEDICATIONS:  has a current medication list which includes the following prescription(s): prochlorperazine, lidocaine-prilocaine, nystatin, nystatin, gabapentin, albuterol-ipratropium, esomeprazole, metformin, simvastatin, fluoxetine, lisinopril, ipratropium-albuterol, ibuprofen, blood glucose test strips, lancets, meclizine, and hydrocodone-acetaminophen, and the following Facility-Administered Medications: sodium chloride, riTUXimab (RITUXAN) 700 mg in sodium chloride 0.9 % 180 mL chemo IVPB, sodium chloride flush, and heparin flush. ALLERGIES:  has No Known Allergies. FAMILY HISTORY: Negative for any hematological or oncological conditions. SOCIAL HISTORY:  reports that she quit smoking about 11 years ago. Her smoking use included cigarettes. She has a 10.00 pack-year smoking history. She has never used smokeless tobacco. She reports that she does not drink alcohol or use drugs. REVIEW OF SYSTEMS:     · General: Positive for weakness and fatigue. No unanticipated weight loss or decreased appetite. No fever or chills. · Eyes: No blurred vision, eye pain or double vision. · Ears: No hearing problems or drainage. No tinnitus. · Throat: No sore throat, problems with swallowing or dysphagia. · Respiratory: No cough, sputum or hemoptysis. No shortness of breath. No pleuritic chest pain. · Cardiovascular: No chest pain, orthopnea or PND. No lower extremity edema. No palpitation. · Gastrointestinal: No problems with swallowing. No abdominal pain or bloating. No nausea or vomiting. No diarrhea or constipation. No GI bleeding. · Genitourinary: No dysuria, hematuria, frequency or urgency. · Musculoskeletal: No muscle aches or pains. No limitation of movement. No back pain. No gait disturbance, No joint complaints. · Dermatologic: No skin rashes or pruritus. No skin lesions or discolorations.    · Psychiatric: No depression, anxiety, or stress or signs of schizophrenia. No change in mood or affect. · Hematologic: No history of bleeding tendency. Easy bruises no ecchymosis. No history of clotting problems. · Infectious disease: No fever, chills or frequent infections. · Endocrine: No problems with obesity. No polydipsia or polyuria. No temperature intolerance. · Neurologic: No headaches or dizziness. No weakness or numbness of the extremities. No changes in balance, coordination,  memory, mentation, behavior. · Allergic/Immunologic: No nasal congestion or hives. No repeated infections. PHYSICAL EXAM:  The patient is not in acute distress. Vital signs: Blood pressure 102/69, pulse 63, temperature 97.6 °F (36.4 °C), temperature source Oral, weight 195 lb (88.5 kg), not currently breastfeeding. HEENT:  Eyes are normal. Ears, nose and throat are normal.  Neck: Supple. No lymph node enlargement. No thyroid enlargement. Trachea is centrally located. Chest:  Clear to auscultation. No wheezes or crepitations. Heart: Regular sinus rhythm. Abdomen: Soft, nontender. No hepatosplenomegaly. No masses. Extremities:  With no edema. Lymph Nodes:  No cervical, axillary or inguinal lymph node enlargement. Neurologic:  Conscious and oriented. No focal neurological deficits. Psychosocial: No depression, anxiety or stress. Skin: No rashes, bruises or ecchymoses. Review of Diagnostic data:   Recent Labs     05/16/19  0821 05/09/19  0810 05/02/19  0955   WBC 8.7 8.5 12.8*   HGB 14.4 14.1 13.4   HCT 42.9 42.3 43.1   MCV 94.1 93.5 97.3    277 See Reflexed IPF Result     Peripheral blood flow cytometry:  Peripheral blood flow cytometric immunophenotyping:         Peripheral blood is positive for a monoclonal B-cell population,   positive for CD19, CD20, CD22,   CD45, FMC7 and lambda light chain.      Comment:  Morphology and immunophenotype of circulating monoclonal   B-cells are most consistent with a marginal zone lymphoma.  Bone   marrow

## 2019-05-16 NOTE — TELEPHONE ENCOUNTER
Patient requesting medication refill be sent to pharmacy on file. Please contact her at earliest convenience if any issues. Phone number on file has been verified. Thank you.
Bandemia     Thrombocytopenia (HCC)     Marginal zone B-cell lymphoma ([facility] [address]

## 2019-05-16 NOTE — PROGRESS NOTES
Pt here for C1D.15. Pt seen by Dr. Chinmay Braswell prior to treatment. Labs drawn from port and results reviewed. Pt was treated without incident and d/c'd in stable condition. Pt will return on 5/23 for C1D22.

## 2019-05-20 ENCOUNTER — OFFICE VISIT (OUTPATIENT)
Dept: FAMILY MEDICINE CLINIC | Age: 74
End: 2019-05-20
Payer: COMMERCIAL

## 2019-05-20 VITALS
HEART RATE: 64 BPM | DIASTOLIC BLOOD PRESSURE: 72 MMHG | OXYGEN SATURATION: 94 % | WEIGHT: 196.2 LBS | BODY MASS INDEX: 42.46 KG/M2 | SYSTOLIC BLOOD PRESSURE: 118 MMHG

## 2019-05-20 DIAGNOSIS — M15.9 PRIMARY OSTEOARTHRITIS INVOLVING MULTIPLE JOINTS: ICD-10-CM

## 2019-05-20 DIAGNOSIS — E11.8 TYPE 2 DIABETES MELLITUS WITH COMPLICATION, WITHOUT LONG-TERM CURRENT USE OF INSULIN (HCC): Primary | ICD-10-CM

## 2019-05-20 DIAGNOSIS — F43.10 POST TRAUMATIC STRESS DISORDER (PTSD): ICD-10-CM

## 2019-05-20 DIAGNOSIS — J44.9 CHRONIC OBSTRUCTIVE PULMONARY DISEASE, UNSPECIFIED COPD TYPE (HCC): ICD-10-CM

## 2019-05-20 PROCEDURE — 99213 OFFICE O/P EST LOW 20 MIN: CPT | Performed by: FAMILY MEDICINE

## 2019-05-20 RX ORDER — ITRACONAZOLE 100 MG/1
2 CAPSULE ORAL
COMMUNITY
End: 2019-05-30 | Stop reason: ALTCHOICE

## 2019-05-20 ASSESSMENT — ENCOUNTER SYMPTOMS
BACK PAIN: 1
COUGH: 0
NAUSEA: 0
VOMITING: 0
SORE THROAT: 0
SHORTNESS OF BREATH: 0
SINUS PRESSURE: 0
DIARRHEA: 0

## 2019-05-20 NOTE — PROGRESS NOTES
Shahriar Mercado is a 69 y.o. female who presents todayfor her medical conditions/complaints as noted below. Shahriar Mercado is here today c/oCheck-Up; Diabetes; and COPD  Routine follow up on PL she is having more pain issues. :     Visit Information    Have you changed or started any medications since your last visit including any over-the-counter medicines, vitamins, or herbal medicines? no   Are you having any side effects from any of your medications? -  no  Have you stopped taking any of your medications? Is so, why? -  no    Have you seen any other physician or provider since your last visit? Yes - Records Obtained  Have you had any other diagnostic tests since your last visit? No  Have you been seen in the emergency room and/or had an admission to a hospital since we last saw you? No  Have you had your routine dental cleaning in the past 6 months? no    Have you activated your [employer] account? If not, what are your barriers?  No     Patient Care Team:  Van Mc MD as PCP - Jatinder Ricardo MD as PCP - S Attributed Provider  Mela Denise MD as Consulting Physician (Otolaryngology)  Yomaira Mora RN as Care Coordinator    Medical History Review  Past Medical, Family, and Social History reviewed and does not contribute to the patient presenting condition    Health Maintenance   Topic Date Due    DTaP/Tdap/Td vaccine (1 - Tdap) 04/30/1966    Shingles Vaccine (1 of 2) 04/30/1997    Diabetic microalbuminuria test  05/15/2018    Colon cancer screen colonoscopy  06/07/2019    Diabetic foot exam  07/11/2019    A1C test (Diabetic or Prediabetic)  07/11/2019    Lipid screen  07/11/2019    Diabetic retinal exam  08/15/2019    Potassium monitoring  05/16/2020    Creatinine monitoring  05/16/2020    Breast cancer screen  03/27/2021    DEXA (modify frequency per FRAX score)  Completed    Flu vaccine  Completed    Pneumococcal 65+ years Vaccine  Completed    Hepatitis C screen Completed           HPI        Past Medical History:   Diagnosis Date    COPD (chronic obstructive pulmonary disease) ([facility] [address]     Depression     Diabetes mellitus ([facility] [address]     Hyperlipidemia     PTSD (post-traumatic stress disorder)     Tubulovillous adenoma       Past Surgical History:   Procedure Laterality Date    COLONOSCOPY      colon polyps    COLONOSCOPY  2016    severe diverticulosis TUBULOVILLOUS ADENOMA.       HYSTERECTOMY      TUNNELED VENOUS PORT PLACEMENT  2019    chemo port to right chest     Family History   Problem Relation Age of Onset    Heart Disease Mother     Heart Disease Brother      Social History     Tobacco Use    Smoking status: Former Smoker     Packs/day: 1.00     Years: 10.00     Pack years: 10.00     Types: Cigarettes     Last attempt to quit: 2007     Years since quittin.4    Smokeless tobacco: Never Used   Substance Use Topics    Alcohol use: No      Current Outpatient Medications   Medication Sig Dispense Refill    itraconazole (SPORANOX) 100 MG capsule Take 2 capsules by mouth      HYDROcodone-acetaminophen (NORCO) 7.5-325 MG per tablet Take 2 tablets by mouth every 6 hours as needed for Pain for up to 22 days. Do not fill until 2018 180 tablet 0    prochlorperazine (COMPAZINE) 10 MG tablet Take 1 tablet by mouth every 6 hours as needed (CHEMO INDUCED NAUSEA) 120 tablet 3    lidocaine-prilocaine (EMLA) 2.5-2.5 % cream Apply topically as needed. 1 Tube 3    nystatin (MYCOSTATIN) 528410 UNIT/GM cream Apply topically 2 times daily. 15 g 0    nystatin (MYCOSTATIN) 522711 UNIT/ML suspension Take 5 mLs by mouth 4 times daily 100 mL 2    gabapentin (NEURONTIN) 600 MG tablet Take 600 mg by mouth 3 times daily. Juani Guerrier albuterol-ipratropium (COMBIVENT RESPIMAT)  MCG/ACT AERS inhaler INHALE ONE INHALATION BY MOUTH FOUR TIMES A DAY 3 Inhaler 3    esomeprazole (NEXIUM) 40 MG delayed release capsule TAKE ONE CAPSULE BY MOUTH DAILY 90 capsule 3

## 2019-05-23 ENCOUNTER — OFFICE VISIT (OUTPATIENT)
Dept: ONCOLOGY | Age: 74
End: 2019-05-23
Payer: COMMERCIAL

## 2019-05-23 ENCOUNTER — HOSPITAL ENCOUNTER (OUTPATIENT)
Age: 74
Discharge: HOME | End: 2019-05-23
Payer: COMMERCIAL

## 2019-05-23 ENCOUNTER — HOSPITAL ENCOUNTER (OUTPATIENT)
Dept: INFUSION THERAPY | Age: 74
Discharge: HOME | End: 2019-05-23
Payer: COMMERCIAL

## 2019-05-23 VITALS
DIASTOLIC BLOOD PRESSURE: 79 MMHG | TEMPERATURE: 97.4 F | BODY MASS INDEX: 42.54 KG/M2 | WEIGHT: 196.6 LBS | RESPIRATION RATE: 22 BRPM | SYSTOLIC BLOOD PRESSURE: 126 MMHG | HEART RATE: 76 BPM

## 2019-05-23 VITALS
HEART RATE: 76 BPM | RESPIRATION RATE: 22 BRPM | WEIGHT: 196.6 LBS | BODY MASS INDEX: 42.54 KG/M2 | OXYGEN SATURATION: 95 % | SYSTOLIC BLOOD PRESSURE: 126 MMHG | DIASTOLIC BLOOD PRESSURE: 79 MMHG | TEMPERATURE: 97.4 F

## 2019-05-23 DIAGNOSIS — C85.80 MARGINAL ZONE B-CELL LYMPHOMA (HCC): Primary | ICD-10-CM

## 2019-05-23 DIAGNOSIS — E11.8 TYPE 2 DIABETES MELLITUS WITH COMPLICATION, WITHOUT LONG-TERM CURRENT USE OF INSULIN (HCC): ICD-10-CM

## 2019-05-23 LAB
ABSOLUTE EOS #: 0.2 K/UL (ref 0–0.4)
ABSOLUTE IMMATURE GRANULOCYTE: NORMAL K/UL (ref 0–0.3)
ABSOLUTE LYMPH #: 2.9 K/UL (ref 1–4.8)
ABSOLUTE MONO #: 0.6 K/UL (ref 0.1–1.2)
ALBUMIN SERPL-MCNC: 3.7 G/DL (ref 3.5–5.2)
ALBUMIN/GLOBULIN RATIO: 1 (ref 1–2.5)
ALP BLD-CCNC: 98 U/L (ref 35–104)
ALT SERPL-CCNC: 24 U/L (ref 5–33)
ANION GAP SERPL CALCULATED.3IONS-SCNC: 13 MMOL/L (ref 9–17)
AST SERPL-CCNC: 29 U/L
BASOPHILS # BLD: 1 % (ref 0–2)
BASOPHILS ABSOLUTE: 0.1 K/UL (ref 0–0.2)
BILIRUB SERPL-MCNC: 0.21 MG/DL (ref 0.3–1.2)
BUN BLDV-MCNC: 19 MG/DL (ref 8–23)
BUN/CREAT BLD: ABNORMAL (ref 9–20)
CALCIUM SERPL-MCNC: 9.1 MG/DL (ref 8.6–10.4)
CHLORIDE BLD-SCNC: 102 MMOL/L (ref 98–107)
CHOLESTEROL/HDL RATIO: 3
CHOLESTEROL: 142 MG/DL
CO2: 25 MMOL/L (ref 20–31)
CREAT SERPL-MCNC: 0.56 MG/DL (ref 0.5–0.9)
DIFFERENTIAL TYPE: NORMAL
EOSINOPHILS RELATIVE PERCENT: 2 % (ref 1–4)
GFR AFRICAN AMERICAN: >60 ML/MIN
GFR NON-AFRICAN AMERICAN: >60 ML/MIN
GFR SERPL CREATININE-BSD FRML MDRD: ABNORMAL ML/MIN/{1.73_M2}
GFR SERPL CREATININE-BSD FRML MDRD: ABNORMAL ML/MIN/{1.73_M2}
GLUCOSE BLD-MCNC: 138 MG/DL (ref 70–99)
HCT VFR BLD CALC: 41.7 % (ref 36–46)
HDLC SERPL-MCNC: 47 MG/DL
HEMOGLOBIN: 13.9 G/DL (ref 12–16)
IMMATURE GRANULOCYTES: NORMAL %
LDL CHOLESTEROL: 58 MG/DL (ref 0–130)
LYMPHOCYTES # BLD: 42 % (ref 24–44)
MCH RBC QN AUTO: 31.3 PG (ref 26–34)
MCHC RBC AUTO-ENTMCNC: 33.3 G/DL (ref 31–37)
MCV RBC AUTO: 94 FL (ref 80–100)
MONOCYTES # BLD: 8 % (ref 2–11)
NRBC AUTOMATED: NORMAL PER 100 WBC
PDW BLD-RTO: 14.8 % (ref 12.5–15.4)
PLATELET # BLD: 237 K/UL (ref 140–450)
PLATELET ESTIMATE: NORMAL
PMV BLD AUTO: 7.4 FL (ref 6–12)
POTASSIUM SERPL-SCNC: 5.3 MMOL/L (ref 3.7–5.3)
RBC # BLD: 4.43 M/UL (ref 4–5.2)
RBC # BLD: NORMAL 10*6/UL
SEG NEUTROPHILS: 47 % (ref 36–66)
SEGMENTED NEUTROPHILS ABSOLUTE COUNT: 3.2 K/UL (ref 1.8–7.7)
SODIUM BLD-SCNC: 140 MMOL/L (ref 135–144)
TOTAL PROTEIN: 7.3 G/DL (ref 6.4–8.3)
TRIGL SERPL-MCNC: 183 MG/DL
VLDLC SERPL CALC-MCNC: ABNORMAL MG/DL (ref 1–30)
WBC # BLD: 6.9 K/UL (ref 3.5–11)
WBC # BLD: NORMAL 10*3/UL

## 2019-05-23 PROCEDURE — 6360000002 HC RX W HCPCS: Performed by: INTERNAL MEDICINE

## 2019-05-23 PROCEDURE — 96415 CHEMO IV INFUSION ADDL HR: CPT | Performed by: NURSE PRACTITIONER

## 2019-05-23 PROCEDURE — 80053 COMPREHEN METABOLIC PANEL: CPT

## 2019-05-23 PROCEDURE — 96375 TX/PRO/DX INJ NEW DRUG ADDON: CPT | Performed by: NURSE PRACTITIONER

## 2019-05-23 PROCEDURE — 80061 LIPID PANEL: CPT

## 2019-05-23 PROCEDURE — 85025 COMPLETE CBC W/AUTO DIFF WBC: CPT

## 2019-05-23 PROCEDURE — 2580000003 HC RX 258: Performed by: INTERNAL MEDICINE

## 2019-05-23 PROCEDURE — 6370000000 HC RX 637 (ALT 250 FOR IP): Performed by: INTERNAL MEDICINE

## 2019-05-23 PROCEDURE — 99214 OFFICE O/P EST MOD 30 MIN: CPT | Performed by: INTERNAL MEDICINE

## 2019-05-23 PROCEDURE — 36591 DRAW BLOOD OFF VENOUS DEVICE: CPT | Performed by: NURSE PRACTITIONER

## 2019-05-23 PROCEDURE — 83036 HEMOGLOBIN GLYCOSYLATED A1C: CPT

## 2019-05-23 PROCEDURE — 96413 CHEMO IV INFUSION 1 HR: CPT | Performed by: NURSE PRACTITIONER

## 2019-05-23 RX ORDER — ACETAMINOPHEN 325 MG/1
650 TABLET ORAL ONCE
Status: COMPLETED | OUTPATIENT
Start: 2019-05-23 | End: 2019-05-23

## 2019-05-23 RX ORDER — DIPHENHYDRAMINE HYDROCHLORIDE 50 MG/ML
25 INJECTION INTRAMUSCULAR; INTRAVENOUS ONCE
Status: COMPLETED | OUTPATIENT
Start: 2019-05-23 | End: 2019-05-23

## 2019-05-23 RX ORDER — SODIUM CHLORIDE 0.9 % (FLUSH) 0.9 %
10 SYRINGE (ML) INJECTION PRN
Status: DISCONTINUED | OUTPATIENT
Start: 2019-05-23 | End: 2019-05-24 | Stop reason: HOSPADM

## 2019-05-23 RX ORDER — SODIUM CHLORIDE 9 MG/ML
INJECTION, SOLUTION INTRAVENOUS ONCE
Status: COMPLETED | OUTPATIENT
Start: 2019-05-23 | End: 2019-05-23

## 2019-05-23 RX ORDER — HEPARIN SODIUM (PORCINE) LOCK FLUSH IV SOLN 100 UNIT/ML 100 UNIT/ML
500 SOLUTION INTRAVENOUS PRN
Status: DISCONTINUED | OUTPATIENT
Start: 2019-05-23 | End: 2019-05-24 | Stop reason: HOSPADM

## 2019-05-23 RX ADMIN — DIPHENHYDRAMINE HYDROCHLORIDE 25 MG: 50 INJECTION, SOLUTION INTRAMUSCULAR; INTRAVENOUS at 09:45

## 2019-05-23 RX ADMIN — ACETAMINOPHEN 650 MG: 325 TABLET ORAL at 09:31

## 2019-05-23 RX ADMIN — Medication 10 ML: at 08:17

## 2019-05-23 RX ADMIN — Medication 10 ML: at 08:23

## 2019-05-23 RX ADMIN — RITUXIMAB 700 MG: 10 INJECTION, SOLUTION INTRAVENOUS at 09:55

## 2019-05-23 RX ADMIN — Medication 500 UNITS: at 11:34

## 2019-05-23 RX ADMIN — Medication 10 ML: at 11:34

## 2019-05-23 RX ADMIN — SODIUM CHLORIDE: 9 INJECTION, SOLUTION INTRAVENOUS at 09:21

## 2019-05-23 ASSESSMENT — PAIN DESCRIPTION - DESCRIPTORS: DESCRIPTORS: ACHING

## 2019-05-23 ASSESSMENT — PAIN DESCRIPTION - LOCATION: LOCATION: BACK;MOUTH

## 2019-05-23 ASSESSMENT — PAIN SCALES - GENERAL
PAINLEVEL_OUTOF10: 8
PAINLEVEL_OUTOF10: 8

## 2019-05-23 ASSESSMENT — PAIN DESCRIPTION - PAIN TYPE: TYPE: CHRONIC PAIN

## 2019-05-23 ASSESSMENT — PAIN DESCRIPTION - FREQUENCY: FREQUENCY: CONTINUOUS

## 2019-05-23 NOTE — LETTER
[facility]   [address], [address], [address] [facility]  [phone]    June 6, 2019     Srikanth Lisa  [address]      Dear Angie Leonison:    Below are the results from your recent visit:    Resulted Orders   Hemoglobin A1C   Result Value Ref Range    Hemoglobin A1C 5.7 4.0 - 6.0 %    Estimated Avg Glucose 117 mg/dL      Comment:      The ADA and AACC recommend providing the estimated average glucose result to permit better   patient understanding of their HBA1c result. Lipid Panel   Result Value Ref Range    Cholesterol 142 <200 mg/dL      Comment:         Cholesterol Guidelines:      <200  Desirable   200-240  Borderline      >240  Undesirable         HDL 47 >40 mg/dL      Comment:         HDL Guidelines:    <40     Undesirable   40-59    Borderline    >59     Desirable         LDL Cholesterol 58 0 - 130 mg/dL      Comment:         LDL Guidelines:     <100    Desirable   100-129   Near to/above Desirable   130-159   Borderline      >159   Undesirable     Direct (measured) LDL and calculated LDL are not interchangeable tests. Chol/HDL Ratio 3.0 <5      Comment:              Triglycerides 183 (H) <150 mg/dL      Comment:         Triglyceride Guidelines:     <150   Desirable   150-199  Borderline   200-499  High     >499   Very high   Based on AHA Guidelines for fasting triglyceride, October 2012. VLDL NOT REPORTED (H) 1 - 30 mg/dL      Labs appear stable. If you have any questions or concerns, please don't hesitate to call.     Sincerely,    [facility]

## 2019-05-23 NOTE — FLOWSHEET NOTE
Situation:   encountered Ms. Smith in the lobby as she was waiting for transportation. Assessment:  Ms. Josy Hay is a 70 y/o female who is receiving treatment for lymphoma. Today, she reported that the test results are showing that the medication is working. Ms. Josy Hay smiled and expressed gratitude. She voiced her belief that her sister shared with her that she will be \"healed through Alexsandra Hermanns. \"    Ms. Josy Hay acknowledged and expressed gratitude for the support of her sister and brother and for her yanira. She revisited parts of the conversation with  from two weeks ago. She shared stories and memories from her past, which indicated how God has helped her. Intervention:   provided supportive presence and explored Pt's coping and needs.  listened as Pt shared stories and memories of her life.  offered words of encouragement and support. Outcome:  Ms. Josy Hay expressed gratitude for her health. She stated her beliefs. She acknowledged 's words of encouragement and expressed thanks. 05/23/19 1324   Encounter Summary   Services provided to: Patient   Referral/Consult From: [address] [facility] Family members   Continue Visiting   (5/23/19)   Complexity of Encounter Low   Length of Encounter 30 minutes   Spiritual Assessment Completed Yes   Spiritual/Congregation   Type Spiritual support   Assessment Calm; Approachable;Coping; Hopeful   Intervention Active listening;Explored feelings, thoughts, concerns;Explored coping resources;Sustaining presence/ Ministry of presence   Outcome Expressed gratitude; Connection/belonging;Encouraged; Hopeful;Receptive;Coping     Electronically signed by Martha Molina [address], [address] [facility] Radiation Oncology  5/23/2019  1:25 PM
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
## 2019-05-23 NOTE — PLAN OF CARE
Problem: Pain:  Goal: Pain level will decrease  Description  Pain level will decrease  Outcome: Met This Shift     Problem: Pain:  Goal: Control of acute pain  Description  Control of acute pain  Outcome: Met This Shift     Problem: Pain:  Goal: Control of chronic pain  Description  Control of chronic pain  Outcome: Met This Shift

## 2019-05-24 LAB
ESTIMATED AVERAGE GLUCOSE: 117 MG/DL
HBA1C MFR BLD: 5.7 % (ref 4–6)

## 2019-05-26 NOTE — PROGRESS NOTES
_           Chief Complaint   Patient presents with    Pain     chronic pain mouth/back    Fatigue     DIAGNOSIS:        Marginal Zone lymphoma  Persistent leukocytosis  Persistent thrombocytopenia  Multiple comorbidities as listed     CURRENT THERAPY:         Started treatment with rituximab 5/2/2019    BRIEF CASE HISTORY:      Ms. Rosa Stoll is a very pleasant 69 y.o. female with history of multiple comorbidities including COPD and diabetes. She quit smoking years ago. The patient was recently hospitalized with chest infection. The patient was noted to have leukocytosis. She also had persistent thrombocytopenia. She is referred for further evaluation. Patient denies any active bleeding. She has easy bruising. No fever or night sweats. No weight loss or decreased appetite. No palpable lymph nodes. No history of repeated infections. Patient has history of smoking for more than 50 years. Denies alcohol drinking  She had flow cytometry and bone marrow test. Results showed evidence of marginal zone lymphoma. Start the rituximab with good results. INTERIM HISTORY:   Patient is seen for follow up leukocytosis and marginal zone lymphoma. Due to increasing symptoms and further problems related to lymphoma, patient was started on single agent rituximab on 5/20/19. Patient is doing fairly well. Tolerating treatment fairly well with no side effects. She denies any fever or chills. No night sweats. No palpable lymph nodes. No weight loss or decreased appetite. PAST MEDICAL HISTORY: has a past medical history of COPD (chronic obstructive pulmonary disease) ([facility] [address] Depression, Diabetes mellitus ([address] Hyperlipidemia, PTSD (post-traumatic stress disorder), and Tubulovillous adenoma. PAST SURGICAL HISTORY: has a past surgical history that includes Hysterectomy; Colonoscopy;  Colonoscopy (06/07/2016); and Tunneled venous port placement (04/08/2019). CURRENT MEDICATIONS:  has a current medication list which includes the following prescription(s): hydrocodone-acetaminophen, prochlorperazine, lidocaine-prilocaine, nystatin, nystatin, gabapentin, albuterol-ipratropium, esomeprazole, metformin, simvastatin, fluoxetine, lisinopril, ipratropium-albuterol, ibuprofen, blood glucose test strips, lancets, meclizine, and itraconazole. ALLERGIES:  has No Known Allergies. FAMILY HISTORY: Negative for any hematological or oncological conditions. SOCIAL HISTORY:  reports that she quit smoking about 11 years ago. Her smoking use included cigarettes. She has a 10.00 pack-year smoking history. She has never used smokeless tobacco. She reports that she does not drink alcohol or use drugs. REVIEW OF SYSTEMS:     · General: Positive for weakness and fatigue. No unanticipated weight loss or decreased appetite. No fever or chills. · Eyes: No blurred vision, eye pain or double vision. · Ears: No hearing problems or drainage. No tinnitus. · Throat: No sore throat, problems with swallowing or dysphagia. · Respiratory: No cough, sputum or hemoptysis. No shortness of breath. No pleuritic chest pain. · Cardiovascular: No chest pain, orthopnea or PND. No lower extremity edema. No palpitation. · Gastrointestinal: No problems with swallowing. No abdominal pain or bloating. No nausea or vomiting. No diarrhea or constipation. No GI bleeding. · Genitourinary: No dysuria, hematuria, frequency or urgency. · Musculoskeletal: No muscle aches or pains. No limitation of movement. No back pain. No gait disturbance, No joint complaints. · Dermatologic: No skin rashes or pruritus. No skin lesions or discolorations. · Psychiatric: No depression, anxiety, or stress or signs of schizophrenia. No change in mood or affect. · Hematologic: No history of bleeding tendency. Easy bruises no ecchymosis.  No history of clotting problems. · Infectious disease: No fever, chills or frequent infections. · Endocrine: No problems with obesity. No polydipsia or polyuria. No temperature intolerance. · Neurologic: No headaches or dizziness. No weakness or numbness of the extremities. No changes in balance, coordination,  memory, mentation, behavior. · Allergic/Immunologic: No nasal congestion or hives. No repeated infections. PHYSICAL EXAM:  The patient is not in acute distress. Vital signs: Blood pressure 126/79, pulse 76, temperature 97.4 °F (36.3 °C), resp. rate 22, weight 196 lb 9.6 oz (89.2 kg), not currently breastfeeding. HEENT:  Eyes are normal. Ears, nose and throat are normal.  Neck: Supple. No lymph node enlargement. No thyroid enlargement. Trachea is centrally located. Chest:  Clear to auscultation. No wheezes or crepitations. Heart: Regular sinus rhythm. Abdomen: Soft, nontender. No hepatosplenomegaly. No masses. Extremities:  With no edema. Lymph Nodes:  No cervical, axillary or inguinal lymph node enlargement. Neurologic:  Conscious and oriented. No focal neurological deficits. Psychosocial: No depression, anxiety or stress. Skin: No rashes, bruises or ecchymoses. Review of Diagnostic data:   Recent Labs     05/23/19  0826 05/16/19  0821 05/09/19  0810   WBC 6.9 8.7 8.5   HGB 13.9 14.4 14.1   HCT 41.7 42.9 42.3   MCV 94.0 94.1 93.5    235 277     Peripheral blood flow cytometry:  Peripheral blood flow cytometric immunophenotyping:         Peripheral blood is positive for a monoclonal B-cell population,   positive for CD19, CD20, CD22,   CD45, FMC7 and lambda light chain. Comment:  Morphology and immunophenotype of circulating monoclonal   B-cells are most consistent with a marginal zone lymphoma.  Bone   marrow evaluation and/or lymph node biopsy if possible are recommended   for confirmation and additional characterization. Bone marrow test August 27, 2018:     Final Diagnosis PERIPHERAL BLOOD:   -LYMPHOCYTOSIS WITH \" VILLOUS\" LYMPHOCYTES. -MODERATE THROMBOCYTOPENIA. BONE MARROW BIOPSY, ASPIRATE SMEAR AND CLOT SECTION:   - MARGINAL ZONE LYMPHOMA. -NORMOCELLULAR TRILINEAGE HEMATOPOIETIC MARROW WITH MARKEDLY   DECREASED IRON STORES. IMPRESSION:  Marginal Zone lymphoma  Persistent leukocytosis  Persistent thrombocytopenia  Positive FANNY with elevated antihistone. Multiple comorbidities as listed    PLAN: I reviewed the labs as above and discussed with the patient. I explained the significance of these abnormalities in layman language. I explained to patient the nature of low grade NHL. Patient is on treatment with immunotherapy using rituximab. Treatment was started 5/20/19. Tolerated well so far. No significant side effects. Clinically she is feeling better with significant improvement in her symptoms. We will proceed with treatment today as planned. Monitor labs closely and monitor side effects. Patient's questions were answered to the best of her satisfaction and she verbalized full understanding and agreement.

## 2019-05-28 ENCOUNTER — TELEPHONE (OUTPATIENT)
Dept: FAMILY MEDICINE CLINIC | Age: 74
End: 2019-05-28

## 2019-05-28 RX ORDER — NYSTATIN 100000 U/G
CREAM TOPICAL
Qty: 15 G | Refills: 0 | Status: SHIPPED | OUTPATIENT
Start: 2019-05-28 | End: 2019-05-30 | Stop reason: SDUPTHER

## 2019-05-28 RX ORDER — NITROFURANTOIN 25; 75 MG/1; MG/1
100 CAPSULE ORAL 2 TIMES DAILY
Qty: 10 CAPSULE | Refills: 0 | Status: SHIPPED | OUTPATIENT
Start: 2019-05-28 | End: 2019-06-06 | Stop reason: ALTCHOICE

## 2019-05-28 NOTE — TELEPHONE ENCOUNTER
Patient says she still has a \"yeast infection on the left side of her face\"  Will you send in more cream for her? URINARY    Patient calling with symptoms of possible urinary tract infection      Symptoms include  Burning with urination, frequency, fever off and on,fatigue    Symptoms have persisted for  6 days    Patient is also complaining of  None    When was their last UTI  Still the same one she had last time    *This condition is eligible for an eVisit. If not already active, sign patient up for MyChart to improve access and communication with the provider. *

## 2019-05-29 ENCOUNTER — CARE COORDINATION (OUTPATIENT)
Dept: FAMILY MEDICINE CLINIC | Age: 74
End: 2019-05-29

## 2019-05-29 NOTE — CARE COORDINATION
involved with chemo  Plan for overcoming my barriers: working with [facility] and PCP for symptoms management  Confidence: 7/10  Anticipated Goal Completion Date: 8/2019              Prior to Admission medications    Medication Sig Start Date End Date Taking? Authorizing Provider   nitrofurantoin, macrocrystal-monohydrate, (MACROBID) 100 MG capsule Take 1 capsule by mouth 2 times daily for 10 days 5/28/19 6/7/19  Eyad Contreras MD   nystatin (MYCOSTATIN) 285907 UNIT/GM cream Apply topically 2 times daily till rash gone 5/28/19   Arik Valero MD   itraconazole (SPORANOX) 100 MG capsule Take 2 capsules by mouth    Historical Provider, MD   HYDROcodone-acetaminophen (NORCO) 7.5-325 MG per tablet Take 2 tablets by mouth every 6 hours as needed for Pain for up to 22 days. Do not fill until 12- 5/16/19 6/7/19  Eyad Contreras MD   prochlorperazine (COMPAZINE) 10 MG tablet Take 1 tablet by mouth every 6 hours as needed (CHEMO INDUCED NAUSEA) 4/30/19   Vish Evans MD   lidocaine-prilocaine (EMLA) 2.5-2.5 % cream Apply topically as needed. 4/30/19   Karen Moy MD   nystatin (MYCOSTATIN) 506556 UNIT/GM cream Apply topically 2 times daily. 4/29/19   Lauren Ang MD   nystatin (MYCOSTATIN) 377069 UNIT/ML suspension Take 5 mLs by mouth 4 times daily 4/18/19   Mitchel Valero MD   gabapentin (NEURONTIN) 600 MG tablet Take 600 mg by mouth 3 times daily. Obed Johnson     Historical Provider, MD   albuterol-ipratropium (COMBIVENT RESPIMAT)  MCG/ACT AERS inhaler INHALE ONE INHALATION BY MOUTH FOUR TIMES A DAY 1/14/19   Arik Valero MD   esomeprazole (NEXIUM) 40 MG delayed release capsule TAKE ONE CAPSULE BY MOUTH DAILY 1/14/19   Eyad Contreras MD   metFORMIN (GLUCOPHAGE-XR) 750 MG extended release tablet Take 1 tablet by mouth daily (with breakfast) 1/14/19   Arik Valero MD   simvastatin (ZOCOR) 40 MG tablet TAKE ONE TABLET BY MOUTH DAILY 1/14/19   Mitchel Valero MD   FLUoxetine (PROZAC) 20 MG capsule TAKE ONE CAPSULE BY MOUTH DAILY 11/19/18   Philip Elliott MD   lisinopril (PRINIVIL;ZESTRIL) 10 MG tablet TAKE ONE TABLET BY MOUTH DAILY 11/12/18   Olaf Knapp MD   ipratropium-albuterol (DUONEB) 0.5-2.5 (3) MG/3ML SOLN nebulizer solution Inhale 3 mLs into the lungs every 6 hours as needed for Shortness of Breath 8/20/18   Arik Valero MD   ibuprofen (ADVIL;MOTRIN) 800 MG tablet TAKE ONE TABLET BY MOUTH EVERY 8 HOURS AS NEEDED FOR PAIN 1/26/18   Paz Valero MD   glucose blood VI test strips (ASCENSIA AUTODISC VI;ONE TOUCH ULTRA TEST VI) strip Indications: [employer] Glucose Meter Test bid 12/30/15   Olaf Knapp MD   Lancets MISC 1 each by Other route 2 times daily Indications: [employer] Glucose Meter 12/30/15   Olaf Knapp MD   meclizine (ANTIVERT) 25 MG tablet Take 1 tablet by mouth 3 times daily as needed for Dizziness or Nausea. 6/12/14   Olaf Knapp MD       Future Appointments   Date Time Provider Thad Jose   5/30/2019  8:00 AM STV [facility] MED ONC CHAIR 01 STVZ PB MON None   5/30/2019  8:00 AM Gabriela Hooker MD [facility]   7/24/2019  1:30 PM [facility] Knapp [facility]     ,   Diabetes Assessment    Medic Alert ID:  No  Meal Planning:  Avoidance of concentrated sweets   How often do you test your blood sugar?:  Daily   Do you have barriers with adherence to non-pharmacologic self-management interventions?  (Nutrition/Exercise/Self-Monitoring):  No   Have you ever had to go to the ED for symptoms of low blood sugar?:  No       No patient-reported symptoms      ,   COPD Assessment    Does the patient understand envrionmental exposure?:  Yes  Is the patient able to verbalize Rescue vs. Long Acting medications?:  Yes  Does the patient use a space with inhaled medications?:  No     No patient-reported symptoms         Symptoms:   None:  Yes      Symptom course:  stable  Breathlessness:  none  Increase use of rapid acting/rescue inhaled medications?:

## 2019-05-30 ENCOUNTER — OFFICE VISIT (OUTPATIENT)
Dept: ONCOLOGY | Age: 74
End: 2019-05-30
Payer: COMMERCIAL

## 2019-05-30 ENCOUNTER — HOSPITAL ENCOUNTER (OUTPATIENT)
Dept: INFUSION THERAPY | Age: 74
Discharge: HOME | End: 2019-05-30
Payer: COMMERCIAL

## 2019-05-30 VITALS
HEART RATE: 71 BPM | SYSTOLIC BLOOD PRESSURE: 108 MMHG | OXYGEN SATURATION: 96 % | TEMPERATURE: 97.5 F | WEIGHT: 197 LBS | RESPIRATION RATE: 24 BRPM | BODY MASS INDEX: 42.63 KG/M2 | DIASTOLIC BLOOD PRESSURE: 73 MMHG

## 2019-05-30 VITALS
TEMPERATURE: 97.5 F | HEART RATE: 71 BPM | DIASTOLIC BLOOD PRESSURE: 73 MMHG | SYSTOLIC BLOOD PRESSURE: 108 MMHG | BODY MASS INDEX: 42.63 KG/M2 | WEIGHT: 197 LBS

## 2019-05-30 DIAGNOSIS — C85.80 MARGINAL ZONE B-CELL LYMPHOMA (HCC): Primary | ICD-10-CM

## 2019-05-30 DIAGNOSIS — D69.6 THROMBOCYTOPENIA (HCC): ICD-10-CM

## 2019-05-30 LAB
ABSOLUTE EOS #: 0.2 K/UL (ref 0–0.4)
ABSOLUTE IMMATURE GRANULOCYTE: NORMAL K/UL (ref 0–0.3)
ABSOLUTE LYMPH #: 2.6 K/UL (ref 1–4.8)
ABSOLUTE MONO #: 0.5 K/UL (ref 0.1–1.2)
ALBUMIN SERPL-MCNC: 3.5 G/DL (ref 3.5–5.2)
ALBUMIN/GLOBULIN RATIO: 1 (ref 1–2.5)
ALP BLD-CCNC: 74 U/L (ref 35–104)
ALT SERPL-CCNC: 17 U/L (ref 5–33)
ANION GAP SERPL CALCULATED.3IONS-SCNC: 9 MMOL/L (ref 9–17)
AST SERPL-CCNC: 18 U/L
BASOPHILS # BLD: 1 % (ref 0–2)
BASOPHILS ABSOLUTE: 0.1 K/UL (ref 0–0.2)
BILIRUB SERPL-MCNC: 0.2 MG/DL (ref 0.3–1.2)
BUN BLDV-MCNC: 22 MG/DL (ref 8–23)
BUN/CREAT BLD: ABNORMAL (ref 9–20)
CALCIUM SERPL-MCNC: 8.7 MG/DL (ref 8.6–10.4)
CHLORIDE BLD-SCNC: 109 MMOL/L (ref 98–107)
CO2: 25 MMOL/L (ref 20–31)
CREAT SERPL-MCNC: 0.57 MG/DL (ref 0.5–0.9)
DIFFERENTIAL TYPE: NORMAL
EOSINOPHILS RELATIVE PERCENT: 3 % (ref 1–4)
GFR AFRICAN AMERICAN: >60 ML/MIN
GFR NON-AFRICAN AMERICAN: >60 ML/MIN
GFR SERPL CREATININE-BSD FRML MDRD: ABNORMAL ML/MIN/{1.73_M2}
GFR SERPL CREATININE-BSD FRML MDRD: ABNORMAL ML/MIN/{1.73_M2}
GLUCOSE BLD-MCNC: 122 MG/DL (ref 70–99)
HCT VFR BLD CALC: 40 % (ref 36–46)
HEMOGLOBIN: 13.4 G/DL (ref 12–16)
IMMATURE GRANULOCYTES: NORMAL %
LYMPHOCYTES # BLD: 43 % (ref 24–44)
MCH RBC QN AUTO: 31.5 PG (ref 26–34)
MCHC RBC AUTO-ENTMCNC: 33.6 G/DL (ref 31–37)
MCV RBC AUTO: 93.8 FL (ref 80–100)
MONOCYTES # BLD: 8 % (ref 2–11)
NRBC AUTOMATED: NORMAL PER 100 WBC
PDW BLD-RTO: 14.6 % (ref 12.5–15.4)
PLATELET # BLD: 233 K/UL (ref 140–450)
PLATELET ESTIMATE: NORMAL
PMV BLD AUTO: 7.1 FL (ref 6–12)
POTASSIUM SERPL-SCNC: 4.6 MMOL/L (ref 3.7–5.3)
RBC # BLD: 4.26 M/UL (ref 4–5.2)
RBC # BLD: NORMAL 10*6/UL
SEG NEUTROPHILS: 45 % (ref 36–66)
SEGMENTED NEUTROPHILS ABSOLUTE COUNT: 2.8 K/UL (ref 1.8–7.7)
SODIUM BLD-SCNC: 143 MMOL/L (ref 135–144)
TOTAL PROTEIN: 7 G/DL (ref 6.4–8.3)
WBC # BLD: 6.1 K/UL (ref 3.5–11)
WBC # BLD: NORMAL 10*3/UL

## 2019-05-30 PROCEDURE — 96415 CHEMO IV INFUSION ADDL HR: CPT | Performed by: NURSE PRACTITIONER

## 2019-05-30 PROCEDURE — 6370000000 HC RX 637 (ALT 250 FOR IP): Performed by: INTERNAL MEDICINE

## 2019-05-30 PROCEDURE — 99214 OFFICE O/P EST MOD 30 MIN: CPT | Performed by: INTERNAL MEDICINE

## 2019-05-30 PROCEDURE — 96413 CHEMO IV INFUSION 1 HR: CPT | Performed by: NURSE PRACTITIONER

## 2019-05-30 PROCEDURE — 36591 DRAW BLOOD OFF VENOUS DEVICE: CPT | Performed by: NURSE PRACTITIONER

## 2019-05-30 PROCEDURE — 6360000002 HC RX W HCPCS: Performed by: INTERNAL MEDICINE

## 2019-05-30 PROCEDURE — 96375 TX/PRO/DX INJ NEW DRUG ADDON: CPT | Performed by: NURSE PRACTITIONER

## 2019-05-30 PROCEDURE — 85025 COMPLETE CBC W/AUTO DIFF WBC: CPT

## 2019-05-30 PROCEDURE — 80053 COMPREHEN METABOLIC PANEL: CPT

## 2019-05-30 PROCEDURE — 2580000003 HC RX 258: Performed by: INTERNAL MEDICINE

## 2019-05-30 RX ORDER — HEPARIN SODIUM (PORCINE) LOCK FLUSH IV SOLN 100 UNIT/ML 100 UNIT/ML
500 SOLUTION INTRAVENOUS PRN
Status: DISCONTINUED | OUTPATIENT
Start: 2019-05-30 | End: 2019-05-31 | Stop reason: HOSPADM

## 2019-05-30 RX ORDER — ACETAMINOPHEN 325 MG/1
650 TABLET ORAL ONCE
Status: COMPLETED | OUTPATIENT
Start: 2019-05-30 | End: 2019-05-30

## 2019-05-30 RX ORDER — SODIUM CHLORIDE 0.9 % (FLUSH) 0.9 %
10 SYRINGE (ML) INJECTION PRN
Status: DISCONTINUED | OUTPATIENT
Start: 2019-05-30 | End: 2019-05-31 | Stop reason: HOSPADM

## 2019-05-30 RX ORDER — SODIUM CHLORIDE 9 MG/ML
INJECTION, SOLUTION INTRAVENOUS ONCE
Status: COMPLETED | OUTPATIENT
Start: 2019-05-30 | End: 2019-05-30

## 2019-05-30 RX ORDER — DIPHENHYDRAMINE HYDROCHLORIDE 50 MG/ML
25 INJECTION INTRAMUSCULAR; INTRAVENOUS ONCE
Status: COMPLETED | OUTPATIENT
Start: 2019-05-30 | End: 2019-05-30

## 2019-05-30 RX ADMIN — Medication 10 ML: at 08:12

## 2019-05-30 RX ADMIN — RITUXIMAB 700 MG: 10 INJECTION, SOLUTION INTRAVENOUS at 09:20

## 2019-05-30 RX ADMIN — SODIUM CHLORIDE: 9 INJECTION, SOLUTION INTRAVENOUS at 08:54

## 2019-05-30 RX ADMIN — DIPHENHYDRAMINE HYDROCHLORIDE 25 MG: 50 INJECTION, SOLUTION INTRAMUSCULAR; INTRAVENOUS at 08:56

## 2019-05-30 RX ADMIN — SODIUM CHLORIDE, PRESERVATIVE FREE 500 UNITS: 5 INJECTION INTRAVENOUS at 11:02

## 2019-05-30 RX ADMIN — Medication 10 ML: at 11:02

## 2019-05-30 RX ADMIN — ACETAMINOPHEN 650 MG: 325 TABLET ORAL at 08:55

## 2019-05-30 RX ADMIN — Medication 10 ML: at 08:09

## 2019-05-30 ASSESSMENT — PAIN DESCRIPTION - PROGRESSION: CLINICAL_PROGRESSION: NOT CHANGED

## 2019-05-30 ASSESSMENT — PAIN SCALES - GENERAL
PAINLEVEL_OUTOF10: 7
PAINLEVEL_OUTOF10: 7

## 2019-05-30 ASSESSMENT — PAIN - FUNCTIONAL ASSESSMENT: PAIN_FUNCTIONAL_ASSESSMENT: PREVENTS OR INTERFERES SOME ACTIVE ACTIVITIES AND ADLS

## 2019-05-30 ASSESSMENT — PAIN DESCRIPTION - ORIENTATION: ORIENTATION: RIGHT

## 2019-05-30 ASSESSMENT — PAIN DESCRIPTION - PAIN TYPE: TYPE: CHRONIC PAIN

## 2019-05-30 ASSESSMENT — PAIN DESCRIPTION - FREQUENCY: FREQUENCY: CONTINUOUS

## 2019-05-30 ASSESSMENT — PAIN DESCRIPTION - DESCRIPTORS: DESCRIPTORS: ACHING

## 2019-05-30 ASSESSMENT — PAIN DESCRIPTION - LOCATION: LOCATION: ARM;SHOULDER

## 2019-05-30 NOTE — PROGRESS NOTES
Patient here for c2d1 of rituxan. Patient was seen by dr. Chinmay Braswell this morning. See his dictation for visit details. Patient stable, denied any issues except for her chronic shoulder pain d/t an injury years ago. Patient tolerated treatment well. Gaby Hsieh called for the patients ride home, [phone]. Patient stable and taken down by  d/t SOB with extensive exertion.

## 2019-06-06 ENCOUNTER — HOSPITAL ENCOUNTER (OUTPATIENT)
Facility: MEDICAL CENTER | Age: 74
End: 2019-06-06
Payer: COMMERCIAL

## 2019-06-06 ENCOUNTER — HOSPITAL ENCOUNTER (OUTPATIENT)
Dept: INFUSION THERAPY | Facility: MEDICAL CENTER | Age: 74
Discharge: HOME | End: 2019-06-06
Payer: COMMERCIAL

## 2019-06-06 VITALS
HEART RATE: 58 BPM | TEMPERATURE: 97.6 F | SYSTOLIC BLOOD PRESSURE: 95 MMHG | DIASTOLIC BLOOD PRESSURE: 62 MMHG | RESPIRATION RATE: 22 BRPM

## 2019-06-06 DIAGNOSIS — C85.80 MARGINAL ZONE B-CELL LYMPHOMA (HCC): Primary | ICD-10-CM

## 2019-06-06 LAB
ABSOLUTE EOS #: 0.14 K/UL (ref 0–0.44)
ABSOLUTE IMMATURE GRANULOCYTE: 0.01 K/UL (ref 0–0.3)
ABSOLUTE LYMPH #: 3.48 K/UL (ref 1.1–3.7)
ABSOLUTE MONO #: 0.68 K/UL (ref 0.1–1.2)
ALBUMIN SERPL-MCNC: 3.9 G/DL (ref 3.5–5.2)
ALBUMIN/GLOBULIN RATIO: ABNORMAL (ref 1–2.5)
ALP BLD-CCNC: 70 U/L (ref 35–104)
ALT SERPL-CCNC: 14 U/L (ref 5–33)
ANION GAP SERPL CALCULATED.3IONS-SCNC: 13 MMOL/L (ref 9–17)
AST SERPL-CCNC: 16 U/L
BASOPHILS # BLD: 0 % (ref 0–2)
BASOPHILS ABSOLUTE: 0.03 K/UL (ref 0–0.2)
BILIRUB SERPL-MCNC: 0.3 MG/DL (ref 0.3–1.2)
BUN BLDV-MCNC: 33 MG/DL (ref 8–23)
BUN/CREAT BLD: 35 (ref 9–20)
CALCIUM SERPL-MCNC: 8.5 MG/DL (ref 8.6–10.4)
CHLORIDE BLD-SCNC: 102 MMOL/L (ref 98–107)
CO2: 24 MMOL/L (ref 20–31)
CREAT SERPL-MCNC: 0.95 MG/DL (ref 0.5–0.9)
DIFFERENTIAL TYPE: NORMAL
EOSINOPHILS RELATIVE PERCENT: 2 % (ref 1–4)
GFR AFRICAN AMERICAN: >60 ML/MIN
GFR NON-AFRICAN AMERICAN: 58 ML/MIN
GFR SERPL CREATININE-BSD FRML MDRD: ABNORMAL ML/MIN/{1.73_M2}
GFR SERPL CREATININE-BSD FRML MDRD: ABNORMAL ML/MIN/{1.73_M2}
GLUCOSE BLD-MCNC: 111 MG/DL (ref 70–99)
HCT VFR BLD CALC: 43.2 % (ref 36.3–47.1)
HEMOGLOBIN: 13.3 G/DL (ref 11.9–15.1)
IMMATURE GRANULOCYTES: 0 %
LYMPHOCYTES # BLD: 40 % (ref 24–43)
MCH RBC QN AUTO: 30 PG (ref 25.2–33.5)
MCHC RBC AUTO-ENTMCNC: 30.8 G/DL (ref 28.4–34.8)
MCV RBC AUTO: 97.5 FL (ref 82.6–102.9)
MONOCYTES # BLD: 8 % (ref 3–12)
NRBC AUTOMATED: NORMAL PER 100 WBC
PDW BLD-RTO: 14.3 % (ref 11.8–14.4)
PLATELET # BLD: 262 K/UL (ref 138–453)
PLATELET ESTIMATE: NORMAL
PMV BLD AUTO: 8.9 FL (ref 8.1–13.5)
POTASSIUM SERPL-SCNC: 4.2 MMOL/L (ref 3.7–5.3)
RBC # BLD: 4.43 M/UL (ref 3.95–5.11)
RBC # BLD: NORMAL 10*6/UL
SEG NEUTROPHILS: 50 % (ref 36–65)
SEGMENTED NEUTROPHILS ABSOLUTE COUNT: 4.33 K/UL (ref 1.5–8.1)
SODIUM BLD-SCNC: 139 MMOL/L (ref 135–144)
TOTAL PROTEIN: 6.9 G/DL (ref 6.4–8.3)
WBC # BLD: 8.7 K/UL (ref 3.5–11.3)
WBC # BLD: NORMAL 10*3/UL

## 2019-06-06 PROCEDURE — 6360000002 HC RX W HCPCS: Performed by: INTERNAL MEDICINE

## 2019-06-06 PROCEDURE — 6370000000 HC RX 637 (ALT 250 FOR IP): Performed by: INTERNAL MEDICINE

## 2019-06-06 PROCEDURE — 80053 COMPREHEN METABOLIC PANEL: CPT

## 2019-06-06 PROCEDURE — 96375 TX/PRO/DX INJ NEW DRUG ADDON: CPT

## 2019-06-06 PROCEDURE — 85025 COMPLETE CBC W/AUTO DIFF WBC: CPT

## 2019-06-06 PROCEDURE — 96413 CHEMO IV INFUSION 1 HR: CPT

## 2019-06-06 PROCEDURE — 2580000003 HC RX 258: Performed by: INTERNAL MEDICINE

## 2019-06-06 PROCEDURE — 96415 CHEMO IV INFUSION ADDL HR: CPT

## 2019-06-06 PROCEDURE — 36591 DRAW BLOOD OFF VENOUS DEVICE: CPT

## 2019-06-06 RX ORDER — SODIUM CHLORIDE 0.9 % (FLUSH) 0.9 %
10 SYRINGE (ML) INJECTION PRN
Status: DISCONTINUED | OUTPATIENT
Start: 2019-06-06 | End: 2019-06-07 | Stop reason: HOSPADM

## 2019-06-06 RX ORDER — HEPARIN SODIUM (PORCINE) LOCK FLUSH IV SOLN 100 UNIT/ML 100 UNIT/ML
500 SOLUTION INTRAVENOUS PRN
Status: DISCONTINUED | OUTPATIENT
Start: 2019-06-06 | End: 2019-06-07 | Stop reason: HOSPADM

## 2019-06-06 RX ORDER — ACETAMINOPHEN 325 MG/1
650 TABLET ORAL ONCE
Status: COMPLETED | OUTPATIENT
Start: 2019-06-06 | End: 2019-06-06

## 2019-06-06 RX ORDER — DIPHENHYDRAMINE HYDROCHLORIDE 50 MG/ML
25 INJECTION INTRAMUSCULAR; INTRAVENOUS ONCE
Status: COMPLETED | OUTPATIENT
Start: 2019-06-06 | End: 2019-06-06

## 2019-06-06 RX ORDER — SODIUM CHLORIDE 9 MG/ML
INJECTION, SOLUTION INTRAVENOUS ONCE
Status: COMPLETED | OUTPATIENT
Start: 2019-06-06 | End: 2019-06-06

## 2019-06-06 RX ADMIN — SODIUM CHLORIDE: 9 INJECTION, SOLUTION INTRAVENOUS at 09:51

## 2019-06-06 RX ADMIN — RITUXIMAB 700 MG: 10 INJECTION, SOLUTION INTRAVENOUS at 10:26

## 2019-06-06 RX ADMIN — DIPHENHYDRAMINE HYDROCHLORIDE 25 MG: 50 INJECTION INTRAMUSCULAR; INTRAVENOUS at 09:55

## 2019-06-06 RX ADMIN — Medication 10 ML: at 08:35

## 2019-06-06 RX ADMIN — ACETAMINOPHEN 650 MG: 325 TABLET ORAL at 09:54

## 2019-06-06 RX ADMIN — Medication 10 ML: at 08:36

## 2019-06-06 RX ADMIN — Medication 10 ML: at 09:51

## 2019-06-06 RX ADMIN — Medication 500 UNITS: at 12:15

## 2019-06-06 RX ADMIN — Medication 10 ML: at 12:15

## 2019-06-06 ASSESSMENT — PAIN SCALES - GENERAL
PAINLEVEL_OUTOF10: 8
PAINLEVEL_OUTOF10: 8

## 2019-06-06 ASSESSMENT — PAIN - FUNCTIONAL ASSESSMENT: PAIN_FUNCTIONAL_ASSESSMENT: PREVENTS OR INTERFERES SOME ACTIVE ACTIVITIES AND ADLS

## 2019-06-06 ASSESSMENT — PAIN DESCRIPTION - FREQUENCY: FREQUENCY: CONTINUOUS

## 2019-06-06 ASSESSMENT — PAIN DESCRIPTION - LOCATION: LOCATION: GENERALIZED;MOUTH

## 2019-06-06 ASSESSMENT — PAIN DESCRIPTION - PROGRESSION: CLINICAL_PROGRESSION: NOT CHANGED

## 2019-06-06 ASSESSMENT — PAIN DESCRIPTION - PAIN TYPE: TYPE: CHRONIC PAIN

## 2019-06-06 NOTE — PROGRESS NOTES
Santiago Gunn here for IV Rituxan   She has chronic generalized pain due to history of other injuries/condition   Port accessed per policy, good blood return  Labs drawn   Pre meds given as ordered see MAR  IV Rituxan given as ordered see MAR  Pt tolerated well and discharged to home

## 2019-06-09 NOTE — PROGRESS NOTES
_           Chief Complaint   Patient presents with    Follow-up     Review status of disease      DIAGNOSIS:        Marginal Zone lymphoma  Persistent leukocytosis  Persistent thrombocytopenia  Multiple comorbidities as listed     CURRENT THERAPY:         Started treatment with rituximab 5/2/2019    BRIEF CASE HISTORY:      Ms. Karishma Ayala is a very pleasant 69 y.o. female with history of multiple comorbidities including COPD and diabetes. She quit smoking years ago. The patient was recently hospitalized with chest infection. The patient was noted to have leukocytosis. She also had persistent thrombocytopenia. She is referred for further evaluation. Patient denies any active bleeding. She has easy bruising. No fever or night sweats. No weight loss or decreased appetite. No palpable lymph nodes. No history of repeated infections. Patient has history of smoking for more than 50 years. Denies alcohol drinking  She had flow cytometry and bone marrow test. Results showed evidence of marginal zone lymphoma. Start the rituximab with good results. INTERIM HISTORY:   Patient is seen for follow up leukocytosis and marginal zone lymphoma. Due to increasing symptoms and further problems related to lymphoma, patient was started on single agent rituximab on 5/20/19. Patient is doing fairly well. Tolerating treatment fairly well with no side effects. She denies any fever or chills. No night sweats. No palpable lymph nodes. No weight loss or decreased appetite. PAST MEDICAL HISTORY: has a past medical history of COPD (chronic obstructive pulmonary disease) ([address] Depression, Diabetes mellitus ([address] Hyperlipidemia, PTSD (post-traumatic stress disorder), and Tubulovillous adenoma. PAST SURGICAL HISTORY: has a past surgical history that includes Hysterectomy; Colonoscopy;  Colonoscopy (06/07/2016); and Tunneled venous port placement (04/08/2019). CURRENT MEDICATIONS:  has a current medication list which includes the following prescription(s): prochlorperazine, lidocaine-prilocaine, nystatin, nystatin, gabapentin, albuterol-ipratropium, esomeprazole, metformin, simvastatin, fluoxetine, lisinopril, ipratropium-albuterol, ibuprofen, blood glucose test strips, lancets, and meclizine. ALLERGIES:  has No Known Allergies. FAMILY HISTORY: Negative for any hematological or oncological conditions. SOCIAL HISTORY:  reports that she quit smoking about 11 years ago. Her smoking use included cigarettes. She has a 10.00 pack-year smoking history. She has never used smokeless tobacco. She reports that she does not drink alcohol or use drugs. REVIEW OF SYSTEMS:     · General: Positive for weakness and fatigue. No unanticipated weight loss or decreased appetite. No fever or chills. · Eyes: No blurred vision, eye pain or double vision. · Ears: No hearing problems or drainage. No tinnitus. · Throat: No sore throat, problems with swallowing or dysphagia. · Respiratory: No cough, sputum or hemoptysis. No shortness of breath. No pleuritic chest pain. · Cardiovascular: No chest pain, orthopnea or PND. No lower extremity edema. No palpitation. · Gastrointestinal: No problems with swallowing. No abdominal pain or bloating. No nausea or vomiting. No diarrhea or constipation. No GI bleeding. · Genitourinary: No dysuria, hematuria, frequency or urgency. · Musculoskeletal: No muscle aches or pains. No limitation of movement. No back pain. No gait disturbance, No joint complaints. · Dermatologic: No skin rashes or pruritus. No skin lesions or discolorations. · Psychiatric: No depression, anxiety, or stress or signs of schizophrenia. No change in mood or affect. · Hematologic: No history of bleeding tendency. Easy bruises no ecchymosis. No history of clotting problems.   · Infectious disease: No fever, chills or frequent infections. · Endocrine: No problems with obesity. No polydipsia or polyuria. No temperature intolerance. · Neurologic: No headaches or dizziness. No weakness or numbness of the extremities. No changes in balance, coordination,  memory, mentation, behavior. · Allergic/Immunologic: No nasal congestion or hives. No repeated infections. PHYSICAL EXAM:  The patient is not in acute distress. Vital signs: Blood pressure 108/73, pulse 71, temperature 97.5 °F (36.4 °C), temperature source Oral, weight 197 lb (89.4 kg), not currently breastfeeding. HEENT:  Eyes are normal. Ears, nose and throat are normal.  Neck: Supple. No lymph node enlargement. No thyroid enlargement. Trachea is centrally located. Chest:  Clear to auscultation. No wheezes or crepitations. Heart: Regular sinus rhythm. Abdomen: Soft, nontender. No hepatosplenomegaly. No masses. Extremities:  With no edema. Lymph Nodes:  No cervical, axillary or inguinal lymph node enlargement. Neurologic:  Conscious and oriented. No focal neurological deficits. Psychosocial: No depression, anxiety or stress. Skin: No rashes, bruises or ecchymoses. Review of Diagnostic data:   Recent Labs     06/06/19  0830 05/30/19  0810 05/23/19  0826   WBC 8.7 6.1 6.9   HGB 13.3 13.4 13.9   HCT 43.2 40.0 41.7   MCV 97.5 93.8 94.0    233 237     Peripheral blood flow cytometry:  Peripheral blood flow cytometric immunophenotyping:         Peripheral blood is positive for a monoclonal B-cell population,   positive for CD19, CD20, CD22,   CD45, FMC7 and lambda light chain. Comment:  Morphology and immunophenotype of circulating monoclonal   B-cells are most consistent with a marginal zone lymphoma.  Bone   marrow evaluation and/or lymph node biopsy if possible are recommended   for confirmation and additional characterization. Bone marrow test August 27, 2018:     Final Diagnosis   PERIPHERAL BLOOD:   -LYMPHOCYTOSIS WITH \" VILLOUS\" LYMPHOCYTES. -MODERATE THROMBOCYTOPENIA. BONE MARROW BIOPSY, ASPIRATE SMEAR AND CLOT SECTION:   - MARGINAL ZONE LYMPHOMA. -NORMOCELLULAR TRILINEAGE HEMATOPOIETIC MARROW WITH MARKEDLY   DECREASED IRON STORES. IMPRESSION:  Marginal Zone lymphoma  Persistent leukocytosis  Persistent thrombocytopenia  Positive FANNY with elevated antihistone. Multiple comorbidities as listed    PLAN: I reviewed the labs as above and discussed with the patient. I explained the significance of these abnormalities in layman language. I explained to patient the nature of low grade NHL. Patient is on treatment with immunotherapy using rituximab. Treatment was started 5/20/19. Tolerated well so far. No significant side effects. Clinically she is feeling better with significant improvement in her symptoms. We will proceed with treatment today as planned. Monitor labs closely and monitor side effects. Patient's questions were answered to the best of her satisfaction and she verbalized full understanding and agreement.

## 2019-06-11 ENCOUNTER — CARE COORDINATION (OUTPATIENT)
Dept: FAMILY MEDICINE CLINIC | Age: 74
End: 2019-06-11

## 2019-06-11 DIAGNOSIS — C85.80 MARGINAL ZONE B-CELL LYMPHOMA (HCC): Primary | ICD-10-CM

## 2019-06-11 DIAGNOSIS — D69.6 THROMBOCYTOPENIA (HCC): ICD-10-CM

## 2019-06-11 NOTE — CARE COORDINATION
RNCC reviewed chart and previous encounters, placed call to [facility] for CC needs and updates with DM and COPD management, currently being treated for lymphoma with chemo infusions , no answer and LVM to return call to [phone] or [phone] for CC updates, questions or concerns.

## 2019-06-12 ENCOUNTER — CARE COORDINATION (OUTPATIENT)
Dept: FAMILY MEDICINE CLINIC | Age: 74
End: 2019-06-12

## 2019-06-12 ENCOUNTER — HOSPITAL ENCOUNTER (OUTPATIENT)
Facility: MEDICAL CENTER | Age: 74
End: 2019-06-12
Payer: COMMERCIAL

## 2019-06-12 DIAGNOSIS — M15.9 PRIMARY OSTEOARTHRITIS INVOLVING MULTIPLE JOINTS: Primary | ICD-10-CM

## 2019-06-12 RX ORDER — HYDROCODONE BITARTRATE AND ACETAMINOPHEN 7.5; 325 MG/1; MG/1
2 TABLET ORAL EVERY 6 HOURS PRN
Qty: 180 TABLET | Refills: 0 | Status: SHIPPED | OUTPATIENT
Start: 2019-06-12 | End: 2019-07-09 | Stop reason: SDUPTHER

## 2019-06-12 NOTE — CARE COORDINATION
Ambulatory Care Coordination Note  6/12/2019  CM Risk Score: 6  Kenneth Mortality Risk Score: 20    ACC: Cristela Weinstein RN    Summary Note: RNCC received call from Hanh Medina with updates DM and COPD management, updates with current chemo treatment for lymphoma. Feeling more fatigued this week, and does have a chemo treatment tomorrow. DM-denies any concerns for BS readings at this time, denies any symptoms. COPD-concerns for some SOB with weather, heat. Aware of need for A/C and continuous Home O2 at 3 L NC.     RNCC Plan: Will follow up with Hanh eMdina on Friday for updates with chemo infusion, updates from Dr. Bairon Shell. COPD and DM management. Care Coordination Interventions    Program Enrollment:  Complex Care  Referral from Primary Care Provider:  No  Suggested Interventions and [address]:  Completed  Zone Management Tools:  Completed (Comment: DM and COPD)         Goals Addressed                 This Visit's Progress     Conditions and Symptoms   Improving     I will schedule office visits, as directed by my provider. I will keep my appointment or reschedule if I have to cancel. I will notify my provider of any barriers to my plan of care. I will notify my provider of any symptoms that indicate a worsening of my condition. Barriers: overwhelmed by complexity of regimen, currently involved with chemo  Plan for overcoming my barriers: working with [address] and PCP for symptoms management  Confidence: 7/10  Anticipated Goal Completion Date: 8/2019              Prior to Admission medications    Medication Sig Start Date End Date Taking? Authorizing Provider   prochlorperazine (COMPAZINE) 10 MG tablet Take 1 tablet by mouth every 6 hours as needed (CHEMO INDUCED NAUSEA) 4/30/19   Keeley Evans MD   lidocaine-prilocaine (EMLA) 2.5-2.5 % cream Apply topically as needed.  4/30/19   Susana Brice MD   nystatin (MYCOSTATIN) 703599 UNIT/GM cream Apply topically 2 times daily. 4/29/19   Saul Ac MD   nystatin (MYCOSTATIN) 233078 UNIT/ML suspension Take 5 mLs by mouth 4 times daily 4/18/19   Camryn Valero MD   gabapentin (NEURONTIN) 600 MG tablet Take 600 mg by mouth 3 times daily. Denzel Dumas Historical Provider, MD   albuterol-ipratropium (COMBIVENT RESPIMAT)  MCG/ACT AERS inhaler INHALE ONE INHALATION BY MOUTH FOUR TIMES A DAY 1/14/19   Arik Valero MD   esomeprazole (NEXIUM) 40 MG delayed release capsule TAKE ONE CAPSULE BY MOUTH DAILY 1/14/19   Augusto Liu MD   metFORMIN (GLUCOPHAGE-XR) 750 MG extended release tablet Take 1 tablet by mouth daily (with breakfast) 1/14/19   Arik Valero MD   simvastatin (ZOCOR) 40 MG tablet TAKE ONE TABLET BY MOUTH DAILY 1/14/19   Camryn Valero MD   FLUoxetine (PROZAC) 20 MG capsule TAKE ONE CAPSULE BY MOUTH DAILY 11/19/18   Saul Ac MD   lisinopril (PRINIVIL;ZESTRIL) 10 MG tablet TAKE ONE TABLET BY MOUTH DAILY 11/12/18   Arik Valero MD   ipratropium-albuterol (DUONEB) 0.5-2.5 (3) MG/3ML SOLN nebulizer solution Inhale 3 mLs into the lungs every 6 hours as needed for Shortness of Breath 8/20/18   Arik Valero MD   ibuprofen (ADVIL;MOTRIN) 800 MG tablet TAKE ONE TABLET BY MOUTH EVERY 8 HOURS AS NEEDED FOR PAIN 1/26/18   Arik Valero MD   glucose blood VI test strips (ASCENSIA AUTODISC VI;ONE TOUCH ULTRA TEST VI) strip Indications: [employer] Glucose Meter Test bid 12/30/15   Augusto Liu MD   Lancets MISC 1 each by Other route 2 times daily Indications: [employer] Glucose Meter 12/30/15   Augusto Liu MD   meclizine (ANTIVERT) 25 MG tablet Take 1 tablet by mouth 3 times daily as needed for Dizziness or Nausea.  6/12/14   Augusto Liu MD       Future Appointments   Date Time Provider Thad Jose   6/13/2019  8:00 AM STV STA CHAIR 14 STVZ STA MED None   6/13/2019  8:40 AM Ardena Paget Al-Khalili, MD  Can[facility]   6/20/2019  8:00 AM MARU STA CHAIR 15 STVZ STA MED None   7/24/2019  1:30 PM MD EVANS Esparza TOP     ,   Diabetes Assessment    Medic Alert ID:  No  Meal Planning:  Avoidance of concentrated sweets   How often do you test your blood sugar?:  Daily   Do you have barriers with adherence to non-pharmacologic self-management interventions?  (Nutrition/Exercise/Self-Monitoring):  No   Have you ever had to go to the ED for symptoms of low blood sugar?:  No       No patient-reported symptoms      ,   COPD Assessment    Does the patient understand envrionmental exposure?:  Yes  Is the patient able to verbalize Rescue vs. Long Acting medications?:  Yes  Does the patient use a space with inhaled medications?:  No     No patient-reported symptoms         Symptoms:          and   General Assessment    Do you have any symptoms that are causing concern?:  Yes  Progression since Onset:  Intermittent - Waxing/Waning  Reported Symptoms:  Fatigue

## 2019-06-13 ENCOUNTER — OFFICE VISIT (OUTPATIENT)
Dept: ONCOLOGY | Age: 74
End: 2019-06-13
Payer: COMMERCIAL

## 2019-06-13 ENCOUNTER — TELEPHONE (OUTPATIENT)
Dept: ONCOLOGY | Age: 74
End: 2019-06-13

## 2019-06-13 ENCOUNTER — HOSPITAL ENCOUNTER (OUTPATIENT)
Dept: INFUSION THERAPY | Facility: MEDICAL CENTER | Age: 74
Discharge: HOME | End: 2019-06-13
Payer: COMMERCIAL

## 2019-06-13 VITALS
TEMPERATURE: 97.6 F | HEART RATE: 67 BPM | BODY MASS INDEX: 42.2 KG/M2 | SYSTOLIC BLOOD PRESSURE: 94 MMHG | WEIGHT: 195 LBS | RESPIRATION RATE: 20 BRPM | DIASTOLIC BLOOD PRESSURE: 55 MMHG

## 2019-06-13 VITALS
HEART RATE: 67 BPM | RESPIRATION RATE: 20 BRPM | SYSTOLIC BLOOD PRESSURE: 116 MMHG | DIASTOLIC BLOOD PRESSURE: 34 MMHG | TEMPERATURE: 97.6 F | WEIGHT: 195 LBS | BODY MASS INDEX: 42.2 KG/M2

## 2019-06-13 DIAGNOSIS — D69.6 THROMBOCYTOPENIA (HCC): ICD-10-CM

## 2019-06-13 DIAGNOSIS — C85.80 MARGINAL ZONE B-CELL LYMPHOMA (HCC): Primary | ICD-10-CM

## 2019-06-13 LAB
ABSOLUTE EOS #: 0.18 K/UL (ref 0–0.44)
ABSOLUTE IMMATURE GRANULOCYTE: 0.02 K/UL (ref 0–0.3)
ABSOLUTE LYMPH #: 4.43 K/UL (ref 1.1–3.7)
ABSOLUTE MONO #: 0.66 K/UL (ref 0.1–1.2)
ALBUMIN SERPL-MCNC: 4 G/DL (ref 3.5–5.2)
ALBUMIN/GLOBULIN RATIO: ABNORMAL (ref 1–2.5)
ALP BLD-CCNC: 75 U/L (ref 35–104)
ALT SERPL-CCNC: 16 U/L (ref 5–33)
ANION GAP SERPL CALCULATED.3IONS-SCNC: 13 MMOL/L (ref 9–17)
AST SERPL-CCNC: 19 U/L
BASOPHILS # BLD: 1 % (ref 0–2)
BASOPHILS ABSOLUTE: 0.07 K/UL (ref 0–0.2)
BILIRUB SERPL-MCNC: 0.23 MG/DL (ref 0.3–1.2)
BUN BLDV-MCNC: 19 MG/DL (ref 8–23)
BUN/CREAT BLD: 25 (ref 9–20)
CALCIUM SERPL-MCNC: 9.1 MG/DL (ref 8.6–10.4)
CHLORIDE BLD-SCNC: 100 MMOL/L (ref 98–107)
CO2: 25 MMOL/L (ref 20–31)
CREAT SERPL-MCNC: 0.76 MG/DL (ref 0.5–0.9)
DIFFERENTIAL TYPE: ABNORMAL
EOSINOPHILS RELATIVE PERCENT: 2 % (ref 1–4)
GFR AFRICAN AMERICAN: >60 ML/MIN
GFR NON-AFRICAN AMERICAN: >60 ML/MIN
GFR SERPL CREATININE-BSD FRML MDRD: ABNORMAL ML/MIN/{1.73_M2}
GFR SERPL CREATININE-BSD FRML MDRD: ABNORMAL ML/MIN/{1.73_M2}
GLUCOSE BLD-MCNC: 159 MG/DL (ref 70–99)
HCT VFR BLD CALC: 45.4 % (ref 36.3–47.1)
HEMOGLOBIN: 13.9 G/DL (ref 11.9–15.1)
IMMATURE GRANULOCYTES: 0 %
LYMPHOCYTES # BLD: 48 % (ref 24–43)
MCH RBC QN AUTO: 29.9 PG (ref 25.2–33.5)
MCHC RBC AUTO-ENTMCNC: 30.6 G/DL (ref 28.4–34.8)
MCV RBC AUTO: 97.6 FL (ref 82.6–102.9)
MONOCYTES # BLD: 7 % (ref 3–12)
NRBC AUTOMATED: 0 PER 100 WBC
PDW BLD-RTO: 14.2 % (ref 11.8–14.4)
PLATELET # BLD: 203 K/UL (ref 138–453)
PLATELET ESTIMATE: ABNORMAL
PMV BLD AUTO: 9.1 FL (ref 8.1–13.5)
POTASSIUM SERPL-SCNC: 4.1 MMOL/L (ref 3.7–5.3)
RBC # BLD: 4.65 M/UL (ref 3.95–5.11)
RBC # BLD: ABNORMAL 10*6/UL
SEG NEUTROPHILS: 42 % (ref 36–65)
SEGMENTED NEUTROPHILS ABSOLUTE COUNT: 3.84 K/UL (ref 1.5–8.1)
SODIUM BLD-SCNC: 138 MMOL/L (ref 135–144)
TOTAL PROTEIN: 7.2 G/DL (ref 6.4–8.3)
WBC # BLD: 9.2 K/UL (ref 3.5–11.3)
WBC # BLD: ABNORMAL 10*3/UL

## 2019-06-13 PROCEDURE — 6360000002 HC RX W HCPCS: Performed by: INTERNAL MEDICINE

## 2019-06-13 PROCEDURE — 36591 DRAW BLOOD OFF VENOUS DEVICE: CPT

## 2019-06-13 PROCEDURE — 2580000003 HC RX 258: Performed by: INTERNAL MEDICINE

## 2019-06-13 PROCEDURE — 80053 COMPREHEN METABOLIC PANEL: CPT

## 2019-06-13 PROCEDURE — 96415 CHEMO IV INFUSION ADDL HR: CPT

## 2019-06-13 PROCEDURE — 6370000000 HC RX 637 (ALT 250 FOR IP): Performed by: INTERNAL MEDICINE

## 2019-06-13 PROCEDURE — 96413 CHEMO IV INFUSION 1 HR: CPT

## 2019-06-13 PROCEDURE — 99214 OFFICE O/P EST MOD 30 MIN: CPT | Performed by: INTERNAL MEDICINE

## 2019-06-13 PROCEDURE — 96375 TX/PRO/DX INJ NEW DRUG ADDON: CPT

## 2019-06-13 PROCEDURE — 99211 OFF/OP EST MAY X REQ PHY/QHP: CPT | Performed by: INTERNAL MEDICINE

## 2019-06-13 PROCEDURE — 85025 COMPLETE CBC W/AUTO DIFF WBC: CPT

## 2019-06-13 RX ORDER — SODIUM CHLORIDE 0.9 % (FLUSH) 0.9 %
10 SYRINGE (ML) INJECTION PRN
Status: DISCONTINUED | OUTPATIENT
Start: 2019-06-13 | End: 2019-06-14 | Stop reason: HOSPADM

## 2019-06-13 RX ORDER — SODIUM CHLORIDE 9 MG/ML
INJECTION, SOLUTION INTRAVENOUS ONCE
Status: COMPLETED | OUTPATIENT
Start: 2019-06-13 | End: 2019-06-13

## 2019-06-13 RX ORDER — DIPHENHYDRAMINE HYDROCHLORIDE 50 MG/ML
25 INJECTION INTRAMUSCULAR; INTRAVENOUS ONCE
Status: COMPLETED | OUTPATIENT
Start: 2019-06-13 | End: 2019-06-13

## 2019-06-13 RX ORDER — HEPARIN SODIUM (PORCINE) LOCK FLUSH IV SOLN 100 UNIT/ML 100 UNIT/ML
500 SOLUTION INTRAVENOUS PRN
Status: DISCONTINUED | OUTPATIENT
Start: 2019-06-13 | End: 2019-06-14 | Stop reason: HOSPADM

## 2019-06-13 RX ORDER — ACETAMINOPHEN 325 MG/1
650 TABLET ORAL ONCE
Status: COMPLETED | OUTPATIENT
Start: 2019-06-13 | End: 2019-06-13

## 2019-06-13 RX ADMIN — DIPHENHYDRAMINE HYDROCHLORIDE 25 MG: 50 INJECTION INTRAMUSCULAR; INTRAVENOUS at 09:17

## 2019-06-13 RX ADMIN — SODIUM CHLORIDE, PRESERVATIVE FREE 500 UNITS: 5 INJECTION INTRAVENOUS at 12:36

## 2019-06-13 RX ADMIN — Medication 10 ML: at 12:36

## 2019-06-13 RX ADMIN — SODIUM CHLORIDE: 9 INJECTION, SOLUTION INTRAVENOUS at 09:11

## 2019-06-13 RX ADMIN — ACETAMINOPHEN 650 MG: 325 TABLET ORAL at 09:17

## 2019-06-13 RX ADMIN — RITUXIMAB 700 MG: 10 INJECTION, SOLUTION INTRAVENOUS at 09:53

## 2019-06-13 ASSESSMENT — PAIN DESCRIPTION - PAIN TYPE: TYPE: CHRONIC PAIN

## 2019-06-13 ASSESSMENT — PAIN SCALES - GENERAL
PAINLEVEL_OUTOF10: 7
PAINLEVEL_OUTOF10: 7

## 2019-06-13 ASSESSMENT — PAIN DESCRIPTION - LOCATION: LOCATION: GENERALIZED

## 2019-06-13 NOTE — PROGRESS NOTES
Nicholas Moreira arrives per wheelchair  Reports she \"aches all over\"  Oxygen applied at 2L per nasal cannula  Rt chest port accessed per protocol with #20 G 3/4 L Smith needle  Good blood return noted and blood work obtained and sent to lab  Lab results reviewed  Dr Lou Herrera vs and examined   Order to proceed with chemotherapy  See MAR for pre medications  Rituxan initiated and completed over 90 minutes    No reaction noted  Smith needle flushed and smith needle removed with needle intact  Band aid applied  Discharged per wheelchair

## 2019-06-13 NOTE — PROGRESS NOTES
venous port placement (04/08/2019). CURRENT MEDICATIONS:  has a current medication list which includes the following prescription(s): hydrocodone-acetaminophen, prochlorperazine, lidocaine-prilocaine, nystatin, nystatin, gabapentin, albuterol-ipratropium, esomeprazole, metformin, simvastatin, fluoxetine, lisinopril, ipratropium-albuterol, ibuprofen, blood glucose test strips, lancets, and meclizine, and the following Facility-Administered Medications: sodium chloride and riTUXimab (RITUXAN) 700 mg in sodium chloride 0.9 % 180 mL chemo IVPB. ALLERGIES:  has No Known Allergies. FAMILY HISTORY: Negative for any hematological or oncological conditions. SOCIAL HISTORY:  reports that she quit smoking about 11 years ago. Her smoking use included cigarettes. She has a 10.00 pack-year smoking history. She has never used smokeless tobacco. She reports that she does not drink alcohol or use drugs. REVIEW OF SYSTEMS:     · General: Positive for weakness and fatigue. No unanticipated weight loss or decreased appetite. No fever or chills. · Eyes: No blurred vision, eye pain or double vision. · Ears: No hearing problems or drainage. No tinnitus. · Throat: No sore throat, problems with swallowing or dysphagia. · Respiratory: No cough, sputum or hemoptysis. No shortness of breath. No pleuritic chest pain. · Cardiovascular: No chest pain, orthopnea or PND. No lower extremity edema. No palpitation. · Gastrointestinal: No problems with swallowing. No abdominal pain or bloating. No nausea or vomiting. No diarrhea or constipation. No GI bleeding. · Genitourinary: No dysuria, hematuria, frequency or urgency. · Musculoskeletal: No muscle aches or pains. No limitation of movement. No back pain. No gait disturbance, No joint complaints. · Dermatologic: No skin rashes or pruritus. No skin lesions or discolorations. · Psychiatric: No depression, anxiety, or stress or signs of schizophrenia.  No change in mood or affect. · Hematologic: No history of bleeding tendency. Easy bruises no ecchymosis. No history of clotting problems. · Infectious disease: No fever, chills or frequent infections. · Endocrine: No problems with obesity. No polydipsia or polyuria. No temperature intolerance. · Neurologic: No headaches or dizziness. No weakness or numbness of the extremities. No changes in balance, coordination,  memory, mentation, behavior. · Allergic/Immunologic: No nasal congestion or hives. No repeated infections. PHYSICAL EXAM:  The patient is not in acute distress. Vital signs: Blood pressure (!) 94/55, pulse 67, temperature 97.6 °F (36.4 °C), temperature source Oral, resp. rate 20, weight 195 lb (88.5 kg), not currently breastfeeding. HEENT:  Eyes are normal. Ears, nose and throat are normal.  Neck: Supple. No lymph node enlargement. No thyroid enlargement. Trachea is centrally located. Chest:  Clear to auscultation. No wheezes or crepitations. Heart: Regular sinus rhythm. Abdomen: Soft, nontender. No hepatosplenomegaly. No masses. Extremities:  With no edema. Lymph Nodes:  No cervical, axillary or inguinal lymph node enlargement. Neurologic:  Conscious and oriented. No focal neurological deficits. Psychosocial: No depression, anxiety or stress. Skin: No rashes, bruises or ecchymoses. Review of Diagnostic data:   Recent Labs     06/13/19  0810 06/06/19  0830 05/30/19  0810   WBC 9.2 8.7 6.1   HGB 13.9 13.3 13.4   HCT 45.4 43.2 40.0   MCV 97.6 97.5 93.8    262 233     Peripheral blood flow cytometry:  Peripheral blood flow cytometric immunophenotyping:         Peripheral blood is positive for a monoclonal B-cell population,   positive for CD19, CD20, CD22,   CD45, FMC7 and lambda light chain.      Comment:  Morphology and immunophenotype of circulating monoclonal   B-cells are most consistent with a marginal zone lymphoma.  Bone   marrow evaluation and/or lymph node biopsy if

## 2019-06-13 NOTE — TELEPHONE ENCOUNTER
Keyana Blum MD VISIT & TX  DR ESTEVEZ IN TO SEE PATIENT  ORDERS RECEIVED  PROCEED W/ TX AS PLANNED AFTER CHECKING LABS  RV 1 WEEK W/ [address] Shreya Gallagher MD VISIT 6/20/19 @ 8:40AM [address] @ 8AM  AVS PRINTED AND GIVEN TO PATIENT W/ INSTRUCTIONS  PATIENT REMAINS IN [facility]

## 2019-06-17 DIAGNOSIS — C85.80 MARGINAL ZONE B-CELL LYMPHOMA (HCC): Primary | ICD-10-CM

## 2019-06-18 ENCOUNTER — HOSPITAL ENCOUNTER (OUTPATIENT)
Facility: MEDICAL CENTER | Age: 74
End: 2019-06-18
Payer: COMMERCIAL

## 2019-06-20 ENCOUNTER — OFFICE VISIT (OUTPATIENT)
Dept: ONCOLOGY | Age: 74
End: 2019-06-20
Payer: COMMERCIAL

## 2019-06-20 ENCOUNTER — HOSPITAL ENCOUNTER (OUTPATIENT)
Facility: MEDICAL CENTER | Age: 74
End: 2019-06-20
Payer: COMMERCIAL

## 2019-06-20 ENCOUNTER — TELEPHONE (OUTPATIENT)
Dept: ONCOLOGY | Age: 74
End: 2019-06-20

## 2019-06-20 VITALS
SYSTOLIC BLOOD PRESSURE: 136 MMHG | BODY MASS INDEX: 41.85 KG/M2 | DIASTOLIC BLOOD PRESSURE: 84 MMHG | HEART RATE: 86 BPM | TEMPERATURE: 97.6 F | WEIGHT: 193.4 LBS

## 2019-06-20 DIAGNOSIS — D69.6 THROMBOCYTOPENIA (HCC): ICD-10-CM

## 2019-06-20 DIAGNOSIS — C85.80 MARGINAL ZONE B-CELL LYMPHOMA (HCC): Primary | ICD-10-CM

## 2019-06-20 DIAGNOSIS — D72.825 BANDEMIA: ICD-10-CM

## 2019-06-20 PROCEDURE — 99211 OFF/OP EST MAY X REQ PHY/QHP: CPT | Performed by: INTERNAL MEDICINE

## 2019-06-20 PROCEDURE — 99214 OFFICE O/P EST MOD 30 MIN: CPT | Performed by: INTERNAL MEDICINE

## 2019-06-20 NOTE — TELEPHONE ENCOUNTER
Heriberto Womack MD VISIT  DR Thelma Elizalde IN TO SEE PATIENT  ORDERS RECEIVED  OK TO [address] 6/21 D/T PATIENT OVER SLEPT  RV 2 MONTHS W/ MAINTENANCE RITUXAN  8/22/19 @ 12NOON [address] @ 11:30AM  AVS PRINTED AND GIVEN TO PATIENT W/ INSTRUCTION  PATIENT DISCHARGED VIA WHEELCHAIR

## 2019-06-20 NOTE — PROGRESS NOTES
_           Chief Complaint   Patient presents with    Follow-up     Review status of disease      DIAGNOSIS:        Marginal Zone lymphoma  Persistent leukocytosis  Persistent thrombocytopenia  Multiple comorbidities as listed     CURRENT THERAPY:         Started treatment with rituximab 5/2/2019. Week #8 June 21, 2019. Will give maintenance Rituxan every 2 months for 2 years. BRIEF CASE HISTORY:      Ms. Pernell Rosado is a very pleasant 69 y.o. female with history of multiple comorbidities including COPD and diabetes. She quit smoking years ago. The patient was recently hospitalized with chest infection. The patient was noted to have leukocytosis. She also had persistent thrombocytopenia. She is referred for further evaluation. Patient denies any active bleeding. She has easy bruising. No fever or night sweats. No weight loss or decreased appetite. No palpable lymph nodes. No history of repeated infections. Patient has history of smoking for more than 50 years. Denies alcohol drinking  She had flow cytometry and bone marrow test. Results showed evidence of marginal zone lymphoma. Start the rituximab with good results. INTERIM HISTORY:   Patient is seen for follow up leukocytosis and marginal zone lymphoma. Due to increasing symptoms and further problems related to lymphoma, patient was started on single agent rituximab on 5/20/19. Patient is doing fairly well. Tolerating treatment fairly well with no side effects. She denies any fever or chills. No night sweats. No palpable lymph nodes. No weight loss or decreased appetite. PAST MEDICAL HISTORY: has a past medical history of COPD (chronic obstructive pulmonary disease) ([facility] [address] Depression, Diabetes mellitus ([facility] [address] Hyperlipidemia, PTSD (post-traumatic stress disorder), and Tubulovillous adenoma.     PAST SURGICAL HISTORY: has a past surgical tendency. Easy bruises no ecchymosis. No history of clotting problems. · Infectious disease: No fever, chills or frequent infections. · Endocrine: No problems with obesity. No polydipsia or polyuria. No temperature intolerance. · Neurologic: No headaches or dizziness. No weakness or numbness of the extremities. No changes in balance, coordination,  memory, mentation, behavior. · Allergic/Immunologic: No nasal congestion or hives. No repeated infections. PHYSICAL EXAM:  The patient is not in acute distress. Vital signs: Blood pressure 136/84, pulse 86, temperature 97.6 °F (36.4 °C), temperature source Oral, weight 193 lb 6.4 oz (87.7 kg), not currently breastfeeding. HEENT:  Eyes are normal. Ears, nose and throat are normal.  Neck: Supple. No lymph node enlargement. No thyroid enlargement. Trachea is centrally located. Chest:  Clear to auscultation. No wheezes or crepitations. Heart: Regular sinus rhythm. Abdomen: Soft, nontender. No hepatosplenomegaly. No masses. Extremities:  With no edema. Lymph Nodes:  No cervical, axillary or inguinal lymph node enlargement. Neurologic:  Conscious and oriented. No focal neurological deficits. Psychosocial: No depression, anxiety or stress. Skin: No rashes, bruises or ecchymoses. Review of Diagnostic data:   Recent Labs     06/13/19  0810 06/06/19  0830 05/30/19  0810   WBC 9.2 8.7 6.1   HGB 13.9 13.3 13.4   HCT 45.4 43.2 40.0   MCV 97.6 97.5 93.8    262 233     Peripheral blood flow cytometry:  Peripheral blood flow cytometric immunophenotyping:         Peripheral blood is positive for a monoclonal B-cell population,   positive for CD19, CD20, CD22,   CD45, FMC7 and lambda light chain.      Comment:  Morphology and immunophenotype of circulating monoclonal   B-cells are most consistent with a marginal zone lymphoma.  Bone   marrow evaluation and/or lymph node biopsy if possible are recommended   for confirmation and additional characterization. Bone marrow test August 27, 2018: Final Diagnosis   PERIPHERAL BLOOD:   -LYMPHOCYTOSIS WITH \" VILLOUS\" LYMPHOCYTES. -MODERATE THROMBOCYTOPENIA. BONE MARROW BIOPSY, ASPIRATE SMEAR AND CLOT SECTION:   - MARGINAL ZONE LYMPHOMA. -NORMOCELLULAR TRILINEAGE HEMATOPOIETIC MARROW WITH MARKEDLY   DECREASED IRON STORES. IMPRESSION:  Marginal Zone lymphoma  Persistent leukocytosis  Persistent thrombocytopenia  Positive FANNY with elevated antihistone. Multiple comorbidities as listed    PLAN: I reviewed the labs as above and discussed with the patient. I explained the significance of these abnormalities in layman language. I explained to patient the nature of low grade NHL. Patient is on treatment with immunotherapy using rituximab. Treatment was started 5/20/19. Tolerated well so far. No significant side effects. Clinically she is feeling better with significant improvement in her symptoms. We will proceed with treatment to finish week #8. Next we will give her maintenance rituximab every 2 months for 2 years. Monitor labs closely and monitor side effects. Patient's questions were answered to the best of her satisfaction and she verbalized full understanding and agreement.

## 2019-06-21 ENCOUNTER — HOSPITAL ENCOUNTER (OUTPATIENT)
Dept: INFUSION THERAPY | Facility: MEDICAL CENTER | Age: 74
Discharge: HOME | End: 2019-06-21
Payer: COMMERCIAL

## 2019-06-21 VITALS
TEMPERATURE: 97.3 F | RESPIRATION RATE: 18 BRPM | HEART RATE: 59 BPM | DIASTOLIC BLOOD PRESSURE: 56 MMHG | SYSTOLIC BLOOD PRESSURE: 98 MMHG

## 2019-06-21 DIAGNOSIS — C85.80 MARGINAL ZONE B-CELL LYMPHOMA (HCC): Primary | ICD-10-CM

## 2019-06-21 LAB
ABSOLUTE EOS #: 0.12 K/UL (ref 0–0.44)
ABSOLUTE IMMATURE GRANULOCYTE: 0.01 K/UL (ref 0–0.3)
ABSOLUTE LYMPH #: 3.42 K/UL (ref 1.1–3.7)
ABSOLUTE MONO #: 0.67 K/UL (ref 0.1–1.2)
ALBUMIN SERPL-MCNC: 3.9 G/DL (ref 3.5–5.2)
ALBUMIN/GLOBULIN RATIO: ABNORMAL (ref 1–2.5)
ALP BLD-CCNC: 71 U/L (ref 35–104)
ALT SERPL-CCNC: 15 U/L (ref 5–33)
ANION GAP SERPL CALCULATED.3IONS-SCNC: 13 MMOL/L (ref 9–17)
AST SERPL-CCNC: 17 U/L
BASOPHILS # BLD: 0 % (ref 0–2)
BASOPHILS ABSOLUTE: 0.03 K/UL (ref 0–0.2)
BILIRUB SERPL-MCNC: 0.22 MG/DL (ref 0.3–1.2)
BUN BLDV-MCNC: 19 MG/DL (ref 8–23)
BUN/CREAT BLD: 22 (ref 9–20)
CALCIUM SERPL-MCNC: 8.9 MG/DL (ref 8.6–10.4)
CHLORIDE BLD-SCNC: 103 MMOL/L (ref 98–107)
CO2: 26 MMOL/L (ref 20–31)
CREAT SERPL-MCNC: 0.85 MG/DL (ref 0.5–0.9)
DIFFERENTIAL TYPE: ABNORMAL
EOSINOPHILS RELATIVE PERCENT: 2 % (ref 1–4)
GFR AFRICAN AMERICAN: >60 ML/MIN
GFR NON-AFRICAN AMERICAN: >60 ML/MIN
GFR SERPL CREATININE-BSD FRML MDRD: ABNORMAL ML/MIN/{1.73_M2}
GFR SERPL CREATININE-BSD FRML MDRD: ABNORMAL ML/MIN/{1.73_M2}
GLUCOSE BLD-MCNC: 139 MG/DL (ref 70–99)
HCT VFR BLD CALC: 45.5 % (ref 36.3–47.1)
HEMOGLOBIN: 14.1 G/DL (ref 11.9–15.1)
IMMATURE GRANULOCYTES: 0 %
LYMPHOCYTES # BLD: 46 % (ref 24–43)
MCH RBC QN AUTO: 30.9 PG (ref 25.2–33.5)
MCHC RBC AUTO-ENTMCNC: 31 G/DL (ref 28.4–34.8)
MCV RBC AUTO: 99.6 FL (ref 82.6–102.9)
MONOCYTES # BLD: 9 % (ref 3–12)
NRBC AUTOMATED: 0 PER 100 WBC
PDW BLD-RTO: 14.6 % (ref 11.8–14.4)
PLATELET # BLD: 257 K/UL (ref 138–453)
PLATELET ESTIMATE: ABNORMAL
PMV BLD AUTO: 9.1 FL (ref 8.1–13.5)
POTASSIUM SERPL-SCNC: 4.4 MMOL/L (ref 3.7–5.3)
RBC # BLD: 4.57 M/UL (ref 3.95–5.11)
RBC # BLD: ABNORMAL 10*6/UL
SEG NEUTROPHILS: 43 % (ref 36–65)
SEGMENTED NEUTROPHILS ABSOLUTE COUNT: 3.2 K/UL (ref 1.5–8.1)
SODIUM BLD-SCNC: 142 MMOL/L (ref 135–144)
TOTAL PROTEIN: 7.1 G/DL (ref 6.4–8.3)
WBC # BLD: 7.5 K/UL (ref 3.5–11.3)
WBC # BLD: ABNORMAL 10*3/UL

## 2019-06-21 PROCEDURE — 2580000003 HC RX 258: Performed by: INTERNAL MEDICINE

## 2019-06-21 PROCEDURE — 96413 CHEMO IV INFUSION 1 HR: CPT

## 2019-06-21 PROCEDURE — 96374 THER/PROPH/DIAG INJ IV PUSH: CPT

## 2019-06-21 PROCEDURE — 36591 DRAW BLOOD OFF VENOUS DEVICE: CPT

## 2019-06-21 PROCEDURE — 6370000000 HC RX 637 (ALT 250 FOR IP): Performed by: INTERNAL MEDICINE

## 2019-06-21 PROCEDURE — 80053 COMPREHEN METABOLIC PANEL: CPT

## 2019-06-21 PROCEDURE — 96415 CHEMO IV INFUSION ADDL HR: CPT

## 2019-06-21 PROCEDURE — 85025 COMPLETE CBC W/AUTO DIFF WBC: CPT

## 2019-06-21 PROCEDURE — 96375 TX/PRO/DX INJ NEW DRUG ADDON: CPT

## 2019-06-21 PROCEDURE — 6360000002 HC RX W HCPCS: Performed by: INTERNAL MEDICINE

## 2019-06-21 RX ORDER — DIPHENHYDRAMINE HYDROCHLORIDE 50 MG/ML
25 INJECTION INTRAMUSCULAR; INTRAVENOUS ONCE
Status: COMPLETED | OUTPATIENT
Start: 2019-06-21 | End: 2019-06-21

## 2019-06-21 RX ORDER — HEPARIN SODIUM (PORCINE) LOCK FLUSH IV SOLN 100 UNIT/ML 100 UNIT/ML
500 SOLUTION INTRAVENOUS PRN
Status: DISCONTINUED | OUTPATIENT
Start: 2019-06-21 | End: 2019-06-22 | Stop reason: HOSPADM

## 2019-06-21 RX ORDER — SODIUM CHLORIDE 0.9 % (FLUSH) 0.9 %
10 SYRINGE (ML) INJECTION PRN
Status: DISCONTINUED | OUTPATIENT
Start: 2019-06-21 | End: 2019-06-22 | Stop reason: HOSPADM

## 2019-06-21 RX ORDER — ACETAMINOPHEN 325 MG/1
650 TABLET ORAL ONCE
Status: COMPLETED | OUTPATIENT
Start: 2019-06-21 | End: 2019-06-21

## 2019-06-21 RX ORDER — SODIUM CHLORIDE 9 MG/ML
INJECTION, SOLUTION INTRAVENOUS ONCE
Status: COMPLETED | OUTPATIENT
Start: 2019-06-21 | End: 2019-06-21

## 2019-06-21 RX ADMIN — SODIUM CHLORIDE: 9 INJECTION, SOLUTION INTRAVENOUS at 09:51

## 2019-06-21 RX ADMIN — Medication 500 UNITS: at 12:42

## 2019-06-21 RX ADMIN — DIPHENHYDRAMINE HYDROCHLORIDE 25 MG: 50 INJECTION, SOLUTION INTRAMUSCULAR; INTRAVENOUS at 09:51

## 2019-06-21 RX ADMIN — RITUXIMAB 700 MG: 10 INJECTION, SOLUTION INTRAVENOUS at 10:20

## 2019-06-21 RX ADMIN — Medication 10 ML: at 12:42

## 2019-06-21 RX ADMIN — ACETAMINOPHEN 650 MG: 325 TABLET ORAL at 09:51

## 2019-06-21 ASSESSMENT — PAIN SCALES - GENERAL: PAINLEVEL_OUTOF10: 0

## 2019-06-21 NOTE — PROGRESS NOTES
Patient arrive via wheelchair for cycle 2 day 22 treatment. Physician note reviewed; order reviewed. Vitals as charted. Port accessed; specimen sent. Labs reviewed. Patient premedicated. Rituxan infusion completed with no adverse reaction as per MAR. Line flushed with normal saline while monitoring patient post infusion. Patient denies complaint or concern. Port flushed and heparinized with intact smith needle removed per protocol. Patient off unit via wheelchair at discharge.

## 2019-06-22 RX ORDER — SODIUM CHLORIDE 9 MG/ML
INJECTION, SOLUTION INTRAVENOUS CONTINUOUS
Status: CANCELLED | OUTPATIENT
Start: 2019-08-22

## 2019-06-22 RX ORDER — 0.9 % SODIUM CHLORIDE 0.9 %
10 VIAL (ML) INJECTION ONCE
Status: CANCELLED | OUTPATIENT
Start: 2019-08-22

## 2019-06-22 RX ORDER — SODIUM CHLORIDE 0.9 % (FLUSH) 0.9 %
5 SYRINGE (ML) INJECTION PRN
Status: CANCELLED | OUTPATIENT
Start: 2019-08-22

## 2019-06-22 RX ORDER — METHYLPREDNISOLONE SODIUM SUCCINATE 125 MG/2ML
125 INJECTION, POWDER, LYOPHILIZED, FOR SOLUTION INTRAMUSCULAR; INTRAVENOUS ONCE
Status: CANCELLED | OUTPATIENT
Start: 2019-08-22

## 2019-06-22 RX ORDER — HEPARIN SODIUM (PORCINE) LOCK FLUSH IV SOLN 100 UNIT/ML 100 UNIT/ML
500 SOLUTION INTRAVENOUS PRN
Status: CANCELLED | OUTPATIENT
Start: 2019-08-22

## 2019-06-22 RX ORDER — MEPERIDINE HYDROCHLORIDE 50 MG/ML
12.5 INJECTION INTRAMUSCULAR; INTRAVENOUS; SUBCUTANEOUS ONCE
Status: CANCELLED | OUTPATIENT
Start: 2019-08-22

## 2019-06-22 RX ORDER — SODIUM CHLORIDE 9 MG/ML
20 INJECTION, SOLUTION INTRAVENOUS ONCE
Status: CANCELLED | OUTPATIENT
Start: 2019-08-22

## 2019-06-22 RX ORDER — ACETAMINOPHEN 325 MG/1
650 TABLET ORAL ONCE
Status: CANCELLED | OUTPATIENT
Start: 2019-08-22

## 2019-06-22 RX ORDER — DIPHENHYDRAMINE HYDROCHLORIDE 50 MG/ML
25 INJECTION INTRAMUSCULAR; INTRAVENOUS ONCE
Status: CANCELLED | OUTPATIENT
Start: 2019-08-22

## 2019-06-22 RX ORDER — DIPHENHYDRAMINE HYDROCHLORIDE 50 MG/ML
50 INJECTION INTRAMUSCULAR; INTRAVENOUS ONCE
Status: CANCELLED | OUTPATIENT
Start: 2019-08-22

## 2019-06-22 RX ORDER — SODIUM CHLORIDE 0.9 % (FLUSH) 0.9 %
10 SYRINGE (ML) INJECTION PRN
Status: CANCELLED | OUTPATIENT
Start: 2019-08-22

## 2019-07-02 ENCOUNTER — CARE COORDINATION (OUTPATIENT)
Dept: FAMILY MEDICINE CLINIC | Age: 74
End: 2019-07-02

## 2019-07-08 ENCOUNTER — TELEPHONE (OUTPATIENT)
Dept: FAMILY MEDICINE CLINIC | Age: 74
End: 2019-07-08

## 2019-07-08 DIAGNOSIS — F41.1 GAD (GENERALIZED ANXIETY DISORDER): ICD-10-CM

## 2019-07-08 DIAGNOSIS — M15.9 PRIMARY OSTEOARTHRITIS INVOLVING MULTIPLE JOINTS: ICD-10-CM

## 2019-07-09 RX ORDER — HYDROCODONE BITARTRATE AND ACETAMINOPHEN 7.5; 325 MG/1; MG/1
2 TABLET ORAL EVERY 6 HOURS PRN
Qty: 180 TABLET | Refills: 0 | Status: SHIPPED | OUTPATIENT
Start: 2019-07-09 | End: 2019-08-07 | Stop reason: SDUPTHER

## 2019-07-09 RX ORDER — ALPRAZOLAM 1 MG/1
TABLET ORAL
Qty: 90 TABLET | Refills: 0 | Status: SHIPPED | OUTPATIENT
Start: 2019-07-09 | End: 2019-08-08

## 2019-07-12 ENCOUNTER — CARE COORDINATION (OUTPATIENT)
Dept: FAMILY MEDICINE CLINIC | Age: 74
End: 2019-07-12

## 2019-07-24 ENCOUNTER — OFFICE VISIT (OUTPATIENT)
Dept: FAMILY MEDICINE CLINIC | Age: 74
End: 2019-07-24
Payer: COMMERCIAL

## 2019-07-24 VITALS
DIASTOLIC BLOOD PRESSURE: 70 MMHG | SYSTOLIC BLOOD PRESSURE: 116 MMHG | HEART RATE: 67 BPM | WEIGHT: 197.4 LBS | OXYGEN SATURATION: 91 % | BODY MASS INDEX: 42.72 KG/M2

## 2019-07-24 DIAGNOSIS — C85.80 MARGINAL ZONE B-CELL LYMPHOMA (HCC): ICD-10-CM

## 2019-07-24 DIAGNOSIS — E11.8 TYPE 2 DIABETES MELLITUS WITH COMPLICATION, WITHOUT LONG-TERM CURRENT USE OF INSULIN (HCC): ICD-10-CM

## 2019-07-24 DIAGNOSIS — J44.9 CHRONIC OBSTRUCTIVE PULMONARY DISEASE, UNSPECIFIED COPD TYPE (HCC): Primary | ICD-10-CM

## 2019-07-24 DIAGNOSIS — M15.9 PRIMARY OSTEOARTHRITIS INVOLVING MULTIPLE JOINTS: ICD-10-CM

## 2019-07-24 PROCEDURE — 99213 OFFICE O/P EST LOW 20 MIN: CPT | Performed by: FAMILY MEDICINE

## 2019-07-24 ASSESSMENT — ENCOUNTER SYMPTOMS
SHORTNESS OF BREATH: 1
NAUSEA: 0
BACK PAIN: 1
SINUS PRESSURE: 0
COUGH: 1
DIARRHEA: 0
VOMITING: 0
SORE THROAT: 0
TROUBLE SWALLOWING: 1

## 2019-07-24 NOTE — PROGRESS NOTES
Jesenia Dawson is a 69 y.o. female who presents todayfor her medical conditions/complaints as noted below. Jesenia Dawson is here today c/oDiabetes and Other (needs testing for her oxygen )    Routine follow up on PL she is concerned over possible loss of her oxygen without further testing. Her pulse ox drops from waiting room to exam room to 86% on oxygen. Not sure what else is needed for support but she obviously needs home oxygen.  :     Visit Information    Have you changed or started any medications since your last visit including any over-the-counter medicines, vitamins, or herbal medicines? no   Are you having any side effects from any of your medications? -  no  Have you stopped taking any of your medications? Is so, why? -  no    Have you seen any other physician or provider since your last visit? yes  Have you had any other diagnostic tests since your last visit? No  Have you been seen in the emergency room and/or had an admission to a hospital since we last saw you? No  Have you had your routine dental cleaning in the past 6 months? yes    Have you activated your [employer] account? If not, what are your barriers?  No     Patient Care Team:  Rush Bush MD as PCP - Pinky August MD as PCP - [facility] Provider  Juan Luis Thompson MD as Consulting Physician (Otolaryngology)  Hunter Rubio RN as Care Coordinator    Medical History Review  Past Medical, Family, and Social History reviewed and does not contribute to the patient presenting condition    Health Maintenance   Topic Date Due    DTaP/Tdap/Td vaccine (1 - Tdap) 04/30/1966    Shingles Vaccine (1 of 2) 04/30/1997    Annual Wellness Visit (AWV)  04/30/2010    Diabetic microalbuminuria test  05/15/2018    Colon cancer screen colonoscopy  06/07/2019    Diabetic foot exam  07/11/2019    Diabetic retinal exam  08/15/2019    Flu vaccine (1) 09/01/2019    A1C test (Diabetic or Prediabetic)  05/23/2020    Lipid screen 2020    Potassium monitoring  2020    Creatinine monitoring  2020    Breast cancer screen  2021    DEXA (modify frequency per FRAX score)  Completed    Pneumococcal 65+ years Vaccine  Completed    Hepatitis C screen  Completed         HPI        Past Medical History:   Diagnosis Date    COPD (chronic obstructive pulmonary disease) ([facility] [address]     Depression     Diabetes mellitus ([facility] [address]     Hyperlipidemia     PTSD (post-traumatic stress disorder)     Tubulovillous adenoma       Past Surgical History:   Procedure Laterality Date    COLONOSCOPY      colon polyps    COLONOSCOPY  2016    severe diverticulosis TUBULOVILLOUS ADENOMA.       HYSTERECTOMY      TUNNELED VENOUS PORT PLACEMENT  2019    chemo port to right chest     Family History   Problem Relation Age of Onset    Heart Disease Mother     Heart Disease Brother      Social History     Tobacco Use    Smoking status: Former Smoker     Packs/day: 1.00     Years: 10.00     Pack years: 10.00     Types: Cigarettes     Last attempt to quit: 2007     Years since quittin.6    Smokeless tobacco: Never Used   Substance Use Topics    Alcohol use: No      Current Outpatient Medications   Medication Sig Dispense Refill    HYDROcodone-acetaminophen (NORCO) 7.5-325 MG per tablet Take 2 tablets by mouth every 6 hours as needed for Pain for up to 30 days. Intended supply: 30 days 180 tablet 0    ALPRAZolam (XANAX) 1 MG tablet TAKE ONE TABLET BY MOUTH THREE TIMES A DAY AS NEEDED 90 tablet 0    prochlorperazine (COMPAZINE) 10 MG tablet Take 1 tablet by mouth every 6 hours as needed (CHEMO INDUCED NAUSEA) 120 tablet 3    lidocaine-prilocaine (EMLA) 2.5-2.5 % cream Apply topically as needed. 1 Tube 3    nystatin (MYCOSTATIN) 923355 UNIT/GM cream Apply topically 2 times daily.  15 g 0    nystatin (MYCOSTATIN) 667204 UNIT/ML suspension Take 5 mLs by mouth 4 times daily 100 mL 2    gabapentin (NEURONTIN) 600 MG tablet

## 2019-07-25 ENCOUNTER — CARE COORDINATION (OUTPATIENT)
Dept: FAMILY MEDICINE CLINIC | Age: 74
End: 2019-07-25

## 2019-07-26 SDOH — SOCIAL STABILITY: SOCIAL NETWORK
DO YOU BELONG TO ANY CLUBS OR ORGANIZATIONS SUCH AS CHURCH GROUPS UNIONS, FRATERNAL OR ATHLETIC GROUPS, OR SCHOOL GROUPS?: NO

## 2019-07-26 SDOH — SOCIAL STABILITY: SOCIAL NETWORK: ARE YOU MARRIED, WIDOWED, DIVORCED, SEPARATED, NEVER MARRIED, OR LIVING WITH A PARTNER?: NEVER MARRIED

## 2019-07-26 SDOH — SOCIAL STABILITY: SOCIAL NETWORK: HOW OFTEN DO YOU GET TOGETHER WITH FRIENDS OR RELATIVES?: ONCE A WEEK

## 2019-07-26 SDOH — SOCIAL STABILITY: SOCIAL NETWORK: HOW OFTEN DO YOU ATTENT MEETINGS OF THE CLUB OR ORGANIZATION YOU BELONG TO?: NEVER

## 2019-07-26 SDOH — ECONOMIC STABILITY: TRANSPORTATION INSECURITY
IN THE PAST 12 MONTHS, HAS LACK OF TRANSPORTATION KEPT YOU FROM MEETINGS, WORK, OR FROM GETTING THINGS NEEDED FOR DAILY LIVING?: NO

## 2019-07-26 SDOH — SOCIAL STABILITY: SOCIAL NETWORK: IN A TYPICAL WEEK, HOW MANY TIMES DO YOU TALK ON THE PHONE WITH FAMILY, FRIENDS, OR NEIGHBORS?: THREE TIMES A WEEK

## 2019-07-26 SDOH — ECONOMIC STABILITY: TRANSPORTATION INSECURITY
IN THE PAST 12 MONTHS, HAS THE LACK OF TRANSPORTATION KEPT YOU FROM MEDICAL APPOINTMENTS OR FROM GETTING MEDICATIONS?: NO

## 2019-07-26 SDOH — SOCIAL STABILITY: SOCIAL NETWORK: HOW OFTEN DO YOU ATTEND CHURCH OR RELIGIOUS SERVICES?: MORE THAN 4 TIMES PER YEAR

## 2019-07-26 NOTE — CARE COORDINATION
No patient-reported symptoms   Do you have hyperglycemia symptoms?:  No   Do you have hypoglycemia symptoms?:  No   Blood Sugar Monitoring Regimen:  Not Testing   Blood Sugar Trends:  No Change      ,   COPD Assessment    Does the patient understand envrionmental exposure?:  Yes  Is the patient able to verbalize Rescue vs. Long Acting medications?:  Yes  Does the patient use a space with inhaled medications?:  No     No patient-reported symptoms         Symptoms:   None:  Yes      Symptom course:  stable  Breathlessness:  none  Increase use of rapid acting/rescue inhaled medications?:  No  Change in chronic cough?:  No/At Baseline  Change in sputum?:  No/At Baseline  Self Monitoring - SaO2:  No  Have you had a recent diagnosis of pneumonia either by PCP or at a hospital?:  No      and   General Assessment    Do you have any symptoms that are causing concern?:  No

## 2019-08-07 ENCOUNTER — CARE COORDINATION (OUTPATIENT)
Dept: FAMILY MEDICINE CLINIC | Age: 74
End: 2019-08-07

## 2019-08-07 DIAGNOSIS — M15.9 PRIMARY OSTEOARTHRITIS INVOLVING MULTIPLE JOINTS: ICD-10-CM

## 2019-08-07 RX ORDER — AMOXICILLIN AND CLAVULANATE POTASSIUM 875; 125 MG/1; MG/1
1 TABLET, FILM COATED ORAL 2 TIMES DAILY
COMMUNITY
Start: 2019-08-06 | End: 2019-08-13

## 2019-08-07 RX ORDER — VALACYCLOVIR HYDROCHLORIDE 500 MG/1
500 TABLET, FILM COATED ORAL 2 TIMES DAILY
COMMUNITY
Start: 2019-08-06 | End: 2019-08-20

## 2019-08-07 RX ORDER — HYDROCODONE BITARTRATE AND ACETAMINOPHEN 7.5; 325 MG/1; MG/1
2 TABLET ORAL EVERY 6 HOURS PRN
Qty: 180 TABLET | Refills: 0 | Status: SHIPPED | OUTPATIENT
Start: 2019-08-07 | End: 2019-09-04 | Stop reason: SDUPTHER

## 2019-08-07 RX ORDER — SULFAMETHOXAZOLE AND TRIMETHOPRIM 800; 160 MG/1; MG/1
1 TABLET ORAL 3 TIMES DAILY
COMMUNITY
Start: 2019-08-07 | End: 2019-08-21

## 2019-08-07 RX ORDER — FUROSEMIDE 40 MG/1
40 TABLET ORAL DAILY
Status: ON HOLD | COMMUNITY
Start: 2019-08-07 | End: 2019-08-25 | Stop reason: HOSPADM

## 2019-08-07 NOTE — CARE COORDINATION
to recent Hospital Admission for pneumonia, 10/19              Prior to Admission medications    Medication Sig Start Date End Date Taking? Authorizing Provider   amoxicillin-clavulanate (AUGMENTIN) 875-125 MG per tablet Take 1 tablet by mouth 2 times daily 8/6/19 8/13/19 Yes Historical Provider, MD   sulfamethoxazole-trimethoprim (BACTRIM DS;SEPTRA DS) 800-160 MG per tablet Take 1 tablet by mouth three times daily 8/7/19 8/21/19 Yes Historical Provider, MD   valACYclovir (VALTREX) 500 MG tablet Take 500 mg by mouth 2 times daily 8/6/19 8/20/19 Yes Historical Provider, MD   HYDROcodone-acetaminophen (NORCO) 7.5-325 MG per tablet Take 2 tablets by mouth every 6 hours as needed for Pain for up to 30 days. Intended supply: 30 days 8/7/19 9/6/19  Arnulfo Abraham MD   ALPRAZolam Alfornia Armin) 1 MG tablet TAKE ONE TABLET BY MOUTH THREE TIMES A DAY AS NEEDED 7/9/19 8/8/19  Arnulfo Abraham MD   prochlorperazine (COMPAZINE) 10 MG tablet Take 1 tablet by mouth every 6 hours as needed (CHEMO INDUCED NAUSEA) 4/30/19   Dottie Evans MD   lidocaine-prilocaine (EMLA) 2.5-2.5 % cream Apply topically as needed. 4/30/19   Juan Carlos Zapata MD   nystatin (MYCOSTATIN) 685345 UNIT/GM cream Apply topically 2 times daily. 4/29/19   Troy López MD   nystatin (MYCOSTATIN) 870301 UNIT/ML suspension Take 5 mLs by mouth 4 times daily 4/18/19   Marta Valero MD   gabapentin (NEURONTIN) 600 MG tablet Take 600 mg by mouth 3 times daily. Orlando Deal     Historical Provider, MD   albuterol-ipratropium (COMBIVENT RESPIMAT)  MCG/ACT AERS inhaler INHALE ONE INHALATION BY MOUTH FOUR TIMES A DAY 1/14/19   Arik Valero MD   esomeprazole (NEXIUM) 40 MG delayed release capsule TAKE ONE CAPSULE BY MOUTH DAILY 1/14/19   Arnulfo Abraham MD   metFORMIN (GLUCOPHAGE-XR) 750 MG extended release tablet Take 1 tablet by mouth daily (with breakfast) 1/14/19   Marta Valero MD   simvastatin (ZOCOR) 40 MG tablet TAKE ONE TABLET BY MOUTH DAILY 1/14/19 either by PCP or at a hospital?:  Yes at [facility]  Have you seen your PCP for follow up or do you have an appointment scheduled?:  Yes  Are you taking your antibiotics as prescribed?:  Yes  Symptom Course:  improving      and   General Assessment    Do you have any symptoms that are causing concern?:  Yes  Progression since Onset:  Gradually Improving  Reported Symptoms:  Vomiting, Shortness of Breath, Other, Fatigue

## 2019-08-14 ENCOUNTER — CARE COORDINATION (OUTPATIENT)
Dept: FAMILY MEDICINE CLINIC | Age: 74
End: 2019-08-14

## 2019-08-14 NOTE — CARE COORDINATION
Glucose Meter Test bid 12/30/15   John Locke MD   Lancets MISC 1 each by Other route 2 times daily Indications: [employer] Glucose Meter 12/30/15   John Locke MD   meclizine (ANTIVERT) 25 MG tablet Take 1 tablet by mouth 3 times daily as needed for Dizziness or Nausea. 6/12/14   John Locke MD       Future Appointments   Date Time Provider Thad Jose   8/15/2019  3:00 PM MD EVANS Gagnon [facility]   8/22/2019 11:30 AM [facility]AIR [facility] None   8/22/2019 12:00 PM Nan Pinon MD [facility]   9/26/2019  2:15 PM MD EVANS Gagnon [facility]     ,   Diabetes Assessment    Medic Alert ID:  No  Meal Planning:  Avoidance of concentrated sweets   How often do you test your blood sugar?:  Daily   Do you have barriers with adherence to non-pharmacologic self-management interventions?  (Nutrition/Exercise/Self-Monitoring):  No   Have you ever had to go to the ED for symptoms of low blood sugar?:  No       No patient-reported symptoms      ,   COPD Assessment    Does the patient understand envrionmental exposure?:  Yes  Is the patient able to verbalize Rescue vs. Long Acting medications?:  Yes  Does the patient use a space with inhaled medications?:  No     No patient-reported symptoms         Symptoms:   None:  Yes      Symptom course:  stable  Breathlessness:  none  Increase use of rapid acting/rescue inhaled medications?:  No  Change in chronic cough?:  No/At Baseline  Change in sputum?:  No/At Baseline  Self Monitoring - SaO2:  No  Have you had a recent diagnosis of pneumonia either by PCP or at a hospital?:  No      and   General Assessment    Do you have any symptoms that are causing concern?:  Yes  Progression since Onset:  Gradually Worsening  Reported Symptoms:  Other (Comment: Burning mouth)

## 2019-08-15 ENCOUNTER — OFFICE VISIT (OUTPATIENT)
Dept: FAMILY MEDICINE CLINIC | Age: 74
End: 2019-08-15
Payer: COMMERCIAL

## 2019-08-15 ENCOUNTER — CARE COORDINATION (OUTPATIENT)
Dept: FAMILY MEDICINE CLINIC | Age: 74
End: 2019-08-15

## 2019-08-15 VITALS
BODY MASS INDEX: 42.02 KG/M2 | DIASTOLIC BLOOD PRESSURE: 60 MMHG | WEIGHT: 194.2 LBS | OXYGEN SATURATION: 92 % | SYSTOLIC BLOOD PRESSURE: 102 MMHG | HEART RATE: 84 BPM

## 2019-08-15 DIAGNOSIS — J69.0 ASPIRATION PNEUMONIA, UNSPECIFIED ASPIRATION PNEUMONIA TYPE, UNSPECIFIED LATERALITY, UNSPECIFIED PART OF LUNG (HCC): Primary | ICD-10-CM

## 2019-08-15 DIAGNOSIS — E11.8 TYPE 2 DIABETES MELLITUS WITH COMPLICATION, WITHOUT LONG-TERM CURRENT USE OF INSULIN (HCC): ICD-10-CM

## 2019-08-15 PROCEDURE — 1111F DSCHRG MED/CURRENT MED MERGE: CPT | Performed by: FAMILY MEDICINE

## 2019-08-15 PROCEDURE — 99213 OFFICE O/P EST LOW 20 MIN: CPT | Performed by: FAMILY MEDICINE

## 2019-08-15 RX ORDER — FLUOXETINE HYDROCHLORIDE 20 MG/1
20 CAPSULE ORAL DAILY
Qty: 90 CAPSULE | Refills: 2 | Status: SHIPPED | OUTPATIENT
Start: 2019-08-15 | End: 2020-05-29

## 2019-08-15 RX ORDER — LISINOPRIL 10 MG/1
TABLET ORAL
Qty: 90 TABLET | Refills: 3 | Status: SHIPPED | OUTPATIENT
Start: 2019-08-15 | End: 2019-11-18 | Stop reason: SDUPTHER

## 2019-08-15 RX ORDER — SULFAMETHOXAZOLE AND TRIMETHOPRIM 800; 160 MG/1; MG/1
1 TABLET ORAL 2 TIMES DAILY
Qty: 10 TABLET | Refills: 0 | Status: SHIPPED | OUTPATIENT
Start: 2019-08-15 | End: 2019-08-20

## 2019-08-15 RX ORDER — CEFDINIR 300 MG/1
300 CAPSULE ORAL 2 TIMES DAILY
Qty: 10 CAPSULE | Refills: 0 | Status: SHIPPED | OUTPATIENT
Start: 2019-08-15 | End: 2019-08-20

## 2019-08-15 RX ORDER — ALPRAZOLAM 1 MG/1
1 TABLET ORAL 3 TIMES DAILY PRN
Qty: 90 TABLET | Refills: 1 | Status: SHIPPED | OUTPATIENT
Start: 2019-08-15 | End: 2019-09-14

## 2019-08-15 RX ORDER — SIMVASTATIN 40 MG
TABLET ORAL
Qty: 90 TABLET | Refills: 3 | Status: SHIPPED | OUTPATIENT
Start: 2019-08-15 | End: 2019-11-18 | Stop reason: SDUPTHER

## 2019-08-15 ASSESSMENT — ENCOUNTER SYMPTOMS
TROUBLE SWALLOWING: 1
VOMITING: 0
DIARRHEA: 0
BACK PAIN: 1
COUGH: 1
NAUSEA: 0
SORE THROAT: 0
SINUS PRESSURE: 0
SHORTNESS OF BREATH: 1

## 2019-08-15 NOTE — PROGRESS NOTES
tablet 3    albuterol-ipratropium (COMBIVENT RESPIMAT)  MCG/ACT AERS inhaler INHALE ONE INHALATION BY MOUTH FOUR TIMES A DAY 3 Inhaler 3    ALPRAZolam (XANAX) 1 MG tablet Take 1 tablet by mouth 3 times daily as needed for Sleep or Anxiety for up to 30 days. 90 tablet 1    HYDROcodone-acetaminophen (NORCO) 7.5-325 MG per tablet Take 2 tablets by mouth every 6 hours as needed for Pain for up to 30 days. Intended supply: 30 days 180 tablet 0    valACYclovir (VALTREX) 500 MG tablet Take 500 mg by mouth 2 times daily      furosemide (LASIX) 40 MG tablet Take 40 mg by mouth daily      prochlorperazine (COMPAZINE) 10 MG tablet Take 1 tablet by mouth every 6 hours as needed (CHEMO INDUCED NAUSEA) 120 tablet 3    lidocaine-prilocaine (EMLA) 2.5-2.5 % cream Apply topically as needed. 1 Tube 3    nystatin (MYCOSTATIN) 660681 UNIT/GM cream Apply topically 2 times daily. 15 g 0    nystatin (MYCOSTATIN) 736978 UNIT/ML suspension Take 5 mLs by mouth 4 times daily 100 mL 2    gabapentin (NEURONTIN) 600 MG tablet Take 600 mg by mouth 3 times daily. Sven Fiore metFORMIN (GLUCOPHAGE-XR) 750 MG extended release tablet Take 1 tablet by mouth daily (with breakfast) 90 tablet 3    ipratropium-albuterol (DUONEB) 0.5-2.5 (3) MG/3ML SOLN nebulizer solution Inhale 3 mLs into the lungs every 6 hours as needed for Shortness of Breath 360 mL 6    ibuprofen (ADVIL;MOTRIN) 800 MG tablet TAKE ONE TABLET BY MOUTH EVERY 8 HOURS AS NEEDED FOR PAIN 90 tablet 3    glucose blood VI test strips (ASCENSIA AUTODISC VI;ONE TOUCH ULTRA TEST VI) strip Indications: [employer] Glucose Meter Test bid 100 strip 3    Lancets MISC 1 each by Other route 2 times daily Indications: [employer] Glucose Meter 100 each 3    meclizine (ANTIVERT) 25 MG tablet Take 1 tablet by mouth 3 times daily as needed for Dizziness or Nausea.  30 tablet 1    sulfamethoxazole-trimethoprim (BACTRIM DS;SEPTRA DS) 800-160 MG per tablet Take 1 tablet by mouth three times daily

## 2019-08-16 ASSESSMENT — ENCOUNTER SYMPTOMS: DYSPNEA ASSOCIATED WITH: EXERTION

## 2019-08-19 ENCOUNTER — HOSPITAL ENCOUNTER (OUTPATIENT)
Facility: MEDICAL CENTER | Age: 74
End: 2019-08-19
Payer: COMMERCIAL

## 2019-08-22 ENCOUNTER — OFFICE VISIT (OUTPATIENT)
Dept: ONCOLOGY | Age: 74
End: 2019-08-22
Payer: COMMERCIAL

## 2019-08-22 ENCOUNTER — HOSPITAL ENCOUNTER (INPATIENT)
Age: 74
LOS: 3 days | Discharge: HOME HEALTH | DRG: 682 | End: 2019-08-25
Attending: EMERGENCY MEDICINE | Admitting: INTERNAL MEDICINE
Payer: COMMERCIAL

## 2019-08-22 ENCOUNTER — TELEPHONE (OUTPATIENT)
Dept: ONCOLOGY | Age: 74
End: 2019-08-22

## 2019-08-22 ENCOUNTER — APPOINTMENT (OUTPATIENT)
Dept: GENERAL RADIOLOGY | Age: 74
DRG: 682 | End: 2019-08-22
Payer: COMMERCIAL

## 2019-08-22 ENCOUNTER — HOSPITAL ENCOUNTER (OUTPATIENT)
Dept: INFUSION THERAPY | Facility: MEDICAL CENTER | Age: 74
Discharge: HOME | End: 2019-08-22
Payer: COMMERCIAL

## 2019-08-22 VITALS
DIASTOLIC BLOOD PRESSURE: 64 MMHG | TEMPERATURE: 97.8 F | WEIGHT: 195.3 LBS | SYSTOLIC BLOOD PRESSURE: 97 MMHG | HEART RATE: 63 BPM | RESPIRATION RATE: 16 BRPM | BODY MASS INDEX: 42.26 KG/M2

## 2019-08-22 VITALS
TEMPERATURE: 97.8 F | SYSTOLIC BLOOD PRESSURE: 97 MMHG | DIASTOLIC BLOOD PRESSURE: 64 MMHG | WEIGHT: 195.3 LBS | HEART RATE: 63 BPM | BODY MASS INDEX: 42.26 KG/M2

## 2019-08-22 DIAGNOSIS — I95.9 HYPOTENSION, UNSPECIFIED HYPOTENSION TYPE: ICD-10-CM

## 2019-08-22 DIAGNOSIS — E86.0 DEHYDRATION: ICD-10-CM

## 2019-08-22 DIAGNOSIS — J44.9 CHRONIC OBSTRUCTIVE PULMONARY DISEASE, UNSPECIFIED COPD TYPE (HCC): ICD-10-CM

## 2019-08-22 DIAGNOSIS — N17.9 AKI (ACUTE KIDNEY INJURY) (HCC): ICD-10-CM

## 2019-08-22 DIAGNOSIS — C85.80 MARGINAL ZONE B-CELL LYMPHOMA (HCC): Primary | ICD-10-CM

## 2019-08-22 DIAGNOSIS — D72.825 BANDEMIA: ICD-10-CM

## 2019-08-22 DIAGNOSIS — N17.9 ACUTE KIDNEY INJURY (HCC): Primary | ICD-10-CM

## 2019-08-22 LAB
ABSOLUTE EOS #: 0.23 K/UL (ref 0–0.44)
ABSOLUTE IMMATURE GRANULOCYTE: 0.01 K/UL (ref 0–0.3)
ABSOLUTE LYMPH #: 3.1 K/UL (ref 1.1–3.7)
ABSOLUTE MONO #: 0.61 K/UL (ref 0.1–1.2)
ALBUMIN SERPL-MCNC: 3.9 G/DL (ref 3.5–5.2)
ALBUMIN/GLOBULIN RATIO: ABNORMAL (ref 1–2.5)
ALP BLD-CCNC: 53 U/L (ref 35–104)
ALT SERPL-CCNC: 9 U/L (ref 5–33)
ANION GAP SERPL CALCULATED.3IONS-SCNC: 14 MMOL/L (ref 9–17)
ANION GAP SERPL CALCULATED.3IONS-SCNC: 16 MMOL/L (ref 9–17)
AST SERPL-CCNC: 13 U/L
BASOPHILS # BLD: 1 % (ref 0–2)
BASOPHILS ABSOLUTE: 0.04 K/UL (ref 0–0.2)
BILIRUB SERPL-MCNC: 0.19 MG/DL (ref 0.3–1.2)
BNP INTERPRETATION: NORMAL
BUN BLDV-MCNC: 46 MG/DL (ref 8–23)
BUN BLDV-MCNC: 53 MG/DL (ref 8–23)
BUN/CREAT BLD: 23 (ref 9–20)
BUN/CREAT BLD: 26 (ref 9–20)
CALCIUM SERPL-MCNC: 8.3 MG/DL (ref 8.6–10.4)
CALCIUM SERPL-MCNC: 9.1 MG/DL (ref 8.6–10.4)
CHLORIDE BLD-SCNC: 105 MMOL/L (ref 98–107)
CHLORIDE BLD-SCNC: 106 MMOL/L (ref 98–107)
CO2: 19 MMOL/L (ref 20–31)
CO2: 22 MMOL/L (ref 20–31)
CREAT SERPL-MCNC: 1.74 MG/DL (ref 0.5–0.9)
CREAT SERPL-MCNC: 2.32 MG/DL (ref 0.5–0.9)
DIFFERENTIAL TYPE: ABNORMAL
EOSINOPHILS RELATIVE PERCENT: 4 % (ref 1–4)
GFR AFRICAN AMERICAN: 25 ML/MIN
GFR AFRICAN AMERICAN: 35 ML/MIN
GFR NON-AFRICAN AMERICAN: 21 ML/MIN
GFR NON-AFRICAN AMERICAN: 29 ML/MIN
GFR SERPL CREATININE-BSD FRML MDRD: ABNORMAL ML/MIN/{1.73_M2}
GLUCOSE BLD-MCNC: 104 MG/DL (ref 70–99)
GLUCOSE BLD-MCNC: 169 MG/DL (ref 70–99)
HCT VFR BLD CALC: 39.8 % (ref 36.3–47.1)
HEMOGLOBIN: 12 G/DL (ref 11.9–15.1)
IMMATURE GRANULOCYTES: 0 %
LACTIC ACID, SEPSIS WHOLE BLOOD: ABNORMAL MMOL/L (ref 0.5–1.9)
LACTIC ACID, SEPSIS WHOLE BLOOD: ABNORMAL MMOL/L (ref 0.5–1.9)
LACTIC ACID, SEPSIS: 2.1 MMOL/L (ref 0.5–1.9)
LACTIC ACID, SEPSIS: 2.5 MMOL/L (ref 0.5–1.9)
LYMPHOCYTES # BLD: 47 % (ref 24–43)
MCH RBC QN AUTO: 30.3 PG (ref 25.2–33.5)
MCHC RBC AUTO-ENTMCNC: 30.2 G/DL (ref 28.4–34.8)
MCV RBC AUTO: 100.5 FL (ref 82.6–102.9)
MONOCYTES # BLD: 9 % (ref 3–12)
NRBC AUTOMATED: 0 PER 100 WBC
PDW BLD-RTO: 14.6 % (ref 11.8–14.4)
PLATELET # BLD: 189 K/UL (ref 138–453)
PLATELET ESTIMATE: ABNORMAL
PMV BLD AUTO: 9 FL (ref 8.1–13.5)
POTASSIUM SERPL-SCNC: 5.6 MMOL/L (ref 3.7–5.3)
POTASSIUM SERPL-SCNC: 5.7 MMOL/L (ref 3.7–5.3)
PRO-BNP: 289 PG/ML
PROCALCITONIN: 0.06 NG/ML
RBC # BLD: 3.96 M/UL (ref 3.95–5.11)
RBC # BLD: ABNORMAL 10*6/UL
SEG NEUTROPHILS: 39 % (ref 36–65)
SEGMENTED NEUTROPHILS ABSOLUTE COUNT: 2.55 K/UL (ref 1.5–8.1)
SODIUM BLD-SCNC: 141 MMOL/L (ref 135–144)
SODIUM BLD-SCNC: 141 MMOL/L (ref 135–144)
TOTAL PROTEIN: 6.9 G/DL (ref 6.4–8.3)
TROPONIN INTERP: NORMAL
TROPONIN INTERP: NORMAL
TROPONIN T: NORMAL NG/ML
TROPONIN T: NORMAL NG/ML
TROPONIN, HIGH SENSITIVITY: 13 NG/L (ref 0–14)
TROPONIN, HIGH SENSITIVITY: 7 NG/L (ref 0–14)
WBC # BLD: 6.5 K/UL (ref 3.5–11.3)
WBC # BLD: ABNORMAL 10*3/UL

## 2019-08-22 PROCEDURE — 6370000000 HC RX 637 (ALT 250 FOR IP): Performed by: NURSE PRACTITIONER

## 2019-08-22 PROCEDURE — 6370000000 HC RX 637 (ALT 250 FOR IP): Performed by: EMERGENCY MEDICINE

## 2019-08-22 PROCEDURE — 84145 PROCALCITONIN (PCT): CPT

## 2019-08-22 PROCEDURE — 2700000000 HC OXYGEN THERAPY PER DAY

## 2019-08-22 PROCEDURE — 2580000003 HC RX 258: Performed by: EMERGENCY MEDICINE

## 2019-08-22 PROCEDURE — 84484 ASSAY OF TROPONIN QUANT: CPT

## 2019-08-22 PROCEDURE — 36415 COLL VENOUS BLD VENIPUNCTURE: CPT

## 2019-08-22 PROCEDURE — 6360000002 HC RX W HCPCS: Performed by: EMERGENCY MEDICINE

## 2019-08-22 PROCEDURE — 99223 1ST HOSP IP/OBS HIGH 75: CPT | Performed by: NURSE PRACTITIONER

## 2019-08-22 PROCEDURE — 51701 INSERT BLADDER CATHETER: CPT

## 2019-08-22 PROCEDURE — 85025 COMPLETE CBC W/AUTO DIFF WBC: CPT

## 2019-08-22 PROCEDURE — 94640 AIRWAY INHALATION TREATMENT: CPT

## 2019-08-22 PROCEDURE — 80048 BASIC METABOLIC PNL TOTAL CA: CPT

## 2019-08-22 PROCEDURE — 2000000000 HC ICU R&B

## 2019-08-22 PROCEDURE — 36591 DRAW BLOOD OFF VENOUS DEVICE: CPT

## 2019-08-22 PROCEDURE — 99211 OFF/OP EST MAY X REQ PHY/QHP: CPT | Performed by: INTERNAL MEDICINE

## 2019-08-22 PROCEDURE — 83605 ASSAY OF LACTIC ACID: CPT

## 2019-08-22 PROCEDURE — 93005 ELECTROCARDIOGRAM TRACING: CPT | Performed by: EMERGENCY MEDICINE

## 2019-08-22 PROCEDURE — 80053 COMPREHEN METABOLIC PANEL: CPT

## 2019-08-22 PROCEDURE — 99214 OFFICE O/P EST MOD 30 MIN: CPT | Performed by: INTERNAL MEDICINE

## 2019-08-22 PROCEDURE — 83880 ASSAY OF NATRIURETIC PEPTIDE: CPT

## 2019-08-22 PROCEDURE — 99291 CRITICAL CARE FIRST HOUR: CPT

## 2019-08-22 PROCEDURE — 96375 TX/PRO/DX INJ NEW DRUG ADDON: CPT

## 2019-08-22 PROCEDURE — 71045 X-RAY EXAM CHEST 1 VIEW: CPT

## 2019-08-22 PROCEDURE — 96361 HYDRATE IV INFUSION ADD-ON: CPT

## 2019-08-22 PROCEDURE — 96365 THER/PROPH/DIAG IV INF INIT: CPT

## 2019-08-22 RX ORDER — ONDANSETRON 2 MG/ML
4 INJECTION INTRAMUSCULAR; INTRAVENOUS EVERY 6 HOURS PRN
Status: DISCONTINUED | OUTPATIENT
Start: 2019-08-22 | End: 2019-08-25 | Stop reason: HOSPADM

## 2019-08-22 RX ORDER — ONDANSETRON 4 MG/1
4 TABLET, ORALLY DISINTEGRATING ORAL EVERY 6 HOURS PRN
Status: DISCONTINUED | OUTPATIENT
Start: 2019-08-22 | End: 2019-08-25 | Stop reason: HOSPADM

## 2019-08-22 RX ORDER — METHYLPREDNISOLONE SODIUM SUCCINATE 125 MG/2ML
125 INJECTION, POWDER, LYOPHILIZED, FOR SOLUTION INTRAMUSCULAR; INTRAVENOUS ONCE
Status: COMPLETED | OUTPATIENT
Start: 2019-08-22 | End: 2019-08-22

## 2019-08-22 RX ORDER — SIMVASTATIN 40 MG
40 TABLET ORAL DAILY
Status: DISCONTINUED | OUTPATIENT
Start: 2019-08-23 | End: 2019-08-25 | Stop reason: HOSPADM

## 2019-08-22 RX ORDER — SODIUM CHLORIDE 9 MG/ML
20 INJECTION, SOLUTION INTRAVENOUS ONCE
Status: DISCONTINUED | OUTPATIENT
Start: 2019-08-22 | End: 2019-08-23 | Stop reason: HOSPADM

## 2019-08-22 RX ORDER — ASPIRIN 81 MG/1
81 TABLET, CHEWABLE ORAL DAILY
Status: DISCONTINUED | OUTPATIENT
Start: 2019-08-23 | End: 2019-08-25 | Stop reason: HOSPADM

## 2019-08-22 RX ORDER — PANTOPRAZOLE SODIUM 40 MG/1
40 TABLET, DELAYED RELEASE ORAL
Status: DISCONTINUED | OUTPATIENT
Start: 2019-08-23 | End: 2019-08-25 | Stop reason: HOSPADM

## 2019-08-22 RX ORDER — METFORMIN HYDROCHLORIDE 750 MG/1
750 TABLET, EXTENDED RELEASE ORAL
Status: DISCONTINUED | OUTPATIENT
Start: 2019-08-23 | End: 2019-08-25 | Stop reason: HOSPADM

## 2019-08-22 RX ORDER — ALPRAZOLAM 1 MG/1
1 TABLET ORAL 3 TIMES DAILY PRN
Status: DISCONTINUED | OUTPATIENT
Start: 2019-08-22 | End: 2019-08-25 | Stop reason: HOSPADM

## 2019-08-22 RX ORDER — DOBUTAMINE HYDROCHLORIDE 400 MG/100ML
2.5 INJECTION INTRAVENOUS CONTINUOUS
Status: DISCONTINUED | OUTPATIENT
Start: 2019-08-22 | End: 2019-08-25 | Stop reason: HOSPADM

## 2019-08-22 RX ORDER — HEPARIN SODIUM (PORCINE) LOCK FLUSH IV SOLN 100 UNIT/ML 100 UNIT/ML
500 SOLUTION INTRAVENOUS PRN
Status: DISCONTINUED | OUTPATIENT
Start: 2019-08-22 | End: 2019-08-23 | Stop reason: HOSPADM

## 2019-08-22 RX ORDER — IPRATROPIUM BROMIDE AND ALBUTEROL SULFATE 2.5; .5 MG/3ML; MG/3ML
1 SOLUTION RESPIRATORY (INHALATION) 4 TIMES DAILY
Status: DISCONTINUED | OUTPATIENT
Start: 2019-08-22 | End: 2019-08-25 | Stop reason: HOSPADM

## 2019-08-22 RX ORDER — GABAPENTIN 300 MG/1
600 CAPSULE ORAL 3 TIMES DAILY
Status: DISCONTINUED | OUTPATIENT
Start: 2019-08-22 | End: 2019-08-25 | Stop reason: HOSPADM

## 2019-08-22 RX ORDER — 0.9 % SODIUM CHLORIDE 0.9 %
1000 INTRAVENOUS SOLUTION INTRAVENOUS ONCE
Status: COMPLETED | OUTPATIENT
Start: 2019-08-22 | End: 2019-08-22

## 2019-08-22 RX ORDER — SODIUM CHLORIDE 0.9 % (FLUSH) 0.9 %
10 SYRINGE (ML) INJECTION PRN
Status: DISCONTINUED | OUTPATIENT
Start: 2019-08-22 | End: 2019-08-23 | Stop reason: HOSPADM

## 2019-08-22 RX ORDER — IPRATROPIUM BROMIDE AND ALBUTEROL SULFATE 2.5; .5 MG/3ML; MG/3ML
1 SOLUTION RESPIRATORY (INHALATION) ONCE
Status: COMPLETED | OUTPATIENT
Start: 2019-08-22 | End: 2019-08-22

## 2019-08-22 RX ORDER — ALBUTEROL SULFATE 2.5 MG/3ML
5 SOLUTION RESPIRATORY (INHALATION) ONCE
Status: COMPLETED | OUTPATIENT
Start: 2019-08-22 | End: 2019-08-22

## 2019-08-22 RX ORDER — ONDANSETRON 2 MG/ML
4 INJECTION INTRAMUSCULAR; INTRAVENOUS EVERY 6 HOURS PRN
Status: DISCONTINUED | OUTPATIENT
Start: 2019-08-22 | End: 2019-08-22 | Stop reason: SDUPTHER

## 2019-08-22 RX ORDER — SODIUM CHLORIDE 0.9 % (FLUSH) 0.9 %
10 SYRINGE (ML) INJECTION PRN
Status: DISCONTINUED | OUTPATIENT
Start: 2019-08-22 | End: 2019-08-25 | Stop reason: HOSPADM

## 2019-08-22 RX ORDER — HYDROCODONE BITARTRATE AND ACETAMINOPHEN 5; 325 MG/1; MG/1
3 TABLET ORAL EVERY 6 HOURS PRN
Status: DISCONTINUED | OUTPATIENT
Start: 2019-08-22 | End: 2019-08-25 | Stop reason: HOSPADM

## 2019-08-22 RX ORDER — IPRATROPIUM BROMIDE AND ALBUTEROL SULFATE 2.5; .5 MG/3ML; MG/3ML
1 SOLUTION RESPIRATORY (INHALATION) EVERY 6 HOURS PRN
Status: DISCONTINUED | OUTPATIENT
Start: 2019-08-22 | End: 2019-08-25 | Stop reason: HOSPADM

## 2019-08-22 RX ORDER — FLUOXETINE HYDROCHLORIDE 20 MG/1
20 CAPSULE ORAL DAILY
Status: DISCONTINUED | OUTPATIENT
Start: 2019-08-23 | End: 2019-08-25 | Stop reason: HOSPADM

## 2019-08-22 RX ORDER — SODIUM CHLORIDE 0.9 % (FLUSH) 0.9 %
10 SYRINGE (ML) INJECTION EVERY 12 HOURS SCHEDULED
Status: DISCONTINUED | OUTPATIENT
Start: 2019-08-22 | End: 2019-08-25 | Stop reason: HOSPADM

## 2019-08-22 RX ADMIN — DOBUTAMINE HYDROCHLORIDE 2.5 MCG/KG/MIN: 400 INJECTION INTRAVENOUS at 14:59

## 2019-08-22 RX ADMIN — HYDROCODONE BITARTRATE AND ACETAMINOPHEN 2 TABLET: 5; 325 TABLET ORAL at 20:42

## 2019-08-22 RX ADMIN — SODIUM CHLORIDE 1000 ML: 9 INJECTION, SOLUTION INTRAVENOUS at 13:40

## 2019-08-22 RX ADMIN — GABAPENTIN 600 MG: 300 CAPSULE ORAL at 21:51

## 2019-08-22 RX ADMIN — METHYLPREDNISOLONE SODIUM SUCCINATE 125 MG: 125 INJECTION, POWDER, FOR SOLUTION INTRAMUSCULAR; INTRAVENOUS at 13:40

## 2019-08-22 RX ADMIN — SODIUM CHLORIDE 1000 ML: 9 INJECTION, SOLUTION INTRAVENOUS at 14:56

## 2019-08-22 RX ADMIN — ALPRAZOLAM 1 MG: 0.25 TABLET ORAL at 23:06

## 2019-08-22 RX ADMIN — ALPRAZOLAM 1 MG: 0.25 TABLET ORAL at 18:09

## 2019-08-22 RX ADMIN — ALBUTEROL SULFATE 5 MG: 2.5 SOLUTION RESPIRATORY (INHALATION) at 13:51

## 2019-08-22 RX ADMIN — IPRATROPIUM BROMIDE AND ALBUTEROL SULFATE 1 AMPULE: .5; 3 SOLUTION RESPIRATORY (INHALATION) at 13:38

## 2019-08-22 ASSESSMENT — ENCOUNTER SYMPTOMS
ABDOMINAL PAIN: 0
NAUSEA: 0
VOMITING: 0
WHEEZING: 1
COLOR CHANGE: 0
SHORTNESS OF BREATH: 1
EYES NEGATIVE: 1
DIARRHEA: 0
SINUS PRESSURE: 0
COUGH: 0
EYE ITCHING: 0
ABDOMINAL DISTENTION: 0
SINUS PAIN: 0
CHEST TIGHTNESS: 0
EYE DISCHARGE: 0

## 2019-08-22 ASSESSMENT — PAIN DESCRIPTION - PROGRESSION: CLINICAL_PROGRESSION: NOT CHANGED

## 2019-08-22 ASSESSMENT — PAIN SCALES - GENERAL
PAINLEVEL_OUTOF10: 4
PAINLEVEL_OUTOF10: 7
PAINLEVEL_OUTOF10: 5
PAINLEVEL_OUTOF10: 7

## 2019-08-22 ASSESSMENT — PAIN DESCRIPTION - ONSET
ONSET: ON-GOING
ONSET: ON-GOING

## 2019-08-22 ASSESSMENT — PAIN DESCRIPTION - ORIENTATION
ORIENTATION: UPPER
ORIENTATION: UPPER

## 2019-08-22 ASSESSMENT — PAIN DESCRIPTION - LOCATION
LOCATION: BACK
LOCATION: BACK
LOCATION: BACK;HEAD

## 2019-08-22 ASSESSMENT — PAIN DESCRIPTION - PAIN TYPE
TYPE: CHRONIC PAIN
TYPE: CHRONIC PAIN

## 2019-08-22 ASSESSMENT — PAIN DESCRIPTION - DESCRIPTORS
DESCRIPTORS: ACHING
DESCRIPTORS: ACHING

## 2019-08-22 ASSESSMENT — PAIN DESCRIPTION - FREQUENCY
FREQUENCY: CONTINUOUS
FREQUENCY: INTERMITTENT

## 2019-08-22 NOTE — PROGRESS NOTES
Transitions of Care Pharmacy Service   Medication Review    The patient's list of current home medications has been reviewed. Her medications are prescribed in Epic. Source(s) of information: patient, Natalie Dorsey    Please feel free to call with any questions about this encounter. Thank you. [address], [facility]  Transitions of Care Pharmacy Service  Phone:  [phone]  Fax: [phone]            Prior to Admission medications    Medication Sig       Magic Mouthwash (MIRACLE MOUTHWASH) Swish and spit 5 mLs 4 times daily as needed for Irritation       ZINC PO Take 1 tablet by mouth daily       lisinopril (PRINIVIL;ZESTRIL) 10 MG tablet TAKE ONE TABLET BY MOUTH DAILY       FLUoxetine (PROZAC) 20 MG capsule Take 1 capsule by mouth daily       simvastatin (ZOCOR) 40 MG tablet TAKE ONE TABLET BY MOUTH DAILY       ALPRAZolam (XANAX) 1 MG tablet Take 1 tablet by mouth 3 times daily as needed for Sleep or Anxiety for up to 30 days. HYDROcodone-acetaminophen (NORCO) 7.5-325 MG per tablet Take 2 tablets by mouth every 6 hours as needed for Pain for up to 30 days. Intended supply: 30 days       furosemide (LASIX) 40 MG tablet Take 40 mg by mouth daily       prochlorperazine (COMPAZINE) 10 MG tablet Take 1 tablet by mouth every 6 hours as needed (CHEMO INDUCED NAUSEA)       nystatin (MYCOSTATIN) 760686 UNIT/GM cream Apply topically 2 times daily. gabapentin (NEURONTIN) 600 MG tablet Take 600 mg by mouth 3 times daily. Shaheed Copeland        esomeprazole (NEXIUM) 40 MG delayed release capsule TAKE ONE CAPSULE BY MOUTH DAILY       metFORMIN (GLUCOPHAGE-XR) 750 MG extended release tablet Take 1 tablet by mouth daily (with breakfast)       ipratropium-albuterol (DUONEB) 0.5-2.5 (3) MG/3ML SOLN nebulizer solution Inhale 3 mLs into the lungs every 6 hours as needed for Shortness of Breath       ibuprofen (ADVIL;MOTRIN) 800 MG tablet TAKE ONE TABLET BY MOUTH EVERY 8 HOURS AS NEEDED FOR PAIN       albuterol-ipratropium

## 2019-08-22 NOTE — TELEPHONE ENCOUNTER
January Smith MD VISIT & [id number] [facility] INTO SEE PATIENT  ORDERS RECEIVED  HOLD [address] [address]  ED FOR ACUTE RENAL FAILURE  RV 2-3 WEEKS  MD VISIT 9/12/19 @2PM    RN DRAW CDP CMP @1:30PM  AVS PRINTED AND GIVEN TO PATIENT WITH INSTRUCTIONS  PATIENT DISCHARGED VIA WHEELCHAIR

## 2019-08-22 NOTE — H&P
however she is on chemotherapeutic medications and is considerably high risk for immunosuppression. Initial emergency department evaluation does not reveal any explanation for her persistent hypotension. Patient currently complains of generalized weakness and shortness of breath. Patient states that she has been weak for \"longer than she can remember\" due to her chronic medical conditions and she also states that her shortness of breath is at her baseline and that she always has this much trouble breathing. Patient will be admitted to the hospital for additional evaluation including a pulmonology evaluation, a cardiology evaluation, and work-up for infectious processes. Patient case will likely require significant evaluation and investigation to appropriately treat and manage. Past Medical History:     Past Medical History:   Diagnosis Date    COPD (chronic obstructive pulmonary disease) ([facility]     Depression     Diabetes mellitus (HCC)     Hyperlipidemia     PTSD (post-traumatic stress disorder)     Tubulovillous adenoma         Past Surgical History:     Past Surgical History:   Procedure Laterality Date    COLONOSCOPY      colon polyps    COLONOSCOPY  06/07/2016    severe diverticulosis TUBULOVILLOUS ADENOMA.       HYSTERECTOMY      TUNNELED VENOUS PORT PLACEMENT  04/08/2019    chemo port to right chest        Medications Prior to Admission:     Prior to Admission medications    Medication Sig Start Date End Date Taking?  Authorizing Provider   Magic Mouthwash (MIRACLE MOUTHWASH) Swish and spit 5 mLs 4 times daily as needed for Irritation   Yes Historical Provider, MD   ZINC PO Take 1 tablet by mouth daily   Yes Historical Provider, MD   lisinopril (PRINIVIL;ZESTRIL) 10 MG tablet TAKE ONE TABLET BY MOUTH DAILY 8/15/19  Yes Arik Valero MD   FLUoxetine (PROZAC) 20 MG capsule Take 1 capsule by mouth daily 8/15/19  Yes Jonathon King MD   simvastatin (ZOCOR) 40 MG tablet TAKE ONE TABLET 0.30 k/uL    WBC Morphology NOT REPORTED     RBC Morphology ANISOCYTOSIS PRESENT     Platelet Estimate NOT REPORTED    Comprehensive Metabolic Panel    Collection Time: 08/22/19 11:45 AM   Result Value Ref Range    Glucose 104 (H) 70 - 99 mg/dL    BUN 53 (H) 8 - 23 mg/dL    CREATININE 2.32 (H) 0.50 - 0.90 mg/dL    Bun/Cre Ratio 23 (H) 9 - 20    Calcium 9.1 8.6 - 10.4 mg/dL    Sodium 141 135 - 144 mmol/L    Potassium 5.7 (H) 3.7 - 5.3 mmol/L    Chloride 105 98 - 107 mmol/L    CO2 22 20 - 31 mmol/L    Anion Gap 14 9 - 17 mmol/L    Alkaline Phosphatase 53 35 - 104 U/L    ALT 9 5 - 33 U/L    AST 13 <32 U/L    Total Bilirubin 0.19 (L) 0.3 - 1.2 mg/dL    Total Protein 6.9 6.4 - 8.3 g/dL    Alb 3.9 3.5 - 5.2 g/dL    Albumin/Globulin Ratio NOT REPORTED 1.0 - 2.5    GFR Non- 21 (L) >60 mL/min    GFR  25 (L) >60 mL/min    GFR Comment          GFR Staging NOT REPORTED    EKG 12 Lead    Collection Time: 08/22/19  2:09 PM   Result Value Ref Range    Ventricular Rate 60 BPM    Atrial Rate 60 BPM    P-R Interval 196 ms    QRS Duration 80 ms    Q-T Interval 398 ms    QTc Calculation (Bazett) 398 ms    P Axis 9 degrees    R Axis 55 degrees    T Axis 55 degrees       Imaging/Diagnostics:  Xr Chest Portable    Result Date: 8/22/2019  Chronic pulmonary change without acute cardiopulmonary process. Assessment :      Hospital Problems           Last Modified POA    * (Principal) Hypotension 8/22/2019 Yes    Obesity 8/22/2019 Yes    COPD (chronic obstructive pulmonary disease) ([facility] [address] 8/22/2019 Yes    Post traumatic stress disorder (PTSD) 8/22/2019 Yes    Diabetes mellitus ([address] 8/22/2019 Yes    Acute respiratory failure ([address] 8/22/2019 Yes    COPD exacerbation ([facility] [address] 8/22/2019 Yes    KATHRIN (acute kidney injury) ([facility] [address] 8/22/2019 Yes          Plan:     Patient status Admit as inpatient in the  Medical ICU    1. Continue vasopressors  2.  Continue oxygen supplementation as well as scheduled and as needed

## 2019-08-22 NOTE — ED PROVIDER NOTES
CT, MRI, and formal ultrasound images (except ED bedside ultrasound) are read by the radiologist, see reports below, unless otherwisenoted in MDM or here. XR CHEST PORTABLE   Final Result   Chronic pulmonary change without acute cardiopulmonary process. LABS: All lab results were reviewed by myself, and all abnormals are listed below. Labs Reviewed - No data to display    EMERGENCY DEPARTMENTCOURSE:   Initially had an unremarkable ER course she was treated with a DuoNeb, and albuterol for her COPD with wheezing, as well as Solu-Medrol 125 mg IV. Patient subsequently dropped her blood pressure to approximately 70 systolic for reasons that were unclear she was already receiving 1 L of normal saline and a second liter was initiated. Additionally dobutamne drip was started      Vitals:    Vitals:    08/22/19 1345 08/22/19 1400 08/22/19 1413 08/22/19 1415   BP: (!) 80/39 (!) 81/31 (!) 70/58    Pulse: 56 61 60 61   Resp: 19 15 18 17   Temp:       TempSrc:       SpO2: 97% 96% 94% 95%   Weight:       Height:           The patient was given the following medications while in the emergency department:  Orders Placed This Encounter   Medications    0.9 % sodium chloride bolus    methylPREDNISolone sodium (SOLU-MEDROL) injection 125 mg    ipratropium-albuterol (DUONEB) nebulizer solution 1 ampule    albuterol (PROVENTIL) nebulizer solution 5 mg    DOBUTamine (DOBUTREX) 1000 mg in dextrose 5 % 250 mL infusion    0.9 % sodium chloride bolus     CONSULTS:  IP CONSULT TO INTERNAL MEDICINE    FINAL IMPRESSION      1. Acute kidney injury ([address]    2. Dehydration    3. Hypotension, unspecified hypotension type    4. Chronic obstructive pulmonary disease, unspecified COPD type ([address]          DISPOSITION/PLAN   DISPOSITION admit to medicine      PATIENT REFERRED TO:  No follow-up provider specified.   DISCHARGE MEDICATIONS:  New Prescriptions    No medications on file     Abbe Mclain MD  Attending Emergency

## 2019-08-23 PROBLEM — R57.1 HYPOVOLEMIC SHOCK (HCC): Status: ACTIVE | Noted: 2019-08-22

## 2019-08-23 PROBLEM — E87.20 METABOLIC ACIDOSIS: Status: ACTIVE | Noted: 2019-08-23

## 2019-08-23 PROBLEM — E87.5 HYPERKALEMIA: Status: ACTIVE | Noted: 2019-08-23

## 2019-08-23 LAB
ANION GAP SERPL CALCULATED.3IONS-SCNC: 10 MMOL/L (ref 9–17)
ANION GAP SERPL CALCULATED.3IONS-SCNC: 15 MMOL/L (ref 9–17)
BUN BLDV-MCNC: 31 MG/DL (ref 8–23)
BUN BLDV-MCNC: 39 MG/DL (ref 8–23)
BUN/CREAT BLD: 32 (ref 9–20)
BUN/CREAT BLD: 35 (ref 9–20)
CALCIUM SERPL-MCNC: 8.9 MG/DL (ref 8.6–10.4)
CALCIUM SERPL-MCNC: 8.9 MG/DL (ref 8.6–10.4)
CHLORIDE BLD-SCNC: 108 MMOL/L (ref 98–107)
CHLORIDE BLD-SCNC: 109 MMOL/L (ref 98–107)
CO2: 19 MMOL/L (ref 20–31)
CO2: 20 MMOL/L (ref 20–31)
CREAT SERPL-MCNC: 0.88 MG/DL (ref 0.5–0.9)
CREAT SERPL-MCNC: 1.23 MG/DL (ref 0.5–0.9)
CREATININE URINE: 90 MG/DL (ref 28–217)
GFR AFRICAN AMERICAN: 52 ML/MIN
GFR AFRICAN AMERICAN: >60 ML/MIN
GFR NON-AFRICAN AMERICAN: 43 ML/MIN
GFR NON-AFRICAN AMERICAN: >60 ML/MIN
GFR SERPL CREATININE-BSD FRML MDRD: ABNORMAL ML/MIN/{1.73_M2}
GLUCOSE BLD-MCNC: 137 MG/DL (ref 65–105)
GLUCOSE BLD-MCNC: 146 MG/DL (ref 65–105)
GLUCOSE BLD-MCNC: 148 MG/DL (ref 70–99)
GLUCOSE BLD-MCNC: 153 MG/DL (ref 70–99)
GLUCOSE BLD-MCNC: 176 MG/DL (ref 65–105)
GLUCOSE BLD-MCNC: 238 MG/DL (ref 65–105)
HCT VFR BLD CALC: 38.3 % (ref 36.3–47.1)
HEMOGLOBIN: 11.6 G/DL (ref 11.9–15.1)
LACTIC ACID: 1.2 MMOL/L (ref 0.5–2.2)
MCH RBC QN AUTO: 30.9 PG (ref 25.2–33.5)
MCHC RBC AUTO-ENTMCNC: 30.3 G/DL (ref 28.4–34.8)
MCV RBC AUTO: 101.9 FL (ref 82.6–102.9)
NRBC AUTOMATED: 0 PER 100 WBC
PDW BLD-RTO: 14.5 % (ref 11.8–14.4)
PLATELET # BLD: 189 K/UL (ref 138–453)
PMV BLD AUTO: 9.9 FL (ref 8.1–13.5)
POTASSIUM SERPL-SCNC: 4.8 MMOL/L (ref 3.7–5.3)
POTASSIUM SERPL-SCNC: 5.5 MMOL/L (ref 3.7–5.3)
POTASSIUM SERPL-SCNC: 6.8 MMOL/L (ref 3.7–5.3)
POTASSIUM SERPL-SCNC: 7.1 MMOL/L (ref 3.7–5.3)
RBC # BLD: 3.76 M/UL (ref 3.95–5.11)
SODIUM BLD-SCNC: 138 MMOL/L (ref 135–144)
SODIUM BLD-SCNC: 143 MMOL/L (ref 135–144)
SODIUM,UR: 57 MMOL/L
TROPONIN INTERP: ABNORMAL
TROPONIN INTERP: ABNORMAL
TROPONIN T: ABNORMAL NG/ML
TROPONIN T: ABNORMAL NG/ML
TROPONIN, HIGH SENSITIVITY: 22 NG/L (ref 0–14)
TROPONIN, HIGH SENSITIVITY: 24 NG/L (ref 0–14)
UREA NITROGEN, UR: 902 MG/DL
WBC # BLD: 4.4 K/UL (ref 3.5–11.3)

## 2019-08-23 PROCEDURE — 6370000000 HC RX 637 (ALT 250 FOR IP): Performed by: NURSE PRACTITIONER

## 2019-08-23 PROCEDURE — 99222 1ST HOSP IP/OBS MODERATE 55: CPT | Performed by: INTERNAL MEDICINE

## 2019-08-23 PROCEDURE — 82570 ASSAY OF URINE CREATININE: CPT

## 2019-08-23 PROCEDURE — 2060000000 HC ICU INTERMEDIATE R&B

## 2019-08-23 PROCEDURE — 6370000000 HC RX 637 (ALT 250 FOR IP): Performed by: INTERNAL MEDICINE

## 2019-08-23 PROCEDURE — 80048 BASIC METABOLIC PNL TOTAL CA: CPT

## 2019-08-23 PROCEDURE — 94640 AIRWAY INHALATION TREATMENT: CPT

## 2019-08-23 PROCEDURE — 99233 SBSQ HOSP IP/OBS HIGH 50: CPT | Performed by: INTERNAL MEDICINE

## 2019-08-23 PROCEDURE — 84132 ASSAY OF SERUM POTASSIUM: CPT

## 2019-08-23 PROCEDURE — 93005 ELECTROCARDIOGRAM TRACING: CPT | Performed by: INTERNAL MEDICINE

## 2019-08-23 PROCEDURE — 97530 THERAPEUTIC ACTIVITIES: CPT

## 2019-08-23 PROCEDURE — 83605 ASSAY OF LACTIC ACID: CPT

## 2019-08-23 PROCEDURE — 82947 ASSAY GLUCOSE BLOOD QUANT: CPT

## 2019-08-23 PROCEDURE — 36415 COLL VENOUS BLD VENIPUNCTURE: CPT

## 2019-08-23 PROCEDURE — 6360000002 HC RX W HCPCS: Performed by: NURSE PRACTITIONER

## 2019-08-23 PROCEDURE — 84484 ASSAY OF TROPONIN QUANT: CPT

## 2019-08-23 PROCEDURE — 97161 PT EVAL LOW COMPLEX 20 MIN: CPT

## 2019-08-23 PROCEDURE — 87324 CLOSTRIDIUM AG IA: CPT

## 2019-08-23 PROCEDURE — APPNB15 APP NON BILLABLE TIME 0-15 MINS: Performed by: NURSE PRACTITIONER

## 2019-08-23 PROCEDURE — 87449 NOS EACH ORGANISM AG IA: CPT

## 2019-08-23 PROCEDURE — 84540 ASSAY OF URINE/UREA-N: CPT

## 2019-08-23 PROCEDURE — 2580000003 HC RX 258: Performed by: NURSE PRACTITIONER

## 2019-08-23 PROCEDURE — 85027 COMPLETE CBC AUTOMATED: CPT

## 2019-08-23 PROCEDURE — 6360000002 HC RX W HCPCS: Performed by: INTERNAL MEDICINE

## 2019-08-23 PROCEDURE — 84300 ASSAY OF URINE SODIUM: CPT

## 2019-08-23 RX ORDER — NICOTINE POLACRILEX 4 MG
15 LOZENGE BUCCAL PRN
Status: DISCONTINUED | OUTPATIENT
Start: 2019-08-23 | End: 2019-08-23 | Stop reason: SDUPTHER

## 2019-08-23 RX ORDER — SODIUM CHLORIDE 9 MG/ML
INJECTION, SOLUTION INTRAVENOUS CONTINUOUS
Status: DISCONTINUED | OUTPATIENT
Start: 2019-08-23 | End: 2019-08-24

## 2019-08-23 RX ORDER — DEXTROSE MONOHYDRATE 50 MG/ML
100 INJECTION, SOLUTION INTRAVENOUS PRN
Status: DISCONTINUED | OUTPATIENT
Start: 2019-08-23 | End: 2019-08-23 | Stop reason: SDUPTHER

## 2019-08-23 RX ORDER — DEXTROSE MONOHYDRATE 50 MG/ML
100 INJECTION, SOLUTION INTRAVENOUS PRN
Status: DISCONTINUED | OUTPATIENT
Start: 2019-08-23 | End: 2019-08-25 | Stop reason: HOSPADM

## 2019-08-23 RX ORDER — DEXTROSE MONOHYDRATE 25 G/50ML
12.5 INJECTION, SOLUTION INTRAVENOUS PRN
Status: DISCONTINUED | OUTPATIENT
Start: 2019-08-23 | End: 2019-08-25 | Stop reason: HOSPADM

## 2019-08-23 RX ORDER — FUROSEMIDE 10 MG/ML
20 INJECTION INTRAMUSCULAR; INTRAVENOUS ONCE
Status: COMPLETED | OUTPATIENT
Start: 2019-08-23 | End: 2019-08-23

## 2019-08-23 RX ORDER — NICOTINE POLACRILEX 4 MG
15 LOZENGE BUCCAL PRN
Status: DISCONTINUED | OUTPATIENT
Start: 2019-08-23 | End: 2019-08-25 | Stop reason: HOSPADM

## 2019-08-23 RX ORDER — SODIUM POLYSTYRENE SULFONATE 15 G/60ML
15 SUSPENSION ORAL; RECTAL ONCE
Status: COMPLETED | OUTPATIENT
Start: 2019-08-23 | End: 2019-08-23

## 2019-08-23 RX ORDER — DEXTROSE MONOHYDRATE 25 G/50ML
25 INJECTION, SOLUTION INTRAVENOUS ONCE
Status: COMPLETED | OUTPATIENT
Start: 2019-08-23 | End: 2019-08-23

## 2019-08-23 RX ORDER — DEXTROSE MONOHYDRATE 25 G/50ML
12.5 INJECTION, SOLUTION INTRAVENOUS PRN
Status: DISCONTINUED | OUTPATIENT
Start: 2019-08-23 | End: 2019-08-23 | Stop reason: SDUPTHER

## 2019-08-23 RX ADMIN — INSULIN LISPRO 1 UNITS: 100 INJECTION, SOLUTION INTRAVENOUS; SUBCUTANEOUS at 21:40

## 2019-08-23 RX ADMIN — IPRATROPIUM BROMIDE AND ALBUTEROL SULFATE 1 AMPULE: .5; 3 SOLUTION RESPIRATORY (INHALATION) at 19:58

## 2019-08-23 RX ADMIN — DEXTROSE 50 % IN WATER (D50W) INTRAVENOUS SYRINGE 25 G: at 07:41

## 2019-08-23 RX ADMIN — ASPIRIN 81 MG 81 MG: 81 TABLET ORAL at 08:41

## 2019-08-23 RX ADMIN — PANTOPRAZOLE SODIUM 40 MG: 40 TABLET, DELAYED RELEASE ORAL at 09:01

## 2019-08-23 RX ADMIN — SODIUM POLYSTYRENE SULFONATE 15 G: 15 SUSPENSION ORAL; RECTAL at 08:42

## 2019-08-23 RX ADMIN — ALPRAZOLAM 1 MG: 0.25 TABLET ORAL at 08:41

## 2019-08-23 RX ADMIN — HYDROCODONE BITARTRATE AND ACETAMINOPHEN 3 TABLET: 5; 325 TABLET ORAL at 01:36

## 2019-08-23 RX ADMIN — ALPRAZOLAM 1 MG: 0.25 TABLET ORAL at 21:46

## 2019-08-23 RX ADMIN — SIMVASTATIN 40 MG: 40 TABLET, FILM COATED ORAL at 08:41

## 2019-08-23 RX ADMIN — IPRATROPIUM BROMIDE AND ALBUTEROL SULFATE 1 AMPULE: .5; 3 SOLUTION RESPIRATORY (INHALATION) at 15:45

## 2019-08-23 RX ADMIN — IPRATROPIUM BROMIDE AND ALBUTEROL SULFATE 1 AMPULE: .5; 3 SOLUTION RESPIRATORY (INHALATION) at 08:03

## 2019-08-23 RX ADMIN — INSULIN LISPRO 1 UNITS: 100 INJECTION, SOLUTION INTRAVENOUS; SUBCUTANEOUS at 12:19

## 2019-08-23 RX ADMIN — GABAPENTIN 600 MG: 300 CAPSULE ORAL at 21:40

## 2019-08-23 RX ADMIN — INSULIN HUMAN 10 UNITS: 100 INJECTION, SOLUTION PARENTERAL at 07:40

## 2019-08-23 RX ADMIN — GABAPENTIN 600 MG: 300 CAPSULE ORAL at 08:41

## 2019-08-23 RX ADMIN — MOMETASONE FUROATE AND FORMOTEROL FUMARATE DIHYDRATE 2 PUFF: 200; 5 AEROSOL RESPIRATORY (INHALATION) at 19:58

## 2019-08-23 RX ADMIN — HYDROCODONE BITARTRATE AND ACETAMINOPHEN 3 TABLET: 5; 325 TABLET ORAL at 21:46

## 2019-08-23 RX ADMIN — HYDROCODONE BITARTRATE AND ACETAMINOPHEN 3 TABLET: 5; 325 TABLET ORAL at 15:44

## 2019-08-23 RX ADMIN — HYDROCODONE BITARTRATE AND ACETAMINOPHEN 3 TABLET: 5; 325 TABLET ORAL at 09:01

## 2019-08-23 RX ADMIN — FLUOXETINE 20 MG: 20 CAPSULE ORAL at 08:41

## 2019-08-23 RX ADMIN — INSULIN LISPRO 2 UNITS: 100 INJECTION, SOLUTION INTRAVENOUS; SUBCUTANEOUS at 08:41

## 2019-08-23 RX ADMIN — IPRATROPIUM BROMIDE AND ALBUTEROL SULFATE 1 AMPULE: .5; 3 SOLUTION RESPIRATORY (INHALATION) at 11:09

## 2019-08-23 RX ADMIN — GABAPENTIN 600 MG: 300 CAPSULE ORAL at 15:44

## 2019-08-23 RX ADMIN — FUROSEMIDE 20 MG: 10 INJECTION, SOLUTION INTRAMUSCULAR; INTRAVENOUS at 08:41

## 2019-08-23 RX ADMIN — ENOXAPARIN SODIUM 30 MG: 30 INJECTION SUBCUTANEOUS at 08:40

## 2019-08-23 ASSESSMENT — PAIN SCALES - GENERAL
PAINLEVEL_OUTOF10: 8
PAINLEVEL_OUTOF10: 9

## 2019-08-23 ASSESSMENT — ENCOUNTER SYMPTOMS
DIARRHEA: 0
VOMITING: 1
SHORTNESS OF BREATH: 1
BLOOD IN STOOL: 0
NAUSEA: 1
COUGH: 1
CHEST TIGHTNESS: 1
SORE THROAT: 1
CONSTIPATION: 0

## 2019-08-23 NOTE — CONSULTS
Not on file   Occupational History    Not on file   Social Needs    Financial resource strain: Not on file    Food insecurity:     Worry: Not on file     Inability: Not on file    Transportation needs:     Medical: No     Non-medical: No   Tobacco Use    Smoking status: Former Smoker     Packs/day: 1.00     Years: 10.00     Pack years: 10.00     Types: Cigarettes     Last attempt to quit: 2007     Years since quittin.7    Smokeless tobacco: Never Used   Substance and Sexual Activity    Alcohol use: No    Drug use: No    Sexual activity: Not Currently   Lifestyle    Physical activity:     Days per week: Not on file     Minutes per session: Not on file    Stress: Not on file   Relationships    Social connections:     Talks on phone: Three times a week     Gets together: Once a week     Attends Church service: More than 4 times per year     Active member of club or organization: No     Attends meetings of clubs or organizations: Never     Relationship status: Never     Intimate partner violence:     Fear of current or ex partner: Not on file     Emotionally abused: Not on file     Physically abused: Not on file     Forced sexual activity: Not on file   Other Topics Concern    Not on file   Social History Narrative    Not on file       Family History:   Family History   Problem Relation Age of Onset    Heart Disease Mother     Heart Disease Brother        Review of Systems:    Constitutional: No fever, no chills, no night sweats, fatigue, generalized weakness, loss of appetite  HEENT:  No headache, otalgia, itchy eyes, epistaxis, nasal discharge or sore throat. Cardiac:  No chest pain, dyspnea, orthopnea or PND, palpitations  Chest:  No cough, hemoptysis, pleuritic chest pain, wheezing,SOB  Abdomen:  No abdominal pain, nausea, vomiting, diarrhea, melena, dysphagia hematemesis,constipation, abdominal bloating, flank pain  Neuro:  No CVA, TIA or seizure like activity.   Skin:   No

## 2019-08-23 NOTE — CARE COORDINATION
AOA and left voicemail for [facility]. Pt has multiple complaints regarding hr HHA not properly doing her work and leaving early. Message relayed. To bother agencies. Discussed skilled home care for nursing and PT/OT. Pt is agreeable and choice offered. Pt agreeable to referral to [facility]. Pt is refusing any thought of SNF. Referral efaxed to [facility]. ASTER initiated.          Electronically signed by Angelika Reyes RN on 8/23/19 at 2:51 PM

## 2019-08-23 NOTE — PROGRESS NOTES
Hyperlipidemia, PTSD (post-traumatic stress disorder), and Tubulovillous adenoma. Social History:   reports that she quit smoking about 11 years ago. Her smoking use included cigarettes. She has a 10.00 pack-year smoking history. She has never used smokeless tobacco. She reports that she does not drink alcohol or use drugs. Family History:   Family History   Problem Relation Age of Onset    Heart Disease Mother     Heart Disease Brother        Vitals:  /65   Pulse 61   Temp 98 °F (36.7 °C) (Infrared)   Resp 20   Ht 4' 9\" (1.448 m)   Wt 197 lb 12 oz (89.7 kg)   SpO2 94%   BMI 42.79 kg/m²   Temp (24hrs), Av.6 °F (36.4 °C), Min:97.1 °F (36.2 °C), Max:98 °F (36.7 °C)    Recent Labs     19  0800 19  1129   POCGLU 238* 176*       I/O (24Hr):     Intake/Output Summary (Last 24 hours) at 2019 1430  Last data filed at 2019 1137  Gross per 24 hour   Intake 281.75 ml   Output 3000 ml   Net -2718.25 ml       Labs:  Hematology:  Recent Labs     19  1145 19  0419   WBC 6.5 4.4   RBC 3.96 3.76*   HGB 12.0 11.6*   HCT 39.8 38.3   .5 101.9   MCH 30.3 30.9   MCHC 30.2 30.3   RDW 14.6* 14.5*    189   MPV 9.0 9.9     Chemistry:  Recent Labs     19  1145  19  1730 19  2134 19  0028 19  0419 19  0634 19  0912     --  141  --   --  138  --   --    K 5.7*  --  5.6*  --   --  7.1* 6.8* 5.5*     --  106  --   --  109*  --   --    CO2 22  --  19*  --   --  19*  --   --    GLUCOSE 104*  --  169*  --   --  153*  --   --    BUN 53*  --  46*  --   --  39*  --   --    CREATININE 2.32*  --  1.74*  --   --  1.23*  --   --    ANIONGAP 14  --  16  --   --  10  --   --    LABGLOM 21*  --  29*  --   --  43*  --   --    GFRAA 25*  --  35*  --   --  52*  --   --    CALCIUM 9.1  --  8.3*  --   --  8.9  --   --    PROBNP  --   --   --  289  --   --   --   --    TROPHS  --    < > 7 13 24* 22*  --   --     < > = values in this scheduled and as needed breathing treatments  3. Continue pulmonology consultation for chronic respiratory failure  4. Consult nephrology for KATHRIN with elevated potassium  5. Continue to monitor potassium and correct with Kayexalate  6. Continue to withhold chemotherapeutic treatments until acute issues are resolved  7. PPI and thickening of liquids for chronic GERD and possible aspiration  8. DVT and GI prophylaxis  9.  Electrolyte monitoring and replacement and correction as needed    KIRAN Dial NP  8/23/2019  2:30 PM

## 2019-08-23 NOTE — CONSULTS
Today's Date: 8/23/2019  Patient Name: Cally Pérez  Date of admission: 8/22/2019  1:14 PM  Patient's age: 69 y.o., 1945  Admission Dx: Hypotension [I95.9]  Hypotension [I95.9]      Requesting Physician: Cely Romano MD    CHIEF COMPLAINT: Acute renal impairment  History Obtained From:  patient, electronic medical record    HISTORY OF PRESENT ILLNESS:      The patient is a 69 y.o.  female who is admitted to the hospital for acute renal impairment. The patient is known to our service with history of low-grade lymphoma, she received 4 weekly doses of rituximab completed 2 months ago. She had a good response and we plan to maintenance rituximab every 2 months. She was coming to the office for the first dose. Before she received treatment, she underwent blood work that showed acute renal impairment. The patient was hypotensive so she was sent to emergency room and then was admitted. The patient was hydrated aggressively and overnight, her blood pressure improved. The patient described significant GI symptoms. I found out that she was in her medical hospital lately where esophageal stenosis was appreciated and the patient was dilated. There was a question of aspiration and she had a swallow study that she passed. However, I think the swallow study was done after the esophageal dilatation. The patient continues to complain of coughing and choking sensation and she has difficulty with swallowing. She also describes ongoing abdominal discomfort, nausea and vomiting. Past Medical History:   has a past medical history of COPD (chronic obstructive pulmonary disease) ([facility] [address] Depression, Diabetes mellitus ([facility] [address] Hyperlipidemia, PTSD (post-traumatic stress disorder), and Tubulovillous adenoma. Past Surgical History:   has a past surgical history that includes Hysterectomy; Colonoscopy;  Colonoscopy GI issues, esophageal stenosis as well as possible aspiration. 3. Continue supportive care with fluids, blood pressure seems to be improving. 4. I think she will need aggressive management of her GI issues as an outpatient. 5. Management of hyperkalemia per primary team      Discussed with patient and Nurse.     Dariel aMrc MD   [phone]

## 2019-08-23 NOTE — DISCHARGE INSTR - COC
traumatic stress disorder (PTSD) F43.10    Osteoarthritis M19.90    Diabetes mellitus ([facility] [address] E11.9    Acute respiratory failure (HCC) J96.00    COPD exacerbation (HCC) J44.1    Pneumonia J18.9    Tubulovillous adenoma D36.9    Oral thrush B37.0    Bandemia D72.825    Thrombocytopenia (HCC) D69.6    Marginal zone lymphoma (HCC) C85.80    Hypovolemic shock (HCC) R57.1    Acute kidney injury ([facility] [address] N17.9    Hyperkalemia Y95.8    Metabolic acidosis W71.4       Isolation/Infection:   Isolation          C Diff Contact            Nurse Assessment:  Last Vital Signs: /65   Pulse 61   Temp 98 °F (36.7 °C) (Infrared)   Resp 20   Ht 4' 9\" (1.448 m)   Wt 197 lb 12 oz (89.7 kg)   SpO2 94%   BMI 42.79 kg/m²     Last documented pain score (0-10 scale): Pain Level: 9  Last Weight:   Wt Readings from Last 1 Encounters:   08/22/19 197 lb 12 oz (89.7 kg)     Mental Status:  {IP PT MENTAL STATUS:96882}    IV Access:  - unaccessed chest port    Nursing Mobility/ADLs:  Walking   Independent  Transfer  Independent  Bathing  Independent  Dressing  Independent  Toileting  Independent  Feeding  Independent  Med Admin  Independent  Med Delivery   whole    Wound Care Documentation and Therapy:  Puncture 08/27/18 Back Lower;Medial (Active)   Number of days: 361        Elimination:  Continence:   · Bowel: Yes  · Bladder: Yes  Urinary Catheter: None   Colostomy/Ileostomy/Ileal Conduit: No       Date of Last BM: 08/25/2019    Intake/Output Summary (Last 24 hours) at 8/23/2019 1537  Last data filed at 8/23/2019 1137  Gross per 24 hour   Intake 281.75 ml   Output 3000 ml   Net -2718.25 ml     I/O last 3 completed shifts: In: 281.8 [P.O.:240; I.V.:41.8]  Out: 3000 [Urine:3000]    Safety Concerns:      At Risk for Falls    Impairments/Disabilities:      None    Nutrition Therapy:  Current Nutrition Therapy:   - Oral Diet:  General    Routes of Feeding: Oral  Liquids: Honey Thick Liquids  Daily Fluid Restriction: no  Last

## 2019-08-23 NOTE — CONSULTS
Pulmonary Medicine and [address] Lakesha Moses MD      Patient - Joe Lopez   MRN -  [id number]   Acct # - [de-identified]   - 1945      Date of Admission -  2019  1:14 PM  Date of evaluation -  2019  Room - [facility]/1101-   Michael Lazo MD Primary Care Physician - Porter Maharaj MD     Reason for Consult    Hypotension, COPD, ? Pulmonary hypertension     Assessment   · Hypotension   · COPD, former smoker, 50-pack-year smoking history, quit 25 yrs ago   · KATHRIN/Hyperkalemia   · Suspected obstructive sleep apnea/Obesity  · Marginal zone lymphoma, last rituximab treatment was 2 months ago    · DM, OA, PTSD     Recommendations   · Continue IV fluids   · Albuterol and Ipratropium Q 4 hours and prn  · [address]  · Nephrology on consult   · X-ray chest in am  · Echocardiogram   · Labs: CBC and BMP in am  · 2-3 liters/min via nasal cannula  · Sleep studies as an outpatient  · DVT prophylaxis with low molecular weight heparin  · OK to move to step down unit from pulmonary stand point  · Will follow with you    Problem List      Patient Active Problem List   Diagnosis    Obesity    COPD (chronic obstructive pulmonary disease) (HCC)    Colon polyps    Post traumatic stress disorder (PTSD)    Osteoarthritis    Diabetes mellitus ([address]    Acute respiratory failure ([address]    COPD exacerbation (HCC)    Pneumonia    Tubulovillous adenoma    Oral thrush    Bandemia    Thrombocytopenia (HCC)    Marginal zone lymphoma (HCC)    Hypotension    Acute kidney injury (HCC)       HPI     Joe Lopez is 69 y.o.,  female, admitted because of acute kidney injury. She was at the cancer center to get her chemotherapy and on pre-therapy blood work she was found to be in acute kidney injury as well as hypotensive. She denies any dizziness, nausea, vomiting, or diarrhea. She has about a 50-pack year smoking history, quit 25 yrs ago.  She uses oxygen at home, 2-3

## 2019-08-23 NOTE — PROGRESS NOTES
back in a better positioned chair. Pt was instructed to use UB as additional support for the unstable R knee. Balance  Posture: Good  Sitting - Static: Good;-  Sitting - Dynamic: Good;-  Standing - Static: Fair  Standing - Dynamic: [address]  Times per week: 1-2x/D, 5-6D/WK  Current Treatment Recommendations: Strengthening, Gait Training, Balance Training, Home Exercise Program, Endurance Training, Functional Mobility Training, Transfer Training, Safety Education & Training  Safety Devices  Type of devices: Left in chair, Patient at risk for falls, Gait belt, Call light within reach      AM-PAC Score  AM-PAC Inpatient Mobility Raw Score : 17 (08/23/19 1446)  AM-PAC Inpatient T-Scale Score : 42.13 (08/23/19 1446)  Mobility Inpatient CMS 0-100% Score: 50.57 (08/23/19 1446)  Mobility Inpatient CMS G-Code Modifier : CK (08/23/19 1446)    Goals  Short term goals  Time Frame for Short term goals: 12 visits  Short term goal 1: Pt to complete bed mobility, transfers, and gait (with R/walker) with supervision to return pt to PLOF  Short term goal 2: Pt to ambulate 75 ft or > with r/walker with superivison to return to community ambulation  Short term goal 3: Pt to tolerate 30-45 minutes of funcitonal mobility, balance, strength, transfers, and gait to increase pt aerobic capacity WFL. Short term goal 4: Pt to increase R LE strength to 4/5 or better overall to aid in an increase in stability leading to safer ambulation and increased balance.    Short term goal 5: Educate pt on functional mobility, balance, transfers, gait, exercises, and saftey, and issue written exercise program.     Therapy Time   Individual Concurrent Group Co-treatment   Time In 1424         Time Out 1449         Minutes 25         Timed Code Treatment Minutes: [email or web address] student physical therapist/ supervised by Omar Perez, PT

## 2019-08-24 ENCOUNTER — APPOINTMENT (OUTPATIENT)
Dept: GENERAL RADIOLOGY | Age: 74
DRG: 682 | End: 2019-08-24
Payer: COMMERCIAL

## 2019-08-24 ENCOUNTER — APPOINTMENT (OUTPATIENT)
Dept: ULTRASOUND IMAGING | Age: 74
DRG: 682 | End: 2019-08-24
Payer: COMMERCIAL

## 2019-08-24 LAB
ABSOLUTE EOS #: 0.03 K/UL (ref 0–0.44)
ABSOLUTE IMMATURE GRANULOCYTE: 0.02 K/UL (ref 0–0.3)
ABSOLUTE LYMPH #: 1.91 K/UL (ref 1.1–3.7)
ABSOLUTE MONO #: 0.32 K/UL (ref 0.1–1.2)
ANION GAP SERPL CALCULATED.3IONS-SCNC: 12 MMOL/L (ref 9–17)
BASOPHILS # BLD: 0 % (ref 0–2)
BASOPHILS ABSOLUTE: <0.03 K/UL (ref 0–0.2)
BUN BLDV-MCNC: 24 MG/DL (ref 8–23)
BUN/CREAT BLD: 39 (ref 9–20)
C DIFF AG + TOXIN: NEGATIVE
CALCIUM SERPL-MCNC: 8.6 MG/DL (ref 8.6–10.4)
CHLORIDE BLD-SCNC: 110 MMOL/L (ref 98–107)
CO2: 21 MMOL/L (ref 20–31)
CREAT SERPL-MCNC: 0.61 MG/DL (ref 0.5–0.9)
DIFFERENTIAL TYPE: ABNORMAL
EOSINOPHILS RELATIVE PERCENT: 1 % (ref 1–4)
GFR AFRICAN AMERICAN: >60 ML/MIN
GFR NON-AFRICAN AMERICAN: >60 ML/MIN
GFR SERPL CREATININE-BSD FRML MDRD: ABNORMAL ML/MIN/{1.73_M2}
GFR SERPL CREATININE-BSD FRML MDRD: ABNORMAL ML/MIN/{1.73_M2}
GLUCOSE BLD-MCNC: 114 MG/DL (ref 65–105)
GLUCOSE BLD-MCNC: 116 MG/DL (ref 65–105)
GLUCOSE BLD-MCNC: 80 MG/DL (ref 65–105)
GLUCOSE BLD-MCNC: 95 MG/DL (ref 65–105)
GLUCOSE BLD-MCNC: 95 MG/DL (ref 70–99)
HCT VFR BLD CALC: 33.8 % (ref 36.3–47.1)
HEMOGLOBIN: 10.5 G/DL (ref 11.9–15.1)
IMMATURE GRANULOCYTES: 0 %
LYMPHOCYTES # BLD: 36 % (ref 24–43)
MCH RBC QN AUTO: 31.1 PG (ref 25.2–33.5)
MCHC RBC AUTO-ENTMCNC: 31.1 G/DL (ref 28.4–34.8)
MCV RBC AUTO: 100 FL (ref 82.6–102.9)
MONOCYTES # BLD: 6 % (ref 3–12)
NRBC AUTOMATED: 0 PER 100 WBC
PDW BLD-RTO: 14.6 % (ref 11.8–14.4)
PLATELET # BLD: 173 K/UL (ref 138–453)
PLATELET ESTIMATE: ABNORMAL
PMV BLD AUTO: 9 FL (ref 8.1–13.5)
POTASSIUM SERPL-SCNC: 4.3 MMOL/L (ref 3.7–5.3)
POTASSIUM SERPL-SCNC: 4.4 MMOL/L (ref 3.7–5.3)
POTASSIUM SERPL-SCNC: 4.4 MMOL/L (ref 3.7–5.3)
POTASSIUM SERPL-SCNC: 4.7 MMOL/L (ref 3.7–5.3)
RBC # BLD: 3.38 M/UL (ref 3.95–5.11)
RBC # BLD: ABNORMAL 10*6/UL
SEG NEUTROPHILS: 57 % (ref 36–65)
SEGMENTED NEUTROPHILS ABSOLUTE COUNT: 3.06 K/UL (ref 1.5–8.1)
SODIUM BLD-SCNC: 143 MMOL/L (ref 135–144)
SPECIMEN DESCRIPTION: NORMAL
WBC # BLD: 5.4 K/UL (ref 3.5–11.3)
WBC # BLD: ABNORMAL 10*3/UL

## 2019-08-24 PROCEDURE — 99232 SBSQ HOSP IP/OBS MODERATE 35: CPT | Performed by: INTERNAL MEDICINE

## 2019-08-24 PROCEDURE — 80048 BASIC METABOLIC PNL TOTAL CA: CPT

## 2019-08-24 PROCEDURE — 6360000002 HC RX W HCPCS: Performed by: NURSE PRACTITIONER

## 2019-08-24 PROCEDURE — 85025 COMPLETE CBC W/AUTO DIFF WBC: CPT

## 2019-08-24 PROCEDURE — 71045 X-RAY EXAM CHEST 1 VIEW: CPT

## 2019-08-24 PROCEDURE — 2580000003 HC RX 258: Performed by: INTERNAL MEDICINE

## 2019-08-24 PROCEDURE — 6370000000 HC RX 637 (ALT 250 FOR IP): Performed by: NURSE PRACTITIONER

## 2019-08-24 PROCEDURE — 82947 ASSAY GLUCOSE BLOOD QUANT: CPT

## 2019-08-24 PROCEDURE — 84132 ASSAY OF SERUM POTASSIUM: CPT

## 2019-08-24 PROCEDURE — 2060000000 HC ICU INTERMEDIATE R&B

## 2019-08-24 PROCEDURE — 94760 N-INVAS EAR/PLS OXIMETRY 1: CPT

## 2019-08-24 PROCEDURE — 6370000000 HC RX 637 (ALT 250 FOR IP): Performed by: INTERNAL MEDICINE

## 2019-08-24 PROCEDURE — 94640 AIRWAY INHALATION TREATMENT: CPT

## 2019-08-24 PROCEDURE — 2700000000 HC OXYGEN THERAPY PER DAY

## 2019-08-24 PROCEDURE — 36415 COLL VENOUS BLD VENIPUNCTURE: CPT

## 2019-08-24 PROCEDURE — 76770 US EXAM ABDO BACK WALL COMP: CPT

## 2019-08-24 RX ADMIN — ENOXAPARIN SODIUM 30 MG: 30 INJECTION SUBCUTANEOUS at 10:35

## 2019-08-24 RX ADMIN — GABAPENTIN 600 MG: 300 CAPSULE ORAL at 21:10

## 2019-08-24 RX ADMIN — IPRATROPIUM BROMIDE AND ALBUTEROL SULFATE 1 AMPULE: .5; 3 SOLUTION RESPIRATORY (INHALATION) at 08:06

## 2019-08-24 RX ADMIN — ALPRAZOLAM 1 MG: 0.25 TABLET ORAL at 23:31

## 2019-08-24 RX ADMIN — ALPRAZOLAM 1 MG: 0.25 TABLET ORAL at 14:48

## 2019-08-24 RX ADMIN — SIMVASTATIN 40 MG: 40 TABLET, FILM COATED ORAL at 10:36

## 2019-08-24 RX ADMIN — MOMETASONE FUROATE AND FORMOTEROL FUMARATE DIHYDRATE 2 PUFF: 200; 5 AEROSOL RESPIRATORY (INHALATION) at 08:06

## 2019-08-24 RX ADMIN — HYDROCODONE BITARTRATE AND ACETAMINOPHEN 3 TABLET: 5; 325 TABLET ORAL at 23:31

## 2019-08-24 RX ADMIN — ASPIRIN 81 MG 81 MG: 81 TABLET ORAL at 10:36

## 2019-08-24 RX ADMIN — PANTOPRAZOLE SODIUM 40 MG: 40 TABLET, DELAYED RELEASE ORAL at 06:02

## 2019-08-24 RX ADMIN — FLUOXETINE 20 MG: 20 CAPSULE ORAL at 10:36

## 2019-08-24 RX ADMIN — ALPRAZOLAM 1 MG: 0.25 TABLET ORAL at 06:40

## 2019-08-24 RX ADMIN — HYDROCODONE BITARTRATE AND ACETAMINOPHEN 3 TABLET: 5; 325 TABLET ORAL at 16:44

## 2019-08-24 RX ADMIN — SODIUM CHLORIDE: 9 INJECTION, SOLUTION INTRAVENOUS at 06:02

## 2019-08-24 RX ADMIN — GABAPENTIN 600 MG: 300 CAPSULE ORAL at 14:48

## 2019-08-24 RX ADMIN — HYDROCODONE BITARTRATE AND ACETAMINOPHEN 3 TABLET: 5; 325 TABLET ORAL at 06:40

## 2019-08-24 RX ADMIN — GABAPENTIN 600 MG: 300 CAPSULE ORAL at 10:36

## 2019-08-24 ASSESSMENT — PAIN SCALES - GENERAL
PAINLEVEL_OUTOF10: 10

## 2019-08-24 NOTE — PROGRESS NOTES
Nephrology Progress Note    Subjective/   69y.o. year old female who we are seeing in consultation for Acute kidney injury and Hyperkalemia. HPI:  This is a 69 y.o. female  With past medical history of  Low-grade lymphoma, COPD, type 2 diabetes, essential hypertension,  Patient presented to the hospital after she had a blood work done which showed elevated creatinine and hyperkalemia and patient was advised to go to the hospital for further management. Patient was recently admitted  From 08/02/2019 to 08/06/2019 and she was treated for pneumonia. Patient was discharged on Augmentin for 7 days for pneumonia  treatment. patient was also discharged on Bactrim for PCP prophylaxis for her chemotherapy, and lisinopril. She denied nausea or vomiting and her shortness of breath is stable  Initial lab investigation ON 8/22/19  revealed  Serum creatinine of 1.7 mg/dL with potassium of 5.6 which increased to 7.1. She was treated with insulin D50 Kayexalate IV normal saline and IV Lasix  Nephrology consultation was requested for treatment of hyperkalemia and acute kidney injury  Pt denies any history of  prolonged NSAID use. Patient denies dysuria, gross hematuria, flank pain, nocturia, urgency, passing frothy urine or urinary incontinence. There has been no recent exposure to IV contrast. There is no history  of paraprotein disease. Pt denies any history of recurrent UTI or kidney stones. Medication review shows use of ACE-inhibitor And Bactrim     Interval history  Patient was transferred out of ICU yesterday. Renal function is significantly improved on potassium has been stable. She denies any abdominal pain shortness of breath or fever.     Objective/     Vitals:    08/23/19 1918 08/24/19 0008 08/24/19 0443 08/24/19 0732   BP: 133/65 (!) 143/73 (!) 145/71 (!) 132/53   Pulse: 77 75 59 50   Resp: 18 18 18 16   Temp: 97 °F (36.1 °C) 97.7 °F (36.5 °C) 97.3 °F (36.3 °C) 97.7 °F (36.5 °C)   TempSrc: Oral Oral Oral

## 2019-08-24 NOTE — PROGRESS NOTES
Tubulovillous adenoma. Past Surgical History:   has a past surgical history that includes Hysterectomy; Colonoscopy; Colonoscopy (2016); and Tunneled venous port placement (2019). Family History: family history includes Heart Disease in her brother and mother. Social History:   reports that she quit smoking about 11 years ago. Her smoking use included cigarettes. She has a 10.00 pack-year smoking history. She has never used smokeless tobacco. She reports that she does not drink alcohol or use drugs. Medications:    Reviewed in EPIC     Allergies:  Patient has no known allergies. REVIEW OF SYSTEMS:      Review of Systems   Constitutional: Positive for activity change, appetite change and fatigue. HENT: Positive for sore throat. Sensation of choking and aspiration   Respiratory: Positive for cough, chest tightness and shortness of breath. Cardiovascular: Negative for chest pain, palpitations and leg swelling. Gastrointestinal: Positive for nausea and vomiting. Negative for blood in stool, constipation and diarrhea. Genitourinary: Negative for difficulty urinating, frequency and hematuria. Musculoskeletal: Positive for myalgias and neck stiffness. Negative for arthralgias. Neurological: Positive for dizziness and light-headedness. Negative for seizures, speech difficulty and numbness. Psychiatric/Behavioral: Negative for agitation, behavioral problems and confusion. PHYSICAL EXAM:      BP (!) 132/53   Pulse 50   Temp 97.7 °F (36.5 °C) (Oral)   Resp 16   Ht 4' 9\" (1.448 m)   Wt 197 lb 12 oz (89.7 kg)   SpO2 96%   BMI 42.79 kg/m²    Temp (24hrs), Av.6 °F (36.4 °C), Min:97 °F (36.1 °C), Max:98.2 °F (36.8 °C)      Physical Exam   Constitutional: She is oriented to person, place, and time. She appears well-developed and well-nourished. HENT:   Head: Normocephalic and atraumatic. Mouth/Throat: No oropharyngeal exudate.    Eyes: Pupils are equal, round, and reactive to light. EOM are normal. No scleral icterus. Neck: Normal range of motion. Neck supple. Cardiovascular: Normal rate and regular rhythm. Pulmonary/Chest: Effort normal.   Decreased air entry both basis      Abdominal: Soft. She exhibits distension. She exhibits no mass. There is no tenderness. There is no guarding. Musculoskeletal: Normal range of motion. She exhibits no edema or deformity. Lymphadenopathy:     She has no cervical adenopathy. Neurological: She is alert and oriented to person, place, and time. No cranial nerve deficit. Skin: Skin is warm and dry. Psychiatric: She has a normal mood and affect. Her behavior is normal. Thought content normal.             DATA:      Labs:       CBC:   Recent Labs     08/23/19  0419 08/24/19  0323   WBC 4.4 5.4   HGB 11.6* 10.5*   HCT 38.3 33.8*    173     BMP:   Recent Labs     08/23/19  1515  08/24/19  0323 08/24/19  0753     --  143  --    K 4.8   < > 4.4 4.3   CO2 20  --  21  --    BUN 31*  --  24*  --    CREATININE 0.88  --  0.61  --    LABGLOM >60  --  >60  --    GLUCOSE 148*  --  95  --     < > = values in this interval not displayed. PT/INR: No results for input(s): PROTIME, INR in the last 72 hours. APTT:No results for input(s): APTT in the last 72 hours.   LIVER PROFILE:  Recent Labs     08/22/19  1145   AST 13   ALT 9   LABALBU 3.9               IMPRESSION:    Primary Problem  Hypovolemic shock ([facility]    Active Hospital Problems    Diagnosis Date Noted    Hyperkalemia [E87.5] 69/61/3956    Metabolic acidosis [Y87.5] 08/23/2019    Hypovolemic shock ([facility] [R57.1] 08/22/2019    Acute kidney injury ([facility] [address] [N17.9] 08/22/2019    Marginal zone lymphoma ([facility] [address] [C85.80] 08/28/2018    Acute respiratory failure ([facility] [J96.00] 07/30/2015    COPD exacerbation (HCC) [J44.1] 07/30/2015    COPD (chronic obstructive pulmonary disease) ([email or web address] [J44.9] 05/22/2012    Diabetes mellitus ([email or web address] [E11.9] 05/22/2012    Obesity [E66.9] 05/22/2012    Post traumatic stress disorder (PTSD) [F43.10] 05/22/2012       RECOMMENDATIONS:  1. The patient has underlying marginal zone lymphoma which treated by rituximab. She has been off therapy for at least 2 months. Patient is on maintenance schedule for Rituxan every 2 months. There is no need for Bactrim PCP prophylaxis. 2. Continue supportive care with fluids, blood pressure seems to be improving. Kidney function and potassium are back to normal.  3. I think she will need aggressive management of her GI issues as an outpatient. 4. We highly appreciate the management of the primary team.   5. Discussed with Dr Cody Alex.            Angel Stiles MD

## 2019-08-24 NOTE — PROGRESS NOTES
Pulmonary Critical Care Progress Note    Patient seen for the follow up of Hypovolemic shock ([address]     Subjective:     she has diffuse body aches and does not feel well today. She has been feeling tired. She has no shortness of breath. She has mild occasional dry cough. She has poor appetite    Examination:    Vitals: /60   Pulse 61   Temp 97.5 °F (36.4 °C) (Oral)   Resp 16   Ht 4' 9\" (1.448 m)   Wt 197 lb 12 oz (89.7 kg)   SpO2 97%   BMI 42.79 kg/m²   SpO2  Av.8 %  Min: 92 %  Max: 99 %  General appearance: alert and cooperative with exam  Neck: No JVD  Lungs:   Decreased breath sounds no crackles or wheezing  Heart: regular rate and rhythm, S1, S2 normal, no gallop  Abdomen: Soft, non tender, + BS  Extremities: no cyanosis or clubbing. No significant edema    LABs:    CBC:   Recent Labs     19  0419 19  0323   WBC 4.4 5.4   HGB 11.6* 10.5*   HCT 38.3 33.8*    173     BMP:   Recent Labs     19  1515  19  0323 19  0753     --  143  --    K 4.8   < > 4.4 4.3   CO2 20  --  21  --    BUN 31*  --  24*  --    CREATININE 0.88  --  0.61  --    LABGLOM >60  --  >60  --    GLUCOSE 148*  --  95  --     < > = values in this interval not displayed. LIVER PROFILE:  Recent Labs     19  1145   AST 13   ALT 9   LABALBU 3.9       Radiology:     chest x-ray   Right IJ infusion port remains in place.  Stable cardiomediastinal   silhouette.  Mild pulmonary edema.  No focal consolidation.    Penetration of right hemidiaphragm noted.           Impression:  · COPD, former smoker, 50-pack-year smoking history, quit 25 yrs ago   · KATHRIN/Hyperkalemia  /status post hypotension  · Suspected obstructive sleep apnea/Obesity  · Marginal zone lymphoma, last rituximab treatment was 2 months ago    · DM, OA, PTSD        Recommendations:  ·  oxygen by nasal cannula  ·  incentive spirometer every hour awake  · Albuterol and Ipratropium Q 4 hours and prn  · Neeraj Morales

## 2019-08-24 NOTE — PROGRESS NOTES
Physical Therapy  DATE: 2019    NAME: Cally Pérez  MRN: [id number]   : 1945    Patient not seen this date for Physical Therapy due to:  [] Blood transfusion in progress  [] Cancel by RN  [] Hemodialysis  [x]  Refusal by Patient (Attempt to see pt at 11:12 pt states that she is to tired for PT at this time)   [] Spine Precautions   [] Strict Bedrest  [] Surgery  [] Testing      [] Other        [] PT being discontinued at this time. Patient independent. No further needs. [] PT being discontinued at this time as the patient has been transferred to hospice care. No further needs.     April Chase, Student Physical Therapist Assistant

## 2019-08-25 VITALS
HEART RATE: 68 BPM | TEMPERATURE: 97.7 F | OXYGEN SATURATION: 93 % | HEIGHT: 57 IN | WEIGHT: 197.75 LBS | RESPIRATION RATE: 16 BRPM | BODY MASS INDEX: 42.66 KG/M2 | SYSTOLIC BLOOD PRESSURE: 137 MMHG | DIASTOLIC BLOOD PRESSURE: 61 MMHG

## 2019-08-25 LAB
ANION GAP SERPL CALCULATED.3IONS-SCNC: 9 MMOL/L (ref 9–17)
BUN BLDV-MCNC: 19 MG/DL (ref 8–23)
BUN/CREAT BLD: 39 (ref 9–20)
CALCIUM SERPL-MCNC: 9 MG/DL (ref 8.6–10.4)
CHLORIDE BLD-SCNC: 110 MMOL/L (ref 98–107)
CO2: 23 MMOL/L (ref 20–31)
CREAT SERPL-MCNC: 0.49 MG/DL (ref 0.5–0.9)
EKG ATRIAL RATE: 60 BPM
EKG ATRIAL RATE: 60 BPM
EKG P AXIS: 53 DEGREES
EKG P AXIS: 9 DEGREES
EKG P-R INTERVAL: 196 MS
EKG P-R INTERVAL: 218 MS
EKG Q-T INTERVAL: 398 MS
EKG Q-T INTERVAL: 422 MS
EKG QRS DURATION: 80 MS
EKG QRS DURATION: 84 MS
EKG QTC CALCULATION (BAZETT): 398 MS
EKG QTC CALCULATION (BAZETT): 422 MS
EKG R AXIS: 54 DEGREES
EKG R AXIS: 55 DEGREES
EKG T AXIS: 55 DEGREES
EKG T AXIS: 55 DEGREES
EKG VENTRICULAR RATE: 60 BPM
EKG VENTRICULAR RATE: 60 BPM
GFR AFRICAN AMERICAN: >60 ML/MIN
GFR NON-AFRICAN AMERICAN: >60 ML/MIN
GFR SERPL CREATININE-BSD FRML MDRD: ABNORMAL ML/MIN/{1.73_M2}
GFR SERPL CREATININE-BSD FRML MDRD: ABNORMAL ML/MIN/{1.73_M2}
GLUCOSE BLD-MCNC: 157 MG/DL (ref 70–99)
GLUCOSE BLD-MCNC: 71 MG/DL (ref 65–105)
GLUCOSE BLD-MCNC: 75 MG/DL (ref 65–105)
POTASSIUM SERPL-SCNC: 4.1 MMOL/L (ref 3.7–5.3)
POTASSIUM SERPL-SCNC: 4.4 MMOL/L (ref 3.7–5.3)
SODIUM BLD-SCNC: 142 MMOL/L (ref 135–144)

## 2019-08-25 PROCEDURE — 80048 BASIC METABOLIC PNL TOTAL CA: CPT

## 2019-08-25 PROCEDURE — 6370000000 HC RX 637 (ALT 250 FOR IP): Performed by: NURSE PRACTITIONER

## 2019-08-25 PROCEDURE — 36415 COLL VENOUS BLD VENIPUNCTURE: CPT

## 2019-08-25 PROCEDURE — 82947 ASSAY GLUCOSE BLOOD QUANT: CPT

## 2019-08-25 PROCEDURE — 94640 AIRWAY INHALATION TREATMENT: CPT

## 2019-08-25 PROCEDURE — 99232 SBSQ HOSP IP/OBS MODERATE 35: CPT | Performed by: INTERNAL MEDICINE

## 2019-08-25 PROCEDURE — 6360000002 HC RX W HCPCS: Performed by: NURSE PRACTITIONER

## 2019-08-25 PROCEDURE — 84132 ASSAY OF SERUM POTASSIUM: CPT

## 2019-08-25 PROCEDURE — 6360000002 HC RX W HCPCS: Performed by: INTERNAL MEDICINE

## 2019-08-25 PROCEDURE — 99238 HOSP IP/OBS DSCHRG MGMT 30/<: CPT | Performed by: INTERNAL MEDICINE

## 2019-08-25 RX ORDER — ASPIRIN 81 MG/1
81 TABLET, CHEWABLE ORAL DAILY
Qty: 30 TABLET | Refills: 3 | Status: SHIPPED | OUTPATIENT
Start: 2019-08-26 | End: 2020-02-06 | Stop reason: SDUPTHER

## 2019-08-25 RX ORDER — LANCETS 30 GAUGE
1 EACH MISCELLANEOUS 2 TIMES DAILY
Qty: 100 EACH | Refills: 3 | Status: SHIPPED | OUTPATIENT
Start: 2019-08-25 | End: 2019-11-29 | Stop reason: SDUPTHER

## 2019-08-25 RX ADMIN — ENOXAPARIN SODIUM 30 MG: 30 INJECTION SUBCUTANEOUS at 10:32

## 2019-08-25 RX ADMIN — HYDROCODONE BITARTRATE AND ACETAMINOPHEN 3 TABLET: 5; 325 TABLET ORAL at 07:37

## 2019-08-25 RX ADMIN — ALPRAZOLAM 1 MG: 0.25 TABLET ORAL at 10:32

## 2019-08-25 RX ADMIN — PANTOPRAZOLE SODIUM 40 MG: 40 TABLET, DELAYED RELEASE ORAL at 06:12

## 2019-08-25 RX ADMIN — IPRATROPIUM BROMIDE AND ALBUTEROL SULFATE 1 AMPULE: .5; 3 SOLUTION RESPIRATORY (INHALATION) at 11:05

## 2019-08-25 RX ADMIN — Medication 100 UNITS: at 14:34

## 2019-08-25 RX ADMIN — SIMVASTATIN 40 MG: 40 TABLET, FILM COATED ORAL at 10:32

## 2019-08-25 RX ADMIN — ASPIRIN 81 MG 81 MG: 81 TABLET ORAL at 10:32

## 2019-08-25 RX ADMIN — FLUOXETINE 20 MG: 20 CAPSULE ORAL at 10:32

## 2019-08-25 RX ADMIN — GABAPENTIN 600 MG: 300 CAPSULE ORAL at 10:32

## 2019-08-25 ASSESSMENT — PAIN SCALES - GENERAL: PAINLEVEL_OUTOF10: 10

## 2019-08-25 NOTE — PROGRESS NOTES
[facility]    Progress Note      Name:   Nissa Regan  MRN:     [id number]     Acct:      [de-identified]   Room:   [facility]8/1018-02  IP Day:  3  Admit Date:  8/22/2019  1:14 PM    PCP:   Charanjit Ibarra MD  Code Status:  Full Code    Subjective:     C/C:   Chief Complaint   Patient presents with    Acute Renal Failure    Shortness of Breath     Interval History Status: improved. Patient indicates no new complaints this morning. Anxious for discharge. Hemodynamically stable. Afebrile. Creatinine stable off IV fuids   Brief History:   68 y/o with h/o marginal zone Lymphoma on maintenance Rituximab was sent to ED from oncology office after labs done prior to infusion showed KATHRIN . Prior h/o chr resp failure on 3 lit NC o2, COPD, DM2, depression. Patient was recently admitted to [facility] for Rt side pneumonia, acute on chronic CHF and discharged on bactrim for PCP prophylaxis, augmentin, lasix and is on lisinopril at home. At [facility] admission, she ws noted to have dysphagia-underwent EGD:found to have benign appearing Esophageal stricture which was dilated. Patient feels her dysphagia is improved and as such no has no new complaints. +chronic fatigue. ED w/u showed hypotension, KATHRIN -cr 2.32, K 5.7. She was started on IV hydration and was on pressors overnight.   nephrology was consulted-her lasix, bactrim were held, was given insulin+dextrose, kayexalate, IVF and lasix. Review of Systems:     Constitutional:  negative for chills, fevers, sweats  Respiratory:  negative for cough, dyspnea on exertion, shortness of breath, wheezing  Cardiovascular:  negative for chest pain, chest pressure/discomfort, lower extremity edema, palpitations  Gastrointestinal:  negative for abdominal pain, constipation, diarrhea, nausea, vomiting  Neurological:  negative for dizziness, headache    Medications:      Allergies:  No Known Allergies    Current Meds: Scheduled Meds:    insulin lispro  0-6 Units Subcutaneous TID WC    insulin lispro  0-3 Units Subcutaneous Nightly    mometasone-formoterol  2 puff Inhalation BID    ipratropium-albuterol  1 ampule Inhalation 4x daily    pantoprazole  40 mg Oral QAM AC    FLUoxetine  20 mg Oral Daily    gabapentin  600 mg Oral TID    [Held by provider] metFORMIN  750 mg Oral Daily with breakfast    simvastatin  40 mg Oral Daily    sodium chloride flush  10 mL Intravenous 2 times per day    aspirin  81 mg Oral Daily    enoxaparin  30 mg Subcutaneous Daily     Continuous Infusions:    dextrose      DOBUTamine Stopped (19 0110)     PRN Meds: glucose, dextrose, glucagon (rDNA), dextrose, ALPRAZolam, HYDROcodone-acetaminophen, ipratropium-albuterol, sodium chloride flush, magnesium hydroxide, ondansetron **OR** ondansetron    Data:     Past Medical History:   has a past medical history of COPD (chronic obstructive pulmonary disease) ([facility] [address] Depression, Diabetes mellitus ([facility] [address] Hyperlipidemia, PTSD (post-traumatic stress disorder), and Tubulovillous adenoma. Social History:   reports that she quit smoking about 11 years ago. Her smoking use included cigarettes. She has a 10.00 pack-year smoking history. She has never used smokeless tobacco. She reports that she does not drink alcohol or use drugs. Family History:   Family History   Problem Relation Age of Onset    Heart Disease Mother     Heart Disease Brother        Vitals:  /61   Pulse 68   Temp 97.7 °F (36.5 °C) (Oral)   Resp 16   Ht 4' 9\" (1.448 m)   Wt 197 lb 12 oz (89.7 kg)   SpO2 93%   BMI 42.79 kg/m²   Temp (24hrs), Av.6 °F (36.4 °C), Min:97.3 °F (36.3 °C), Max:97.9 °F (36.6 °C)    Recent Labs     19  1647 19  2043 19  0721 19  1144   POCGLU 95 114* 71 75       I/O (24Hr):   No intake or output data in the 24 hours ending 19 1235    Labs:  Hematology:  Recent Labs     19  0419 19  0323   WBC 4.4 5.4   RBC 3.76* 3.38*   HGB 11.6* 10.5*   HCT 38.3 33.8*   .9 100.0   MCH 30.9 31.1   MCHC 30.3 31.1   RDW 14.5* 14.6*    173   MPV 9.9 9.0     Chemistry:  Recent Labs     08/22/19  2134 08/23/19  0028 08/23/19  0419  08/23/19  1515  08/24/19  0323  08/24/19  1718 08/25/19  0010 08/25/19  0917   NA  --   --  138  --  143  --  143  --   --   --  142   K  --   --  7.1*   < > 4.8   < > 4.4   < > 4.7 4.4 4.1   CL  --   --  109*  --  108*  --  110*  --   --   --  110*   CO2  --   --  19*  --  20  --  21  --   --   --  23   GLUCOSE  --   --  153*  --  148*  --  95  --   --   --  157*   BUN  --   --  39*  --  31*  --  24*  --   --   --  19   CREATININE  --   --  1.23*  --  0.88  --  0.61  --   --   --  0.49*   ANIONGAP  --   --  10  --  15  --  12  --   --   --  9   LABGLOM  --   --  43*  --  >60  --  >60  --   --   --  >60   GFRAA  --   --  52*  --  >60  --  >60  --   --   --  >60   CALCIUM  --   --  8.9  --  8.9  --  8.6  --   --   --  9.0   PROBNP 289  --   --   --   --   --   --   --   --   --   --    TROPHS 13 24* 22*  --   --   --   --   --   --   --   --     < > = values in this interval not displayed. Recent Labs     08/24/19  0716 08/24/19  1119 08/24/19  1647 08/24/19  2043 08/25/19  0721 08/25/19  1144   POCGLU 80 116* 95 114* 71 75       Radiology:  Xr Chest Portable    Result Date: 8/24/2019  Mild seizure prominence stable. No acute process. Xr Chest Portable    Result Date: 8/22/2019  Chronic pulmonary change without acute cardiopulmonary process. Us Retroperitoneal Complete    Result Date: 8/24/2019  Unremarkable ultrasound of the kidneys and urinary bladder.        Physical Examination:        General appearance:  alert, cooperative and no distress  Mental Status:  oriented to person, place and time and normal affect  Lungs:  clear to auscultation bilaterally, normal effort  Heart:  regular rate and rhythm, no murmur  Abdomen:  soft, nontender, nondistended, normal bowel

## 2019-08-25 NOTE — PROGRESS NOTES
Patient discharged per wheelchair to personal vehicle with documented belongings. Discharge paperwork given and discussed, questions and concerns answered to the best of writers knowledge.

## 2019-08-25 NOTE — PROGRESS NOTES
Labs:  Hematology:  Recent Labs     08/22/19  1145 08/23/19  0419 08/24/19  0323   WBC 6.5 4.4 5.4   RBC 3.96 3.76* 3.38*   HGB 12.0 11.6* 10.5*   HCT 39.8 38.3 33.8*   .5 101.9 100.0   MCH 30.3 30.9 31.1   MCHC 30.2 30.3 31.1   RDW 14.6* 14.5* 14.6*    189 173   MPV 9.0 9.9 9.0     Chemistry:  Recent Labs     08/22/19  2134 08/23/19  0028 08/23/19  0419  08/23/19  1515  08/24/19  0323 08/24/19  0753 08/24/19  1718   NA  --   --  138  --  143  --  143  --   --    K  --   --  7.1*   < > 4.8   < > 4.4 4.3 4.7   CL  --   --  109*  --  108*  --  110*  --   --    CO2  --   --  19*  --  20  --  21  --   --    GLUCOSE  --   --  153*  --  148*  --  95  --   --    BUN  --   --  39*  --  31*  --  24*  --   --    CREATININE  --   --  1.23*  --  0.88  --  0.61  --   --    ANIONGAP  --   --  10  --  15  --  12  --   --    LABGLOM  --   --  43*  --  >60  --  >60  --   --    GFRAA  --   --  52*  --  >60  --  >60  --   --    CALCIUM  --   --  8.9  --  8.9  --  8.6  --   --    PROBNP 289  --   --   --   --   --   --   --   --    TROPHS 13 24* 22*  --   --   --   --   --   --     < > = values in this interval not displayed. Recent Labs     08/22/19  1145  08/23/19  1728 08/23/19 2055 08/24/19  0716 08/24/19  1119 08/24/19  1647 08/24/19  2043   PROT 6.9  --   --   --   --   --   --   --    LABALBU 3.9  --   --   --   --   --   --   --    AST 13  --   --   --   --   --   --   --    ALT 9  --   --   --   --   --   --   --    ALKPHOS 53  --   --   --   --   --   --   --    BILITOT 0.19*  --   --   --   --   --   --   --    POCGLU  --    < > 137* 146* 80 116* 95 114*    < > = values in this interval not displayed. Radiology:  Xr Chest Portable    Result Date: 8/24/2019  Mild seizure prominence stable. No acute process. Xr Chest Portable    Result Date: 8/22/2019  Chronic pulmonary change without acute cardiopulmonary process.      Us Retroperitoneal Complete    Result Date: 8/24/2019  Unremarkable ultrasound of the kidneys and urinary bladder. Physical Examination:        General appearance:  alert, cooperative and no distress  Mental Status:  oriented to person, place and time and normal affect  Lungs:  clear to auscultation bilaterally, normal effort  Heart:  regular rate and rhythm, no murmur  Abdomen:  soft, nontender, nondistended, normal bowel sounds, no masses, hepatomegaly, splenomegaly  Extremities:  no edema, redness, tenderness in the calves  Skin:  no gross lesions, rashes, induration    Assessment:        Hospital Problems           Last Modified POA    * (Principal) Hypovolemic shock ([address] 8/23/2019 Yes    Obesity 8/22/2019 Yes    COPD (chronic obstructive pulmonary disease) ([address] 8/22/2019 Yes    Post traumatic stress disorder (PTSD) 8/22/2019 Yes    Diabetes mellitus ([address] 8/22/2019 Yes    Acute respiratory failure ([address] 8/22/2019 Yes    COPD exacerbation ([address] 8/22/2019 Yes    Marginal zone lymphoma ([address] 8/23/2019 Yes    Acute kidney injury ([address] 8/23/2019 Yes    Hyperkalemia 8/27/1236 Yes    Metabolic acidosis 2/17/6835 Yes    Dehydration 8/24/2019 Yes          Plan:      - renal function is improved, back to baseline. Appreciate nephrology recs. Off IVF. Continue to monitor renal function off of IVfluids. If remains stable, plan to d/c to home in next 24 hrs. - d/w Dr Nestor Arias: no need for PCP prophylaxis while on maintenance Rituximab.       Ronal Cotton MD

## 2019-08-25 NOTE — PROGRESS NOTES
Pulmonary Critical Care Progress Note    Patient seen for the follow up of Hypovolemic shock ([address]     Subjective:      She denies shortness of breath. She has improved cough. .  She has been feeling tired. She has no   Chest pain. She  Had good appetite today. She is ambulating. Examination:    Vitals: /61   Pulse 68   Temp 97.7 °F (36.5 °C) (Oral)   Resp 16   Ht 4' 9\" (1.448 m)   Wt 197 lb 12 oz (89.7 kg)   SpO2 93%   BMI 42.79 kg/m²   SpO2  Av %  Min: 91 %  Max: 98 %  General appearance: alert and cooperative with exam  Neck: No JVD  Lungs:   Decreased breath sounds no crackles or wheezing  Heart: regular rate and rhythm, S1, S2 normal, no gallop  Abdomen: Soft, non tender, + BS  Extremities: no cyanosis or clubbing. No significant edema    LABs:    CBC:   Recent Labs     19  0419 19  0323   WBC 4.4 5.4   HGB 11.6* 10.5*   HCT 38.3 33.8*    173     BMP:   Recent Labs     19  0323  19  0010 19  0917     --   --  142   K 4.4   < > 4.4 4.1   CO2 21  --   --  23   BUN 24*  --   --  19   CREATININE 0.61  --   --  0.49*   LABGLOM >60  --   --  >60   GLUCOSE 95  --   --  157*    < > = values in this interval not displayed. LIVER PROFILE:  No results for input(s): AST, ALT, LABALBU in the last 72 hours. Radiology:     chest x-ray   Right IJ infusion port remains in place.  Stable cardiomediastinal   silhouette.  Mild pulmonary edema.  No focal consolidation.    Penetration of right hemidiaphragm noted.           Impression:  · COPD, former smoker, 50-pack-year smoking history, quit 25 yrs ago   · KATHRIN/Hyperkalemia  /status post hypotension  · Suspected obstructive sleep apnea/Obesity  · Marginal zone lymphoma, last rituximab treatment was 2 months ago    · DM, OA, PTSD        Recommendations:  ·   Discontinue oxygen by nasal cannula  ·  incentive spirometer every hour awake  · Albuterol and Ipratropium Q 4 hours and prn  · [facility]

## 2019-08-26 ENCOUNTER — CARE COORDINATION (OUTPATIENT)
Dept: CASE MANAGEMENT | Age: 74
End: 2019-08-26

## 2019-08-26 ENCOUNTER — CARE COORDINATION (OUTPATIENT)
Dept: FAMILY MEDICINE CLINIC | Age: 74
End: 2019-08-26

## 2019-08-26 ASSESSMENT — ENCOUNTER SYMPTOMS: DYSPNEA ASSOCIATED WITH: MINIMAL EXERTION

## 2019-08-28 ENCOUNTER — TELEPHONE (OUTPATIENT)
Dept: ADMINISTRATIVE | Age: 74
End: 2019-08-28

## 2019-08-29 ENCOUNTER — CARE COORDINATION (OUTPATIENT)
Dept: CASE MANAGEMENT | Age: 74
End: 2019-08-29

## 2019-08-29 NOTE — CARE COORDINATION
[facility] Transitions Follow Up Call    2019    Patient: Cally Pérez  Patient : 1945   MRN: [id number]  Reason for Admission: SOB, ARF, dehydration, hypotension, COPD, hypovolemic shock   Discharge Date: 19 RARS: Readmission Risk Score: 20  CMRS 6       Spoke with: Trey [employer]    Call to pt who states she is feeling \"much better\" but still a little weak. States she thinks she is choking every time she eats or drinks   Denies nausea. States appetite is good. States she thinks she is aspirating while eating/ drinking (pt with history of multiple admissions for pneumonia)  Writer advised she make appt with Dr Kiran Ochoa sooner rather than 19 (pt declined to move appt up per scheduling pool note). Pt agreeable to contacting PCP office to make appt soon. Writer advised eating upright, eat/ drink slowly chewing food- states understanding   Denies needs  Writer or [facility] to call pt tomorrow to schedule appt with Dr Kiran Ochoa  Encouraged to call writer/ [facility] or providers if questions or concerns- v/u     Writer sends secure email to [employer] [place] [address] requesting appt for pt (TCM)       Care Transitions Subsequent and Final Call    Subsequent and Final Calls  Do you have any ongoing symptoms?:  No  Have your medications changed?:  No  Do you have any questions related to your medications?:  No  Do you currently have any active services?:  Yes  Are you currently active with any services?:  Home Health  Do you have any needs or concerns that I can assist you with?:  No  Identified Barriers:  Lack of Education  Care Transitions Interventions  Other Interventions:             Follow Up  Future Appointments   Date Time Provider Thad Jose   2019  1:30 PM [facility]   2019  2:00 PM Hui Escobedo MD [facility]   2019  2:15 PM MD EVANS Monteiro [facility]   10/15/2019  3:15 PM MD Mercedes Monteiro RN

## 2019-08-30 ENCOUNTER — CARE COORDINATION (OUTPATIENT)
Dept: FAMILY MEDICINE CLINIC | Age: 74
End: 2019-08-30

## 2019-08-30 NOTE — CARE COORDINATION
Sig Start Date End Date Taking? Authorizing Provider   aspirin 81 MG chewable tablet Take 1 tablet by mouth daily 8/26/19   Lyndy Spatz, MD   Lancets MISC 1 each by Other route 2 times daily Indications: [employer] Glucose Meter 8/25/19   Lyndy Spatz, MD   Magic Mouthwash (MIRACLE MOUTHWASH) Swish and spit 5 mLs 4 times daily as needed for Irritation    Historical Provider, MD   ZINC PO Take 1 tablet by mouth daily    Historical Provider, MD   lisinopril (PRINIVIL;ZESTRIL) 10 MG tablet TAKE ONE TABLET BY MOUTH DAILY 8/15/19   Duc Sibley MD   FLUoxetine (PROZAC) 20 MG capsule Take 1 capsule by mouth daily 8/15/19   Arik Valero MD   simvastatin (ZOCOR) 40 MG tablet TAKE ONE TABLET BY MOUTH DAILY 8/15/19   Arik Valero MD   albuterol-ipratropium (COMBIVENT RESPIMAT)  MCG/ACT AERS inhaler INHALE ONE INHALATION BY MOUTH FOUR TIMES A DAY 8/15/19   Marshall Valero MD   ALPRAZolam (XANAX) 1 MG tablet Take 1 tablet by mouth 3 times daily as needed for Sleep or Anxiety for up to 30 days. 8/15/19 9/14/19  Duc Sibley MD   HYDROcodone-acetaminophen (NORCO) 7.5-325 MG per tablet Take 2 tablets by mouth every 6 hours as needed for Pain for up to 30 days. Intended supply: 30 days 8/7/19 9/6/19  Duc Sibley MD   prochlorperazine (COMPAZINE) 10 MG tablet Take 1 tablet by mouth every 6 hours as needed (CHEMO INDUCED NAUSEA) 4/30/19   Megha Evans MD   nystatin (MYCOSTATIN) 046408 UNIT/GM cream Apply topically 2 times daily. 4/29/19   Leonardo Pereira MD   gabapentin (NEURONTIN) 600 MG tablet Take 600 mg by mouth 3 times daily. Epifanio Gannon     Historical Provider, MD   esomeprazole (NEXIUM) 40 MG delayed release capsule TAKE ONE CAPSULE BY MOUTH DAILY 1/14/19   Duc Sibley MD   metFORMIN (GLUCOPHAGE-XR) 750 MG extended release tablet Take 1 tablet by mouth daily (with breakfast) 1/14/19   Arik Valero MD   ipratropium-albuterol (DUONEB) 0.5-2.5 (3) MG/3ML SOLN nebulizer solution Inhale 3 mLs

## 2019-09-03 ENCOUNTER — CARE COORDINATION (OUTPATIENT)
Dept: FAMILY MEDICINE CLINIC | Age: 74
End: 2019-09-03

## 2019-09-03 DIAGNOSIS — M15.9 PRIMARY OSTEOARTHRITIS INVOLVING MULTIPLE JOINTS: ICD-10-CM

## 2019-09-03 SDOH — HEALTH STABILITY: MENTAL HEALTH
STRESS IS WHEN SOMEONE FEELS TENSE, NERVOUS, ANXIOUS, OR CAN'T SLEEP AT NIGHT BECAUSE THEIR MIND IS TROUBLED. HOW STRESSED ARE YOU?: ONLY A LITTLE

## 2019-09-03 SDOH — HEALTH STABILITY: PHYSICAL HEALTH: ON AVERAGE, HOW MANY DAYS PER WEEK DO YOU ENGAGE IN MODERATE TO STRENUOUS EXERCISE (LIKE A BRISK WALK)?: 0 DAYS

## 2019-09-03 SDOH — ECONOMIC STABILITY: FOOD INSECURITY: WITHIN THE PAST 12 MONTHS, YOU WORRIED THAT YOUR FOOD WOULD RUN OUT BEFORE YOU GOT MONEY TO BUY MORE.: NEVER TRUE

## 2019-09-03 SDOH — HEALTH STABILITY: PHYSICAL HEALTH: ON AVERAGE, HOW MANY MINUTES DO YOU ENGAGE IN EXERCISE AT THIS LEVEL?: 0 MIN

## 2019-09-03 SDOH — ECONOMIC STABILITY: FOOD INSECURITY: WITHIN THE PAST 12 MONTHS, THE FOOD YOU BOUGHT JUST DIDN'T LAST AND YOU DIDN'T HAVE MONEY TO GET MORE.: NEVER TRUE

## 2019-09-04 ENCOUNTER — TELEPHONE (OUTPATIENT)
Dept: FAMILY MEDICINE CLINIC | Age: 74
End: 2019-09-04

## 2019-09-04 ENCOUNTER — CARE COORDINATION (OUTPATIENT)
Dept: CARE COORDINATION | Age: 74
End: 2019-09-04

## 2019-09-04 ENCOUNTER — CARE COORDINATION (OUTPATIENT)
Dept: FAMILY MEDICINE CLINIC | Age: 74
End: 2019-09-04

## 2019-09-04 RX ORDER — HYDROCODONE BITARTRATE AND ACETAMINOPHEN 7.5; 325 MG/1; MG/1
2 TABLET ORAL EVERY 6 HOURS PRN
Qty: 180 TABLET | Refills: 0 | Status: SHIPPED | OUTPATIENT
Start: 2019-09-04 | End: 2019-10-01 | Stop reason: SDUPTHER

## 2019-09-04 NOTE — CARE COORDINATION
RNCC reviewed chart and previous encounters, placed call to Dov Harper for CC needs and updates with COPD and DM management, home health aide updates with [facility], no answer and LVM to return call to [phone] or [phone] for CC updates,  questions or concerns. Ambulatory Care Coordination Note  9/4/2019  CM Risk Score: 6  Charlson 10 Year Mortality Risk Score: 100%     ACC: Aida Bautista RN    Summary Note: RNCC received call back from Dov Harper, updated with Chava Wong information regarding [facility] aide service, Ant Anderson spoke with Bob Sood and was informed that there will be a new aide assigned to Dov Harper, and new . May agreed for Chava Wong to meet with Dov Harper with OV tomorrow at 6 AM. Dov Harper is appreciative for CC team and efforts with resources. Continues with \"burning mouth syndrome\" and did receive call from Pharmacy for confirmation. RNCC Plan: Will follow up with Beverley Up next week for updates with DM and COPD management, [facility] services, LSW following. Care Coordination Interventions    Program Enrollment:  Complex Care  Referral from Primary Care Provider:  No  Suggested Interventions and Community Resources  Home Health Services:  Completed  Social Work:  Completed  Zone Management Tools:  Completed (Comment: DM and COPD, added pneumonia 8/15/19)         Goals Addressed                 This Visit's Progress     Conditions and Symptoms   Improving     I will schedule office visits, as directed by my provider. I will keep my appointment or reschedule if I have to cancel. I will notify my provider of any barriers to my plan of care. I will notify my provider of any symptoms that indicate a worsening of my condition.     Barriers: overwhelmed by complexity of regimen, currently involved with chemo  Plan for overcoming my barriers: working with [address] and PCP for symptoms management  Confidence: 7/10  Anticipated Goal Completion Date: 8/2019, updated due to recent Hospital Admission

## 2019-09-05 ENCOUNTER — OFFICE VISIT (OUTPATIENT)
Dept: FAMILY MEDICINE CLINIC | Age: 74
End: 2019-09-05
Payer: COMMERCIAL

## 2019-09-05 ENCOUNTER — CARE COORDINATION (OUTPATIENT)
Dept: CASE MANAGEMENT | Age: 74
End: 2019-09-05

## 2019-09-05 ENCOUNTER — CARE COORDINATION (OUTPATIENT)
Dept: CARE COORDINATION | Age: 74
End: 2019-09-05

## 2019-09-05 VITALS
BODY MASS INDEX: 42.57 KG/M2 | DIASTOLIC BLOOD PRESSURE: 64 MMHG | HEART RATE: 93 BPM | WEIGHT: 196.7 LBS | SYSTOLIC BLOOD PRESSURE: 114 MMHG | OXYGEN SATURATION: 91 %

## 2019-09-05 DIAGNOSIS — N17.9 ACUTE KIDNEY INJURY (HCC): ICD-10-CM

## 2019-09-05 DIAGNOSIS — E11.8 TYPE 2 DIABETES MELLITUS WITH COMPLICATION, WITHOUT LONG-TERM CURRENT USE OF INSULIN (HCC): ICD-10-CM

## 2019-09-05 DIAGNOSIS — Z23 IMMUNIZATION DUE: Primary | ICD-10-CM

## 2019-09-05 DIAGNOSIS — J44.9 CHRONIC OBSTRUCTIVE PULMONARY DISEASE, UNSPECIFIED COPD TYPE (HCC): ICD-10-CM

## 2019-09-05 DIAGNOSIS — M15.9 PRIMARY OSTEOARTHRITIS INVOLVING MULTIPLE JOINTS: ICD-10-CM

## 2019-09-05 PROCEDURE — 1111F DSCHRG MED/CURRENT MED MERGE: CPT | Performed by: FAMILY MEDICINE

## 2019-09-05 PROCEDURE — G0008 ADMIN INFLUENZA VIRUS VAC: HCPCS | Performed by: FAMILY MEDICINE

## 2019-09-05 PROCEDURE — 90688 IIV4 VACCINE SPLT 0.5 ML IM: CPT | Performed by: FAMILY MEDICINE

## 2019-09-05 PROCEDURE — 99213 OFFICE O/P EST LOW 20 MIN: CPT | Performed by: FAMILY MEDICINE

## 2019-09-05 ASSESSMENT — ENCOUNTER SYMPTOMS
COUGH: 0
SHORTNESS OF BREATH: 0
SORE THROAT: 0
NAUSEA: 0
DIARRHEA: 0
SINUS PRESSURE: 0
VOMITING: 0
BACK PAIN: 1

## 2019-09-05 NOTE — PROGRESS NOTES
(ZOCOR) 40 MG tablet TAKE ONE TABLET BY MOUTH DAILY 90 tablet 3    albuterol-ipratropium (COMBIVENT RESPIMAT)  MCG/ACT AERS inhaler INHALE ONE INHALATION BY MOUTH FOUR TIMES A DAY 3 Inhaler 3    ALPRAZolam (XANAX) 1 MG tablet Take 1 tablet by mouth 3 times daily as needed for Sleep or Anxiety for up to 30 days. 90 tablet 1    prochlorperazine (COMPAZINE) 10 MG tablet Take 1 tablet by mouth every 6 hours as needed (CHEMO INDUCED NAUSEA) 120 tablet 3    nystatin (MYCOSTATIN) 021128 UNIT/GM cream Apply topically 2 times daily. 15 g 0    gabapentin (NEURONTIN) 600 MG tablet Take 600 mg by mouth 3 times daily. Terry Jewell esomeprazole (NEXIUM) 40 MG delayed release capsule TAKE ONE CAPSULE BY MOUTH DAILY 90 capsule 3    metFORMIN (GLUCOPHAGE-XR) 750 MG extended release tablet Take 1 tablet by mouth daily (with breakfast) 90 tablet 3    glucose blood VI test strips (ASCENSIA AUTODISC VI;ONE TOUCH ULTRA TEST VI) strip Indications: [employer] Glucose Meter Test bid 100 strip 3     No current facility-administered medications for this visit. No Known Allergies      Subjective:   Review of Systems   Constitutional: Negative for chills, diaphoresis and fever. HENT: Negative for congestion, sinus pressure and sore throat. Eyes: Negative for visual disturbance. Respiratory: Negative for cough and shortness of breath. Cardiovascular: Negative for chest pain and leg swelling. Gastrointestinal: Negative for diarrhea, nausea and vomiting. Genitourinary: Negative for dysuria, menstrual problem, urgency and vaginal discharge. Musculoskeletal: Positive for back pain and gait problem. Negative for arthralgias and myalgias. Skin: Negative for rash. Neurological: Negative for weakness, numbness and headaches.    Psychiatric/Behavioral: Negative for sleep disturbance.       :   /64   Pulse 93   Wt 196 lb 11.2 oz (89.2 kg)   SpO2 91%   BMI 42.57 kg/m²     Physical Exam   Constitutional: She is

## 2019-09-06 ENCOUNTER — CARE COORDINATION (OUTPATIENT)
Dept: CASE MANAGEMENT | Age: 74
End: 2019-09-06

## 2019-09-09 ENCOUNTER — CARE COORDINATION (OUTPATIENT)
Dept: CASE MANAGEMENT | Age: 74
End: 2019-09-09

## 2019-09-09 ENCOUNTER — HOSPITAL ENCOUNTER (OUTPATIENT)
Facility: MEDICAL CENTER | Age: 74
End: 2019-09-09
Payer: COMMERCIAL

## 2019-09-10 ENCOUNTER — TELEPHONE (OUTPATIENT)
Dept: FAMILY MEDICINE CLINIC | Age: 74
End: 2019-09-10

## 2019-09-11 ENCOUNTER — CARE COORDINATION (OUTPATIENT)
Dept: CARE COORDINATION | Age: 74
End: 2019-09-11

## 2019-09-12 ENCOUNTER — HOSPITAL ENCOUNTER (OUTPATIENT)
Dept: INFUSION THERAPY | Facility: MEDICAL CENTER | Age: 74
Discharge: HOME | End: 2019-09-12
Payer: COMMERCIAL

## 2019-09-12 ENCOUNTER — OFFICE VISIT (OUTPATIENT)
Dept: ONCOLOGY | Age: 74
End: 2019-09-12
Payer: COMMERCIAL

## 2019-09-12 ENCOUNTER — TELEPHONE (OUTPATIENT)
Dept: ONCOLOGY | Age: 74
End: 2019-09-12

## 2019-09-12 VITALS
TEMPERATURE: 97.4 F | DIASTOLIC BLOOD PRESSURE: 63 MMHG | SYSTOLIC BLOOD PRESSURE: 116 MMHG | HEART RATE: 63 BPM | OXYGEN SATURATION: 97 % | BODY MASS INDEX: 41.51 KG/M2 | RESPIRATION RATE: 16 BRPM | WEIGHT: 191.8 LBS

## 2019-09-12 DIAGNOSIS — C85.80 MARGINAL ZONE LYMPHOMA (HCC): Primary | ICD-10-CM

## 2019-09-12 DIAGNOSIS — C85.80 MARGINAL ZONE B-CELL LYMPHOMA (HCC): ICD-10-CM

## 2019-09-12 LAB
ABSOLUTE EOS #: 0.1 K/UL (ref 0–0.44)
ABSOLUTE IMMATURE GRANULOCYTE: 0.05 K/UL (ref 0–0.3)
ABSOLUTE LYMPH #: 1.89 K/UL (ref 1.1–3.7)
ABSOLUTE MONO #: 0.54 K/UL (ref 0.1–1.2)
ALBUMIN SERPL-MCNC: 4.2 G/DL (ref 3.5–5.2)
ALBUMIN/GLOBULIN RATIO: ABNORMAL (ref 1–2.5)
ALP BLD-CCNC: 64 U/L (ref 35–104)
ALT SERPL-CCNC: 11 U/L (ref 5–33)
ANION GAP SERPL CALCULATED.3IONS-SCNC: 8 MMOL/L (ref 9–17)
AST SERPL-CCNC: 18 U/L
BASOPHILS # BLD: 1 % (ref 0–2)
BASOPHILS ABSOLUTE: 0.05 K/UL (ref 0–0.2)
BILIRUB SERPL-MCNC: 0.36 MG/DL (ref 0.3–1.2)
BUN BLDV-MCNC: 15 MG/DL (ref 8–23)
BUN/CREAT BLD: 21 (ref 9–20)
CALCIUM SERPL-MCNC: 9.5 MG/DL (ref 8.6–10.4)
CHLORIDE BLD-SCNC: 108 MMOL/L (ref 98–107)
CO2: 26 MMOL/L (ref 20–31)
CREAT SERPL-MCNC: 0.71 MG/DL (ref 0.5–0.9)
DIFFERENTIAL TYPE: ABNORMAL
EOSINOPHILS RELATIVE PERCENT: 1 % (ref 1–4)
GFR AFRICAN AMERICAN: >60 ML/MIN
GFR NON-AFRICAN AMERICAN: >60 ML/MIN
GFR SERPL CREATININE-BSD FRML MDRD: ABNORMAL ML/MIN/{1.73_M2}
GFR SERPL CREATININE-BSD FRML MDRD: ABNORMAL ML/MIN/{1.73_M2}
GLUCOSE BLD-MCNC: 136 MG/DL (ref 70–99)
HCT VFR BLD CALC: 43 % (ref 36.3–47.1)
HEMOGLOBIN: 13.3 G/DL (ref 11.9–15.1)
IMMATURE GRANULOCYTES: 1 %
LYMPHOCYTES # BLD: 23 % (ref 24–43)
MCH RBC QN AUTO: 31.1 PG (ref 25.2–33.5)
MCHC RBC AUTO-ENTMCNC: 30.9 G/DL (ref 28.4–34.8)
MCV RBC AUTO: 100.5 FL (ref 82.6–102.9)
MONOCYTES # BLD: 7 % (ref 3–12)
NRBC AUTOMATED: 0 PER 100 WBC
PDW BLD-RTO: 14.5 % (ref 11.8–14.4)
PLATELET # BLD: 268 K/UL (ref 138–453)
PLATELET ESTIMATE: ABNORMAL
PMV BLD AUTO: 9.3 FL (ref 8.1–13.5)
POTASSIUM SERPL-SCNC: 4.6 MMOL/L (ref 3.7–5.3)
RBC # BLD: 4.28 M/UL (ref 3.95–5.11)
RBC # BLD: ABNORMAL 10*6/UL
SEG NEUTROPHILS: 67 % (ref 36–65)
SEGMENTED NEUTROPHILS ABSOLUTE COUNT: 5.61 K/UL (ref 1.5–8.1)
SODIUM BLD-SCNC: 142 MMOL/L (ref 135–144)
TOTAL PROTEIN: 7.1 G/DL (ref 6.4–8.3)
WBC # BLD: 8.2 K/UL (ref 3.5–11.3)
WBC # BLD: ABNORMAL 10*3/UL

## 2019-09-12 PROCEDURE — 2580000003 HC RX 258: Performed by: INTERNAL MEDICINE

## 2019-09-12 PROCEDURE — 99211 OFF/OP EST MAY X REQ PHY/QHP: CPT | Performed by: INTERNAL MEDICINE

## 2019-09-12 PROCEDURE — 99214 OFFICE O/P EST MOD 30 MIN: CPT | Performed by: INTERNAL MEDICINE

## 2019-09-12 PROCEDURE — 36591 DRAW BLOOD OFF VENOUS DEVICE: CPT

## 2019-09-12 PROCEDURE — 85025 COMPLETE CBC W/AUTO DIFF WBC: CPT

## 2019-09-12 PROCEDURE — 80053 COMPREHEN METABOLIC PANEL: CPT

## 2019-09-12 PROCEDURE — 6360000002 HC RX W HCPCS: Performed by: INTERNAL MEDICINE

## 2019-09-12 RX ORDER — METHYLPREDNISOLONE SODIUM SUCCINATE 125 MG/2ML
125 INJECTION, POWDER, LYOPHILIZED, FOR SOLUTION INTRAMUSCULAR; INTRAVENOUS ONCE
Status: CANCELLED | OUTPATIENT
Start: 2019-12-12

## 2019-09-12 RX ORDER — EPINEPHRINE 1 MG/ML
0.3 INJECTION, SOLUTION, CONCENTRATE INTRAVENOUS PRN
Status: CANCELLED | OUTPATIENT
Start: 2019-12-12

## 2019-09-12 RX ORDER — SODIUM CHLORIDE 9 MG/ML
INJECTION, SOLUTION INTRAVENOUS CONTINUOUS
Status: CANCELLED | OUTPATIENT
Start: 2019-12-12

## 2019-09-12 RX ORDER — DIPHENHYDRAMINE HYDROCHLORIDE 50 MG/ML
25 INJECTION INTRAMUSCULAR; INTRAVENOUS ONCE
Status: CANCELLED | OUTPATIENT
Start: 2019-12-12

## 2019-09-12 RX ORDER — MEPERIDINE HYDROCHLORIDE 50 MG/ML
12.5 INJECTION INTRAMUSCULAR; INTRAVENOUS; SUBCUTANEOUS ONCE
Status: CANCELLED | OUTPATIENT
Start: 2019-12-12

## 2019-09-12 RX ORDER — HEPARIN SODIUM (PORCINE) LOCK FLUSH IV SOLN 100 UNIT/ML 100 UNIT/ML
500 SOLUTION INTRAVENOUS PRN
Status: CANCELLED | OUTPATIENT
Start: 2019-12-12

## 2019-09-12 RX ORDER — ACETAMINOPHEN 325 MG/1
650 TABLET ORAL ONCE
Status: CANCELLED | OUTPATIENT
Start: 2019-12-12

## 2019-09-12 RX ORDER — DIPHENHYDRAMINE HYDROCHLORIDE 50 MG/ML
50 INJECTION INTRAMUSCULAR; INTRAVENOUS ONCE
Status: CANCELLED | OUTPATIENT
Start: 2019-12-12

## 2019-09-12 RX ORDER — HEPARIN SODIUM (PORCINE) LOCK FLUSH IV SOLN 100 UNIT/ML 100 UNIT/ML
500 SOLUTION INTRAVENOUS PRN
Status: DISCONTINUED | OUTPATIENT
Start: 2019-09-12 | End: 2019-09-13 | Stop reason: HOSPADM

## 2019-09-12 RX ORDER — SODIUM CHLORIDE 9 MG/ML
20 INJECTION, SOLUTION INTRAVENOUS ONCE
Status: CANCELLED | OUTPATIENT
Start: 2019-12-12

## 2019-09-12 RX ORDER — SODIUM CHLORIDE 0.9 % (FLUSH) 0.9 %
5 SYRINGE (ML) INJECTION PRN
Status: CANCELLED | OUTPATIENT
Start: 2019-12-12

## 2019-09-12 RX ORDER — SODIUM CHLORIDE 0.9 % (FLUSH) 0.9 %
10 SYRINGE (ML) INJECTION PRN
Status: DISCONTINUED | OUTPATIENT
Start: 2019-09-12 | End: 2019-09-13 | Stop reason: HOSPADM

## 2019-09-12 RX ORDER — SODIUM CHLORIDE 0.9 % (FLUSH) 0.9 %
10 SYRINGE (ML) INJECTION PRN
Status: CANCELLED | OUTPATIENT
Start: 2019-12-12

## 2019-09-12 RX ORDER — 0.9 % SODIUM CHLORIDE 0.9 %
10 VIAL (ML) INJECTION ONCE
Status: CANCELLED | OUTPATIENT
Start: 2019-12-12

## 2019-09-12 RX ADMIN — Medication 10 ML: at 15:14

## 2019-09-12 RX ADMIN — Medication 500 UNITS: at 15:14

## 2019-09-12 NOTE — PROGRESS NOTES
Patient arrive via wheelchair for lab draw and physician visit. Switch from home O2 to hospital O2 at 3L. Port accessed; specimen sent. Physician met with patient; labs reviewed. Port flushed and heparinized with intact smith needle removed per protocol. Patient to  for checkout via wheelchair; AVS provided by front office staff.

## 2019-09-16 ENCOUNTER — CARE COORDINATION (OUTPATIENT)
Dept: FAMILY MEDICINE CLINIC | Age: 74
End: 2019-09-16

## 2019-09-16 NOTE — CARE COORDINATION
called and spoke to Ms. Smith. Bailee Luis reports that she is not doing well and has been getting sick all day. Bailee Luis reports that she canceled her therapy session today because she is too ill. Bailee Luis denied that she has heard from her  or anyone from [facility] office on aging. Bailee Luis reports that currently has no aides coming in to help her bathe or clean her home.  encouraged Bailee Luis to call Ms. Barrios, her  to follow up on finding an aide for her. Bailee Luis reports she does have her contact number. Plan:   Bailee Luis will call Ms. Barrios.  will follow up with Bailee Luis.

## 2019-09-22 NOTE — PROGRESS NOTES
_           Chief Complaint   Patient presents with    Follow-up     Review status of disease      DIAGNOSIS:        Marginal Zone lymphoma  Persistent leukocytosis  Persistent thrombocytopenia  Multiple comorbidities as listed     CURRENT THERAPY:         Started treatment with rituximab 5/2/2019. Week #8 June 21, 2019. Will give maintenance Rituxan every 2 months for 2 years. BRIEF CASE HISTORY:      Ms. Terra Arriaga is a very pleasant 69 y.o. female with history of multiple comorbidities including COPD and diabetes. She quit smoking years ago. The patient was recently hospitalized with chest infection. The patient was noted to have leukocytosis. She also had persistent thrombocytopenia. She is referred for further evaluation. Patient denies any active bleeding. She has easy bruising. No fever or night sweats. No weight loss or decreased appetite. No palpable lymph nodes. No history of repeated infections. Patient has history of smoking for more than 50 years. Denies alcohol drinking  She had flow cytometry and bone marrow test. Results showed evidence of marginal zone lymphoma. Start the rituximab with good results. INTERIM HISTORY:   Patient is seen for follow up leukocytosis and marginal zone lymphoma. Due to increasing symptoms and further problems related to lymphoma, patient was started on single agent rituximab on 5/20/19. Patient is doing fairly well. She denies any fever or chills. No night sweats. No palpable lymph nodes. No weight loss or decreased appetite. PAST MEDICAL HISTORY: has a past medical history of COPD (chronic obstructive pulmonary disease) ([address] Depression, Diabetes mellitus ([address] Hyperlipidemia, PTSD (post-traumatic stress disorder), and Tubulovillous adenoma. PAST SURGICAL HISTORY: has a past surgical history that includes Hysterectomy; Colonoscopy;  Colonoscopy (06/07/2016); and Tunneled venous port placement (04/08/2019). CURRENT MEDICATIONS:  has a current medication list which includes the following prescription(s): OXYGEN, magic mouthwash, hydrocodone-acetaminophen, aspirin, lancets, multiple vitamins-minerals, lisinopril, fluoxetine, simvastatin, albuterol-ipratropium, prochlorperazine, nystatin, gabapentin, esomeprazole, metformin, and blood glucose test strips. ALLERGIES:  has No Known Allergies. FAMILY HISTORY: Negative for any hematological or oncological conditions. SOCIAL HISTORY:  reports that she quit smoking about 11 years ago. Her smoking use included cigarettes. She has a 10.00 pack-year smoking history. She has never used smokeless tobacco. She reports that she does not drink alcohol or use drugs. REVIEW OF SYSTEMS:     · General: Positive for weakness and fatigue. No unanticipated weight loss or decreased appetite. No fever or chills. · Eyes: No blurred vision, eye pain or double vision. · Ears: No hearing problems or drainage. No tinnitus. · Throat: No sore throat, problems with swallowing or dysphagia. · Respiratory: No cough, sputum or hemoptysis. No shortness of breath. No pleuritic chest pain. · Cardiovascular: No chest pain, orthopnea or PND. No lower extremity edema. No palpitation. · Gastrointestinal: No problems with swallowing. No abdominal pain or bloating. No nausea or vomiting. No diarrhea or constipation. No GI bleeding. · Genitourinary: No dysuria, hematuria, frequency or urgency. · Musculoskeletal: No muscle aches or pains. No limitation of movement. No back pain. No gait disturbance, No joint complaints. · Dermatologic: No skin rashes or pruritus. No skin lesions or discolorations. · Psychiatric: No depression, anxiety, or stress or signs of schizophrenia. No change in mood or affect. · Hematologic: No history of bleeding tendency. Easy bruises no ecchymosis.  No history of clotting

## 2019-09-25 ENCOUNTER — TELEPHONE (OUTPATIENT)
Dept: FAMILY MEDICINE CLINIC | Age: 74
End: 2019-09-25

## 2019-09-26 ENCOUNTER — OFFICE VISIT (OUTPATIENT)
Dept: FAMILY MEDICINE CLINIC | Age: 74
End: 2019-09-26
Payer: COMMERCIAL

## 2019-09-26 ENCOUNTER — CARE COORDINATION (OUTPATIENT)
Dept: FAMILY MEDICINE CLINIC | Age: 74
End: 2019-09-26

## 2019-09-26 VITALS
WEIGHT: 190 LBS | SYSTOLIC BLOOD PRESSURE: 124 MMHG | HEART RATE: 67 BPM | DIASTOLIC BLOOD PRESSURE: 60 MMHG | BODY MASS INDEX: 41.12 KG/M2 | OXYGEN SATURATION: 92 %

## 2019-09-26 DIAGNOSIS — M15.9 PRIMARY OSTEOARTHRITIS INVOLVING MULTIPLE JOINTS: ICD-10-CM

## 2019-09-26 DIAGNOSIS — C85.80 MARGINAL ZONE LYMPHOMA (HCC): ICD-10-CM

## 2019-09-26 DIAGNOSIS — J44.9 CHRONIC OBSTRUCTIVE PULMONARY DISEASE, UNSPECIFIED COPD TYPE (HCC): Primary | ICD-10-CM

## 2019-09-26 DIAGNOSIS — E11.8 TYPE 2 DIABETES MELLITUS WITH COMPLICATION, WITHOUT LONG-TERM CURRENT USE OF INSULIN (HCC): ICD-10-CM

## 2019-09-26 PROCEDURE — 99213 OFFICE O/P EST LOW 20 MIN: CPT | Performed by: FAMILY MEDICINE

## 2019-09-26 ASSESSMENT — ENCOUNTER SYMPTOMS
SORE THROAT: 0
SHORTNESS OF BREATH: 1
COUGH: 1
DIARRHEA: 0
BACK PAIN: 1
DYSPNEA ASSOCIATED WITH: MINIMAL EXERTION
VOMITING: 0
NAUSEA: 0
SINUS PRESSURE: 0

## 2019-09-30 ENCOUNTER — CARE COORDINATION (OUTPATIENT)
Dept: CARE COORDINATION | Age: 74
End: 2019-09-30

## 2019-09-30 DIAGNOSIS — M15.9 PRIMARY OSTEOARTHRITIS INVOLVING MULTIPLE JOINTS: ICD-10-CM

## 2019-10-01 RX ORDER — HYDROCODONE BITARTRATE AND ACETAMINOPHEN 7.5; 325 MG/1; MG/1
2 TABLET ORAL EVERY 6 HOURS PRN
Qty: 180 TABLET | Refills: 0 | Status: SHIPPED | OUTPATIENT
Start: 2019-10-01 | End: 2019-10-31 | Stop reason: SDUPTHER

## 2019-10-03 ENCOUNTER — CARE COORDINATION (OUTPATIENT)
Dept: FAMILY MEDICINE CLINIC | Age: 74
End: 2019-10-03

## 2019-10-08 ENCOUNTER — CARE COORDINATION (OUTPATIENT)
Dept: CARE COORDINATION | Age: 74
End: 2019-10-08

## 2019-10-09 ENCOUNTER — CARE COORDINATION (OUTPATIENT)
Dept: FAMILY MEDICINE CLINIC | Age: 74
End: 2019-10-09

## 2019-10-09 ENCOUNTER — TELEPHONE (OUTPATIENT)
Dept: FAMILY MEDICINE CLINIC | Age: 74
End: 2019-10-09

## 2019-10-14 ENCOUNTER — HOSPITAL ENCOUNTER (OUTPATIENT)
Facility: MEDICAL CENTER | Age: 74
End: 2019-10-14
Payer: COMMERCIAL

## 2019-10-14 ENCOUNTER — OFFICE VISIT (OUTPATIENT)
Dept: FAMILY MEDICINE CLINIC | Age: 74
End: 2019-10-14
Payer: COMMERCIAL

## 2019-10-14 ENCOUNTER — CARE COORDINATION (OUTPATIENT)
Dept: FAMILY MEDICINE CLINIC | Age: 74
End: 2019-10-14

## 2019-10-14 VITALS
BODY MASS INDEX: 41.94 KG/M2 | SYSTOLIC BLOOD PRESSURE: 126 MMHG | OXYGEN SATURATION: 93 % | DIASTOLIC BLOOD PRESSURE: 66 MMHG | WEIGHT: 193.8 LBS | HEART RATE: 101 BPM

## 2019-10-14 DIAGNOSIS — Z00.00 ROUTINE GENERAL MEDICAL EXAMINATION AT A HEALTH CARE FACILITY: Primary | ICD-10-CM

## 2019-10-14 DIAGNOSIS — R63.4 WEIGHT LOSS: ICD-10-CM

## 2019-10-14 PROCEDURE — G0438 PPPS, INITIAL VISIT: HCPCS | Performed by: FAMILY MEDICINE

## 2019-10-14 ASSESSMENT — LIFESTYLE VARIABLES: HOW OFTEN DO YOU HAVE A DRINK CONTAINING ALCOHOL: 0

## 2019-10-14 ASSESSMENT — PATIENT HEALTH QUESTIONNAIRE - PHQ9
SUM OF ALL RESPONSES TO PHQ QUESTIONS 1-9: 2
SUM OF ALL RESPONSES TO PHQ QUESTIONS 1-9: 2

## 2019-10-17 ENCOUNTER — OFFICE VISIT (OUTPATIENT)
Dept: ONCOLOGY | Age: 74
End: 2019-10-17
Payer: COMMERCIAL

## 2019-10-17 ENCOUNTER — HOSPITAL ENCOUNTER (OUTPATIENT)
Dept: INFUSION THERAPY | Facility: MEDICAL CENTER | Age: 74
Discharge: HOME | End: 2019-10-17
Payer: COMMERCIAL

## 2019-10-17 ENCOUNTER — TELEPHONE (OUTPATIENT)
Dept: ONCOLOGY | Age: 74
End: 2019-10-17

## 2019-10-17 VITALS
RESPIRATION RATE: 20 BRPM | BODY MASS INDEX: 41.87 KG/M2 | TEMPERATURE: 97.5 F | SYSTOLIC BLOOD PRESSURE: 111 MMHG | HEART RATE: 67 BPM | WEIGHT: 193.5 LBS | DIASTOLIC BLOOD PRESSURE: 66 MMHG

## 2019-10-17 DIAGNOSIS — C85.80 MARGINAL ZONE LYMPHOMA (HCC): ICD-10-CM

## 2019-10-17 DIAGNOSIS — C85.80 MARGINAL ZONE LYMPHOMA (HCC): Primary | ICD-10-CM

## 2019-10-17 DIAGNOSIS — C85.80 MARGINAL ZONE B-CELL LYMPHOMA (HCC): Primary | ICD-10-CM

## 2019-10-17 LAB
ABSOLUTE EOS #: 0.06 K/UL (ref 0–0.44)
ABSOLUTE IMMATURE GRANULOCYTE: 0.06 K/UL (ref 0–0.3)
ABSOLUTE LYMPH #: 2.35 K/UL (ref 1.1–3.7)
ABSOLUTE MONO #: 0.4 K/UL (ref 0.1–1.2)
ALBUMIN SERPL-MCNC: 4.3 G/DL (ref 3.5–5.2)
ALBUMIN/GLOBULIN RATIO: ABNORMAL (ref 1–2.5)
ALP BLD-CCNC: 73 U/L (ref 35–104)
ALT SERPL-CCNC: 21 U/L (ref 5–33)
ANION GAP SERPL CALCULATED.3IONS-SCNC: 12 MMOL/L (ref 9–17)
AST SERPL-CCNC: 26 U/L
BASOPHILS # BLD: 1 % (ref 0–2)
BASOPHILS ABSOLUTE: 0.04 K/UL (ref 0–0.2)
BILIRUB SERPL-MCNC: 0.24 MG/DL (ref 0.3–1.2)
BUN BLDV-MCNC: 19 MG/DL (ref 8–23)
BUN/CREAT BLD: 30 (ref 9–20)
CALCIUM SERPL-MCNC: 9.2 MG/DL (ref 8.6–10.4)
CHLORIDE BLD-SCNC: 105 MMOL/L (ref 98–107)
CO2: 25 MMOL/L (ref 20–31)
CREAT SERPL-MCNC: 0.63 MG/DL (ref 0.5–0.9)
DIFFERENTIAL TYPE: ABNORMAL
EOSINOPHILS RELATIVE PERCENT: 1 % (ref 1–4)
GFR AFRICAN AMERICAN: >60 ML/MIN
GFR NON-AFRICAN AMERICAN: >60 ML/MIN
GFR SERPL CREATININE-BSD FRML MDRD: ABNORMAL ML/MIN/{1.73_M2}
GFR SERPL CREATININE-BSD FRML MDRD: ABNORMAL ML/MIN/{1.73_M2}
GLUCOSE BLD-MCNC: 127 MG/DL (ref 70–99)
HCT VFR BLD CALC: 43.1 % (ref 36.3–47.1)
HEMOGLOBIN: 13.4 G/DL (ref 11.9–15.1)
IMMATURE GRANULOCYTES: 1 %
LYMPHOCYTES # BLD: 34 % (ref 24–43)
MCH RBC QN AUTO: 31.3 PG (ref 25.2–33.5)
MCHC RBC AUTO-ENTMCNC: 31.1 G/DL (ref 28.4–34.8)
MCV RBC AUTO: 100.7 FL (ref 82.6–102.9)
MONOCYTES # BLD: 6 % (ref 3–12)
NRBC AUTOMATED: 0 PER 100 WBC
PDW BLD-RTO: 14.3 % (ref 11.8–14.4)
PLATELET # BLD: 237 K/UL (ref 138–453)
PLATELET ESTIMATE: ABNORMAL
PMV BLD AUTO: 10 FL (ref 8.1–13.5)
POTASSIUM SERPL-SCNC: 5 MMOL/L (ref 3.7–5.3)
RBC # BLD: 4.28 M/UL (ref 3.95–5.11)
RBC # BLD: ABNORMAL 10*6/UL
SEG NEUTROPHILS: 57 % (ref 36–65)
SEGMENTED NEUTROPHILS ABSOLUTE COUNT: 4.05 K/UL (ref 1.5–8.1)
SODIUM BLD-SCNC: 142 MMOL/L (ref 135–144)
TOTAL PROTEIN: 6.5 G/DL (ref 6.4–8.3)
WBC # BLD: 7 K/UL (ref 3.5–11.3)
WBC # BLD: ABNORMAL 10*3/UL

## 2019-10-17 PROCEDURE — 6370000000 HC RX 637 (ALT 250 FOR IP): Performed by: INTERNAL MEDICINE

## 2019-10-17 PROCEDURE — 96415 CHEMO IV INFUSION ADDL HR: CPT

## 2019-10-17 PROCEDURE — 99211 OFF/OP EST MAY X REQ PHY/QHP: CPT

## 2019-10-17 PROCEDURE — 96413 CHEMO IV INFUSION 1 HR: CPT

## 2019-10-17 PROCEDURE — 6360000002 HC RX W HCPCS: Performed by: INTERNAL MEDICINE

## 2019-10-17 PROCEDURE — 99214 OFFICE O/P EST MOD 30 MIN: CPT | Performed by: INTERNAL MEDICINE

## 2019-10-17 PROCEDURE — 36591 DRAW BLOOD OFF VENOUS DEVICE: CPT

## 2019-10-17 PROCEDURE — 80053 COMPREHEN METABOLIC PANEL: CPT

## 2019-10-17 PROCEDURE — 85025 COMPLETE CBC W/AUTO DIFF WBC: CPT

## 2019-10-17 PROCEDURE — 96375 TX/PRO/DX INJ NEW DRUG ADDON: CPT

## 2019-10-17 PROCEDURE — 2580000003 HC RX 258: Performed by: INTERNAL MEDICINE

## 2019-10-17 RX ORDER — ACETAMINOPHEN 325 MG/1
650 TABLET ORAL ONCE
Status: COMPLETED | OUTPATIENT
Start: 2019-10-17 | End: 2019-10-17

## 2019-10-17 RX ORDER — ACETAMINOPHEN 325 MG/1
650 TABLET ORAL ONCE
Status: CANCELLED | OUTPATIENT
Start: 2019-10-17

## 2019-10-17 RX ORDER — SODIUM CHLORIDE 0.9 % (FLUSH) 0.9 %
5 SYRINGE (ML) INJECTION PRN
Status: CANCELLED | OUTPATIENT
Start: 2020-02-26

## 2019-10-17 RX ORDER — 0.9 % SODIUM CHLORIDE 0.9 %
10 VIAL (ML) INJECTION ONCE
Status: CANCELLED | OUTPATIENT
Start: 2020-02-26

## 2019-10-17 RX ORDER — DIPHENHYDRAMINE HYDROCHLORIDE 50 MG/ML
50 INJECTION INTRAMUSCULAR; INTRAVENOUS ONCE
Status: CANCELLED | OUTPATIENT
Start: 2019-10-17

## 2019-10-17 RX ORDER — EPINEPHRINE 1 MG/ML
0.3 INJECTION, SOLUTION, CONCENTRATE INTRAVENOUS PRN
Status: CANCELLED | OUTPATIENT
Start: 2020-02-26

## 2019-10-17 RX ORDER — DIPHENHYDRAMINE HYDROCHLORIDE 50 MG/ML
50 INJECTION INTRAMUSCULAR; INTRAVENOUS ONCE
Status: CANCELLED | OUTPATIENT
Start: 2020-02-26

## 2019-10-17 RX ORDER — SODIUM CHLORIDE 9 MG/ML
INJECTION, SOLUTION INTRAVENOUS CONTINUOUS
Status: CANCELLED | OUTPATIENT
Start: 2019-10-17

## 2019-10-17 RX ORDER — SODIUM CHLORIDE 9 MG/ML
INJECTION, SOLUTION INTRAVENOUS CONTINUOUS
Status: CANCELLED | OUTPATIENT
Start: 2020-02-26

## 2019-10-17 RX ORDER — SODIUM CHLORIDE 0.9 % (FLUSH) 0.9 %
5 SYRINGE (ML) INJECTION PRN
Status: DISCONTINUED | OUTPATIENT
Start: 2019-10-17 | End: 2019-10-18 | Stop reason: HOSPADM

## 2019-10-17 RX ORDER — MEPERIDINE HYDROCHLORIDE 50 MG/ML
12.5 INJECTION INTRAMUSCULAR; INTRAVENOUS; SUBCUTANEOUS ONCE
Status: CANCELLED | OUTPATIENT
Start: 2020-02-26

## 2019-10-17 RX ORDER — DIPHENHYDRAMINE HYDROCHLORIDE 50 MG/ML
25 INJECTION INTRAMUSCULAR; INTRAVENOUS ONCE
Status: COMPLETED | OUTPATIENT
Start: 2019-10-17 | End: 2019-10-17

## 2019-10-17 RX ORDER — SODIUM CHLORIDE 0.9 % (FLUSH) 0.9 %
10 SYRINGE (ML) INJECTION PRN
Status: CANCELLED | OUTPATIENT
Start: 2020-02-26

## 2019-10-17 RX ORDER — HEPARIN SODIUM (PORCINE) LOCK FLUSH IV SOLN 100 UNIT/ML 100 UNIT/ML
500 SOLUTION INTRAVENOUS PRN
Status: DISCONTINUED | OUTPATIENT
Start: 2019-10-17 | End: 2019-10-18 | Stop reason: HOSPADM

## 2019-10-17 RX ORDER — 0.9 % SODIUM CHLORIDE 0.9 %
10 VIAL (ML) INJECTION ONCE
Status: CANCELLED | OUTPATIENT
Start: 2019-10-17

## 2019-10-17 RX ORDER — SODIUM CHLORIDE 0.9 % (FLUSH) 0.9 %
10 SYRINGE (ML) INJECTION PRN
Status: DISCONTINUED | OUTPATIENT
Start: 2019-10-17 | End: 2019-10-18 | Stop reason: HOSPADM

## 2019-10-17 RX ORDER — METHYLPREDNISOLONE SODIUM SUCCINATE 125 MG/2ML
125 INJECTION, POWDER, LYOPHILIZED, FOR SOLUTION INTRAMUSCULAR; INTRAVENOUS ONCE
Status: CANCELLED | OUTPATIENT
Start: 2019-10-17

## 2019-10-17 RX ORDER — SODIUM CHLORIDE 9 MG/ML
20 INJECTION, SOLUTION INTRAVENOUS ONCE
Status: COMPLETED | OUTPATIENT
Start: 2019-10-17 | End: 2019-10-17

## 2019-10-17 RX ORDER — MEPERIDINE HYDROCHLORIDE 50 MG/ML
12.5 INJECTION INTRAMUSCULAR; INTRAVENOUS; SUBCUTANEOUS ONCE
Status: CANCELLED | OUTPATIENT
Start: 2019-10-17

## 2019-10-17 RX ORDER — METHYLPREDNISOLONE SODIUM SUCCINATE 125 MG/2ML
125 INJECTION, POWDER, LYOPHILIZED, FOR SOLUTION INTRAMUSCULAR; INTRAVENOUS ONCE
Status: CANCELLED | OUTPATIENT
Start: 2020-02-26

## 2019-10-17 RX ORDER — SODIUM CHLORIDE 9 MG/ML
20 INJECTION, SOLUTION INTRAVENOUS ONCE
Status: CANCELLED | OUTPATIENT
Start: 2020-02-26

## 2019-10-17 RX ORDER — DIPHENHYDRAMINE HYDROCHLORIDE 50 MG/ML
25 INJECTION INTRAMUSCULAR; INTRAVENOUS ONCE
Status: CANCELLED | OUTPATIENT
Start: 2020-02-26

## 2019-10-17 RX ORDER — ACETAMINOPHEN 325 MG/1
650 TABLET ORAL ONCE
Status: CANCELLED | OUTPATIENT
Start: 2020-02-26

## 2019-10-17 RX ORDER — EPINEPHRINE 1 MG/ML
0.3 INJECTION, SOLUTION, CONCENTRATE INTRAVENOUS PRN
Status: CANCELLED | OUTPATIENT
Start: 2019-10-17

## 2019-10-17 RX ORDER — HEPARIN SODIUM (PORCINE) LOCK FLUSH IV SOLN 100 UNIT/ML 100 UNIT/ML
500 SOLUTION INTRAVENOUS PRN
Status: CANCELLED | OUTPATIENT
Start: 2020-02-26

## 2019-10-17 RX ORDER — DIPHENHYDRAMINE HYDROCHLORIDE 50 MG/ML
25 INJECTION INTRAMUSCULAR; INTRAVENOUS ONCE
Status: CANCELLED | OUTPATIENT
Start: 2019-10-17

## 2019-10-17 RX ADMIN — Medication 10 ML: at 16:50

## 2019-10-17 RX ADMIN — RITUXIMAB 700 MG: 10 INJECTION, SOLUTION INTRAVENOUS at 15:06

## 2019-10-17 RX ADMIN — Medication 10 ML: at 13:32

## 2019-10-17 RX ADMIN — ACETAMINOPHEN 650 MG: 325 TABLET ORAL at 14:36

## 2019-10-17 RX ADMIN — SODIUM CHLORIDE 20 ML/HR: 9 INJECTION, SOLUTION INTRAVENOUS at 13:33

## 2019-10-17 RX ADMIN — DIPHENHYDRAMINE HYDROCHLORIDE 25 MG: 50 INJECTION, SOLUTION INTRAMUSCULAR; INTRAVENOUS at 14:38

## 2019-10-17 RX ADMIN — Medication 500 UNITS: at 16:50

## 2019-10-17 ASSESSMENT — PAIN SCALES - GENERAL: PAINLEVEL_OUTOF10: 8

## 2019-10-17 NOTE — PROGRESS NOTES
Boston Cartagena here per wheel chair. Weighted pt, recorded in appropriate area. Pt taken to treatment area, she has 3 liters of oxygen per nasal cannula, disconnected portable oxygen. Started at 3 liters per wall oxygen. Accessed port and sent blood work to lab, long delay in results. Dr. Brooks Flight into see pt, proceed with treatment if labs WNL. Reviewed lab work and ordered rituxan. Pre medications given. Started rituxan 700 mg at 100 ml/hr x 30 minutes to run rituxan over a total of 90 minutes.

## 2019-10-30 ENCOUNTER — CARE COORDINATION (OUTPATIENT)
Dept: FAMILY MEDICINE CLINIC | Age: 74
End: 2019-10-30

## 2019-10-31 DIAGNOSIS — M15.9 PRIMARY OSTEOARTHRITIS INVOLVING MULTIPLE JOINTS: ICD-10-CM

## 2019-10-31 RX ORDER — HYDROCODONE BITARTRATE AND ACETAMINOPHEN 7.5; 325 MG/1; MG/1
2 TABLET ORAL EVERY 6 HOURS PRN
Qty: 180 TABLET | Refills: 0 | Status: SHIPPED | OUTPATIENT
Start: 2019-10-31 | End: 2019-11-26 | Stop reason: SDUPTHER

## 2019-11-01 ENCOUNTER — CARE COORDINATION (OUTPATIENT)
Dept: CARE COORDINATION | Age: 74
End: 2019-11-01

## 2019-11-13 ENCOUNTER — NURSE TRIAGE (OUTPATIENT)
Dept: OTHER | Facility: CLINIC | Age: 74
End: 2019-11-13

## 2019-11-13 ENCOUNTER — CARE COORDINATION (OUTPATIENT)
Dept: FAMILY MEDICINE CLINIC | Age: 74
End: 2019-11-13

## 2019-11-14 ENCOUNTER — CARE COORDINATION (OUTPATIENT)
Dept: FAMILY MEDICINE CLINIC | Age: 74
End: 2019-11-14

## 2019-11-18 ENCOUNTER — OFFICE VISIT (OUTPATIENT)
Dept: FAMILY MEDICINE CLINIC | Age: 74
End: 2019-11-18
Payer: COMMERCIAL

## 2019-11-18 ENCOUNTER — HOSPITAL ENCOUNTER (OUTPATIENT)
Age: 74
Setting detail: SPECIMEN
Discharge: HOME | End: 2019-11-18
Payer: COMMERCIAL

## 2019-11-18 VITALS
DIASTOLIC BLOOD PRESSURE: 70 MMHG | BODY MASS INDEX: 40.96 KG/M2 | WEIGHT: 189.3 LBS | SYSTOLIC BLOOD PRESSURE: 122 MMHG | HEART RATE: 89 BPM | OXYGEN SATURATION: 94 %

## 2019-11-18 DIAGNOSIS — F41.9 ANXIETY: Primary | ICD-10-CM

## 2019-11-18 DIAGNOSIS — R63.4 WEIGHT LOSS: ICD-10-CM

## 2019-11-18 DIAGNOSIS — J44.9 CHRONIC OBSTRUCTIVE PULMONARY DISEASE, UNSPECIFIED COPD TYPE (HCC): ICD-10-CM

## 2019-11-18 DIAGNOSIS — E11.8 TYPE 2 DIABETES MELLITUS WITH COMPLICATION, WITHOUT LONG-TERM CURRENT USE OF INSULIN (HCC): ICD-10-CM

## 2019-11-18 DIAGNOSIS — K92.2 LGI BLEED: ICD-10-CM

## 2019-11-18 LAB
ABSOLUTE EOS #: 0.08 K/UL (ref 0–0.44)
ABSOLUTE IMMATURE GRANULOCYTE: 0.04 K/UL (ref 0–0.3)
ABSOLUTE LYMPH #: 4.37 K/UL (ref 1.1–3.7)
ABSOLUTE MONO #: 0.75 K/UL (ref 0.1–1.2)
ALBUMIN SERPL-MCNC: 4.2 G/DL (ref 3.5–5.2)
ALBUMIN/GLOBULIN RATIO: 1.2 (ref 1–2.5)
ALP BLD-CCNC: 78 U/L (ref 35–104)
ALT SERPL-CCNC: 12 U/L (ref 5–33)
ANION GAP SERPL CALCULATED.3IONS-SCNC: 15 MMOL/L (ref 9–17)
AST SERPL-CCNC: 18 U/L
BASOPHILS # BLD: 0 % (ref 0–2)
BASOPHILS ABSOLUTE: 0.04 K/UL (ref 0–0.2)
BILIRUB SERPL-MCNC: 0.25 MG/DL (ref 0.3–1.2)
BUN BLDV-MCNC: 16 MG/DL (ref 8–23)
BUN/CREAT BLD: ABNORMAL (ref 9–20)
CALCIUM SERPL-MCNC: 9.9 MG/DL (ref 8.6–10.4)
CHLORIDE BLD-SCNC: 107 MMOL/L (ref 98–107)
CO2: 24 MMOL/L (ref 20–31)
CREAT SERPL-MCNC: 0.59 MG/DL (ref 0.5–0.9)
DIFFERENTIAL TYPE: ABNORMAL
EOSINOPHILS RELATIVE PERCENT: 1 % (ref 1–4)
GFR AFRICAN AMERICAN: >60 ML/MIN
GFR NON-AFRICAN AMERICAN: >60 ML/MIN
GFR SERPL CREATININE-BSD FRML MDRD: ABNORMAL ML/MIN/{1.73_M2}
GFR SERPL CREATININE-BSD FRML MDRD: ABNORMAL ML/MIN/{1.73_M2}
GLUCOSE BLD-MCNC: 113 MG/DL (ref 70–99)
HCT VFR BLD CALC: 51.3 % (ref 36.3–47.1)
HEMOGLOBIN: 15.8 G/DL (ref 11.9–15.1)
IMMATURE GRANULOCYTES: 0 %
LYMPHOCYTES # BLD: 38 % (ref 24–43)
MCH RBC QN AUTO: 31.4 PG (ref 25.2–33.5)
MCHC RBC AUTO-ENTMCNC: 30.8 G/DL (ref 28.4–34.8)
MCV RBC AUTO: 102 FL (ref 82.6–102.9)
MONOCYTES # BLD: 7 % (ref 3–12)
NRBC AUTOMATED: 0 PER 100 WBC
PDW BLD-RTO: 13.5 % (ref 11.8–14.4)
PLATELET # BLD: 329 K/UL (ref 138–453)
PLATELET ESTIMATE: ABNORMAL
PMV BLD AUTO: 10.1 FL (ref 8.1–13.5)
POTASSIUM SERPL-SCNC: 4.7 MMOL/L (ref 3.7–5.3)
RBC # BLD: 5.03 M/UL (ref 3.95–5.11)
RBC # BLD: ABNORMAL 10*6/UL
SEG NEUTROPHILS: 54 % (ref 36–65)
SEGMENTED NEUTROPHILS ABSOLUTE COUNT: 6.32 K/UL (ref 1.5–8.1)
SODIUM BLD-SCNC: 146 MMOL/L (ref 135–144)
T4 TOTAL: 6.9 UG/DL (ref 4.5–12)
TOTAL PROTEIN: 7.8 G/DL (ref 6.4–8.3)
TSH SERPL DL<=0.05 MIU/L-ACNC: 1.15 MIU/L (ref 0.3–5)
WBC # BLD: 11.6 K/UL (ref 3.5–11.3)
WBC # BLD: ABNORMAL 10*3/UL

## 2019-11-18 PROCEDURE — 99213 OFFICE O/P EST LOW 20 MIN: CPT | Performed by: FAMILY MEDICINE

## 2019-11-18 RX ORDER — ALPRAZOLAM 1 MG/1
1 TABLET ORAL 3 TIMES DAILY PRN
Qty: 90 TABLET | Refills: 2 | Status: SHIPPED | OUTPATIENT
Start: 2019-11-18 | End: 2020-03-11

## 2019-11-18 RX ORDER — SIMVASTATIN 40 MG
TABLET ORAL
Qty: 90 TABLET | Refills: 3 | Status: SHIPPED | OUTPATIENT
Start: 2019-11-18 | End: 2020-08-11

## 2019-11-18 RX ORDER — DULOXETIN HYDROCHLORIDE 30 MG/1
30 CAPSULE, DELAYED RELEASE ORAL DAILY
COMMUNITY
End: 2020-09-21

## 2019-11-18 RX ORDER — LISINOPRIL 10 MG/1
TABLET ORAL
Qty: 90 TABLET | Refills: 3 | Status: SHIPPED | OUTPATIENT
Start: 2019-11-18 | End: 2020-08-25

## 2019-11-18 ASSESSMENT — ENCOUNTER SYMPTOMS
NAUSEA: 0
SORE THROAT: 0
DIARRHEA: 0
SHORTNESS OF BREATH: 1
BACK PAIN: 1
BLOOD IN STOOL: 1
COUGH: 0
VOMITING: 0
SINUS PRESSURE: 0

## 2019-11-19 ENCOUNTER — CARE COORDINATION (OUTPATIENT)
Dept: FAMILY MEDICINE CLINIC | Age: 74
End: 2019-11-19

## 2019-11-21 RX ORDER — NYSTATIN 100000 U/G
CREAM TOPICAL
Qty: 15 G | Refills: 0 | Status: SHIPPED | OUTPATIENT
Start: 2019-11-21 | End: 2020-02-06 | Stop reason: SDUPTHER

## 2019-11-21 RX ORDER — DIAPER,BRIEF,INFANT-TODD,DISP
EACH MISCELLANEOUS
Qty: 15 G | Refills: 1 | Status: SHIPPED | OUTPATIENT
Start: 2019-11-21 | End: 2020-06-16 | Stop reason: ALTCHOICE

## 2019-11-26 DIAGNOSIS — M15.9 PRIMARY OSTEOARTHRITIS INVOLVING MULTIPLE JOINTS: ICD-10-CM

## 2019-11-26 RX ORDER — HYDROCODONE BITARTRATE AND ACETAMINOPHEN 7.5; 325 MG/1; MG/1
2 TABLET ORAL EVERY 6 HOURS PRN
Qty: 180 TABLET | Refills: 0 | Status: SHIPPED | OUTPATIENT
Start: 2019-11-26 | End: 2019-12-27 | Stop reason: SDUPTHER

## 2019-11-29 RX ORDER — LANCETS 30 GAUGE
1 EACH MISCELLANEOUS 2 TIMES DAILY
Qty: 100 EACH | Refills: 3 | Status: SHIPPED | OUTPATIENT
Start: 2019-11-29 | End: 2021-09-14 | Stop reason: SDUPTHER

## 2019-12-04 ENCOUNTER — CARE COORDINATION (OUTPATIENT)
Dept: FAMILY MEDICINE CLINIC | Age: 74
End: 2019-12-04

## 2019-12-06 ENCOUNTER — OFFICE VISIT (OUTPATIENT)
Dept: FAMILY MEDICINE CLINIC | Age: 74
End: 2019-12-06
Payer: COMMERCIAL

## 2019-12-06 ENCOUNTER — CARE COORDINATION (OUTPATIENT)
Dept: FAMILY MEDICINE CLINIC | Age: 74
End: 2019-12-06

## 2019-12-06 VITALS — DIASTOLIC BLOOD PRESSURE: 66 MMHG | OXYGEN SATURATION: 90 % | HEART RATE: 85 BPM | SYSTOLIC BLOOD PRESSURE: 126 MMHG

## 2019-12-06 DIAGNOSIS — E11.9 TYPE 2 DIABETES MELLITUS WITHOUT COMPLICATION, WITHOUT LONG-TERM CURRENT USE OF INSULIN (HCC): ICD-10-CM

## 2019-12-06 DIAGNOSIS — J44.9 CHRONIC OBSTRUCTIVE PULMONARY DISEASE, UNSPECIFIED COPD TYPE (HCC): ICD-10-CM

## 2019-12-06 DIAGNOSIS — K57.32 DIVERTICULITIS OF LARGE INTESTINE WITHOUT PERFORATION OR ABSCESS WITHOUT BLEEDING: Primary | ICD-10-CM

## 2019-12-06 PROCEDURE — 99213 OFFICE O/P EST LOW 20 MIN: CPT | Performed by: FAMILY MEDICINE

## 2019-12-06 RX ORDER — CIPROFLOXACIN 500 MG/1
500 TABLET, FILM COATED ORAL 2 TIMES DAILY
Qty: 20 TABLET | Refills: 0 | Status: SHIPPED | OUTPATIENT
Start: 2019-12-06 | End: 2019-12-16

## 2019-12-06 RX ORDER — METRONIDAZOLE 500 MG/1
500 TABLET ORAL 2 TIMES DAILY
Qty: 20 TABLET | Refills: 0 | Status: SHIPPED | OUTPATIENT
Start: 2019-12-06 | End: 2019-12-16

## 2019-12-06 ASSESSMENT — ENCOUNTER SYMPTOMS
DIARRHEA: 0
SORE THROAT: 0
COUGH: 0
CONSTIPATION: 1
NAUSEA: 0
TROUBLE SWALLOWING: 1
SINUS PRESSURE: 1
ABDOMINAL PAIN: 1
BACK PAIN: 1
SHORTNESS OF BREATH: 0
VOMITING: 0
ABDOMINAL DISTENTION: 1

## 2019-12-11 ENCOUNTER — HOSPITAL ENCOUNTER (OUTPATIENT)
Facility: MEDICAL CENTER | Age: 74
End: 2019-12-11
Payer: COMMERCIAL

## 2019-12-12 ENCOUNTER — OFFICE VISIT (OUTPATIENT)
Dept: ONCOLOGY | Age: 74
End: 2019-12-12
Payer: COMMERCIAL

## 2019-12-12 ENCOUNTER — HOSPITAL ENCOUNTER (OUTPATIENT)
Dept: INFUSION THERAPY | Facility: MEDICAL CENTER | Age: 74
Discharge: HOME | End: 2019-12-12
Payer: COMMERCIAL

## 2019-12-12 ENCOUNTER — TELEPHONE (OUTPATIENT)
Dept: ONCOLOGY | Age: 74
End: 2019-12-12

## 2019-12-12 VITALS — DIASTOLIC BLOOD PRESSURE: 67 MMHG | SYSTOLIC BLOOD PRESSURE: 115 MMHG | HEART RATE: 69 BPM | TEMPERATURE: 97.7 F

## 2019-12-12 DIAGNOSIS — C85.80 MARGINAL ZONE LYMPHOMA (HCC): Primary | ICD-10-CM

## 2019-12-12 DIAGNOSIS — C85.80 MARGINAL ZONE B-CELL LYMPHOMA (HCC): Primary | ICD-10-CM

## 2019-12-12 DIAGNOSIS — C85.80 MARGINAL ZONE LYMPHOMA (HCC): ICD-10-CM

## 2019-12-12 LAB
ABSOLUTE EOS #: 0.13 K/UL (ref 0–0.44)
ABSOLUTE IMMATURE GRANULOCYTE: 0.01 K/UL (ref 0–0.3)
ABSOLUTE LYMPH #: 2.15 K/UL (ref 1.1–3.7)
ABSOLUTE MONO #: 1.03 K/UL (ref 0.1–1.2)
ALBUMIN SERPL-MCNC: 3.6 G/DL (ref 3.5–5.2)
ALBUMIN/GLOBULIN RATIO: ABNORMAL (ref 1–2.5)
ALP BLD-CCNC: 72 U/L (ref 35–104)
ALT SERPL-CCNC: 16 U/L (ref 5–33)
ANION GAP SERPL CALCULATED.3IONS-SCNC: 7 MMOL/L (ref 9–17)
AST SERPL-CCNC: 20 U/L
BASOPHILS # BLD: 1 % (ref 0–2)
BASOPHILS ABSOLUTE: 0.09 K/UL (ref 0–0.2)
BILIRUB SERPL-MCNC: 0.18 MG/DL (ref 0.3–1.2)
BUN BLDV-MCNC: 13 MG/DL (ref 8–23)
BUN/CREAT BLD: 21 (ref 9–20)
CALCIUM SERPL-MCNC: 9 MG/DL (ref 8.6–10.4)
CHLORIDE BLD-SCNC: 107 MMOL/L (ref 98–107)
CO2: 27 MMOL/L (ref 20–31)
CREAT SERPL-MCNC: 0.61 MG/DL (ref 0.5–0.9)
DIFFERENTIAL TYPE: NORMAL
EOSINOPHILS RELATIVE PERCENT: 2 % (ref 1–4)
GFR AFRICAN AMERICAN: >60 ML/MIN
GFR NON-AFRICAN AMERICAN: >60 ML/MIN
GFR SERPL CREATININE-BSD FRML MDRD: ABNORMAL ML/MIN/{1.73_M2}
GFR SERPL CREATININE-BSD FRML MDRD: ABNORMAL ML/MIN/{1.73_M2}
GLUCOSE BLD-MCNC: 112 MG/DL (ref 70–99)
HCT VFR BLD CALC: 45.9 % (ref 36.3–47.1)
HEMOGLOBIN: 13.8 G/DL (ref 11.9–15.1)
IMMATURE GRANULOCYTES: 0 %
LYMPHOCYTES # BLD: 26 % (ref 24–43)
MCH RBC QN AUTO: 30.5 PG (ref 25.2–33.5)
MCHC RBC AUTO-ENTMCNC: 30.1 G/DL (ref 28.4–34.8)
MCV RBC AUTO: 101.3 FL (ref 82.6–102.9)
MONOCYTES # BLD: 12 % (ref 3–12)
NRBC AUTOMATED: 0 PER 100 WBC
PDW BLD-RTO: 13.1 % (ref 11.8–14.4)
PLATELET # BLD: 255 K/UL (ref 138–453)
PLATELET ESTIMATE: NORMAL
PMV BLD AUTO: 9.2 FL (ref 8.1–13.5)
POTASSIUM SERPL-SCNC: 3.9 MMOL/L (ref 3.7–5.3)
RBC # BLD: 4.53 M/UL (ref 3.95–5.11)
RBC # BLD: NORMAL 10*6/UL
SEG NEUTROPHILS: 59 % (ref 36–65)
SEGMENTED NEUTROPHILS ABSOLUTE COUNT: 4.94 K/UL (ref 1.5–8.1)
SODIUM BLD-SCNC: 141 MMOL/L (ref 135–144)
TOTAL PROTEIN: 6.6 G/DL (ref 6.4–8.3)
WBC # BLD: 8.4 K/UL (ref 3.5–11.3)
WBC # BLD: NORMAL 10*3/UL

## 2019-12-12 PROCEDURE — 96413 CHEMO IV INFUSION 1 HR: CPT

## 2019-12-12 PROCEDURE — 85025 COMPLETE CBC W/AUTO DIFF WBC: CPT

## 2019-12-12 PROCEDURE — 2580000003 HC RX 258: Performed by: INTERNAL MEDICINE

## 2019-12-12 PROCEDURE — 6370000000 HC RX 637 (ALT 250 FOR IP): Performed by: INTERNAL MEDICINE

## 2019-12-12 PROCEDURE — 99214 OFFICE O/P EST MOD 30 MIN: CPT | Performed by: INTERNAL MEDICINE

## 2019-12-12 PROCEDURE — 96374 THER/PROPH/DIAG INJ IV PUSH: CPT

## 2019-12-12 PROCEDURE — 80053 COMPREHEN METABOLIC PANEL: CPT

## 2019-12-12 PROCEDURE — 6360000002 HC RX W HCPCS: Performed by: INTERNAL MEDICINE

## 2019-12-12 PROCEDURE — 99211 OFF/OP EST MAY X REQ PHY/QHP: CPT

## 2019-12-12 PROCEDURE — 96417 CHEMO IV INFUS EACH ADDL SEQ: CPT

## 2019-12-12 PROCEDURE — 96415 CHEMO IV INFUSION ADDL HR: CPT

## 2019-12-12 PROCEDURE — 96375 TX/PRO/DX INJ NEW DRUG ADDON: CPT

## 2019-12-12 RX ORDER — SODIUM CHLORIDE 0.9 % (FLUSH) 0.9 %
5 SYRINGE (ML) INJECTION PRN
Status: CANCELLED | OUTPATIENT
Start: 2020-04-21

## 2019-12-12 RX ORDER — HEPARIN SODIUM (PORCINE) LOCK FLUSH IV SOLN 100 UNIT/ML 100 UNIT/ML
500 SOLUTION INTRAVENOUS PRN
Status: CANCELLED | OUTPATIENT
Start: 2020-04-21

## 2019-12-12 RX ORDER — DIPHENHYDRAMINE HYDROCHLORIDE 50 MG/ML
25 INJECTION INTRAMUSCULAR; INTRAVENOUS ONCE
Status: CANCELLED | OUTPATIENT
Start: 2020-04-21

## 2019-12-12 RX ORDER — EPINEPHRINE 1 MG/ML
0.3 INJECTION, SOLUTION, CONCENTRATE INTRAVENOUS PRN
Status: CANCELLED | OUTPATIENT
Start: 2020-04-21

## 2019-12-12 RX ORDER — SODIUM CHLORIDE 0.9 % (FLUSH) 0.9 %
10 SYRINGE (ML) INJECTION PRN
Status: DISCONTINUED | OUTPATIENT
Start: 2019-12-12 | End: 2019-12-13 | Stop reason: HOSPADM

## 2019-12-12 RX ORDER — DIPHENHYDRAMINE HYDROCHLORIDE 50 MG/ML
50 INJECTION INTRAMUSCULAR; INTRAVENOUS ONCE
Status: CANCELLED | OUTPATIENT
Start: 2020-04-21

## 2019-12-12 RX ORDER — SODIUM CHLORIDE 9 MG/ML
INJECTION, SOLUTION INTRAVENOUS CONTINUOUS
Status: CANCELLED | OUTPATIENT
Start: 2020-04-21

## 2019-12-12 RX ORDER — SODIUM CHLORIDE 9 MG/ML
20 INJECTION, SOLUTION INTRAVENOUS ONCE
Status: COMPLETED | OUTPATIENT
Start: 2019-12-12 | End: 2019-12-12

## 2019-12-12 RX ORDER — METHYLPREDNISOLONE SODIUM SUCCINATE 125 MG/2ML
125 INJECTION, POWDER, LYOPHILIZED, FOR SOLUTION INTRAMUSCULAR; INTRAVENOUS ONCE
Status: CANCELLED | OUTPATIENT
Start: 2020-04-21

## 2019-12-12 RX ORDER — ACETAMINOPHEN 325 MG/1
650 TABLET ORAL ONCE
Status: COMPLETED | OUTPATIENT
Start: 2019-12-12 | End: 2019-12-12

## 2019-12-12 RX ORDER — SODIUM CHLORIDE 9 MG/ML
20 INJECTION, SOLUTION INTRAVENOUS ONCE
Status: CANCELLED | OUTPATIENT
Start: 2020-04-21

## 2019-12-12 RX ORDER — SODIUM CHLORIDE 0.9 % (FLUSH) 0.9 %
10 SYRINGE (ML) INJECTION PRN
Status: CANCELLED | OUTPATIENT
Start: 2020-04-21

## 2019-12-12 RX ORDER — MEPERIDINE HYDROCHLORIDE 50 MG/ML
12.5 INJECTION INTRAMUSCULAR; INTRAVENOUS; SUBCUTANEOUS ONCE
Status: CANCELLED | OUTPATIENT
Start: 2020-04-21

## 2019-12-12 RX ORDER — 0.9 % SODIUM CHLORIDE 0.9 %
10 VIAL (ML) INJECTION ONCE
Status: CANCELLED | OUTPATIENT
Start: 2020-04-21

## 2019-12-12 RX ORDER — DIPHENHYDRAMINE HYDROCHLORIDE 50 MG/ML
25 INJECTION INTRAMUSCULAR; INTRAVENOUS ONCE
Status: COMPLETED | OUTPATIENT
Start: 2019-12-12 | End: 2019-12-12

## 2019-12-12 RX ORDER — ACETAMINOPHEN 325 MG/1
650 TABLET ORAL ONCE
Status: CANCELLED | OUTPATIENT
Start: 2020-04-21

## 2019-12-12 RX ORDER — HEPARIN SODIUM (PORCINE) LOCK FLUSH IV SOLN 100 UNIT/ML 100 UNIT/ML
500 SOLUTION INTRAVENOUS PRN
Status: DISCONTINUED | OUTPATIENT
Start: 2019-12-12 | End: 2019-12-13 | Stop reason: HOSPADM

## 2019-12-12 RX ADMIN — HEPARIN 500 UNITS: 100 SYRINGE at 13:22

## 2019-12-12 RX ADMIN — DIPHENHYDRAMINE HYDROCHLORIDE 25 MG: 50 INJECTION, SOLUTION INTRAMUSCULAR; INTRAVENOUS at 11:02

## 2019-12-12 RX ADMIN — ALTEPLASE 2 MG: 2.2 INJECTION, POWDER, LYOPHILIZED, FOR SOLUTION INTRAVENOUS at 09:31

## 2019-12-12 RX ADMIN — RITUXIMAB 700 MG: 10 INJECTION, SOLUTION INTRAVENOUS at 11:34

## 2019-12-12 RX ADMIN — WATER 2.2 ML: 1 INJECTION INTRAMUSCULAR; INTRAVENOUS; SUBCUTANEOUS at 09:33

## 2019-12-12 RX ADMIN — ACETAMINOPHEN 650 MG: 325 TABLET ORAL at 11:01

## 2019-12-12 RX ADMIN — SODIUM CHLORIDE 20 ML/HR: 9 INJECTION, SOLUTION INTRAVENOUS at 10:35

## 2019-12-12 ASSESSMENT — PAIN SCALES - GENERAL: PAINLEVEL_OUTOF10: 0

## 2019-12-12 NOTE — PROGRESS NOTES
Patient here for MD visit,labs and chemo. No complaints today,  Oxygen on at 3 L nasal cannula. Vitals obtained. Port accessed per policy with no blood return obtained. IVF ran at 999 ml/hr x 10 minutes. No blood return obtained. Activase placed in line at 9:31. Labs drawn peripherally. Activase removed at 10:35 with brisk blood flow obtained. Seen by MD.  Huy Collins given per [facility]. Rituxan hung per MAR. Infusing. Patient sleeping. Completed over 90 minutes. Tolerated well. Port flushed per policy and gripper removed intact, band aid to site. Has a return visit scheduled. Discharged ambulatory with help to front lobby.

## 2019-12-16 ENCOUNTER — CARE COORDINATION (OUTPATIENT)
Dept: CARE COORDINATION | Age: 74
End: 2019-12-16

## 2019-12-18 ENCOUNTER — CARE COORDINATION (OUTPATIENT)
Dept: FAMILY MEDICINE CLINIC | Age: 74
End: 2019-12-18

## 2019-12-18 RX ORDER — GLUCOSAMINE HCL/CHONDROITIN SU 500-400 MG
1 CAPSULE ORAL
COMMUNITY

## 2019-12-18 ASSESSMENT — ENCOUNTER SYMPTOMS: DYSPNEA ASSOCIATED WITH: EXERTION

## 2019-12-26 ENCOUNTER — TELEPHONE (OUTPATIENT)
Dept: ADMINISTRATIVE | Age: 74
End: 2019-12-26

## 2019-12-26 DIAGNOSIS — M15.9 PRIMARY OSTEOARTHRITIS INVOLVING MULTIPLE JOINTS: ICD-10-CM

## 2019-12-27 RX ORDER — HYDROCODONE BITARTRATE AND ACETAMINOPHEN 7.5; 325 MG/1; MG/1
2 TABLET ORAL EVERY 6 HOURS PRN
Qty: 180 TABLET | Refills: 0 | Status: SHIPPED | OUTPATIENT
Start: 2019-12-27 | End: 2020-01-23 | Stop reason: SDUPTHER

## 2020-01-24 RX ORDER — HYDROCODONE BITARTRATE AND ACETAMINOPHEN 7.5; 325 MG/1; MG/1
2 TABLET ORAL EVERY 6 HOURS PRN
Qty: 180 TABLET | Refills: 0 | Status: SHIPPED | OUTPATIENT
Start: 2020-01-24 | End: 2020-02-27 | Stop reason: SDUPTHER

## 2020-01-29 ENCOUNTER — OFFICE VISIT (OUTPATIENT)
Dept: FAMILY MEDICINE CLINIC | Age: 75
End: 2020-01-29
Payer: COMMERCIAL

## 2020-01-29 VITALS
BODY MASS INDEX: 38.4 KG/M2 | SYSTOLIC BLOOD PRESSURE: 122 MMHG | HEART RATE: 97 BPM | WEIGHT: 178 LBS | OXYGEN SATURATION: 98 % | HEIGHT: 57 IN | DIASTOLIC BLOOD PRESSURE: 62 MMHG

## 2020-01-29 PROCEDURE — 99213 OFFICE O/P EST LOW 20 MIN: CPT | Performed by: FAMILY MEDICINE

## 2020-01-29 RX ORDER — ZINC SULFATE 50(220)MG
220 CAPSULE ORAL DAILY
Qty: 360 CAPSULE | Refills: 0 | Status: SHIPPED | OUTPATIENT
Start: 2020-01-29 | End: 2021-01-04

## 2020-01-29 RX ORDER — ASPIRIN 81 MG/1
81 TABLET ORAL DAILY
Qty: 90 TABLET | Refills: 3 | Status: SHIPPED | OUTPATIENT
Start: 2020-01-29 | End: 2021-01-25 | Stop reason: SDUPTHER

## 2020-01-29 RX ORDER — FLUCONAZOLE 100 MG/1
100 TABLET ORAL DAILY
Qty: 7 TABLET | Refills: 0 | Status: SHIPPED | OUTPATIENT
Start: 2020-01-29 | End: 2020-02-05

## 2020-01-29 ASSESSMENT — PATIENT HEALTH QUESTIONNAIRE - PHQ9
SUM OF ALL RESPONSES TO PHQ9 QUESTIONS 1 & 2: 0
2. FEELING DOWN, DEPRESSED OR HOPELESS: 0
1. LITTLE INTEREST OR PLEASURE IN DOING THINGS: 0
SUM OF ALL RESPONSES TO PHQ QUESTIONS 1-9: 0
SUM OF ALL RESPONSES TO PHQ QUESTIONS 1-9: 0

## 2020-01-29 ASSESSMENT — ENCOUNTER SYMPTOMS
BACK PAIN: 1
NAUSEA: 0
VOMITING: 0
DIARRHEA: 0
COUGH: 0
SORE THROAT: 1
ABDOMINAL PAIN: 1
SHORTNESS OF BREATH: 0
SINUS PRESSURE: 0

## 2020-01-29 NOTE — PROGRESS NOTES
Jung Scott is a 69 y.o. female who presents todayfor her medical conditions/complaints as noted below. Jung Scott is c/o of [facility] (bloodwork)  Routine follow up on PL updates on Rx refill. Having more mouth and \"yeast\" symptoms. HPI:            Past Medical History:   Diagnosis Date    [facility] ([facility]ronic obstructive [facility] disease) [facility]     Depression     Diabetes mellitus ([facility]     Hyperlipidemia     PTSD (post-traumatic stress disorder)     Tubulovillous adenoma       Past Surgical History:   Procedure Laterality Date    COLONOSCOPY      colon polyps    COLONOSCOPY  2016    severe diverticulosis TUBULOVILLOUS ADENOMA.       HYSTERECTOMY      TUNNELED VENOUS PORT PLACEMENT  2019    chemo port to right chest     Family History   Problem Relation Age of Onset    Heart Disease Mother     Heart Disease Brother      Social History     Tobacco Use    Smoking status: Former Smoker     Packs/day: 1.00     Years: 10.00     Pack years: 10.00     Types: Cigarettes     Last attempt to quit: 2007     Years since quittin.1    Smokeless tobacco: Never Used   Substance Use Topics    Alcohol use: No      Current Outpatient Medications   Medication Sig Dispense Refill    fluconazole (DIFLUCAN) 100 MG tablet Take 1 tablet by mouth daily for 7 days 7 tablet 0    aspirin EC 81 MG EC tablet Take 1 tablet by mouth daily 90 tablet 3    zinc sulfate (ORAZINC) 220 (50 Zn) MG capsule Take 1 capsule by mouth daily 360 capsule 0    HYDROcodone-acetaminophen (NORCO) 7.5-325 MG per tablet Take 2 tablets by mouth every 6 hours as needed for Pain for up to 22 days. Intended supply: 30 days 180 tablet 0    blood glucose monitor strips 1 strip by Other route TrueTest Strips     3 times a day & as needed for symptoms of irregular blood glucose.       Lancets MISC 1 each by Other route 2 times daily Indications: [employer] Glucose Meter 100 each 3    nystatin (MYCOSTATIN) 480884 UNIT/GM cream Apply topically 2 times daily. 15 g 0    hydrocortisone 1 % cream Apply topically BID  prn 15 g 1    DULoxetine (CYMBALTA) 30 MG extended release capsule Take 30 mg by mouth daily      lisinopril (PRINIVIL;ZESTRIL) 10 MG tablet TAKE ONE TABLET BY MOUTH DAILY 90 tablet 3    simvastatin (ZOCOR) 40 MG tablet TAKE ONE TABLET BY MOUTH DAILY 90 tablet 3    ALPRAZolam (XANAX) 1 MG tablet Take 1 tablet by mouth 3 times daily as needed for Sleep or Anxiety for up to 90 days. 90 tablet 2    nystatin-triamcinolone (MYCOLOG II) 521933-5.5 UNIT/GM-% cream Apply topically 2 times daily. 15 g 1    OXYGEN Inhale 3 L into the lungs continuous      aspirin 81 MG chewable tablet Take 1 tablet by mouth daily 30 tablet 3    ZINC PO Take 1 tablet by mouth daily      FLUoxetine (PROZAC) 20 MG capsule Take 1 capsule by mouth daily 90 capsule 2    albuterol-ipratropium (COMBIVENT RESPIMAT)  MCG/ACT AERS inhaler INHALE ONE INHALATION BY MOUTH FOUR TIMES A DAY 3 Inhaler 3    prochlorperazine (COMPAZINE) 10 MG tablet Take 1 tablet by mouth every 6 hours as needed (CHEMO INDUCED NAUSEA) 120 tablet 3    nystatin (MYCOSTATIN) 018244 UNIT/GM cream Apply topically 2 times daily. 15 g 0    gabapentin (NEURONTIN) 600 MG tablet Take 600 mg by mouth 3 times daily. Aixa Shouts esomeprazole (NEXIUM) 40 MG delayed release capsule TAKE ONE CAPSULE BY MOUTH DAILY 90 capsule 3    metFORMIN (GLUCOPHAGE-XR) 750 MG extended release tablet Take 1 tablet by mouth daily (with breakfast) 90 tablet 3     No current facility-administered medications for this visit. No Known Allergies      Subjective:   Review of Systems   Constitutional: Negative for chills, diaphoresis and fever. HENT: Positive for dental problem, drooling and sore throat. Negative for congestion and sinus pressure. Eyes: Negative for visual disturbance. Respiratory: Negative for cough and shortness of breath.     Cardiovascular: Negative for chest pain and leg

## 2020-01-31 NOTE — TELEPHONE ENCOUNTER
Alexa Garrido is questioning why pt is being given 180 capsules of norco and says that she would need 240 capsules for 30 days.  He wants to make sure that you really just want her to have 180 capsules and if that is meant to last 30 days

## 2020-02-04 ENCOUNTER — TELEPHONE (OUTPATIENT)
Dept: FAMILY MEDICINE CLINIC | Age: 75
End: 2020-02-04

## 2020-02-04 ENCOUNTER — HOSPITAL ENCOUNTER (OUTPATIENT)
Facility: MEDICAL CENTER | Age: 75
End: 2020-02-04
Payer: COMMERCIAL

## 2020-02-04 RX ORDER — CIPROFLOXACIN 250 MG/1
250 TABLET, FILM COATED ORAL 2 TIMES DAILY
Qty: 14 TABLET | Refills: 0 | Status: SHIPPED | OUTPATIENT
Start: 2020-02-04 | End: 2020-02-14

## 2020-02-04 RX ORDER — NYSTATIN 100000 U/G
CREAM TOPICAL
Qty: 15 G | Refills: 2 | Status: SHIPPED | OUTPATIENT
Start: 2020-02-04 | End: 2020-06-16

## 2020-02-04 NOTE — TELEPHONE ENCOUNTER
Sonal Pt Ms Sarah is calling, to see if Dr Jose Bautista can call her something else, for Her Yeast Infection, Pt states the Diflucan did not work, x 7 days ago, can reach Pt at [phone] thanks writer.

## 2020-02-24 ENCOUNTER — HOSPITAL ENCOUNTER (OUTPATIENT)
Facility: MEDICAL CENTER | Age: 75
End: 2020-02-24
Payer: COMMERCIAL

## 2020-02-25 ENCOUNTER — TELEPHONE (OUTPATIENT)
Dept: ONCOLOGY | Age: 75
End: 2020-02-25

## 2020-02-25 ENCOUNTER — HOSPITAL ENCOUNTER (OUTPATIENT)
Dept: INFUSION THERAPY | Facility: MEDICAL CENTER | Age: 75
Discharge: HOME | End: 2020-02-25
Payer: COMMERCIAL

## 2020-02-25 ENCOUNTER — OFFICE VISIT (OUTPATIENT)
Dept: ONCOLOGY | Age: 75
End: 2020-02-25
Payer: COMMERCIAL

## 2020-02-25 VITALS
SYSTOLIC BLOOD PRESSURE: 108 MMHG | TEMPERATURE: 97.7 F | HEART RATE: 75 BPM | RESPIRATION RATE: 22 BRPM | BODY MASS INDEX: 37.88 KG/M2 | WEIGHT: 175.04 LBS | DIASTOLIC BLOOD PRESSURE: 60 MMHG

## 2020-02-25 VITALS
RESPIRATION RATE: 22 BRPM | WEIGHT: 175 LBS | DIASTOLIC BLOOD PRESSURE: 60 MMHG | BODY MASS INDEX: 37.87 KG/M2 | SYSTOLIC BLOOD PRESSURE: 108 MMHG | TEMPERATURE: 87.7 F | HEART RATE: 75 BPM

## 2020-02-25 DIAGNOSIS — C85.80 MARGINAL ZONE B-CELL LYMPHOMA (HCC): Primary | ICD-10-CM

## 2020-02-25 DIAGNOSIS — C85.80 MARGINAL ZONE LYMPHOMA (HCC): ICD-10-CM

## 2020-02-25 LAB
ABSOLUTE EOS #: 0.09 K/UL (ref 0–0.44)
ABSOLUTE IMMATURE GRANULOCYTE: 0.03 K/UL (ref 0–0.3)
ABSOLUTE LYMPH #: 3.56 K/UL (ref 1.1–3.7)
ABSOLUTE MONO #: 0.71 K/UL (ref 0.1–1.2)
ALBUMIN SERPL-MCNC: 3.6 G/DL (ref 3.5–5.2)
ALBUMIN/GLOBULIN RATIO: ABNORMAL (ref 1–2.5)
ALP BLD-CCNC: 70 U/L (ref 35–104)
ALT SERPL-CCNC: 16 U/L (ref 5–33)
ANION GAP SERPL CALCULATED.3IONS-SCNC: 13 MMOL/L (ref 9–17)
AST SERPL-CCNC: 15 U/L
BASOPHILS # BLD: 0 % (ref 0–2)
BASOPHILS ABSOLUTE: 0.03 K/UL (ref 0–0.2)
BILIRUB SERPL-MCNC: 0.29 MG/DL (ref 0.3–1.2)
BUN BLDV-MCNC: 14 MG/DL (ref 8–23)
BUN/CREAT BLD: 28 (ref 9–20)
CALCIUM SERPL-MCNC: 9.2 MG/DL (ref 8.6–10.4)
CHLORIDE BLD-SCNC: 105 MMOL/L (ref 98–107)
CO2: 23 MMOL/L (ref 20–31)
CREAT SERPL-MCNC: 0.5 MG/DL (ref 0.5–0.9)
DIFFERENTIAL TYPE: ABNORMAL
EOSINOPHILS RELATIVE PERCENT: 1 % (ref 1–4)
GFR AFRICAN AMERICAN: >60 ML/MIN
GFR NON-AFRICAN AMERICAN: >60 ML/MIN
GFR SERPL CREATININE-BSD FRML MDRD: ABNORMAL ML/MIN/{1.73_M2}
GFR SERPL CREATININE-BSD FRML MDRD: ABNORMAL ML/MIN/{1.73_M2}
GLUCOSE BLD-MCNC: 147 MG/DL (ref 70–99)
HCT VFR BLD CALC: 44.4 % (ref 36.3–47.1)
HEMOGLOBIN: 14 G/DL (ref 11.9–15.1)
IMMATURE GRANULOCYTES: 0 %
LYMPHOCYTES # BLD: 37 % (ref 24–43)
MCH RBC QN AUTO: 30.3 PG (ref 25.2–33.5)
MCHC RBC AUTO-ENTMCNC: 31.5 G/DL (ref 28.4–34.8)
MCV RBC AUTO: 96.1 FL (ref 82.6–102.9)
MONOCYTES # BLD: 7 % (ref 3–12)
NRBC AUTOMATED: 0 PER 100 WBC
PDW BLD-RTO: 14.8 % (ref 11.8–14.4)
PLATELET # BLD: 234 K/UL (ref 138–453)
PLATELET ESTIMATE: ABNORMAL
PMV BLD AUTO: 10.1 FL (ref 8.1–13.5)
POTASSIUM SERPL-SCNC: 3.9 MMOL/L (ref 3.7–5.3)
RBC # BLD: 4.62 M/UL (ref 3.95–5.11)
RBC # BLD: ABNORMAL 10*6/UL
SEG NEUTROPHILS: 55 % (ref 36–65)
SEGMENTED NEUTROPHILS ABSOLUTE COUNT: 5.22 K/UL (ref 1.5–8.1)
SODIUM BLD-SCNC: 141 MMOL/L (ref 135–144)
TOTAL PROTEIN: 6.8 G/DL (ref 6.4–8.3)
WBC # BLD: 9.6 K/UL (ref 3.5–11.3)
WBC # BLD: ABNORMAL 10*3/UL

## 2020-02-25 PROCEDURE — 85025 COMPLETE CBC W/AUTO DIFF WBC: CPT

## 2020-02-25 PROCEDURE — 96413 CHEMO IV INFUSION 1 HR: CPT

## 2020-02-25 PROCEDURE — 96374 THER/PROPH/DIAG INJ IV PUSH: CPT

## 2020-02-25 PROCEDURE — 96375 TX/PRO/DX INJ NEW DRUG ADDON: CPT

## 2020-02-25 PROCEDURE — 6360000002 HC RX W HCPCS: Performed by: INTERNAL MEDICINE

## 2020-02-25 PROCEDURE — 99211 OFF/OP EST MAY X REQ PHY/QHP: CPT | Performed by: INTERNAL MEDICINE

## 2020-02-25 PROCEDURE — 36591 DRAW BLOOD OFF VENOUS DEVICE: CPT

## 2020-02-25 PROCEDURE — 99214 OFFICE O/P EST MOD 30 MIN: CPT | Performed by: INTERNAL MEDICINE

## 2020-02-25 PROCEDURE — 96415 CHEMO IV INFUSION ADDL HR: CPT

## 2020-02-25 PROCEDURE — 6370000000 HC RX 637 (ALT 250 FOR IP): Performed by: INTERNAL MEDICINE

## 2020-02-25 PROCEDURE — 2580000003 HC RX 258: Performed by: INTERNAL MEDICINE

## 2020-02-25 PROCEDURE — 80053 COMPREHEN METABOLIC PANEL: CPT

## 2020-02-25 RX ORDER — 0.9 % SODIUM CHLORIDE 0.9 %
10 VIAL (ML) INJECTION ONCE
Status: CANCELLED | OUTPATIENT
Start: 2020-06-16

## 2020-02-25 RX ORDER — SODIUM CHLORIDE 0.9 % (FLUSH) 0.9 %
10 SYRINGE (ML) INJECTION PRN
Status: CANCELLED | OUTPATIENT
Start: 2020-06-16

## 2020-02-25 RX ORDER — SODIUM CHLORIDE 0.9 % (FLUSH) 0.9 %
10 SYRINGE (ML) INJECTION PRN
Status: DISCONTINUED | OUTPATIENT
Start: 2020-02-25 | End: 2020-02-26 | Stop reason: HOSPADM

## 2020-02-25 RX ORDER — MEPERIDINE HYDROCHLORIDE 50 MG/ML
12.5 INJECTION INTRAMUSCULAR; INTRAVENOUS; SUBCUTANEOUS ONCE
Status: CANCELLED | OUTPATIENT
Start: 2020-06-16

## 2020-02-25 RX ORDER — DIPHENHYDRAMINE HYDROCHLORIDE 50 MG/ML
50 INJECTION INTRAMUSCULAR; INTRAVENOUS ONCE
Status: CANCELLED | OUTPATIENT
Start: 2020-06-16

## 2020-02-25 RX ORDER — SODIUM CHLORIDE 9 MG/ML
INJECTION, SOLUTION INTRAVENOUS CONTINUOUS
Status: CANCELLED | OUTPATIENT
Start: 2020-06-16

## 2020-02-25 RX ORDER — ACETAMINOPHEN 325 MG/1
650 TABLET ORAL ONCE
Status: CANCELLED | OUTPATIENT
Start: 2020-06-16

## 2020-02-25 RX ORDER — METHYLPREDNISOLONE SODIUM SUCCINATE 125 MG/2ML
125 INJECTION, POWDER, LYOPHILIZED, FOR SOLUTION INTRAMUSCULAR; INTRAVENOUS ONCE
Status: CANCELLED | OUTPATIENT
Start: 2020-06-16

## 2020-02-25 RX ORDER — SODIUM CHLORIDE 0.9 % (FLUSH) 0.9 %
5 SYRINGE (ML) INJECTION PRN
Status: CANCELLED | OUTPATIENT
Start: 2020-06-16

## 2020-02-25 RX ORDER — DIPHENHYDRAMINE HYDROCHLORIDE 50 MG/ML
25 INJECTION INTRAMUSCULAR; INTRAVENOUS ONCE
Status: COMPLETED | OUTPATIENT
Start: 2020-02-25 | End: 2020-02-25

## 2020-02-25 RX ORDER — SODIUM CHLORIDE 9 MG/ML
20 INJECTION, SOLUTION INTRAVENOUS ONCE
Status: COMPLETED | OUTPATIENT
Start: 2020-02-25 | End: 2020-02-25

## 2020-02-25 RX ORDER — HEPARIN SODIUM (PORCINE) LOCK FLUSH IV SOLN 100 UNIT/ML 100 UNIT/ML
500 SOLUTION INTRAVENOUS PRN
Status: CANCELLED | OUTPATIENT
Start: 2020-06-16

## 2020-02-25 RX ORDER — EPINEPHRINE 1 MG/ML
0.3 INJECTION, SOLUTION, CONCENTRATE INTRAVENOUS PRN
Status: CANCELLED | OUTPATIENT
Start: 2020-06-16

## 2020-02-25 RX ORDER — ACETAMINOPHEN 325 MG/1
650 TABLET ORAL ONCE
Status: COMPLETED | OUTPATIENT
Start: 2020-02-25 | End: 2020-02-25

## 2020-02-25 RX ORDER — SODIUM CHLORIDE 9 MG/ML
20 INJECTION, SOLUTION INTRAVENOUS ONCE
Status: CANCELLED | OUTPATIENT
Start: 2020-06-16

## 2020-02-25 RX ORDER — DIPHENHYDRAMINE HYDROCHLORIDE 50 MG/ML
25 INJECTION INTRAMUSCULAR; INTRAVENOUS ONCE
Status: CANCELLED | OUTPATIENT
Start: 2020-06-16

## 2020-02-25 RX ORDER — HEPARIN SODIUM (PORCINE) LOCK FLUSH IV SOLN 100 UNIT/ML 100 UNIT/ML
500 SOLUTION INTRAVENOUS PRN
Status: DISCONTINUED | OUTPATIENT
Start: 2020-02-25 | End: 2020-02-26 | Stop reason: HOSPADM

## 2020-02-25 RX ADMIN — SODIUM CHLORIDE 20 ML/HR: 9 INJECTION, SOLUTION INTRAVENOUS at 11:52

## 2020-02-25 RX ADMIN — DIPHENHYDRAMINE HYDROCHLORIDE 25 MG: 50 INJECTION, SOLUTION INTRAMUSCULAR; INTRAVENOUS at 12:52

## 2020-02-25 RX ADMIN — ACETAMINOPHEN 650 MG: 325 TABLET ORAL at 12:51

## 2020-02-25 RX ADMIN — HEPARIN 500 UNITS: 100 SYRINGE at 15:24

## 2020-02-25 RX ADMIN — RITUXIMAB 700 MG: 10 INJECTION, SOLUTION INTRAVENOUS at 13:26

## 2020-02-25 RX ADMIN — SODIUM CHLORIDE, PRESERVATIVE FREE 20 ML: 5 INJECTION INTRAVENOUS at 15:24

## 2020-02-25 ASSESSMENT — PAIN DESCRIPTION - PAIN TYPE: TYPE: CHRONIC PAIN

## 2020-02-25 ASSESSMENT — PAIN DESCRIPTION - FREQUENCY: FREQUENCY: CONTINUOUS

## 2020-02-25 ASSESSMENT — PAIN SCALES - GENERAL
PAINLEVEL_OUTOF10: 10
PAINLEVEL_OUTOF10: 10

## 2020-02-25 ASSESSMENT — PAIN DESCRIPTION - ONSET: ONSET: ON-GOING

## 2020-02-25 ASSESSMENT — PAIN DESCRIPTION - LOCATION: LOCATION: MOUTH

## 2020-02-25 NOTE — PROGRESS NOTES
Pt  wheeled to infusion area for scheduled chemotherapy. Pt admits to chronic oral discomfort. VS stable. Port accessed and labs obtained. Pt approved for planned chemotherapy. Rituxan infused at 100 ml.hr for 30 min followed by Rituxan 300 ml/hr for remainder of infusion. Port flushed with normal saline and Heparin, Moseley removed, needle intact and site unremarkable. Site covered with gauze. Pt scheduled return in one month.

## 2020-02-26 NOTE — PROGRESS NOTES
disease) ([facility] Depression, Diabetes mellitus ([facility] Hyperlipidemia, PTSD (post-traumatic stress disorder), and Tubulovillous adenoma. PAST SURGICAL HISTORY: has a past surgical history that includes Hysterectomy; Colonoscopy; Colonoscopy (06/07/2016); and Tunneled venous port placement (04/08/2019). CURRENT MEDICATIONS:  has a current medication list which includes the following prescription(s): nystatin, aspirin ec, zinc sulfate, blood glucose test strips, lancets, hydrocortisone, duloxetine, lisinopril, simvastatin, nystatin-triamcinolone, OXYGEN, multiple vitamins-minerals, fluoxetine, albuterol-ipratropium, prochlorperazine, gabapentin, esomeprazole, and metformin, and the following Facility-Administered Medications: sodium chloride flush, heparin flush, and riTUXimab (RITUXAN) 700 mg in sodium chloride 0.9 % 180 mL chemo IVPB. ALLERGIES:  has No Known Allergies. FAMILY HISTORY: Negative for any hematological or oncological conditions. SOCIAL HISTORY:  reports that she quit smoking about 12 years ago. Her smoking use included cigarettes. She has a 10.00 pack-year smoking history. She has never used smokeless tobacco. She reports that she does not drink alcohol or use drugs. REVIEW OF SYSTEMS:     · General: Positive for weakness and fatigue. No unanticipated weight loss or decreased appetite. No fever or chills. · Eyes: No blurred vision, eye pain or double vision. · Ears: No hearing problems or drainage. No tinnitus. · Throat: No sore throat, problems with swallowing or dysphagia. · Respiratory: No cough, sputum or hemoptysis. No shortness of breath. No pleuritic chest pain. · Cardiovascular: No chest pain, orthopnea or PND. No lower extremity edema. No palpitation. · Gastrointestinal: No problems with swallowing. No abdominal pain or bloating. No nausea or vomiting. No diarrhea or constipation. No GI bleeding. · Genitourinary: No dysuria, hematuria, frequency or urgency. repeated labs. Patient's questions were answered to the best of her satisfaction and she verbalized full understanding and agreement.

## 2020-02-27 RX ORDER — HYDROCODONE BITARTRATE AND ACETAMINOPHEN 7.5; 325 MG/1; MG/1
2 TABLET ORAL EVERY 6 HOURS PRN
Qty: 180 TABLET | Refills: 0 | Status: SHIPPED | OUTPATIENT
Start: 2020-02-27 | End: 2020-03-23 | Stop reason: SDUPTHER

## 2020-02-27 NOTE — TELEPHONE ENCOUNTER
Last visit: 1/29/20  Last Med refill: 1/24/20      Next Visit Date:  Future Appointments   Date Time Provider Thad Magallanesi   3/30/2020  2:00 PM MD EVANS Melara [facility]   4/21/2020 11:00 AM STV STA CHAIR 16 [facility] Suz[facility]   4/21/2020 11:30 AM Daniel Brandt MD [facility]       Health Maintenance   Topic Date Due    Hepatitis B vaccine (1 of 3 - Risk 3-dose series) [birth year]30/1966    Diabetic microalbuminuria test  05/15/2018    Colon cancer screen colonoscopy  06/07/2019    Diabetic foot exam  07/11/2019    Diabetic retinal exam  08/15/2019    DTaP/Tdap/Td vaccine (1 - Tdap) 08/15/2020 (Originally 4/30/1956)    Shingles Vaccine (1 of 2) 08/15/2020 (Originally 4/30/1997)    A1C test (Diabetic or Prediabetic)  05/23/2020    Lipid screen  05/23/2020    Annual Wellness Visit (AWV)  10/14/2020    Potassium monitoring  02/25/2021    Creatinine monitoring  02/25/2021    Breast cancer screen  03/27/2021    DEXA (modify frequency per FRAX score)  Completed    Flu vaccine  Completed    Pneumococcal 65+ yrs at Risk Vaccine  Completed    Hepatitis C screen  Completed    Hepatitis A vaccine  Aged Out    Hib vaccine  Aged Out    Meningococcal (ACWY) vaccine  Aged Out       Hemoglobin A1C (%)   Date Value   05/23/2019 5.7   07/11/2018 6.4 (H)   07/20/2017 6.1 (H)             ( goal A1C is < 7)   No results found for: LABMICR  LDL Cholesterol (mg/dL)   Date Value   05/23/2019 58   07/11/2018 67       (goal LDL is <100)   AST (U/L)   Date Value   02/25/2020 15     ALT (U/L)   Date Value   02/25/2020 16     BUN (mg/dL)   Date Value   02/25/2020 14     BP Readings from Last 3 Encounters:   02/25/20 108/60   02/25/20 108/60   01/29/20 122/62          (goal 120/80)    All Future Testing planned in [facility]  Lab Frequency Next Occurrence   T4 Once 10/14/2019   TSH without Reflex Once 10/14/2019   CBC Auto Differential     CBC Auto Differential     Comprehensive Metabolic Panel

## 2020-03-11 RX ORDER — ALPRAZOLAM 1 MG/1
TABLET ORAL
Qty: 90 TABLET | Refills: 1 | Status: SHIPPED | OUTPATIENT
Start: 2020-03-11 | End: 2020-05-21

## 2020-03-11 NOTE — TELEPHONE ENCOUNTER
Last visit: 1/29/20  Last Med refill: 11/18/19      Next Visit Date:  Future Appointments   Date Time Provider Thad Magallanesi   3/30/2020  2:00 PM Bandar Beard MD [facility]   4/21/2020 11:00 AM STV STA CHAIR 16 [facility] STA MED [facility] Rad[facility]   4/21/2020 11:30 AM Kary Dunbar MD [facility]       Health Maintenance   Topic Date Due    Hepatitis B vaccine (1 of 3 - Risk 3-dose series) [birth year]30/1966    Diabetic microalbuminuria test  05/15/2018    Colon cancer screen colonoscopy  06/07/2019    Diabetic foot exam  07/11/2019    Diabetic retinal exam  08/15/2019    DTaP/Tdap/Td vaccine (1 - Tdap) 08/15/2020 (Originally [birth year]30/1964)    Shingles Vaccine (1 of 2) 08/15/2020 (Originally 4/30/1997)    A1C test (Diabetic or Prediabetic)  05/23/2020    Lipid screen  05/23/2020    Annual Wellness Visit (AWV)  10/14/2020    Potassium monitoring  02/25/2021    Creatinine monitoring  02/25/2021    Breast cancer screen  03/27/2021    DEXA (modify frequency per FRAX score)  Completed    Flu vaccine  Completed    Pneumococcal 65+ yrs at Risk Vaccine  Completed    Hepatitis C screen  Completed    Hepatitis A vaccine  Aged Out    Hib vaccine  Aged Out    Meningococcal (ACWY) vaccine  Aged Out       Hemoglobin A1C (%)   Date Value   05/23/2019 5.7   07/11/2018 6.4 (H)   07/20/2017 6.1 (H)             ( goal A1C is < 7)   No results found for: LABMICR  LDL Cholesterol (mg/dL)   Date Value   05/23/2019 58   07/11/2018 67       (goal LDL is <100)   AST (U/L)   Date Value   02/25/2020 15     ALT (U/L)   Date Value   02/25/2020 16     BUN (mg/dL)   Date Value   02/25/2020 14     BP Readings from Last 3 Encounters:   02/25/20 108/60   02/25/20 108/60   01/29/20 122/62          (goal 120/80)    All Future Testing planned in [facility]  Lab Frequency Next Occurrence   T4 Once 10/14/2019   TSH without Reflex Once 10/14/2019   CBC Auto Differential q 3 months    CBC Auto Differential     Comprehensive Metabolic Panel                 Patient Active Problem List:     Obesity     COPD (chronic obstructive pulmonary disease) (HCC)     Colon polyps     Post traumatic stress disorder (PTSD)     Osteoarthritis     Diabetes mellitus (HCC)     Acute respiratory failure (HCC)     COPD exacerbation (HCC)     Pneumonia     Tubulovillous adenoma     Oral thrush     Bandemia     Thrombocytopenia (HCC)     Marginal zone lymphoma (HCC)     Hypovolemic shock (HCC)     Acute kidney injury (HCC)     Hyperkalemia     Metabolic acidosis

## 2020-03-23 RX ORDER — HYDROCODONE BITARTRATE AND ACETAMINOPHEN 7.5; 325 MG/1; MG/1
2 TABLET ORAL EVERY 6 HOURS PRN
Qty: 180 TABLET | Refills: 0 | Status: SHIPPED | OUTPATIENT
Start: 2020-03-23 | End: 2020-04-23 | Stop reason: SDUPTHER

## 2020-03-23 NOTE — TELEPHONE ENCOUNTER
Last visit: 1/29/2020  Last Med refill:   Does patient have enough medication for 72 hours:     Next Visit Date:  Future Appointments   Date Time Provider Thad Magallanesi   3/30/2020  2:00 PM MD EVANS Melara [facility]   4/21/2020 11:00 AM STV STA CHAIR 16 STVZ STA MED [facility]   4/21/2020 11:30 AM Daniel Brandt MD [facility]       Health Maintenance   Topic Date Due    Hepatitis B vaccine (1 of 3 - Risk 3-dose series) 04[birth year]30/1966    Diabetic microalbuminuria test  05/15/2018    Colon cancer screen colonoscopy  06/07/2019    Diabetic foot exam  07/11/2019    Diabetic retinal exam  08/15/2019    DTaP/Tdap/Td vaccine (1 - Tdap) 08/15/2020 (Originally [birth year]30/1964)    Shingles Vaccine (1 of 2) 08/15/2020 (Originally 4/30/1997)    A1C test (Diabetic or Prediabetic)  05/23/2020    Lipid screen  05/23/2020    Annual Wellness Visit (AWV)  10/14/2020    Potassium monitoring  02/25/2021    Creatinine monitoring  02/25/2021    Breast cancer screen  03/27/2021    DEXA (modify frequency per FRAX score)  Completed    Flu vaccine  Completed    Pneumococcal 65+ yrs at Risk Vaccine  Completed    Hepatitis C screen  Completed    Hepatitis A vaccine  Aged Out    Hib vaccine  Aged Out    Meningococcal (ACWY) vaccine  Aged Out       Hemoglobin A1C (%)   Date Value   05/23/2019 5.7   07/11/2018 6.4 (H)   07/20/2017 6.1 (H)             ( goal A1C is < 7)   No results found for: LABMICR  LDL Cholesterol (mg/dL)   Date Value   05/23/2019 58   07/11/2018 67       (goal LDL is <100)   AST (U/L)   Date Value   02/25/2020 15     ALT (U/L)   Date Value   02/25/2020 16     BUN (mg/dL)   Date Value   02/25/2020 14     BP Readings from Last 3 Encounters:   02/25/20 108/60   02/25/20 108/60   01/29/20 122/62          (goal 120/80)    All Future Testing planned in [facility]  Lab Frequency Next Occurrence   T4 Once 10/14/2019   TSH without Reflex Once 10/14/2019   CBC Auto Differential q 3 months Patient Active Problem List:     Obesity     COPD (chronic obstructive pulmonary disease) (HCC)     Colon polyps     Post traumatic stress disorder (PTSD)     Osteoarthritis     Diabetes mellitus (HCC)     Acute respiratory failure (HCC)     COPD exacerbation (HCC)     Pneumonia     Tubulovillous adenoma     Oral thrush     Bandemia     Thrombocytopenia (HCC)     Marginal zone lymphoma (HCC)     Hypovolemic shock (HCC)     Acute kidney injury (HCC)     Hyperkalemia     Metabolic acidosis

## 2020-04-08 RX ORDER — METFORMIN HYDROCHLORIDE 750 MG/1
TABLET, EXTENDED RELEASE ORAL
Qty: 90 TABLET | Refills: 2 | Status: SHIPPED | OUTPATIENT
Start: 2020-04-08 | End: 2021-01-13

## 2020-04-08 NOTE — TELEPHONE ENCOUNTER
Next Visit Date:  Future Appointments   Date Time Provider Department Center   4/21/2020 11:00 AM STV STA CHAIR 16 STVZ STA MED [place]   4/21/2020 11:30 AM Herlinda Cheema MD [facility]   5/4/2020  1:15 PM Edita Kohler  [address] [facility]   Topic Date Due    Diabetic microalbuminuria test  05/15/2018    Colon cancer screen colonoscopy  06/07/2019    Diabetic foot exam  07/11/2019    Diabetic retinal exam  08/15/2019    DTaP/Tdap/Td vaccine (1 - Tdap) 08/15/2020 (Originally [birth year]30/1964)    Shingles Vaccine (1 of 2) 08/15/2020 (Originally 4/30/1997)    A1C test (Diabetic or Prediabetic)  05/23/2020    Lipid screen  05/23/2020    Annual Wellness Visit (AWV)  10/14/2020    Potassium monitoring  02/25/2021    Creatinine monitoring  02/25/2021    Breast cancer screen  03/27/2021    DEXA (modify frequency per FRAX score)  Completed    Flu vaccine  Completed    Pneumococcal 65+ yrs at Risk Vaccine  Completed    Hepatitis C screen  Completed    Hepatitis A vaccine  Aged Out    Hib vaccine  Aged Out    Meningococcal (ACWY) vaccine  Aged Out       Hemoglobin A1C (%)   Date Value   05/23/2019 5.7   07/11/2018 6.4 (H)   07/20/2017 6.1 (H)             ( goal A1C is < 7)   No results found for: LABMICR  LDL Cholesterol (mg/dL)   Date Value   05/23/2019 58   07/11/2018 67       (goal LDL is <100)   AST (U/L)   Date Value   02/25/2020 15     ALT (U/L)   Date Value   02/25/2020 16     BUN (mg/dL)   Date Value   02/25/2020 14     BP Readings from Last 3 Encounters:   02/25/20 108/60   02/25/20 108/60   01/29/20 122/62          (goal 120/80)    All Future Testing planned in [facility]  Lab Frequency Next Occurrence   T4 Once 10/14/2019   TSH without Reflex Once 10/14/2019   CBC Auto Differential q 3 months                Patient Active Problem List:     Obesity     COPD (chronic obstructive pulmonary disease) (HCC)     Colon polyps     Post traumatic stress disorder (PTSD)     Osteoarthritis     Diabetes mellitus (HCC)     Acute respiratory failure (HCC)     COPD exacerbation (HCC)     Pneumonia     Tubulovillous adenoma     Oral thrush     Bandemia     Thrombocytopenia (HCC)     Marginal zone lymphoma (HCC)     Hypovolemic shock (HCC)     Acute kidney injury (HCC)     Hyperkalemia     Metabolic acidosis

## 2020-04-20 ENCOUNTER — HOSPITAL ENCOUNTER (OUTPATIENT)
Facility: MEDICAL CENTER | Age: 75
End: 2020-04-20
Payer: COMMERCIAL

## 2020-04-21 ENCOUNTER — OFFICE VISIT (OUTPATIENT)
Dept: ONCOLOGY | Age: 75
End: 2020-04-21
Payer: COMMERCIAL

## 2020-04-21 ENCOUNTER — HOSPITAL ENCOUNTER (OUTPATIENT)
Dept: INFUSION THERAPY | Facility: MEDICAL CENTER | Age: 75
Discharge: HOME | End: 2020-04-21
Payer: COMMERCIAL

## 2020-04-21 ENCOUNTER — TELEPHONE (OUTPATIENT)
Dept: ONCOLOGY | Age: 75
End: 2020-04-21

## 2020-04-21 VITALS
BODY MASS INDEX: 38.13 KG/M2 | TEMPERATURE: 97.6 F | HEART RATE: 68 BPM | DIASTOLIC BLOOD PRESSURE: 71 MMHG | SYSTOLIC BLOOD PRESSURE: 115 MMHG | WEIGHT: 176.2 LBS

## 2020-04-21 VITALS — SYSTOLIC BLOOD PRESSURE: 120 MMHG | DIASTOLIC BLOOD PRESSURE: 64 MMHG

## 2020-04-21 DIAGNOSIS — C85.80 MARGINAL ZONE B-CELL LYMPHOMA (HCC): Primary | ICD-10-CM

## 2020-04-21 DIAGNOSIS — C85.80 MARGINAL ZONE LYMPHOMA (HCC): ICD-10-CM

## 2020-04-21 LAB
ABSOLUTE EOS #: 0.12 K/UL (ref 0–0.44)
ABSOLUTE IMMATURE GRANULOCYTE: 0.03 K/UL (ref 0–0.3)
ABSOLUTE LYMPH #: 3.16 K/UL (ref 1.1–3.7)
ABSOLUTE MONO #: 0.77 K/UL (ref 0.1–1.2)
ALBUMIN SERPL-MCNC: 3.9 G/DL (ref 3.5–5.2)
ALBUMIN/GLOBULIN RATIO: ABNORMAL (ref 1–2.5)
ALP BLD-CCNC: 75 U/L (ref 35–104)
ALT SERPL-CCNC: 27 U/L (ref 5–33)
ANION GAP SERPL CALCULATED.3IONS-SCNC: 14 MMOL/L (ref 9–17)
AST SERPL-CCNC: 24 U/L
BASOPHILS # BLD: 1 % (ref 0–2)
BASOPHILS ABSOLUTE: 0.06 K/UL (ref 0–0.2)
BILIRUB SERPL-MCNC: 0.24 MG/DL (ref 0.3–1.2)
BUN BLDV-MCNC: 22 MG/DL (ref 8–23)
BUN/CREAT BLD: 39 (ref 9–20)
CALCIUM SERPL-MCNC: 9.2 MG/DL (ref 8.6–10.4)
CHLORIDE BLD-SCNC: 102 MMOL/L (ref 98–107)
CO2: 25 MMOL/L (ref 20–31)
CREAT SERPL-MCNC: 0.56 MG/DL (ref 0.5–0.9)
DIFFERENTIAL TYPE: ABNORMAL
EOSINOPHILS RELATIVE PERCENT: 1 % (ref 1–4)
GFR AFRICAN AMERICAN: >60 ML/MIN
GFR NON-AFRICAN AMERICAN: >60 ML/MIN
GFR SERPL CREATININE-BSD FRML MDRD: ABNORMAL ML/MIN/{1.73_M2}
GFR SERPL CREATININE-BSD FRML MDRD: ABNORMAL ML/MIN/{1.73_M2}
GLUCOSE BLD-MCNC: 119 MG/DL (ref 70–99)
HCT VFR BLD CALC: 42.7 % (ref 36.3–47.1)
HEMOGLOBIN: 13.4 G/DL (ref 11.9–15.1)
IMMATURE GRANULOCYTES: 0 %
LYMPHOCYTES # BLD: 38 % (ref 24–43)
MCH RBC QN AUTO: 30.9 PG (ref 25.2–33.5)
MCHC RBC AUTO-ENTMCNC: 31.4 G/DL (ref 28.4–34.8)
MCV RBC AUTO: 98.6 FL (ref 82.6–102.9)
MONOCYTES # BLD: 9 % (ref 3–12)
NRBC AUTOMATED: 0 PER 100 WBC
PDW BLD-RTO: 15.1 % (ref 11.8–14.4)
PLATELET # BLD: 242 K/UL (ref 138–453)
PLATELET ESTIMATE: ABNORMAL
PMV BLD AUTO: 9.3 FL (ref 8.1–13.5)
POTASSIUM SERPL-SCNC: 4.1 MMOL/L (ref 3.7–5.3)
RBC # BLD: 4.33 M/UL (ref 3.95–5.11)
RBC # BLD: ABNORMAL 10*6/UL
SEG NEUTROPHILS: 51 % (ref 36–65)
SEGMENTED NEUTROPHILS ABSOLUTE COUNT: 4.27 K/UL (ref 1.5–8.1)
SODIUM BLD-SCNC: 141 MMOL/L (ref 135–144)
TOTAL PROTEIN: 6.6 G/DL (ref 6.4–8.3)
WBC # BLD: 8.4 K/UL (ref 3.5–11.3)
WBC # BLD: ABNORMAL 10*3/UL

## 2020-04-21 PROCEDURE — 6370000000 HC RX 637 (ALT 250 FOR IP): Performed by: INTERNAL MEDICINE

## 2020-04-21 PROCEDURE — 6360000002 HC RX W HCPCS: Performed by: INTERNAL MEDICINE

## 2020-04-21 PROCEDURE — 96413 CHEMO IV INFUSION 1 HR: CPT

## 2020-04-21 PROCEDURE — 99211 OFF/OP EST MAY X REQ PHY/QHP: CPT | Performed by: INTERNAL MEDICINE

## 2020-04-21 PROCEDURE — 96375 TX/PRO/DX INJ NEW DRUG ADDON: CPT

## 2020-04-21 PROCEDURE — 96415 CHEMO IV INFUSION ADDL HR: CPT

## 2020-04-21 PROCEDURE — 2580000003 HC RX 258: Performed by: INTERNAL MEDICINE

## 2020-04-21 PROCEDURE — 99214 OFFICE O/P EST MOD 30 MIN: CPT | Performed by: INTERNAL MEDICINE

## 2020-04-21 PROCEDURE — 36591 DRAW BLOOD OFF VENOUS DEVICE: CPT

## 2020-04-21 PROCEDURE — 85025 COMPLETE CBC W/AUTO DIFF WBC: CPT

## 2020-04-21 PROCEDURE — 80053 COMPREHEN METABOLIC PANEL: CPT

## 2020-04-21 RX ORDER — DIPHENHYDRAMINE HYDROCHLORIDE 50 MG/ML
25 INJECTION INTRAMUSCULAR; INTRAVENOUS ONCE
Status: COMPLETED | OUTPATIENT
Start: 2020-04-21 | End: 2020-04-21

## 2020-04-21 RX ORDER — METHYLPREDNISOLONE SODIUM SUCCINATE 125 MG/2ML
125 INJECTION, POWDER, LYOPHILIZED, FOR SOLUTION INTRAMUSCULAR; INTRAVENOUS ONCE
Status: CANCELLED | OUTPATIENT
Start: 2020-08-11

## 2020-04-21 RX ORDER — MEPERIDINE HYDROCHLORIDE 50 MG/ML
12.5 INJECTION INTRAMUSCULAR; INTRAVENOUS; SUBCUTANEOUS ONCE
Status: CANCELLED | OUTPATIENT
Start: 2020-08-11

## 2020-04-21 RX ORDER — ACETAMINOPHEN 325 MG/1
650 TABLET ORAL ONCE
Status: COMPLETED | OUTPATIENT
Start: 2020-04-21 | End: 2020-04-21

## 2020-04-21 RX ORDER — SODIUM CHLORIDE 0.9 % (FLUSH) 0.9 %
10 SYRINGE (ML) INJECTION PRN
Status: DISCONTINUED | OUTPATIENT
Start: 2020-04-21 | End: 2020-04-22 | Stop reason: HOSPADM

## 2020-04-21 RX ORDER — SODIUM CHLORIDE 9 MG/ML
INJECTION, SOLUTION INTRAVENOUS CONTINUOUS
Status: CANCELLED | OUTPATIENT
Start: 2020-08-11

## 2020-04-21 RX ORDER — SODIUM CHLORIDE 0.9 % (FLUSH) 0.9 %
10 SYRINGE (ML) INJECTION PRN
Status: CANCELLED | OUTPATIENT
Start: 2020-08-11

## 2020-04-21 RX ORDER — ACETAMINOPHEN 325 MG/1
650 TABLET ORAL ONCE
Status: CANCELLED | OUTPATIENT
Start: 2020-08-11

## 2020-04-21 RX ORDER — DIPHENHYDRAMINE HYDROCHLORIDE 50 MG/ML
50 INJECTION INTRAMUSCULAR; INTRAVENOUS ONCE
Status: CANCELLED | OUTPATIENT
Start: 2020-08-11

## 2020-04-21 RX ORDER — DIPHENHYDRAMINE HYDROCHLORIDE 50 MG/ML
25 INJECTION INTRAMUSCULAR; INTRAVENOUS ONCE
Status: CANCELLED | OUTPATIENT
Start: 2020-08-11

## 2020-04-21 RX ORDER — HEPARIN SODIUM (PORCINE) LOCK FLUSH IV SOLN 100 UNIT/ML 100 UNIT/ML
500 SOLUTION INTRAVENOUS PRN
Status: CANCELLED | OUTPATIENT
Start: 2020-08-11

## 2020-04-21 RX ORDER — 0.9 % SODIUM CHLORIDE 0.9 %
10 VIAL (ML) INJECTION ONCE
Status: CANCELLED | OUTPATIENT
Start: 2020-08-11

## 2020-04-21 RX ORDER — SODIUM CHLORIDE 0.9 % (FLUSH) 0.9 %
5 SYRINGE (ML) INJECTION PRN
Status: CANCELLED | OUTPATIENT
Start: 2020-08-11

## 2020-04-21 RX ORDER — SODIUM CHLORIDE 9 MG/ML
20 INJECTION, SOLUTION INTRAVENOUS ONCE
Status: CANCELLED | OUTPATIENT
Start: 2020-08-11

## 2020-04-21 RX ORDER — HEPARIN SODIUM (PORCINE) LOCK FLUSH IV SOLN 100 UNIT/ML 100 UNIT/ML
500 SOLUTION INTRAVENOUS PRN
Status: DISCONTINUED | OUTPATIENT
Start: 2020-04-21 | End: 2020-04-22 | Stop reason: HOSPADM

## 2020-04-21 RX ORDER — SODIUM CHLORIDE 9 MG/ML
20 INJECTION, SOLUTION INTRAVENOUS ONCE
Status: COMPLETED | OUTPATIENT
Start: 2020-04-21 | End: 2020-04-21

## 2020-04-21 RX ORDER — EPINEPHRINE 1 MG/ML
0.3 INJECTION, SOLUTION, CONCENTRATE INTRAVENOUS PRN
Status: CANCELLED | OUTPATIENT
Start: 2020-08-11

## 2020-04-21 RX ADMIN — SODIUM CHLORIDE 20 ML/HR: 9 INJECTION, SOLUTION INTRAVENOUS at 11:40

## 2020-04-21 RX ADMIN — ACETAMINOPHEN 650 MG: 325 TABLET ORAL at 13:03

## 2020-04-21 RX ADMIN — Medication 10 ML: at 11:40

## 2020-04-21 RX ADMIN — RITUXIMAB 700 MG: 10 INJECTION, SOLUTION INTRAVENOUS at 13:43

## 2020-04-21 RX ADMIN — DIPHENHYDRAMINE HYDROCHLORIDE 25 MG: 50 INJECTION, SOLUTION INTRAMUSCULAR; INTRAVENOUS at 13:03

## 2020-04-21 RX ADMIN — HEPARIN 500 UNITS: 100 SYRINGE at 15:35

## 2020-04-21 RX ADMIN — Medication 10 ML: at 15:35

## 2020-04-21 ASSESSMENT — PAIN SCALES - GENERAL: PAINLEVEL_OUTOF10: 0

## 2020-04-21 NOTE — PROGRESS NOTES
Pt arrives per wheelchair per self and labs and orders reviewed. Pt seen per md and NS infusing before and after premed and chemo. Pt tolerated rituxan at 90 minute infusion rate at previous infusions and Rituxan infusing at 100 ml/hr for 30 min and then 200 ml/hr for remainder and see flow sheet for vital signs Q 30 minute. Pt napping throughout infusion. No reactions or complaints and blood return present throughout infusion. Pt given AVS from  with next appts and [facility] per wheelchair to drive home per self. Pt alert and states feels very awake now.

## 2020-04-21 NOTE — PROGRESS NOTES
_           Chief Complaint   Patient presents with    Follow-up     review status of disease     DIAGNOSIS:        Marginal Zone lymphoma  Persistent leukocytosis  Persistent thrombocytopenia  Multiple comorbidities as listed     CURRENT THERAPY:         Started treatment with rituximab 5/2/2019. Week #8 June 21, 2019. Maintenance Rituxan every 2 months for 2 years started October 17, 2019. BRIEF CASE HISTORY:      Ms. Mary Lieberman is a very pleasant 69 y.o. female with history of multiple comorbidities including COPD and diabetes. She quit smoking years ago. The patient was recently hospitalized with chest infection. The patient was noted to have leukocytosis. She also had persistent thrombocytopenia. She is referred for further evaluation. Patient denies any active bleeding. She has easy bruising. No fever or night sweats. No weight loss or decreased appetite. No palpable lymph nodes. No history of repeated infections. Patient has history of smoking for more than 50 years. Denies alcohol drinking  She had flow cytometry and bone marrow test. Results showed evidence of marginal zone lymphoma. Start the rituximab with good results. Due to increasing symptoms and further problems related to lymphoma, patient was started on single agent rituximab on 5/20/19. Patient received maintenance rituximab after induction. INTERIM HISTORY:   Patient is seen for follow up leukocytosis and marginal zone lymphoma. She had very good response to induction rituximab. She is due today for rituximab maintenance. She is clinically stable. No weakness or fatigue. No dizziness. No fever or infections. No palpable lymph nodes. No recent infections. No recent hospitalizations. No weight loss or decreased appetite.        PAST MEDICAL HISTORY: has a past medical history of COPD (chronic obstructive pulmonary repeated labs. Patient's questions were answered to the best of her satisfaction and she verbalized full understanding and agreement.

## 2020-04-21 NOTE — TELEPHONE ENCOUNTER
Dea Lopez MD VISIT & [address] [facility] TO SEE PATIENT  ORDERS RECEIVED  PROCEED W/ TX AS PLANNED AFTER CHECKING LABS  RV 6 WEEKS W/ [address]  MD VISIT 6/16/20 @ 11:30AM [address] @ 11AM  AVS PRINTED AND GIVEN TO PATIENT W/ INSTRUCTIONS  PATIENT REMAINS IN [facility]

## 2020-04-23 RX ORDER — HYDROCODONE BITARTRATE AND ACETAMINOPHEN 7.5; 325 MG/1; MG/1
2 TABLET ORAL EVERY 6 HOURS PRN
Qty: 180 TABLET | Refills: 0 | Status: SHIPPED | OUTPATIENT
Start: 2020-04-23 | End: 2020-05-21 | Stop reason: SDUPTHER

## 2020-04-23 NOTE — TELEPHONE ENCOUNTER
Last visit: 1/29/20  Last Med refill: 3/23/20    Next Visit Date:  Future Appointments   Date Time Provider Thad Edwardsisti   5/4/2020  1:15 PM Orin Blount MD [facility]   6/16/2020 11:00 AM STV STA CHAIR 17 STVZ STA MED [facility]   6/16/2020 11:30 AM Varun Garcia MD [facility]       Health Maintenance   Topic Date Due    Diabetic microalbuminuria test  05/15/2018    Colon cancer screen colonoscopy  06/07/2019    Diabetic foot exam  07/11/2019    Diabetic retinal exam  08/15/2019    DTaP/Tdap/Td vaccine (1 - Tdap) 08/15/2020 (Originally [birth year]30/1964)    Shingles Vaccine (1 of 2) 08/15/2020 (Originally 4/30/1997)    A1C test (Diabetic or Prediabetic)  05/23/2020    Lipid screen  05/23/2020    Annual Wellness Visit (AWV)  10/14/2020    Breast cancer screen  03/27/2021    Potassium monitoring  04/21/2021    Creatinine monitoring  04/21/2021    DEXA (modify frequency per FRAX score)  Completed    Flu vaccine  Completed    Pneumococcal 65+ yrs at Risk Vaccine  Completed    Hepatitis C screen  Completed    Hepatitis A vaccine  Aged Out    Hib vaccine  Aged Out    Meningococcal (ACWY) vaccine  Aged Out       Hemoglobin A1C (%)   Date Value   05/23/2019 5.7   07/11/2018 6.4 (H)   07/20/2017 6.1 (H)             ( goal A1C is < 7)   No results found for: LABMICR  LDL Cholesterol (mg/dL)   Date Value   05/23/2019 58   07/11/2018 67       (goal LDL is <100)   AST (U/L)   Date Value   04/21/2020 24     ALT (U/L)   Date Value   04/21/2020 27     BUN (mg/dL)   Date Value   04/21/2020 22     BP Readings from Last 3 Encounters:   04/21/20 120/64   04/21/20 115/71   02/25/20 108/60          (goal 120/80)    All Future Testing planned in [facility]  Lab Frequency Next Occurrence   T4 Once 10/14/2019   TSH without Reflex Once 10/14/2019   CBC Auto Differential q 3 months    CBC Auto Differential     Comprehensive Metabolic Panel                 Patient Active Problem List:     Obesity

## 2020-04-28 ENCOUNTER — TELEPHONE (OUTPATIENT)
Dept: PRIMARY CARE CLINIC | Age: 75
End: 2020-04-28

## 2020-04-28 NOTE — TELEPHONE ENCOUNTER
Attempted to call patient regarding MyChart sign-up/activation, patient did not answer, left voicemail regarding reason for call.

## 2020-05-05 ENCOUNTER — VIRTUAL VISIT (OUTPATIENT)
Dept: FAMILY MEDICINE CLINIC | Age: 75
End: 2020-05-05
Payer: COMMERCIAL

## 2020-05-05 PROCEDURE — 99441 PR PHYS/QHP TELEPHONE EVALUATION 5-10 MIN: CPT | Performed by: FAMILY MEDICINE

## 2020-05-05 ASSESSMENT — ENCOUNTER SYMPTOMS
COUGH: 1
SINUS PRESSURE: 0
SHORTNESS OF BREATH: 0
VOMITING: 0
NAUSEA: 0
BACK PAIN: 1
DIARRHEA: 0
SORE THROAT: 0

## 2020-05-05 NOTE — PROGRESS NOTES
MD Anjum   aspirin EC 81 MG EC tablet Take 1 tablet by mouth daily  Arik Valero MD   zinc sulfate (ORAZINC) 220 (50 Zn) MG capsule Take 1 capsule by mouth daily  Carla Christian MD   blood glucose monitor strips 1 strip by Other route TrueTest Strips     3 times a day & as needed for symptoms of irregular blood glucose. Historical Provider, MD   Lancets MISC 1 each by Other route 2 times daily Indications: [employer] Glucose Meter  Arik Valero MD   hydrocortisone 1 % cream Apply topically BID  prn  Arik Valero MD   DULoxetine (CYMBALTA) 30 MG extended release capsule Take 30 mg by mouth daily  Historical Provider, MD   lisinopril (PRINIVIL;ZESTRIL) 10 MG tablet TAKE ONE TABLET BY MOUTH DAILY  Arik Valero MD   simvastatin (ZOCOR) 40 MG tablet TAKE ONE TABLET BY MOUTH DAILY  Arik Valero MD   nystatin-triamcinolone (MYCOLOG II) 744325-1.1 UNIT/GM-% cream Apply topically 2 times daily. Thersicasi Valero MD   OXYGEN Inhale 3 L into the lungs continuous  Historical Provider, MD   FLUoxetine (PROZAC) 20 MG capsule Take 1 capsule by mouth daily  Arik Valero MD   albuterol-ipratropium (COMBIVENT RESPIMAT)  MCG/ACT AERS inhaler INHALE ONE INHALATION BY MOUTH FOUR TIMES A DAY  Arik Valero MD   prochlorperazine (COMPAZINE) 10 MG tablet Take 1 tablet by mouth every 6 hours as needed (CHEMO INDUCED NAUSEA)  Billy Evans MD   gabapentin (NEURONTIN) 600 MG tablet Take 600 mg by mouth 3 times daily. Lakesha Lopez   Historical Provider, MD   esomeprazole (Toothpick) 40 MG delayed release capsule TAKE ONE CAPSULE BY MOUTH DAILY  Carla Christian MD       Social History     Tobacco Use    Smoking status: Former Smoker     Packs/day: 1.00     Years: 10.00     Pack years: 10.00     Types: Cigarettes     Last attempt to quit: 2007     Years since quittin.4    Smokeless tobacco: Never Used   Substance Use Topics    Alcohol use: No    Drug use: No        Past Medical History:   Diagnosis Date [] Abnormal-       Neurological:        [] No Facial Asymmetry (Cranial nerve 7 motor function) (limited exam to video visit)          [] No gaze palsy        [] Abnormal-         Skin:        [] No significant exanthematous lesions or discoloration noted on facial skin         [] Abnormal-            Psychiatric:       [x] Normal Affect [] No Hallucinations        [] Abnormal-     Other pertinent observable physical exam findings-     ASSESSMENT/PLAN:  Burning mouth syndrome  COPD  AODM    Return in about 2 months (around 7/5/2020). Macy Oliveira is a 70 y.o. female being evaluated by a Virtual Visit (video visit) encounter to address concerns as mentioned above. A caregiver was present when appropriate. Due to this being a TeleHealth encounter (During [facility] public health emergency), evaluation of the following organ systems was limited: Vitals/Constitutional/EENT/Resp/CV/GI//MS/Neuro/Skin/Heme-Lymph-Imm. Pursuant to the emergency declaration under the [address] [place] [address] authority and the [employer] Act, this Virtual Visit was conducted with patient's (and/or legal guardian's) consent, to reduce the patient's risk of exposure to COVID-19 and provide necessary medical care. The patient (and/or legal guardian) has also been advised to contact this office for worsening conditions or problems, and seek emergency medical treatment and/or call 911 if deemed necessary. Patient identification was verified at the start of the visit: Yes    Total time spent on this encounter: ten minutes    Services were provided through a video synchronous discussion virtually to substitute for in-person clinic visit. Patient and provider were located at their individual homes. Rx sent to her pharmacy for Magic mouthwash. See us in two months sooner prn.     --Thai Corrigan MD on 5/5/2020 at 1:54 PM    An electronic signature was used to authenticate this note.

## 2020-05-21 RX ORDER — ALPRAZOLAM 1 MG/1
TABLET ORAL
Qty: 90 TABLET | Refills: 0 | Status: SHIPPED | OUTPATIENT
Start: 2020-05-21 | End: 2020-07-02

## 2020-05-21 RX ORDER — HYDROCODONE BITARTRATE AND ACETAMINOPHEN 7.5; 325 MG/1; MG/1
2 TABLET ORAL EVERY 6 HOURS PRN
Qty: 180 TABLET | Refills: 0 | Status: SHIPPED | OUTPATIENT
Start: 2020-05-21 | End: 2020-06-23 | Stop reason: SDUPTHER

## 2020-05-21 NOTE — TELEPHONE ENCOUNTER
disease) ([facility]     Colon polyps     Post traumatic stress disorder (PTSD)     Osteoarthritis     Diabetes mellitus ([facility]     Acute respiratory failure (HCC)     COPD exacerbation (HCC)     Pneumonia     Tubulovillous adenoma     Oral thrush     Bandemia     Thrombocytopenia (HCC)     Marginal zone lymphoma (HCC)     Hypovolemic shock (HCC)     Acute kidney injury (HCC)     Hyperkalemia     Metabolic acidosis

## 2020-05-21 NOTE — TELEPHONE ENCOUNTER
Last visit: 5/5/2020  Last Med refill: 3/11/2020  Does patient have enough medication for 72 hours: Yes    Next Visit Date:  Future Appointments   Date Time Provider Thad Jose   6/16/2020 11:00 AM STV STA CHAIR 17 [facility] [place]   6/16/2020 11:30 AM Andrea Guillen MD [id number] [facility] Maintenance   Topic Date Due    Diabetic microalbuminuria test  05/15/2018    Colon cancer screen colonoscopy  06/07/2019    Diabetic foot exam  07/11/2019    Diabetic retinal exam  08/15/2019    A1C test (Diabetic or Prediabetic)  05/23/2020    Lipid screen  05/23/2020    DTaP/Tdap/Td vaccine (1 - Tdap) 08/15/2020 (Originally 4/30/1966)    Shingles Vaccine (1 of 2) 08/15/2020 (Originally 4/30/1997)    Annual Wellness Visit (AWV)  10/14/2020    Breast cancer screen  03/27/2021    Potassium monitoring  04/21/2021    Creatinine monitoring  04/21/2021    DEXA (modify frequency per FRAX score)  Completed    Flu vaccine  Completed    Pneumococcal 65+ yrs at Risk Vaccine  Completed    Hepatitis C screen  Completed    Hepatitis A vaccine  Aged Out    Hib vaccine  Aged Out    Meningococcal (ACWY) vaccine  Aged Out       Hemoglobin A1C (%)   Date Value   05/23/2019 5.7   07/11/2018 6.4 (H)   07/20/2017 6.1 (H)             ( goal A1C is < 7)   No results found for: LABMICR  LDL Cholesterol (mg/dL)   Date Value   05/23/2019 58   07/11/2018 67       (goal LDL is <100)   AST (U/L)   Date Value   04/21/2020 24     ALT (U/L)   Date Value   04/21/2020 27     BUN (mg/dL)   Date Value   04/21/2020 22     BP Readings from Last 3 Encounters:   04/21/20 120/64   04/21/20 115/71   02/25/20 108/60          (goal 120/80)    All Future Testing planned in [facility]  Lab Frequency Next Occurrence   T4 Once 10/14/2019   TSH without Reflex Once 10/14/2019   CBC Auto Differential q 3 months    CBC Auto Differential     Comprehensive Metabolic Panel                 Patient Active Problem List:     Obesity COPD (chronic obstructive pulmonary disease) (HCC)     Colon polyps     Post traumatic stress disorder (PTSD)     Osteoarthritis     Diabetes mellitus ([facility]     Acute respiratory failure (HCC)     COPD exacerbation (HCC)     Pneumonia     Tubulovillous adenoma     Oral thrush     Bandemia     Thrombocytopenia (HCC)     Marginal zone lymphoma (HCC)     Hypovolemic shock (HCC)     Acute kidney injury (HCC)     Hyperkalemia     Metabolic acidosis

## 2020-05-29 RX ORDER — FLUOXETINE HYDROCHLORIDE 20 MG/1
CAPSULE ORAL
Qty: 90 CAPSULE | Refills: 1 | Status: SHIPPED
Start: 2020-05-29 | End: 2020-06-16 | Stop reason: ALTCHOICE

## 2020-05-29 RX ORDER — FLUOXETINE HYDROCHLORIDE 20 MG/1
CAPSULE ORAL
Qty: 90 CAPSULE | Refills: 1 | OUTPATIENT
Start: 2020-05-29

## 2020-06-11 ENCOUNTER — HOSPITAL ENCOUNTER (OUTPATIENT)
Facility: MEDICAL CENTER | Age: 75
End: 2020-06-11
Payer: COMMERCIAL

## 2020-06-16 ENCOUNTER — HOSPITAL ENCOUNTER (OUTPATIENT)
Dept: INFUSION THERAPY | Facility: MEDICAL CENTER | Age: 75
Discharge: HOME | End: 2020-06-16
Payer: COMMERCIAL

## 2020-06-16 ENCOUNTER — OFFICE VISIT (OUTPATIENT)
Dept: ONCOLOGY | Age: 75
End: 2020-06-16
Payer: COMMERCIAL

## 2020-06-16 ENCOUNTER — TELEPHONE (OUTPATIENT)
Dept: ONCOLOGY | Age: 75
End: 2020-06-16

## 2020-06-16 VITALS
TEMPERATURE: 98.1 F | BODY MASS INDEX: 38.04 KG/M2 | HEART RATE: 77 BPM | DIASTOLIC BLOOD PRESSURE: 59 MMHG | WEIGHT: 175.8 LBS | SYSTOLIC BLOOD PRESSURE: 115 MMHG

## 2020-06-16 DIAGNOSIS — C85.80 MARGINAL ZONE B-CELL LYMPHOMA (HCC): Primary | ICD-10-CM

## 2020-06-16 DIAGNOSIS — C85.80 MARGINAL ZONE LYMPHOMA (HCC): ICD-10-CM

## 2020-06-16 LAB
ABSOLUTE EOS #: 0.13 K/UL (ref 0–0.44)
ABSOLUTE IMMATURE GRANULOCYTE: 0.01 K/UL (ref 0–0.3)
ABSOLUTE LYMPH #: 3.12 K/UL (ref 1.1–3.7)
ABSOLUTE MONO #: 0.63 K/UL (ref 0.1–1.2)
ALBUMIN SERPL-MCNC: 3.5 G/DL (ref 3.5–5.2)
ALBUMIN/GLOBULIN RATIO: ABNORMAL (ref 1–2.5)
ALP BLD-CCNC: 74 U/L (ref 35–104)
ALT SERPL-CCNC: 62 U/L (ref 5–33)
ANION GAP SERPL CALCULATED.3IONS-SCNC: 12 MMOL/L (ref 9–17)
AST SERPL-CCNC: 76 U/L
BASOPHILS # BLD: 0 % (ref 0–2)
BASOPHILS ABSOLUTE: 0.03 K/UL (ref 0–0.2)
BILIRUB SERPL-MCNC: 0.1 MG/DL (ref 0.3–1.2)
BUN BLDV-MCNC: 22 MG/DL (ref 8–23)
BUN/CREAT BLD: 41 (ref 9–20)
CALCIUM SERPL-MCNC: 8.8 MG/DL (ref 8.6–10.4)
CHLORIDE BLD-SCNC: 105 MMOL/L (ref 98–107)
CO2: 21 MMOL/L (ref 20–31)
CREAT SERPL-MCNC: 0.54 MG/DL (ref 0.5–0.9)
DIFFERENTIAL TYPE: NORMAL
EOSINOPHILS RELATIVE PERCENT: 2 % (ref 1–4)
GFR AFRICAN AMERICAN: >60 ML/MIN
GFR NON-AFRICAN AMERICAN: >60 ML/MIN
GFR SERPL CREATININE-BSD FRML MDRD: ABNORMAL ML/MIN/{1.73_M2}
GFR SERPL CREATININE-BSD FRML MDRD: ABNORMAL ML/MIN/{1.73_M2}
GLUCOSE BLD-MCNC: 122 MG/DL (ref 70–99)
HCT VFR BLD CALC: 42.4 % (ref 36.3–47.1)
HEMOGLOBIN: 13.3 G/DL (ref 11.9–15.1)
IMMATURE GRANULOCYTES: 0 %
LYMPHOCYTES # BLD: 42 % (ref 24–43)
MCH RBC QN AUTO: 31.4 PG (ref 25.2–33.5)
MCHC RBC AUTO-ENTMCNC: 31.4 G/DL (ref 28.4–34.8)
MCV RBC AUTO: 100 FL (ref 82.6–102.9)
MONOCYTES # BLD: 9 % (ref 3–12)
NRBC AUTOMATED: NORMAL PER 100 WBC
PDW BLD-RTO: 13.6 % (ref 11.8–14.4)
PLATELET # BLD: 234 K/UL (ref 138–453)
PLATELET ESTIMATE: NORMAL
PMV BLD AUTO: 9.6 FL (ref 8.1–13.5)
POTASSIUM SERPL-SCNC: 4.6 MMOL/L (ref 3.7–5.3)
RBC # BLD: 4.24 M/UL (ref 3.95–5.11)
RBC # BLD: NORMAL 10*6/UL
SEG NEUTROPHILS: 47 % (ref 36–65)
SEGMENTED NEUTROPHILS ABSOLUTE COUNT: 3.43 K/UL (ref 1.5–8.1)
SODIUM BLD-SCNC: 138 MMOL/L (ref 135–144)
TOTAL PROTEIN: 6.1 G/DL (ref 6.4–8.3)
WBC # BLD: 7.4 K/UL (ref 3.5–11.3)
WBC # BLD: NORMAL 10*3/UL

## 2020-06-16 PROCEDURE — 96375 TX/PRO/DX INJ NEW DRUG ADDON: CPT

## 2020-06-16 PROCEDURE — 96413 CHEMO IV INFUSION 1 HR: CPT

## 2020-06-16 PROCEDURE — 2580000003 HC RX 258: Performed by: INTERNAL MEDICINE

## 2020-06-16 PROCEDURE — 36593 DECLOT VASCULAR DEVICE: CPT

## 2020-06-16 PROCEDURE — 99211 OFF/OP EST MAY X REQ PHY/QHP: CPT | Performed by: INTERNAL MEDICINE

## 2020-06-16 PROCEDURE — 85025 COMPLETE CBC W/AUTO DIFF WBC: CPT

## 2020-06-16 PROCEDURE — 6370000000 HC RX 637 (ALT 250 FOR IP): Performed by: INTERNAL MEDICINE

## 2020-06-16 PROCEDURE — 36415 COLL VENOUS BLD VENIPUNCTURE: CPT

## 2020-06-16 PROCEDURE — 99214 OFFICE O/P EST MOD 30 MIN: CPT | Performed by: INTERNAL MEDICINE

## 2020-06-16 PROCEDURE — 80053 COMPREHEN METABOLIC PANEL: CPT

## 2020-06-16 PROCEDURE — 96415 CHEMO IV INFUSION ADDL HR: CPT

## 2020-06-16 PROCEDURE — 6360000002 HC RX W HCPCS: Performed by: INTERNAL MEDICINE

## 2020-06-16 RX ORDER — 0.9 % SODIUM CHLORIDE 0.9 %
10 VIAL (ML) INJECTION ONCE
Status: CANCELLED | OUTPATIENT
Start: 2020-10-06

## 2020-06-16 RX ORDER — HEPARIN SODIUM (PORCINE) LOCK FLUSH IV SOLN 100 UNIT/ML 100 UNIT/ML
500 SOLUTION INTRAVENOUS PRN
Status: DISCONTINUED | OUTPATIENT
Start: 2020-06-16 | End: 2020-06-17 | Stop reason: HOSPADM

## 2020-06-16 RX ORDER — EPINEPHRINE 1 MG/ML
0.3 INJECTION, SOLUTION, CONCENTRATE INTRAVENOUS PRN
Status: CANCELLED | OUTPATIENT
Start: 2020-10-06

## 2020-06-16 RX ORDER — MEPERIDINE HYDROCHLORIDE 50 MG/ML
12.5 INJECTION INTRAMUSCULAR; INTRAVENOUS; SUBCUTANEOUS ONCE
Status: CANCELLED | OUTPATIENT
Start: 2020-10-06

## 2020-06-16 RX ORDER — SODIUM CHLORIDE 0.9 % (FLUSH) 0.9 %
10 SYRINGE (ML) INJECTION PRN
Status: CANCELLED | OUTPATIENT
Start: 2020-10-06

## 2020-06-16 RX ORDER — SODIUM CHLORIDE 0.9 % (FLUSH) 0.9 %
5 SYRINGE (ML) INJECTION PRN
Status: CANCELLED | OUTPATIENT
Start: 2020-10-06

## 2020-06-16 RX ORDER — DIPHENHYDRAMINE HYDROCHLORIDE 50 MG/ML
25 INJECTION INTRAMUSCULAR; INTRAVENOUS ONCE
Status: COMPLETED | OUTPATIENT
Start: 2020-06-16 | End: 2020-06-16

## 2020-06-16 RX ORDER — SODIUM CHLORIDE 9 MG/ML
20 INJECTION, SOLUTION INTRAVENOUS ONCE
Status: CANCELLED | OUTPATIENT
Start: 2020-10-06

## 2020-06-16 RX ORDER — DIPHENHYDRAMINE HYDROCHLORIDE 50 MG/ML
50 INJECTION INTRAMUSCULAR; INTRAVENOUS ONCE
Status: CANCELLED | OUTPATIENT
Start: 2020-10-06

## 2020-06-16 RX ORDER — ACETAMINOPHEN 325 MG/1
650 TABLET ORAL ONCE
Status: COMPLETED | OUTPATIENT
Start: 2020-06-16 | End: 2020-06-16

## 2020-06-16 RX ORDER — SODIUM CHLORIDE 9 MG/ML
INJECTION, SOLUTION INTRAVENOUS CONTINUOUS
Status: CANCELLED | OUTPATIENT
Start: 2020-10-06

## 2020-06-16 RX ORDER — METHYLPREDNISOLONE SODIUM SUCCINATE 125 MG/2ML
125 INJECTION, POWDER, LYOPHILIZED, FOR SOLUTION INTRAMUSCULAR; INTRAVENOUS ONCE
Status: CANCELLED | OUTPATIENT
Start: 2020-10-06

## 2020-06-16 RX ORDER — SODIUM CHLORIDE 0.9 % (FLUSH) 0.9 %
10 SYRINGE (ML) INJECTION PRN
Status: DISCONTINUED | OUTPATIENT
Start: 2020-06-16 | End: 2020-06-17 | Stop reason: HOSPADM

## 2020-06-16 RX ORDER — DIPHENHYDRAMINE HYDROCHLORIDE 50 MG/ML
25 INJECTION INTRAMUSCULAR; INTRAVENOUS ONCE
Status: CANCELLED | OUTPATIENT
Start: 2020-10-06

## 2020-06-16 RX ORDER — ACETAMINOPHEN 325 MG/1
650 TABLET ORAL ONCE
Status: CANCELLED | OUTPATIENT
Start: 2020-10-06

## 2020-06-16 RX ORDER — HEPARIN SODIUM (PORCINE) LOCK FLUSH IV SOLN 100 UNIT/ML 100 UNIT/ML
500 SOLUTION INTRAVENOUS PRN
Status: CANCELLED | OUTPATIENT
Start: 2020-10-06

## 2020-06-16 RX ADMIN — RITUXIMAB 700 MG: 10 INJECTION, SOLUTION INTRAVENOUS at 13:57

## 2020-06-16 RX ADMIN — ACETAMINOPHEN 650 MG: 325 TABLET ORAL at 13:23

## 2020-06-16 RX ADMIN — ALTEPLASE 2 MG: 2.2 INJECTION, POWDER, LYOPHILIZED, FOR SOLUTION INTRAVENOUS at 12:05

## 2020-06-16 RX ADMIN — DIPHENHYDRAMINE HYDROCHLORIDE 25 MG: 50 INJECTION, SOLUTION INTRAMUSCULAR; INTRAVENOUS at 13:23

## 2020-06-16 RX ADMIN — Medication 10 ML: at 11:35

## 2020-06-16 RX ADMIN — Medication 10 ML: at 16:02

## 2020-06-16 RX ADMIN — HEPARIN 500 UNITS: 100 SYRINGE at 16:02

## 2020-06-16 ASSESSMENT — PAIN SCALES - GENERAL: PAINLEVEL_OUTOF10: 3

## 2020-06-16 NOTE — PROGRESS NOTES
Patient arrive via wheelchair for cycle 5 day 1 treatment and physician visit. Denies complaint or concern to writer. Port accessed; sluggish blood return. Flush line in attempt to obtain specimen; unsuccessful. Activase to port; call phlebotomy for peripheral lab draw. Physician met with patient, labs reviewed; ok to proceed with treatment. Port checked post activase and blood return obtained. Patient premedicated. Rituxan infused with no sign of adverse reaction; line flushed. See MAR for titration. Patient monitored for 30 minutes post rituxan while saline flushing. No reactions. Port flushed and heparinized with intact smith needle removed per protocol. Patient off unit via wheelchair at discharge; AVS provided by front office staff.

## 2020-06-17 NOTE — PROGRESS NOTES
_           Chief Complaint   Patient presents with    Follow-up     Review status of disease     DIAGNOSIS:        Marginal Zone lymphoma  Persistent leukocytosis  Persistent thrombocytopenia  Multiple comorbidities as listed     CURRENT THERAPY:         Started treatment with rituximab 5/2/2019. Week #8 June 21, 2019. Maintenance Rituxan every 2 months for 2 years started October 17, 2019. BRIEF CASE HISTORY:      Ms. Andrea Torres is a very pleasant 70 y.o. female with history of multiple comorbidities including COPD and diabetes. She quit smoking years ago. The patient was recently hospitalized with chest infection. The patient was noted to have leukocytosis. She also had persistent thrombocytopenia. She is referred for further evaluation. Patient denies any active bleeding. She has easy bruising. No fever or night sweats. No weight loss or decreased appetite. No palpable lymph nodes. No history of repeated infections. Patient has history of smoking for more than 50 years. Denies alcohol drinking  She had flow cytometry and bone marrow test. Results showed evidence of marginal zone lymphoma. Start the rituximab with good results. Due to increasing symptoms and further problems related to lymphoma, patient was started on single agent rituximab on 5/20/19. Patient received maintenance rituximab after induction. INTERIM HISTORY:   Patient is seen for follow up leukocytosis and marginal zone lymphoma. She had very good response to induction rituximab. She is due today for rituximab maintenance. She is clinically stable. No weakness or fatigue. No dizziness. No fever or infections. No palpable lymph nodes. No recent infections. No recent hospitalizations. No weight loss or decreased appetite.        PAST MEDICAL HISTORY: has a past medical history of [facility]ronic [facility] disease) [facility] Depression, Diabetes mellitus ([facility] Hyperlipidemia, PTSD (post-traumatic stress disorder), and Tubulovillous adenoma. PAST SURGICAL HISTORY: has a past surgical history that includes Hysterectomy; Colonoscopy; Colonoscopy (06/07/2016); and Tunneled venous port placement (04/08/2019). CURRENT MEDICATIONS:  has a current medication list which includes the following prescription(s): alprazolam, magic mouthwash, metformin, aspirin ec, blood glucose test strips, lancets, duloxetine, lisinopril, simvastatin, OXYGEN, albuterol-ipratropium, prochlorperazine, gabapentin, esomeprazole, and zinc sulfate, and the following Facility-Administered Medications: heparin flush, sodium chloride flush, and riTUXimab (RITUXAN) 700 mg in sodium chloride 0.9 % 180 mL chemo IVPB. ALLERGIES:  has No Known Allergies. FAMILY HISTORY: Negative for any hematological or oncological conditions. SOCIAL HISTORY:  reports that she quit smoking about 12 years ago. Her smoking use included cigarettes. She has a 10.00 pack-year smoking history. She has never used smokeless tobacco. She reports that she does not drink alcohol or use drugs. REVIEW OF SYSTEMS:     · General: Positive for weakness and fatigue. No unanticipated weight loss or decreased appetite. No fever or chills. · Eyes: No blurred vision, eye pain or double vision. · Ears: No hearing problems or drainage. No tinnitus. · Throat: No sore throat, problems with swallowing or dysphagia. · Respiratory: No cough, sputum or hemoptysis. No shortness of breath. No pleuritic chest pain. · Cardiovascular: No chest pain, orthopnea or PND. No lower extremity edema. No palpitation. · Gastrointestinal: No problems with swallowing. No abdominal pain or bloating. No nausea or vomiting. No diarrhea or constipation. No GI bleeding. · Genitourinary: No dysuria, hematuria, frequency or urgency. · Musculoskeletal: No muscle aches or pains.  No limitation

## 2020-06-23 RX ORDER — HYDROCODONE BITARTRATE AND ACETAMINOPHEN 7.5; 325 MG/1; MG/1
2 TABLET ORAL EVERY 6 HOURS PRN
Qty: 180 TABLET | Refills: 0 | Status: SHIPPED | OUTPATIENT
Start: 2020-06-23 | End: 2020-07-15

## 2020-07-02 RX ORDER — ALPRAZOLAM 1 MG/1
TABLET ORAL
Qty: 90 TABLET | Refills: 0 | Status: SHIPPED | OUTPATIENT
Start: 2020-07-02 | End: 2020-08-06

## 2020-07-02 NOTE — TELEPHONE ENCOUNTER
Last visit: 5/5/20 VV  Last Med refill: 5/21/2020  Does patient have enough medication for 72 hours: Yes    Next Visit Date:  Future Appointments   Date Time Provider Thad Rebeca   8/11/2020 11:00 AM STV STA CHAIR 17 [facility]   8/11/2020 11:50 AM Misbah Madera MD [id number] [facility] [employer] Maintenance   Topic Date Due    Diabetic microalbuminuria test  05/15/2018    Colon cancer screen colonoscopy  06/07/2019    Diabetic foot exam  07/11/2019    Diabetic retinal exam  08/15/2019    A1C test (Diabetic or Prediabetic)  05/23/2020    Lipid screen  05/23/2020    DTaP/Tdap/Td vaccine (1 - Tdap) 08/15/2020 (Originally 4/30/1964)    Shingles Vaccine (1 of 2) 08/15/2020 (Originally 4/30/1997)    Flu vaccine (1) 09/01/2020    Annual Wellness Visit (AWV)  10/14/2020    Breast cancer screen  03/27/2021    Potassium monitoring  06/16/2021    Creatinine monitoring  06/16/2021    DEXA (modify frequency per FRAX score)  Completed    Pneumococcal 65+ yrs at Risk Vaccine  Completed    Hepatitis C screen  Completed    Hepatitis A vaccine  Aged Out    Hib vaccine  Aged Out    Meningococcal (ACWY) vaccine  Aged Out       Hemoglobin A1C (%)   Date Value   05/23/2019 5.7   07/11/2018 6.4 (H)   07/20/2017 6.1 (H)             ( goal A1C is < 7)   No results found for: LABMICR  LDL Cholesterol (mg/dL)   Date Value   05/23/2019 58   07/11/2018 67       (goal LDL is <100)   AST (U/L)   Date Value   06/16/2020 76 (H)     ALT (U/L)   Date Value   06/16/2020 62 (H)     BUN (mg/dL)   Date Value   06/16/2020 22     BP Readings from Last 3 Encounters:   06/16/20 (!) 115/59   04/21/20 120/64   04/21/20 115/71          (goal 120/80)    All Future Testing planned in [facility]  Lab Frequency Next Occurrence   T4 Once 10/14/2019   TSH without Reflex Once 10/14/2019   CBC Auto Differential q 3 months    CBC Auto Differential     Comprehensive Metabolic Panel                 Patient Active Problem List:     Obesity     COPD (chronic obstructive pulmonary disease) (HCC)     Colon polyps     Post traumatic stress disorder (PTSD)     Osteoarthritis     Diabetes mellitus (HCC)     Acute respiratory failure (HCC)     COPD exacerbation (HCC)     Pneumonia     Tubulovillous adenoma     Oral thrush     Bandemia     Thrombocytopenia (HCC)     Marginal zone lymphoma (HCC)     Hypovolemic shock (HCC)     Acute kidney injury (HCC)     Hyperkalemia     Metabolic acidosis

## 2020-07-23 RX ORDER — HYDROCODONE BITARTRATE AND ACETAMINOPHEN 7.5; 325 MG/1; MG/1
2 TABLET ORAL EVERY 6 HOURS PRN
Qty: 180 TABLET | Refills: 0 | Status: SHIPPED | OUTPATIENT
Start: 2020-07-23 | End: 2020-08-24 | Stop reason: SDUPTHER

## 2020-07-23 NOTE — TELEPHONE ENCOUNTER
Last visit: VV 5-5-20  Last Med refill: 6-23-20  Does patient have enough medication for 72 hours: Yes    Next Visit Date:  Future Appointments   Date Time Provider Thad Rebeca   8/11/2020 11:00 AM STV STA CHAIR 17 [facility]   8/11/2020 11:50 AM Ayse Younger MD [email or web address]       Health Maintenance   Topic Date Due    Diabetic microalbuminuria test  05/15/2018    Colon cancer screen colonoscopy  06/07/2019    Diabetic foot exam  07/11/2019    Diabetic retinal exam  08/15/2019    A1C test (Diabetic or Prediabetic)  05/23/2020    Lipid screen  05/23/2020    DTaP/Tdap/Td vaccine (1 - Tdap) 08/15/2020 (Originally 4/30/1964)    Shingles Vaccine (1 of 2) 08/15/2020 (Originally 4/30/1997)    Flu vaccine (1) 09/01/2020    Annual Wellness Visit (AWV)  10/14/2020    Breast cancer screen  03/27/2021    Potassium monitoring  06/16/2021    Creatinine monitoring  06/16/2021    DEXA (modify frequency per FRAX score)  Completed    Pneumococcal 65+ yrs at Risk Vaccine  Completed    Hepatitis C screen  Completed    Hepatitis A vaccine  Aged Out    Hib vaccine  Aged Out    Meningococcal (ACWY) vaccine  Aged Out       Hemoglobin A1C (%)   Date Value   05/23/2019 5.7   07/11/2018 6.4 (H)   07/20/2017 6.1 (H)             ( goal A1C is < 7)   No results found for: LABMICR  LDL Cholesterol (mg/dL)   Date Value   05/23/2019 58   07/11/2018 67       (goal LDL is <100)   AST (U/L)   Date Value   06/16/2020 76 (H)     ALT (U/L)   Date Value   06/16/2020 62 (H)     BUN (mg/dL)   Date Value   06/16/2020 22     BP Readings from Last 3 Encounters:   06/16/20 (!) 115/59   04/21/20 120/64   04/21/20 115/71          (goal 120/80)    All Future Testing planned in [facility]  Lab Frequency Next Occurrence   T4 Once 10/14/2019   TSH without Reflex Once 10/14/2019   CBC Auto Differential q 3 months    CBC Auto Differential     Comprehensive Metabolic Panel                 Patient Active Problem List: Obesity     COPD (chronic obstructive pulmonary disease) (HCC)     Colon polyps     Post traumatic stress disorder (PTSD)     Osteoarthritis     Diabetes mellitus (HCC)     Acute respiratory failure (HCC)     COPD exacerbation (HCC)     Pneumonia     Tubulovillous adenoma     Oral thrush     Bandemia     Thrombocytopenia (HCC)     Marginal zone lymphoma (HCC)     Hypovolemic shock (HCC)     Acute kidney injury (HCC)     Hyperkalemia     Metabolic acidosis

## 2020-08-06 RX ORDER — ALPRAZOLAM 1 MG/1
TABLET ORAL
Qty: 90 TABLET | Refills: 0 | Status: SHIPPED | OUTPATIENT
Start: 2020-08-06 | End: 2020-09-18 | Stop reason: SDUPTHER

## 2020-08-06 NOTE — TELEPHONE ENCOUNTER
Please review OARRS  Last visit: 5/5/20  Last Med refill: 7/2/20  Does patient have enough medication for 72 hours:     Next Visit Date:  Future Appointments   Date Time Provider Thad Jose   8/11/2020 11:00 AM STV STA CHAIR 17 [facility]   8/11/2020 11:45 AM Clint Leyva MD [id number] [place] [employer] Maintenance   Topic Date Due    Diabetic microalbuminuria test  05/15/2018    Colon cancer screen colonoscopy  06/07/2019    Diabetic foot exam  07/11/2019    Diabetic retinal exam  08/15/2019    A1C test (Diabetic or Prediabetic)  05/23/2020    Lipid screen  05/23/2020    DTaP/Tdap/Td vaccine (1 - Tdap) 08/15/2020 (Originally 4/30/1964)    Shingles Vaccine (1 of 2) 08/15/2020 (Originally 4/30/1997)    Flu vaccine (1) 09/01/2020    Annual Wellness Visit (AWV)  10/14/2020    Breast cancer screen  03/27/2021    Potassium monitoring  06/16/2021    Creatinine monitoring  06/16/2021    DEXA (modify frequency per FRAX score)  Completed    Pneumococcal 65+ yrs at Risk Vaccine  Completed    Hepatitis C screen  Completed    Hepatitis A vaccine  Aged Out    Hib vaccine  Aged Out    Meningococcal (ACWY) vaccine  Aged Out       Hemoglobin A1C (%)   Date Value   05/23/2019 5.7   07/11/2018 6.4 (H)   07/20/2017 6.1 (H)             ( goal A1C is < 7)   No results found for: LABMICR  LDL Cholesterol (mg/dL)   Date Value   05/23/2019 58   07/11/2018 67       (goal LDL is <100)   AST (U/L)   Date Value   06/16/2020 76 (H)     ALT (U/L)   Date Value   06/16/2020 62 (H)     BUN (mg/dL)   Date Value   06/16/2020 22     BP Readings from Last 3 Encounters:   06/16/20 (!) 115/59   04/21/20 120/64   04/21/20 115/71          (goal 120/80)    All Future Testing planned in [facility]  Lab Frequency Next Occurrence   T4 Once 10/14/2019   TSH without Reflex Once 10/14/2019   CBC Auto Differential q 3 months    CBC Auto Differential     Comprehensive Metabolic Panel                 Patient Active

## 2020-08-07 ENCOUNTER — HOSPITAL ENCOUNTER (OUTPATIENT)
Facility: MEDICAL CENTER | Age: 75
End: 2020-08-07
Payer: COMMERCIAL

## 2020-08-11 ENCOUNTER — HOSPITAL ENCOUNTER (OUTPATIENT)
Dept: INFUSION THERAPY | Facility: MEDICAL CENTER | Age: 75
Discharge: HOME | End: 2020-08-11
Payer: COMMERCIAL

## 2020-08-11 ENCOUNTER — OFFICE VISIT (OUTPATIENT)
Dept: ONCOLOGY | Age: 75
End: 2020-08-11
Payer: COMMERCIAL

## 2020-08-11 ENCOUNTER — TELEPHONE (OUTPATIENT)
Dept: ONCOLOGY | Age: 75
End: 2020-08-11

## 2020-08-11 VITALS
SYSTOLIC BLOOD PRESSURE: 115 MMHG | DIASTOLIC BLOOD PRESSURE: 59 MMHG | BODY MASS INDEX: 38.04 KG/M2 | HEART RATE: 77 BPM | WEIGHT: 175.8 LBS | TEMPERATURE: 98.1 F

## 2020-08-11 VITALS
BODY MASS INDEX: 37.5 KG/M2 | RESPIRATION RATE: 20 BRPM | TEMPERATURE: 97.4 F | HEART RATE: 77 BPM | DIASTOLIC BLOOD PRESSURE: 68 MMHG | WEIGHT: 173.3 LBS | SYSTOLIC BLOOD PRESSURE: 117 MMHG

## 2020-08-11 DIAGNOSIS — C85.80 MARGINAL ZONE LYMPHOMA (HCC): ICD-10-CM

## 2020-08-11 DIAGNOSIS — C85.80 MARGINAL ZONE B-CELL LYMPHOMA (HCC): Primary | ICD-10-CM

## 2020-08-11 LAB
ABSOLUTE EOS #: 0.09 K/UL (ref 0–0.44)
ABSOLUTE IMMATURE GRANULOCYTE: 0.02 K/UL (ref 0–0.3)
ABSOLUTE LYMPH #: 2.68 K/UL (ref 1.1–3.7)
ABSOLUTE MONO #: 0.65 K/UL (ref 0.1–1.2)
ALBUMIN SERPL-MCNC: 3.7 G/DL (ref 3.5–5.2)
ALBUMIN/GLOBULIN RATIO: ABNORMAL (ref 1–2.5)
ALP BLD-CCNC: 62 U/L (ref 35–104)
ALT SERPL-CCNC: 55 U/L (ref 5–33)
ANION GAP SERPL CALCULATED.3IONS-SCNC: 11 MMOL/L (ref 9–17)
AST SERPL-CCNC: 51 U/L
BASOPHILS # BLD: 1 % (ref 0–2)
BASOPHILS ABSOLUTE: 0.05 K/UL (ref 0–0.2)
BILIRUB SERPL-MCNC: 0.22 MG/DL (ref 0.3–1.2)
BUN BLDV-MCNC: 28 MG/DL (ref 8–23)
BUN/CREAT BLD: 40 (ref 9–20)
CALCIUM SERPL-MCNC: 9.4 MG/DL (ref 8.6–10.4)
CHLORIDE BLD-SCNC: 104 MMOL/L (ref 98–107)
CO2: 24 MMOL/L (ref 20–31)
CREAT SERPL-MCNC: 0.7 MG/DL (ref 0.5–0.9)
DIFFERENTIAL TYPE: NORMAL
EOSINOPHILS RELATIVE PERCENT: 1 % (ref 1–4)
GFR AFRICAN AMERICAN: >60 ML/MIN
GFR NON-AFRICAN AMERICAN: >60 ML/MIN
GFR SERPL CREATININE-BSD FRML MDRD: ABNORMAL ML/MIN/{1.73_M2}
GFR SERPL CREATININE-BSD FRML MDRD: ABNORMAL ML/MIN/{1.73_M2}
GLUCOSE BLD-MCNC: 137 MG/DL (ref 70–99)
HCT VFR BLD CALC: 42.5 % (ref 36.3–47.1)
HEMOGLOBIN: 13.6 G/DL (ref 11.9–15.1)
IMMATURE GRANULOCYTES: 0 %
LYMPHOCYTES # BLD: 34 % (ref 24–43)
MCH RBC QN AUTO: 31.6 PG (ref 25.2–33.5)
MCHC RBC AUTO-ENTMCNC: 32 G/DL (ref 28.4–34.8)
MCV RBC AUTO: 98.8 FL (ref 82.6–102.9)
MONOCYTES # BLD: 8 % (ref 3–12)
NRBC AUTOMATED: 0 PER 100 WBC
PDW BLD-RTO: 13.8 % (ref 11.8–14.4)
PLATELET # BLD: 232 K/UL (ref 138–453)
PLATELET ESTIMATE: NORMAL
PMV BLD AUTO: 9.4 FL (ref 8.1–13.5)
POTASSIUM SERPL-SCNC: 4.5 MMOL/L (ref 3.7–5.3)
RBC # BLD: 4.3 M/UL (ref 3.95–5.11)
RBC # BLD: NORMAL 10*6/UL
SEG NEUTROPHILS: 56 % (ref 36–65)
SEGMENTED NEUTROPHILS ABSOLUTE COUNT: 4.45 K/UL (ref 1.5–8.1)
SODIUM BLD-SCNC: 139 MMOL/L (ref 135–144)
TOTAL PROTEIN: 6.2 G/DL (ref 6.4–8.3)
WBC # BLD: 7.9 K/UL (ref 3.5–11.3)
WBC # BLD: NORMAL 10*3/UL

## 2020-08-11 PROCEDURE — 2580000003 HC RX 258: Performed by: INTERNAL MEDICINE

## 2020-08-11 PROCEDURE — 80053 COMPREHEN METABOLIC PANEL: CPT

## 2020-08-11 PROCEDURE — 6370000000 HC RX 637 (ALT 250 FOR IP): Performed by: INTERNAL MEDICINE

## 2020-08-11 PROCEDURE — 96413 CHEMO IV INFUSION 1 HR: CPT

## 2020-08-11 PROCEDURE — 99211 OFF/OP EST MAY X REQ PHY/QHP: CPT | Performed by: INTERNAL MEDICINE

## 2020-08-11 PROCEDURE — 99214 OFFICE O/P EST MOD 30 MIN: CPT | Performed by: INTERNAL MEDICINE

## 2020-08-11 PROCEDURE — 96415 CHEMO IV INFUSION ADDL HR: CPT

## 2020-08-11 PROCEDURE — 36415 COLL VENOUS BLD VENIPUNCTURE: CPT

## 2020-08-11 PROCEDURE — 36591 DRAW BLOOD OFF VENOUS DEVICE: CPT

## 2020-08-11 PROCEDURE — 6360000002 HC RX W HCPCS: Performed by: INTERNAL MEDICINE

## 2020-08-11 PROCEDURE — 96376 TX/PRO/DX INJ SAME DRUG ADON: CPT

## 2020-08-11 PROCEDURE — 85025 COMPLETE CBC W/AUTO DIFF WBC: CPT

## 2020-08-11 PROCEDURE — 96375 TX/PRO/DX INJ NEW DRUG ADDON: CPT

## 2020-08-11 RX ORDER — SODIUM CHLORIDE 0.9 % (FLUSH) 0.9 %
10 SYRINGE (ML) INJECTION PRN
Status: CANCELLED | OUTPATIENT
Start: 2020-12-01

## 2020-08-11 RX ORDER — ACETAMINOPHEN 325 MG/1
650 TABLET ORAL ONCE
Status: CANCELLED | OUTPATIENT
Start: 2020-12-01

## 2020-08-11 RX ORDER — DIPHENHYDRAMINE HYDROCHLORIDE 50 MG/ML
25 INJECTION INTRAMUSCULAR; INTRAVENOUS ONCE
Status: COMPLETED | OUTPATIENT
Start: 2020-08-11 | End: 2020-08-11

## 2020-08-11 RX ORDER — SODIUM CHLORIDE 9 MG/ML
INJECTION, SOLUTION INTRAVENOUS CONTINUOUS
Status: CANCELLED | OUTPATIENT
Start: 2020-12-01

## 2020-08-11 RX ORDER — SODIUM CHLORIDE 0.9 % (FLUSH) 0.9 %
10 SYRINGE (ML) INJECTION PRN
Status: DISCONTINUED | OUTPATIENT
Start: 2020-08-11 | End: 2020-08-12 | Stop reason: HOSPADM

## 2020-08-11 RX ORDER — MEPERIDINE HYDROCHLORIDE 50 MG/ML
12.5 INJECTION INTRAMUSCULAR; INTRAVENOUS; SUBCUTANEOUS ONCE
Status: CANCELLED | OUTPATIENT
Start: 2020-12-01

## 2020-08-11 RX ORDER — HEPARIN SODIUM (PORCINE) LOCK FLUSH IV SOLN 100 UNIT/ML 100 UNIT/ML
500 SOLUTION INTRAVENOUS PRN
Status: DISCONTINUED | OUTPATIENT
Start: 2020-08-11 | End: 2020-08-12 | Stop reason: HOSPADM

## 2020-08-11 RX ORDER — SODIUM CHLORIDE 0.9 % (FLUSH) 0.9 %
5 SYRINGE (ML) INJECTION PRN
Status: CANCELLED | OUTPATIENT
Start: 2020-12-01

## 2020-08-11 RX ORDER — DIPHENHYDRAMINE HYDROCHLORIDE 50 MG/ML
25 INJECTION INTRAMUSCULAR; INTRAVENOUS ONCE
Status: CANCELLED | OUTPATIENT
Start: 2020-12-01

## 2020-08-11 RX ORDER — DIPHENHYDRAMINE HYDROCHLORIDE 50 MG/ML
50 INJECTION INTRAMUSCULAR; INTRAVENOUS ONCE
Status: CANCELLED | OUTPATIENT
Start: 2020-12-01

## 2020-08-11 RX ORDER — EPINEPHRINE 1 MG/ML
0.3 INJECTION, SOLUTION, CONCENTRATE INTRAVENOUS PRN
Status: CANCELLED | OUTPATIENT
Start: 2020-12-01

## 2020-08-11 RX ORDER — HEPARIN SODIUM (PORCINE) LOCK FLUSH IV SOLN 100 UNIT/ML 100 UNIT/ML
500 SOLUTION INTRAVENOUS PRN
Status: CANCELLED | OUTPATIENT
Start: 2020-12-01

## 2020-08-11 RX ORDER — 0.9 % SODIUM CHLORIDE 0.9 %
10 VIAL (ML) INJECTION ONCE
Status: CANCELLED | OUTPATIENT
Start: 2020-12-01

## 2020-08-11 RX ORDER — SODIUM CHLORIDE 9 MG/ML
20 INJECTION, SOLUTION INTRAVENOUS ONCE
Status: CANCELLED | OUTPATIENT
Start: 2020-12-01

## 2020-08-11 RX ORDER — ACETAMINOPHEN 325 MG/1
650 TABLET ORAL ONCE
Status: COMPLETED | OUTPATIENT
Start: 2020-08-11 | End: 2020-08-11

## 2020-08-11 RX ORDER — SODIUM CHLORIDE 9 MG/ML
20 INJECTION, SOLUTION INTRAVENOUS ONCE
Status: COMPLETED | OUTPATIENT
Start: 2020-08-11 | End: 2020-08-11

## 2020-08-11 RX ORDER — METHYLPREDNISOLONE SODIUM SUCCINATE 125 MG/2ML
125 INJECTION, POWDER, LYOPHILIZED, FOR SOLUTION INTRAMUSCULAR; INTRAVENOUS ONCE
Status: CANCELLED | OUTPATIENT
Start: 2020-12-01

## 2020-08-11 RX ADMIN — ACETAMINOPHEN 650 MG: 325 TABLET ORAL at 14:18

## 2020-08-11 RX ADMIN — RITUXIMAB 700 MG: 10 INJECTION, SOLUTION INTRAVENOUS at 14:51

## 2020-08-11 RX ADMIN — HEPARIN 500 UNITS: 100 SYRINGE at 16:36

## 2020-08-11 RX ADMIN — DIPHENHYDRAMINE HYDROCHLORIDE 25 MG: 50 INJECTION, SOLUTION INTRAMUSCULAR; INTRAVENOUS at 14:18

## 2020-08-11 RX ADMIN — SODIUM CHLORIDE 20 ML/HR: 9 INJECTION, SOLUTION INTRAVENOUS at 11:24

## 2020-08-11 RX ADMIN — Medication 10 ML: at 16:36

## 2020-08-11 RX ADMIN — Medication 10 ML: at 11:24

## 2020-08-11 RX ADMIN — ALTEPLASE 2 MG: 2.2 INJECTION, POWDER, LYOPHILIZED, FOR SOLUTION INTRAVENOUS at 12:07

## 2020-08-11 ASSESSMENT — PAIN SCALES - GENERAL: PAINLEVEL_OUTOF10: 0

## 2020-08-11 NOTE — PLAN OF CARE
Problem: SAFETY  Goal: Free from accidental physical injury  Outcome: Completed  Goal: Free from intentional harm  Outcome: Completed

## 2020-08-11 NOTE — TELEPHONE ENCOUNTER
Shailesh Miller MD VISIT & [id number] [facility] IN TO SEE PATIENT  ORDERS RECEIVED  PROCEED W/RITUXAN TODAY  RV 8 WEEKS W/TX & MD VISIT  MD VISIT 10/6/20 @11:30AM  [email or web address]  AVS PRINTED AND GIVEN TO PATIENT WITH INSTRUCTIONS  PATIENT REMAINS IN [address] [place] [address]

## 2020-08-11 NOTE — PROGRESS NOTES
_           Chief Complaint   Patient presents with    Follow-up     Review status of disease     DIAGNOSIS:        Marginal Zone lymphoma  Persistent leukocytosis  Persistent thrombocytopenia  Multiple comorbidities as listed     CURRENT THERAPY:         Started treatment with rituximab 5/2/2019. Week #8 June 21, 2019. Maintenance Rituxan every 2 months for 2 years started October 17, 2019. BRIEF CASE HISTORY:      Ms. Evita Schmitz is a very pleasant 70 y.o. female with history of multiple comorbidities including COPD and diabetes. She quit smoking years ago. The patient was recently hospitalized with chest infection. The patient was noted to have leukocytosis. She also had persistent thrombocytopenia. She is referred for further evaluation. Patient denies any active bleeding. She has easy bruising. No fever or night sweats. No weight loss or decreased appetite. No palpable lymph nodes. No history of repeated infections. Patient has history of smoking for more than 50 years. Denies alcohol drinking  She had flow cytometry and bone marrow test. Results showed evidence of marginal zone lymphoma. Start the rituximab with good results. Due to increasing symptoms and further problems related to lymphoma, patient was started on single agent rituximab on 5/20/19. Patient received maintenance rituximab after induction. INTERIM HISTORY:   Patient is seen for follow up leukocytosis and marginal zone lymphoma. She had very good response to induction rituximab. She is due today for rituximab maintenance. She is clinically stable. No weakness or fatigue. No dizziness. No fever or infections. No palpable lymph nodes. No recent infections. No recent hospitalizations. No weight loss or decreased appetite.        PAST MEDICAL HISTORY: has a past medical history of [facility] Depression, Diabetes mellitus [facility] Hyperlipidemia, PTSD (post-traumatic stress disorder), and Tubulovillous adenoma. PAST SURGICAL HISTORY: has a past surgical history that includes Hysterectomy; Colonoscopy; Colonoscopy (06/07/2016); and Tunneled venous port placement (04/08/2019). CURRENT MEDICATIONS:  has a current medication list which includes the following prescription(s): alprazolam, hydrocodone-acetaminophen, magic mouthwash, metformin, aspirin ec, zinc sulfate, blood glucose test strips, lancets, duloxetine, lisinopril, OXYGEN, albuterol-ipratropium, prochlorperazine, gabapentin, and esomeprazole, and the following Facility-Administered Medications: sodium chloride flush, heparin flush, and riTUXimab (RITUXAN) 700 mg in sodium chloride 0.9 % 180 mL chemo IVPB. ALLERGIES:  has No Known Allergies. FAMILY HISTORY: Negative for any hematological or oncological conditions. SOCIAL HISTORY:  reports that she quit smoking about 12 years ago. Her smoking use included cigarettes. She has a 10.00 pack-year smoking history. She has never used smokeless tobacco. She reports that she does not drink alcohol or use drugs. REVIEW OF SYSTEMS:     · General: Positive for weakness and fatigue. No unanticipated weight loss or decreased appetite. No fever or chills. · Eyes: No blurred vision, eye pain or double vision. · Ears: No hearing problems or drainage. No tinnitus. · Throat: No sore throat, problems with swallowing or dysphagia. · Respiratory: No cough, sputum or hemoptysis. No shortness of breath. No pleuritic chest pain. · Cardiovascular: No chest pain, orthopnea or PND. No lower extremity edema. No palpitation. · Gastrointestinal: No problems with swallowing. No abdominal pain or bloating. No nausea or vomiting. No diarrhea or constipation. No GI bleeding. · Genitourinary: No dysuria, hematuria, frequency or urgency. · Musculoskeletal: No muscle aches or pains. No limitation of movement. No back pain. No gait disturbance, No joint complaints. · Dermatologic: No skin rashes or pruritus. No skin lesions or discolorations. · Psychiatric: No depression, anxiety, or stress or signs of schizophrenia. No change in mood or affect. · Hematologic: No history of bleeding tendency. Easy bruises no ecchymosis. No history of clotting problems. · Infectious disease: No fever, chills or frequent infections. · Endocrine: No problems with obesity. No polydipsia or polyuria. No temperature intolerance. · Neurologic: No headaches or dizziness. No weakness or numbness of the extremities. No changes in balance, coordination,  memory, mentation, behavior. · Allergic/Immunologic: No nasal congestion or hives. No repeated infections. PHYSICAL EXAM:  The patient is not in acute distress. Vital signs: Blood pressure (!) 115/59, pulse 77, temperature 98.1 °F (36.7 °C), temperature source Oral, weight 175 lb 12.8 oz (79.7 kg), not currently breastfeeding. HEENT:  Eyes are normal. Ears, nose and throat are normal.  Neck: Supple. No lymph node enlargement. No thyroid enlargement. Trachea is centrally located. Chest:  Clear to auscultation. No wheezes or crepitations. Heart: Regular sinus rhythm. Abdomen: Soft, nontender. No hepatosplenomegaly. No masses. Extremities:  With no edema. Lymph Nodes:  No cervical, axillary or inguinal lymph node enlargement. Neurologic:  Conscious and oriented. No focal neurological deficits. Psychosocial: No depression, anxiety or stress. Skin: No rashes, bruises or ecchymoses. Review of Diagnostic data:   Recent Labs     08/11/20  1300   WBC 7.9   HGB 13.6   HCT 42.5   MCV 98.8        Peripheral blood flow cytometry:  Peripheral blood flow cytometric immunophenotyping:         Peripheral blood is positive for a monoclonal B-cell population,   positive for CD19, CD20, CD22,   CD45, FMC7 and lambda light chain.      Comment:  Morphology and immunophenotype of circulating monoclonal   B-cells are most consistent with a marginal zone lymphoma.  Bone   marrow evaluation and/or lymph node biopsy if possible are recommended   for confirmation and additional characterization. Bone marrow test August 27, 2018: Final Diagnosis   PERIPHERAL BLOOD:   -LYMPHOCYTOSIS WITH \" VILLOUS\" LYMPHOCYTES. -MODERATE THROMBOCYTOPENIA. BONE MARROW BIOPSY, ASPIRATE SMEAR AND CLOT SECTION:   - MARGINAL ZONE LYMPHOMA. -NORMOCELLULAR TRILINEAGE HEMATOPOIETIC MARROW WITH MARKEDLY   DECREASED IRON STORES. Chemistry        Component Value Date/Time     08/11/2020 1300    K 4.5 08/11/2020 1300     08/11/2020 1300    CO2 24 08/11/2020 1300    BUN 28 (H) 08/11/2020 1300    CREATININE 0.70 08/11/2020 1300        Component Value Date/Time    CALCIUM 9.4 08/11/2020 1300    ALKPHOS 62 08/11/2020 1300    AST 51 (H) 08/11/2020 1300    ALT 55 (H) 08/11/2020 1300    BILITOT 0.22 (L) 08/11/2020 1300        Lab Results   Component Value Date    WBC 7.9 08/11/2020    HGB 13.6 08/11/2020    HCT 42.5 08/11/2020    MCV 98.8 08/11/2020     08/11/2020         IMPRESSION:  Marginal Zone lymphoma  Persistent leukocytosis  Persistent thrombocytopenia  Positive FANNY with elevated antihistone. Multiple comorbidities as listed  Acute renal failure, corrected. PLAN: I reviewed the labs as above and discussed with the patient. I explained the significance of these abnormalities in layman language. I explained to patient the nature of low grade NHL. Patient received immunotherapy using rituximab. She had good results induction rituximab treatment. We will continue maintenance rituximab. Explained the benefits and side effects and she understands and agrees. Maintenance rituximab treatment will be every 2 months for 2 years. Proceed with rituximab today. KATHRIN corrected. Return visit in 8 weeks with repeated labs.   Patient's questions were answered to the best of her satisfaction and she verbalized full understanding and agreement.

## 2020-08-11 NOTE — PROGRESS NOTES
Patient arrived ambulatory per self for cycle 6 day 1 treatment and physician visit. Patient denies complaints or concerns. Port accessed. Flushes well but unable to obtain blood return. Lab notified to obtain blood draw. Alteplase administered. Physician met with patient, labs reviewed; ok to proceed with treatment. Port checked post alteplase and blood return obtained. Patient premedicated. Rituxan infused with no sign of adverse reaction; line flushed. See MAR for titration. Patient monitored for 30 minutes post rituxan while saline flushing. No reactions. Port flushed and heparinized with intact smith needle removed per protocol. Patient left unit per wheelchair by herself at discharge.  Instructed to stop at  for AVS.

## 2020-08-14 RX ORDER — SIMVASTATIN 40 MG
TABLET ORAL
Qty: 90 TABLET | Refills: 2 | Status: SHIPPED | OUTPATIENT
Start: 2020-08-14 | End: 2021-05-26 | Stop reason: SDUPTHER

## 2020-08-14 NOTE — TELEPHONE ENCOUNTER
Ang Marino is calling to request a refill on the following medication(s):    Medication Request:  Requested Prescriptions     Pending Prescriptions Disp Refills    simvastatin (ZOCOR) 40 MG tablet [Pharmacy Med Name: SIMVASTATIN 40 MG TABLET] 90 tablet 2     Sig: TAKE ONE TABLET BY MOUTH DAILY       Last Visit Date (If Applicable):  8/9/7478    Next Visit Date:    Visit date not found

## 2020-08-24 ENCOUNTER — TELEPHONE (OUTPATIENT)
Dept: FAMILY MEDICINE CLINIC | Age: 75
End: 2020-08-24

## 2020-08-24 RX ORDER — HYDROCODONE BITARTRATE AND ACETAMINOPHEN 7.5; 325 MG/1; MG/1
2 TABLET ORAL EVERY 6 HOURS PRN
Qty: 180 TABLET | Refills: 0 | Status: SHIPPED | OUTPATIENT
Start: 2020-08-24 | End: 2020-09-28 | Stop reason: SDUPTHER

## 2020-08-24 NOTE — TELEPHONE ENCOUNTER
Wayne Mccallum is calling to request a refill on the following medication(s):    Medication Request:  Requested Prescriptions     Pending Prescriptions Disp Refills    HYDROcodone-acetaminophen (NORCO) 7.5-325 MG per tablet 180 tablet 0     Sig: Take 2 tablets by mouth every 6 hours as needed for Pain for up to 22 days.  Intended supply: 30 days       Last Visit Date (If Applicable):  9/5/7304    Next Visit Date:    Visit date not found              Last Refill:  7/23/20

## 2020-08-25 RX ORDER — LISINOPRIL 10 MG/1
TABLET ORAL
Qty: 90 TABLET | Refills: 2 | Status: SHIPPED | OUTPATIENT
Start: 2020-08-25 | End: 2021-05-26 | Stop reason: SDUPTHER

## 2020-08-25 NOTE — TELEPHONE ENCOUNTER
Alexis Rebolledo is calling to request a refill on the following medication(s):    Medication Request:  Requested Prescriptions     Pending Prescriptions Disp Refills    lisinopril (PRINIVIL;ZESTRIL) 10 MG tablet [Pharmacy Med Name: LISINOPRIL 10 MG TABLET] 90 tablet 2     Sig: TAKE ONE TABLET BY MOUTH DAILY       Last Visit Date (If Applicable):  1/9/4064    Next Visit Date:    Visit date not found            Last Refill:  8/15/19

## 2020-08-26 RX ORDER — FLUOXETINE HYDROCHLORIDE 20 MG/1
CAPSULE ORAL
Qty: 90 CAPSULE | Refills: 1 | Status: SHIPPED
Start: 2020-08-26 | End: 2020-10-06 | Stop reason: ALTCHOICE

## 2020-09-17 ENCOUNTER — TELEPHONE (OUTPATIENT)
Dept: FAMILY MEDICINE CLINIC | Age: 75
End: 2020-09-17

## 2020-09-17 NOTE — TELEPHONE ENCOUNTER
Patient requesting a medication refill.   Medication: alprazolam 1 mg    Ph[facility]: [id number]  Last office visit:   Next office visit: 9/21/2020

## 2020-09-18 RX ORDER — ALPRAZOLAM 1 MG/1
TABLET ORAL
Qty: 90 TABLET | Refills: 0 | Status: SHIPPED | OUTPATIENT
Start: 2020-09-18 | End: 2020-10-21

## 2020-09-18 NOTE — TELEPHONE ENCOUNTER
Ulysses Lava is calling to request a refill on the following medication(s):    Medication Request:  Requested Prescriptions     Pending Prescriptions Disp Refills    ALPRAZolam (XANAX) 1 MG tablet 90 tablet 0     Sig: TAKE ONE TABLET BY MOUTH THREE TIMES A DAY AS NEEDED FOR SLEEP OR ANXIETY       Last Visit Date (If Applicable):  4/1/4870    Next Visit Date:    Visit date not found        Last Refill:  8/6/2020

## 2020-09-21 ENCOUNTER — OFFICE VISIT (OUTPATIENT)
Dept: FAMILY MEDICINE CLINIC | Age: 75
End: 2020-09-21
Payer: COMMERCIAL

## 2020-09-21 ENCOUNTER — HOSPITAL ENCOUNTER (OUTPATIENT)
Age: 75
Setting detail: SPECIMEN
Discharge: HOME | End: 2020-09-21
Payer: COMMERCIAL

## 2020-09-21 VITALS
HEART RATE: 87 BPM | TEMPERATURE: 97.3 F | BODY MASS INDEX: 37.58 KG/M2 | SYSTOLIC BLOOD PRESSURE: 118 MMHG | OXYGEN SATURATION: 96 % | WEIGHT: 174.2 LBS | DIASTOLIC BLOOD PRESSURE: 74 MMHG | HEIGHT: 57 IN

## 2020-09-21 PROBLEM — E66.01 MORBID OBESITY WITH BMI OF 40.0-44.9, ADULT (HCC): Status: ACTIVE | Noted: 2020-09-21

## 2020-09-21 PROBLEM — M54.50 CHRONIC LOW BACK PAIN WITHOUT SCIATICA: Status: ACTIVE | Noted: 2020-09-21

## 2020-09-21 PROBLEM — R57.1 HYPOVOLEMIC SHOCK (HCC): Status: RESOLVED | Noted: 2019-08-22 | Resolved: 2020-09-21

## 2020-09-21 PROBLEM — I10 ESSENTIAL HYPERTENSION: Status: ACTIVE | Noted: 2020-09-21

## 2020-09-21 PROBLEM — D72.825 BANDEMIA: Status: RESOLVED | Noted: 2018-08-09 | Resolved: 2020-09-21

## 2020-09-21 PROBLEM — I50.9 ACUTE ON CHRONIC CONGESTIVE HEART FAILURE (HCC): Status: ACTIVE | Noted: 2020-09-21

## 2020-09-21 PROBLEM — E87.5 HYPERKALEMIA: Status: RESOLVED | Noted: 2019-08-23 | Resolved: 2020-09-21

## 2020-09-21 PROBLEM — E87.20 METABOLIC ACIDOSIS: Status: RESOLVED | Noted: 2019-08-23 | Resolved: 2020-09-21

## 2020-09-21 PROBLEM — N17.9 ACUTE KIDNEY INJURY (HCC): Status: RESOLVED | Noted: 2019-08-22 | Resolved: 2020-09-21

## 2020-09-21 PROBLEM — K14.6 BURNING MOUTH SYNDROME: Status: ACTIVE | Noted: 2020-09-21

## 2020-09-21 PROBLEM — G89.29 CHRONIC LOW BACK PAIN WITHOUT SCIATICA: Status: ACTIVE | Noted: 2020-09-21

## 2020-09-21 PROBLEM — E78.2 MIXED HYPERLIPIDEMIA: Status: ACTIVE | Noted: 2020-09-21

## 2020-09-21 LAB
CHOLESTEROL/HDL RATIO: 2.2
CHOLESTEROL: 155 MG/DL
CREATININE URINE: 216.6 MG/DL (ref 28–217)
ESTIMATED AVERAGE GLUCOSE: 117 MG/DL
HBA1C MFR BLD: 5.7 % (ref 4–6)
HDLC SERPL-MCNC: 72 MG/DL
LDL CHOLESTEROL: 54 MG/DL (ref 0–130)
MICROALBUMIN/CREAT 24H UR: <12 MG/L
MICROALBUMIN/CREAT UR-RTO: NORMAL MCG/MG CREAT
TRIGL SERPL-MCNC: 143 MG/DL
VLDLC SERPL CALC-MCNC: NORMAL MG/DL (ref 1–30)

## 2020-09-21 PROCEDURE — G0008 ADMIN INFLUENZA VIRUS VAC: HCPCS | Performed by: FAMILY MEDICINE

## 2020-09-21 PROCEDURE — 99214 OFFICE O/P EST MOD 30 MIN: CPT | Performed by: FAMILY MEDICINE

## 2020-09-21 PROCEDURE — 90694 VACC AIIV4 NO PRSRV 0.5ML IM: CPT | Performed by: FAMILY MEDICINE

## 2020-09-21 RX ORDER — DULOXETIN HYDROCHLORIDE 60 MG/1
CAPSULE, DELAYED RELEASE ORAL
COMMUNITY
Start: 2020-09-17 | End: 2021-01-25 | Stop reason: SDUPTHER

## 2020-09-21 ASSESSMENT — ENCOUNTER SYMPTOMS
HEMOPTYSIS: 0
DIFFICULTY BREATHING: 0
TROUBLE SWALLOWING: 0
CHEST TIGHTNESS: 0
WHEEZING: 0
FREQUENT THROAT CLEARING: 0
ALLERGIC/IMMUNOLOGIC NEGATIVE: 1
EYES NEGATIVE: 1
HOARSE VOICE: 0
SHORTNESS OF BREATH: 1
GASTROINTESTINAL NEGATIVE: 1
ABDOMINAL PAIN: 0
SPUTUM PRODUCTION: 0
HEARTBURN: 0
RHINORRHEA: 0
ORTHOPNEA: 0
BLURRED VISION: 0
COUGH: 0
SORE THROAT: 0

## 2020-09-21 ASSESSMENT — PATIENT HEALTH QUESTIONNAIRE - PHQ9
1. LITTLE INTEREST OR PLEASURE IN DOING THINGS: 1
SUM OF ALL RESPONSES TO PHQ QUESTIONS 1-9: 2
SUM OF ALL RESPONSES TO PHQ9 QUESTIONS 1 & 2: 2
SUM OF ALL RESPONSES TO PHQ QUESTIONS 1-9: 2
2. FEELING DOWN, DEPRESSED OR HOPELESS: 1

## 2020-09-21 ASSESSMENT — COPD QUESTIONNAIRES: COPD: 1

## 2020-09-21 NOTE — PATIENT INSTRUCTIONS
Patient Education        Preventing Falls: Care Instructions  Your Care Instructions     Getting around your home safely can be a challenge if you have injuries or health problems that make it easy for you to fall. Loose rugs and furniture in walkways are among the dangers for many older people who have problems walking or who have poor eyesight. People who have conditions such as arthritis, osteoporosis, or dementia also have to be careful not to fall. You can make your home safer with a few simple measures. Follow-up care is a key part of your treatment and safety. Be sure to make and go to all appointments, and call your doctor if you are having problems. It's also a good idea to know your test results and keep a list of the medicines you take. How can you care for yourself at home? Taking care of yourself  · You may get dizzy if you do not drink enough water. To prevent dehydration, drink plenty of fluids, enough so that your urine is light yellow or clear like water. Choose water and other caffeine-free clear liquids. If you have kidney, heart, or liver disease and have to limit fluids, talk with your doctor before you increase the amount of fluids you drink. · Exercise regularly to improve your strength, muscle tone, and balance. Walk if you can. Swimming may be a good choice if you cannot walk easily. · Have your vision and hearing checked each year or any time you notice a change. If you have trouble seeing and hearing, you might not be able to avoid objects and could lose your balance. · Know the side effects of the medicines you take. Ask your doctor or pharmacist whether the medicines you take can affect your balance. Sleeping pills or sedatives can affect your balance. · Limit the amount of alcohol you drink. Alcohol can impair your balance and other senses. · Ask your doctor whether calluses or corns on your feet need to be removed.  If you wear loose-fitting shoes because of calluses or corns, you can lose your balance and fall. · Talk to your doctor if you have numbness in your feet. Preventing falls at home  · Remove raised doorway thresholds, throw rugs, and clutter. Repair loose carpet or raised areas in the floor. · Move furniture and electrical cords to keep them out of walking paths. · Use nonskid floor wax, and wipe up spills right away, especially on ceramic tile floors. · If you use a walker or cane, put rubber tips on it. If you use crutches, clean the bottoms of them regularly with an abrasive pad, such as steel wool. · Keep your house well lit, especially [place], and outside walkways. Use night-lights in areas such as hallways and bathrooms. Add extra light switches or use remote switches (such as switches that go on or off when you clap your hands) to make it easier to turn lights on if you have to get up during the night. · Install sturdy handrails on stairways. · Move items in your cabinets so that the things you use a lot are on the lower shelves (about waist level). · Keep a cordless phone and a flashlight with new batteries by your bed. If possible, put a phone in each of the main rooms of your house, or carry a cell phone in case you fall and cannot reach a phone. Or, you can wear a device around your neck or wrist. You push a button that sends a signal for help. · Wear low-heeled shoes that fit well and give your feet good support. Use footwear with nonskid soles. Check the heels and soles of your shoes for wear. Repair or replace worn heels or soles. · Do not wear socks without shoes on wood floors. · Walk on the grass when the sidewalks are slippery. If you live in an area that gets snow and ice in the winter, sprinkle salt on slippery steps and sidewalks. Preventing falls in the bath  · Install grab bars and nonskid mats inside and outside your shower or tub and near the toilet and sinks. · Use shower chairs and bath benches.   · Use a hand-held shower head that will allow you to sit while showering. · Get into a tub or shower by putting the weaker leg in first. Get out of a tub or shower with your strong side first.  · Repair loose toilet seats and consider installing a raised toilet seat to make getting on and off the toilet easier. · Keep your bathroom door unlocked while you are in the shower. Where can you learn more? Go to https://[email or web address] [email or web address] org and sign in to your imageloop account. Enter [phone] [id number] [phone] in the SCL box to learn more about \"Preventing Falls: Care Instructions. \"     If you do not have an account, please click on the \"Sign Up Now\" link. Current as of: August 7, 2019               Content Version: 12.5  © 7390-0066 [employer]. Care instructions adapted under license by [facility] ([facility]). If you have questions about a medical condition or this instruction, always ask your healthcare professional. [address] any warranty or liability for your use of this information.

## 2020-09-21 NOTE — PROGRESS NOTES
AP[facility] Progress Note    Date:9/23/2020         Patient Name:Samara Smith     Date of Birth:[birth year]     Age:75 y.o. Assessment/Plan        Problem List Items Addressed This Visit        Circulatory    Acute on chronic congestive heart failure (HCC)     Stable  On baby ASA, Lisinopril, Zocor         Essential hypertension     Controlled on Lisinopril            Respiratory    COPD (chronic obstructive pulmonary disease) ([facility]     On home O2 continuously  On Combivent Respimat            Digestive    Burning mouth syndrome     Sees neurology  Vastly improved on CBD tincture            Endocrine    Type 2 diabetes mellitus with complication, without long-term current use of insulin (HCC) - Primary     Controlled on Metformin  Checks home CBGs         Relevant Orders    HM DIABETES FOOT EXAM (Completed)    Hemoglobin A1C (Completed)    Microalbumin, Ur (Completed)       Musculoskeletal and Integument    Osteoarthritis     On chronic pain medication            Other    Post traumatic stress disorder (PTSD)     Stable on Cymbalta, Prozac, and xanax 1mg TID         Mixed hyperlipidemia     Compliant with Zocor         Relevant Orders    Lipid Panel (Completed)    Morbid obesity with BMI of 40.0-44.9, adult ([facility] [address]     Encouraged healthy diabetic diet and as much physical activity as her medical issues permit her to do         Chronic low back pain without sciatica     On chronic pain medication           Other Visit Diagnoses     Encounter for colorectal cancer screening        Relevant Orders    Cologuard    At high risk for falls        Discussed being on opioid and benzo treatment  Given home safety tips    Immunization due        Relevant Orders    INFLUENZA, QUADV, ADJUVANTED, 65 YRS =, IM, PF, PREFILL SYR, 0.5ML (FLUAD) (Completed)           Return in about 3 months (around 12/21/2020).     Electronically signed by Ros Galvin DO on 9/21/20         Vincent Currie is a 70 y.o. female presenting today for   Chief Complaint   Patient presents with   [facility]   . PTSD - stable on cymbalta, prozac, and xanax  Has chronic back pain - on pain medication    Has had 3 types of cancer - undergoing chemo now    Hypertension   This is a chronic problem. The current episode started more than 1 year ago. The problem has been gradually improving since onset. The problem is controlled. Associated symptoms include shortness of breath. Pertinent negatives include no blurred vision, chest pain, headaches, malaise/fatigue, neck pain, orthopnea, palpitations, peripheral edema, PND or sweats. Past treatments include ACE inhibitors. The current treatment provides significant improvement. There is no history of chronic renal disease. Diabetes   She presents for her follow-up diabetic visit. She has type 2 diabetes mellitus. Her disease course has been stable. There are no hypoglycemic associated symptoms. Pertinent negatives for hypoglycemia include no headaches or sweats. There are no diabetic associated symptoms. Pertinent negatives for diabetes include no blurred vision, no chest pain, no fatigue and no weight loss. There are no hypoglycemic complications. Current diabetic treatment includes oral agent (monotherapy). She is compliant with treatment all of the time. Her weight is stable. Her home blood glucose trend is decreasing steadily. Hyperlipidemia   This is a chronic problem. The current episode started more than 1 year ago. The problem is controlled. Recent lipid tests were reviewed and are normal. Exacerbating diseases include diabetes and obesity. She has no history of chronic renal disease, hypothyroidism, liver disease or nephrotic syndrome. Associated symptoms include shortness of breath. Pertinent negatives include no chest pain, focal sensory loss, focal weakness, leg pain or myalgias. Current antihyperlipidemic treatment includes statins.  The current treatment provides significant improvement of lipids. Congestive Heart Failure   Presents for follow-up visit. Associated symptoms include orthopnea and shortness of breath. Pertinent negatives include no abdominal pain, chest pain, chest pressure, claudication, edema, fatigue, muscle weakness, near-syncope, nocturia, palpitations, paroxysmal nocturnal dyspnea or unexpected weight change. The symptoms have been stable. Compliance with total regimen is %. Compliance with diet is 51-75%. Compliance with exercise is 0-25%. Compliance with medications is %. COPD   She complains of shortness of breath. There is no chest tightness, cough, difficulty breathing, frequent throat clearing, hemoptysis, hoarse voice, sputum production or wheezing. This is a chronic problem. The current episode started more than 1 year ago. The problem occurs daily. The problem has been unchanged. Associated symptoms include dyspnea on exertion and orthopnea. Pertinent negatives include no appetite change, chest pain, ear congestion, ear pain, fever, headaches, heartburn, malaise/fatigue, myalgias, nasal congestion, PND, postnasal drip, rhinorrhea, sneezing, sore throat, sweats, trouble swallowing or weight loss. Her symptoms are aggravated by strenuous activity. Her symptoms are alleviated by rest (inhalers, rest, oxygen). She reports significant improvement on treatment. Review of Systems   Review of Systems   Constitutional: Negative. Negative for appetite change, fatigue, fever, malaise/fatigue, unexpected weight change and weight loss. HENT: Negative. Negative for ear pain, hoarse voice, postnasal drip, rhinorrhea, sneezing, sore throat and trouble swallowing. Eyes: Negative. Negative for blurred vision. Respiratory: Positive for shortness of breath. Negative for cough, hemoptysis, sputum production and wheezing. Cardiovascular: Positive for dyspnea on exertion. Negative for chest pain, palpitations, orthopnea, claudication, PND and near-syncope. Gastrointestinal: Negative. Negative for abdominal pain and heartburn. Endocrine: Negative. Genitourinary: Negative. Negative for nocturia. Musculoskeletal: Negative. Negative for myalgias, muscle weakness and neck pain. Skin: Negative. Allergic/Immunologic: Negative. Neurological: Negative. Negative for focal weakness and headaches. Hematological: Negative. Psychiatric/Behavioral: Negative. All other systems reviewed and are negative. Medications     Current Outpatient Medications   Medication Sig Dispense Refill    DULoxetine (CYMBALTA) 60 MG extended release capsule       ALPRAZolam (XANAX) 1 MG tablet TAKE ONE TABLET BY MOUTH THREE TIMES A DAY AS NEEDED FOR SLEEP OR ANXIETY 90 tablet 0    FLUoxetine (PROZAC) 20 MG capsule TAKE ONE CAPSULE BY MOUTH DAILY 90 capsule 1    lisinopril (PRINIVIL;ZESTRIL) 10 MG tablet TAKE ONE TABLET BY MOUTH DAILY 90 tablet 2    simvastatin (ZOCOR) 40 MG tablet TAKE ONE TABLET BY MOUTH DAILY 90 tablet 2    metFORMIN (GLUCOPHAGE-XR) 750 MG extended release tablet TAKE ONE TABLET BY MOUTH DAILY WITH BREAKFAST 90 tablet 2    aspirin EC 81 MG EC tablet Take 1 tablet by mouth daily 90 tablet 3    zinc sulfate (ORAZINC) 220 (50 Zn) MG capsule Take 1 capsule by mouth daily 360 capsule 0    blood glucose monitor strips 1 strip by Other route TrueTest Strips     3 times a day & as needed for symptoms of irregular blood glucose.  Lancets MISC 1 each by Other route 2 times daily Indications: [employer] Glucose Meter 100 each 3    OXYGEN Inhale 3 L into the lungs continuous      albuterol-ipratropium (COMBIVENT RESPIMAT)  MCG/ACT AERS inhaler INHALE ONE INHALATION BY MOUTH FOUR TIMES A DAY 3 Inhaler 3    prochlorperazine (COMPAZINE) 10 MG tablet Take 1 tablet by mouth every 6 hours as needed (CHEMO INDUCED NAUSEA) 120 tablet 3    gabapentin (NEURONTIN) 600 MG tablet Take 600 mg by mouth 3 times daily. Jolene Ganser esomeprazole (NEXIUM) 40 MG eye: No discharge. Extraocular Movements: Extraocular movements intact. Conjunctiva/sclera: Conjunctivae normal.      Pupils: Pupils are equal, round, and reactive to light. Neck:      Musculoskeletal: Normal range of motion and neck supple. Cardiovascular:      Rate and Rhythm: Normal rate and regular rhythm. Pulses: Normal pulses. Heart sounds: Normal heart sounds. No murmur. No friction rub. No gallop. Pulmonary:      Effort: Pulmonary effort is normal. No respiratory distress. Breath sounds: Normal breath sounds. No stridor. No wheezing, rhonchi or rales. Comments: On supplemental O2 via NC - gets very out of breath with walking  Chest:      Chest wall: No tenderness. Abdominal:      General: Abdomen is flat. Bowel sounds are normal. There is no distension. Palpations: Abdomen is soft. There is no mass. Tenderness: There is no abdominal tenderness. There is no right CVA tenderness, left CVA tenderness, guarding or rebound. Hernia: No hernia is present. Musculoskeletal:      Lumbar back: She exhibits decreased range of motion, tenderness, pain and spasm. She exhibits no bony tenderness, no swelling, no edema, no deformity, no laceration and normal pulse. Skin:     General: Skin is warm. Capillary Refill: Capillary refill takes less than 2 seconds. Coloration: Skin is not jaundiced or pale. Findings: No bruising, erythema, lesion or rash. Neurological:      General: No focal deficit present. Mental Status: She is alert and oriented to person, place, and time. Mental status is at baseline. Cranial Nerves: No cranial nerve deficit. Sensory: No sensory deficit. Motor: Motor function is intact. No weakness.       Coordination: Coordination abnormal.      Gait: Gait abnormal and tandem walk abnormal.      Deep Tendon Reflexes: Reflexes normal.   Psychiatric:         Mood and Affect: Mood normal.         Behavior: Behavior normal.         Thought Content: Thought content normal.         Judgment: Judgment normal.         Labs/Imaging/Diagnostics   Labs:  Hemoglobin A1C   Date Value Ref Range Status   09/21/2020 5.7 4.0 - 6.0 % Final       Imaging Last 24 Hours:  US RETROPERITONEAL COMPLETE  Narrative: EXAMINATION:  RETROPERITONEAL ULTRASOUND OF THE KIDNEYS AND URINARY BLADDER    8/24/2019    COMPARISON:  None. HISTORY:  ORDERING SYSTEM PROVIDED HISTORY: RENAL FAILURE, ACUTE (KIDNEY INJURY)    FINDINGS:    Kidneys: The right kidney measures 10.2 cm in length and the left kidney measures 10.9  cm in length. Kidneys demonstrate normal cortical echogenicity. No evidence of  hydronephrosis or intrarenal stones. Bladder:    Unremarkable appearance of the bladder. No significant post void residual.  Impression: Unremarkable ultrasound of the kidneys and urinary bladder. XR CHEST PORTABLE  Narrative: EXAMINATION:  ONE XRAY VIEW OF THE CHEST    8/24/2019 6:28 am    COMPARISON:  08/22/2019    HISTORY:  ORDERING SYSTEM PROVIDED HISTORY: shortness of breath  TECHNOLOGIST PROVIDED HISTORY:  shortness of breath  Reason for Exam: sob  Acuity: Unknown  Type of Exam: Unknown    FINDINGS:  Right IJ infusion port remains in place. Stable cardiomediastinal  silhouette. Mild seizure prominence unchanged. No focal consolidation. Penetration of right hemidiaphragm noted. Impression: Mild seizure prominence stable. No acute process. On the basis of positive falls risk screening, assessment and plan is as follows: home safety tips provided, patient declines any further evaluation/treatment for increased falls risk.

## 2020-09-22 ENCOUNTER — TELEPHONE (OUTPATIENT)
Dept: FAMILY MEDICINE CLINIC | Age: 75
End: 2020-09-22

## 2020-09-22 NOTE — TELEPHONE ENCOUNTER
Patient having bilateral calf pain. Please advise, patient was here yesterday, thinks maybe she ran around too much .

## 2020-09-28 ENCOUNTER — TELEPHONE (OUTPATIENT)
Dept: FAMILY MEDICINE CLINIC | Age: 75
End: 2020-09-28

## 2020-09-28 RX ORDER — HYDROCODONE BITARTRATE AND ACETAMINOPHEN 7.5; 325 MG/1; MG/1
2 TABLET ORAL EVERY 6 HOURS PRN
Qty: 180 TABLET | Refills: 0 | Status: SHIPPED | OUTPATIENT
Start: 2020-09-28 | End: 2020-11-02 | Stop reason: SDUPTHER

## 2020-09-28 NOTE — TELEPHONE ENCOUNTER
Ulysses Lava is calling to request a refill on the following medication(s):    Medication Request:  Requested Prescriptions     Pending Prescriptions Disp Refills    HYDROcodone-acetaminophen (NORCO) 7.5-325 MG per tablet 180 tablet 0     Sig: Take 2 tablets by mouth every 6 hours as needed for Pain for up to 22 days.  Intended supply: 30 days       Last Visit Date (If Applicable):  9/46/7898    Next Visit Date:    1/4/2021

## 2020-09-28 NOTE — TELEPHONE ENCOUNTER
Patient is in active cancer treatment. PDMP checked. Case discussed with pain medicine specialist Dr. Corina Mccloud - treatment appropriate. Patient has been advised on the dangers/risks of chronic opioid use.

## 2020-09-30 ENCOUNTER — HOSPITAL ENCOUNTER (OUTPATIENT)
Facility: MEDICAL CENTER | Age: 75
End: 2020-09-30
Payer: COMMERCIAL

## 2020-10-06 ENCOUNTER — TELEPHONE (OUTPATIENT)
Dept: ONCOLOGY | Age: 75
End: 2020-10-06

## 2020-10-06 ENCOUNTER — HOSPITAL ENCOUNTER (OUTPATIENT)
Dept: INFUSION THERAPY | Facility: MEDICAL CENTER | Age: 75
Discharge: HOME | End: 2020-10-06
Payer: COMMERCIAL

## 2020-10-06 ENCOUNTER — OFFICE VISIT (OUTPATIENT)
Dept: ONCOLOGY | Age: 75
End: 2020-10-06
Payer: COMMERCIAL

## 2020-10-06 ENCOUNTER — TELEPHONE (OUTPATIENT)
Dept: FAMILY MEDICINE CLINIC | Age: 75
End: 2020-10-06

## 2020-10-06 VITALS
SYSTOLIC BLOOD PRESSURE: 115 MMHG | BODY MASS INDEX: 37.57 KG/M2 | TEMPERATURE: 98.1 F | HEART RATE: 61 BPM | WEIGHT: 173.6 LBS | DIASTOLIC BLOOD PRESSURE: 46 MMHG

## 2020-10-06 DIAGNOSIS — C85.80 MARGINAL ZONE LYMPHOMA (HCC): ICD-10-CM

## 2020-10-06 DIAGNOSIS — C85.80 MARGINAL ZONE B-CELL LYMPHOMA (HCC): Primary | ICD-10-CM

## 2020-10-06 LAB
ABSOLUTE EOS #: 0.1 K/UL (ref 0–0.44)
ABSOLUTE IMMATURE GRANULOCYTE: 0.03 K/UL (ref 0–0.3)
ABSOLUTE LYMPH #: 3.13 K/UL (ref 1.1–3.7)
ABSOLUTE MONO #: 0.65 K/UL (ref 0.1–1.2)
ALBUMIN SERPL-MCNC: 4.1 G/DL (ref 3.5–5.2)
ALBUMIN/GLOBULIN RATIO: ABNORMAL (ref 1–2.5)
ALP BLD-CCNC: 59 U/L (ref 35–104)
ALT SERPL-CCNC: 18 U/L (ref 5–33)
ANION GAP SERPL CALCULATED.3IONS-SCNC: 9 MMOL/L (ref 9–17)
AST SERPL-CCNC: 16 U/L
BASOPHILS # BLD: 0 % (ref 0–2)
BASOPHILS ABSOLUTE: 0.04 K/UL (ref 0–0.2)
BILIRUB SERPL-MCNC: 0.28 MG/DL (ref 0.3–1.2)
BUN BLDV-MCNC: 14 MG/DL (ref 8–23)
BUN/CREAT BLD: 21 (ref 9–20)
CALCIUM SERPL-MCNC: 9.6 MG/DL (ref 8.6–10.4)
CHLORIDE BLD-SCNC: 101 MMOL/L (ref 98–107)
CO2: 28 MMOL/L (ref 20–31)
CREAT SERPL-MCNC: 0.67 MG/DL (ref 0.5–0.9)
DIFFERENTIAL TYPE: NORMAL
EOSINOPHILS RELATIVE PERCENT: 1 % (ref 1–4)
GFR AFRICAN AMERICAN: >60 ML/MIN
GFR NON-AFRICAN AMERICAN: >60 ML/MIN
GFR SERPL CREATININE-BSD FRML MDRD: ABNORMAL ML/MIN/{1.73_M2}
GFR SERPL CREATININE-BSD FRML MDRD: ABNORMAL ML/MIN/{1.73_M2}
GLUCOSE BLD-MCNC: 122 MG/DL (ref 70–99)
HCT VFR BLD CALC: 46 % (ref 36.3–47.1)
HEMOGLOBIN: 14.6 G/DL (ref 11.9–15.1)
IMMATURE GRANULOCYTES: 0 %
LYMPHOCYTES # BLD: 35 % (ref 24–43)
MCH RBC QN AUTO: 31.5 PG (ref 25.2–33.5)
MCHC RBC AUTO-ENTMCNC: 31.7 G/DL (ref 28.4–34.8)
MCV RBC AUTO: 99.4 FL (ref 82.6–102.9)
MONOCYTES # BLD: 7 % (ref 3–12)
NRBC AUTOMATED: 0 PER 100 WBC
PDW BLD-RTO: 13.2 % (ref 11.8–14.4)
PLATELET # BLD: 288 K/UL (ref 138–453)
PLATELET ESTIMATE: NORMAL
PMV BLD AUTO: 9.1 FL (ref 8.1–13.5)
POTASSIUM SERPL-SCNC: 4.9 MMOL/L (ref 3.7–5.3)
RBC # BLD: 4.63 M/UL (ref 3.95–5.11)
RBC # BLD: NORMAL 10*6/UL
SEG NEUTROPHILS: 57 % (ref 36–65)
SEGMENTED NEUTROPHILS ABSOLUTE COUNT: 5.03 K/UL (ref 1.5–8.1)
SODIUM BLD-SCNC: 138 MMOL/L (ref 135–144)
TOTAL PROTEIN: 7.1 G/DL (ref 6.4–8.3)
WBC # BLD: 9 K/UL (ref 3.5–11.3)
WBC # BLD: NORMAL 10*3/UL

## 2020-10-06 PROCEDURE — 99214 OFFICE O/P EST MOD 30 MIN: CPT | Performed by: INTERNAL MEDICINE

## 2020-10-06 PROCEDURE — 96374 THER/PROPH/DIAG INJ IV PUSH: CPT

## 2020-10-06 PROCEDURE — 36415 COLL VENOUS BLD VENIPUNCTURE: CPT

## 2020-10-06 PROCEDURE — 2580000003 HC RX 258: Performed by: INTERNAL MEDICINE

## 2020-10-06 PROCEDURE — 96375 TX/PRO/DX INJ NEW DRUG ADDON: CPT

## 2020-10-06 PROCEDURE — 80053 COMPREHEN METABOLIC PANEL: CPT

## 2020-10-06 PROCEDURE — 6360000002 HC RX W HCPCS: Performed by: INTERNAL MEDICINE

## 2020-10-06 PROCEDURE — 6370000000 HC RX 637 (ALT 250 FOR IP): Performed by: INTERNAL MEDICINE

## 2020-10-06 PROCEDURE — 99211 OFF/OP EST MAY X REQ PHY/QHP: CPT | Performed by: INTERNAL MEDICINE

## 2020-10-06 PROCEDURE — 96413 CHEMO IV INFUSION 1 HR: CPT

## 2020-10-06 PROCEDURE — 85025 COMPLETE CBC W/AUTO DIFF WBC: CPT

## 2020-10-06 PROCEDURE — 96415 CHEMO IV INFUSION ADDL HR: CPT

## 2020-10-06 RX ORDER — ACETAMINOPHEN 325 MG/1
650 TABLET ORAL ONCE
Status: COMPLETED | OUTPATIENT
Start: 2020-10-06 | End: 2020-10-06

## 2020-10-06 RX ORDER — SODIUM CHLORIDE 9 MG/ML
20 INJECTION, SOLUTION INTRAVENOUS ONCE
Status: COMPLETED | OUTPATIENT
Start: 2020-10-06 | End: 2020-10-06

## 2020-10-06 RX ORDER — HEPARIN SODIUM (PORCINE) LOCK FLUSH IV SOLN 100 UNIT/ML 100 UNIT/ML
500 SOLUTION INTRAVENOUS PRN
Status: DISCONTINUED | OUTPATIENT
Start: 2020-10-06 | End: 2020-10-07 | Stop reason: HOSPADM

## 2020-10-06 RX ORDER — SODIUM CHLORIDE 0.9 % (FLUSH) 0.9 %
10 SYRINGE (ML) INJECTION PRN
Status: DISCONTINUED | OUTPATIENT
Start: 2020-10-06 | End: 2020-10-07 | Stop reason: HOSPADM

## 2020-10-06 RX ORDER — DIPHENHYDRAMINE HYDROCHLORIDE 50 MG/ML
25 INJECTION INTRAMUSCULAR; INTRAVENOUS ONCE
Status: COMPLETED | OUTPATIENT
Start: 2020-10-06 | End: 2020-10-06

## 2020-10-06 RX ADMIN — SODIUM CHLORIDE 20 ML/HR: 9 INJECTION, SOLUTION INTRAVENOUS at 13:13

## 2020-10-06 RX ADMIN — HEPARIN 500 UNITS: 100 SYRINGE at 15:50

## 2020-10-06 RX ADMIN — DIPHENHYDRAMINE HYDROCHLORIDE 25 MG: 50 INJECTION, SOLUTION INTRAMUSCULAR; INTRAVENOUS at 13:14

## 2020-10-06 RX ADMIN — RITUXIMAB 700 MG: 10 INJECTION, SOLUTION INTRAVENOUS at 13:58

## 2020-10-06 RX ADMIN — Medication 10 ML: at 15:50

## 2020-10-06 RX ADMIN — ACETAMINOPHEN 650 MG: 325 TABLET ORAL at 13:13

## 2020-10-06 ASSESSMENT — PAIN SCALES - GENERAL: PAINLEVEL_OUTOF10: 0

## 2020-10-06 NOTE — TELEPHONE ENCOUNTER
Annamarie Stewart MD VISIT & TX  DR Steffanie Perales IN TO SEE PATIENT  ORDERS RECEIVED  PROCEED W/RITUXAN TODAY  RV 8 WEEKS W/MD & Elenita DAVILA VISIT 12/1/20 @11:30AM  [email or web address]  AVS PRINTED AND GIVEN TO PATIENT WITH INSTRUCTIONS  PATIENT REMAINS IN [address] [place] [address]

## 2020-10-06 NOTE — TELEPHONE ENCOUNTER
I'm not sure. She sounded like her anxiety was bothering her. It might be because of chemo today? Not sure. I saw where you filled it and she received it. She said someone here told her she wasn't going to get that one anymore.

## 2020-10-06 NOTE — PROGRESS NOTES
Patient here for labs, MD visit and chemo. Feels well today. Vitals obtained. Port accessed with some blood return obtained but not enough for testing. Line flushed and needle secured for chemo. Lab called and labs drawn. Labs reviewed. Seen by MD.  Manny Locke given per [facility]. Rituxan hung per [facility] over 90 minutes per titration schedule. Patient sleeping throughout infusion. Completed. Tolerated well. Port flushed per policy and gripper removed intact, band aid to site. Has a return visit scheduled. Taken to her vehicle in a wheelchair per writer.

## 2020-10-06 NOTE — TELEPHONE ENCOUNTER
Patient called because she is upset about her hydrocodone prescription. She wants to know if this will continue. She was told you weren't going to continue filling this. She has chemo today. She states Dr. Nish Castro has prescribed in the past to help take the edge off and with you being new to her she is wanting to know if you are planning on continuing this.

## 2020-10-06 NOTE — TELEPHONE ENCOUNTER
Medication was just refilled. I don't understand her being upset. On 9/28/2020 I sent in a refill of Percocet 7.5mg tablets #180.

## 2020-10-06 NOTE — PROGRESS NOTES
_           Chief Complaint   Patient presents with    Follow-up     DIAGNOSIS:        Marginal Zone lymphoma  Persistent leukocytosis  Persistent thrombocytopenia  Multiple comorbidities as listed     CURRENT THERAPY:         Started treatment with rituximab 5/2/2019. Week #8 June 21, 2019. Maintenance Rituxan every 2 months for 2 years started October 17, 2019. BRIEF CASE HISTORY:      Ms. Pilo Randhawa is a very pleasant 70 y.o. female with history of multiple comorbidities including COPD and diabetes. She quit smoking years ago. The patient was recently hospitalized with chest infection. The patient was noted to have leukocytosis. She also had persistent thrombocytopenia. She is referred for further evaluation. Patient denies any active bleeding. She has easy bruising. No fever or night sweats. No weight loss or decreased appetite. No palpable lymph nodes. No history of repeated infections. Patient has history of smoking for more than 50 years. Denies alcohol drinking  She had flow cytometry and bone marrow test. Results showed evidence of marginal zone lymphoma. Start the rituximab with good results. Due to increasing symptoms and further problems related to lymphoma, patient was started on single agent rituximab on 5/20/19. Patient received maintenance rituximab after induction. INTERIM HISTORY:   Patient is seen for follow up leukocytosis and marginal zone lymphoma. She had very good response to induction rituximab. She is due today for rituximab maintenance. She is clinically stable. No weakness or fatigue. No dizziness. No fever or infections. No palpable lymph nodes. No recent infections. No recent hospitalizations. No weight loss or decreased appetite.        PAST MEDICAL HISTORY: has a past medical history of COPD (chronic obstructive pulmonary disease) ([address] Depression, Diabetes No gait disturbance, No joint complaints. · Dermatologic: No skin rashes or pruritus. No skin lesions or discolorations. · Psychiatric: No depression, anxiety, or stress or signs of schizophrenia. No change in mood or affect. · Hematologic: No history of bleeding tendency. Easy bruises no ecchymosis. No history of clotting problems. · Infectious disease: No fever, chills or frequent infections. · Endocrine: No problems with obesity. No polydipsia or polyuria. No temperature intolerance. · Neurologic: No headaches or dizziness. No weakness or numbness of the extremities. No changes in balance, coordination,  memory, mentation, behavior. · Allergic/Immunologic: No nasal congestion or hives. No repeated infections. PHYSICAL EXAM:  The patient is not in acute distress. Vital signs: Blood pressure (!) 115/46, pulse 61, temperature 98.1 °F (36.7 °C), temperature source Temporal, weight 173 lb 9.6 oz (78.7 kg), not currently breastfeeding. HEENT:  Eyes are normal. Ears, nose and throat are normal.  Neck: Supple. No lymph node enlargement. No thyroid enlargement. Trachea is centrally located. Chest:  Clear to auscultation. No wheezes or crepitations. Heart: Regular sinus rhythm. Abdomen: Soft, nontender. No hepatosplenomegaly. No masses. Extremities:  With no edema. Lymph Nodes:  No cervical, axillary or inguinal lymph node enlargement. Neurologic:  Conscious and oriented. No focal neurological deficits. Psychosocial: No depression, anxiety or stress. Skin: No rashes, bruises or ecchymoses. Review of Diagnostic data:   Recent Labs     10/06/20  1210   WBC 9.0   HGB 14.6   HCT 46.0   MCV 99.4        Peripheral blood flow cytometry:  Peripheral blood flow cytometric immunophenotyping:         Peripheral blood is positive for a monoclonal B-cell population,   positive for CD19, CD20, CD22,   CD45, FMC7 and lambda light chain.      Comment:  Morphology and immunophenotype of verbalized full understanding and agreement.

## 2020-10-21 RX ORDER — ALPRAZOLAM 1 MG/1
TABLET ORAL
Qty: 90 TABLET | Refills: 0 | Status: SHIPPED | OUTPATIENT
Start: 2020-10-21 | End: 2020-12-01

## 2020-10-21 NOTE — TELEPHONE ENCOUNTER
Julian Cruz is calling to request a refill on the following medication(s):    Medication Request:  Requested Prescriptions     Pending Prescriptions Disp Refills    ALPRAZolam (XANAX) 1 MG tablet [Pharmacy Med Name: ALPRAZolam 1 MG TABLET] 90 tablet 0     Sig: TAKE ONE TABLET BY MOUTH THREE TIMES A DAY AS NEEDED FOR SLEEP OR ANXIETY    albuterol-ipratropium (COMBIVENT RESPIMAT)  MCG/ACT AERS inhaler [Pharmacy Med Name: Soledad Yañez  MCG]  2     Sig: INHALE ONE INHALATION BY MOUTH FOUR TIMES A DAY       Last Visit Date (If Applicable):  3/1/6378    Next Visit Date:    Visit date not found        Last Refill:  9/18/2020

## 2020-11-02 ENCOUNTER — TELEPHONE (OUTPATIENT)
Dept: ADMINISTRATIVE | Age: 75
End: 2020-11-02

## 2020-11-02 RX ORDER — HYDROCODONE BITARTRATE AND ACETAMINOPHEN 7.5; 325 MG/1; MG/1
2 TABLET ORAL EVERY 6 HOURS PRN
Qty: 180 TABLET | Refills: 0 | Status: SHIPPED | OUTPATIENT
Start: 2020-11-02 | End: 2020-12-16 | Stop reason: SDUPTHER

## 2020-11-20 ENCOUNTER — HOSPITAL ENCOUNTER (OUTPATIENT)
Facility: MEDICAL CENTER | Age: 75
End: 2020-11-20
Payer: COMMERCIAL

## 2020-12-01 ENCOUNTER — OFFICE VISIT (OUTPATIENT)
Dept: ONCOLOGY | Age: 75
End: 2020-12-01
Payer: COMMERCIAL

## 2020-12-01 ENCOUNTER — TELEPHONE (OUTPATIENT)
Dept: ONCOLOGY | Age: 75
End: 2020-12-01

## 2020-12-01 ENCOUNTER — HOSPITAL ENCOUNTER (OUTPATIENT)
Dept: INFUSION THERAPY | Facility: MEDICAL CENTER | Age: 75
Discharge: HOME | End: 2020-12-01
Payer: COMMERCIAL

## 2020-12-01 VITALS
WEIGHT: 172.9 LBS | HEART RATE: 87 BPM | BODY MASS INDEX: 37.42 KG/M2 | RESPIRATION RATE: 16 BRPM | TEMPERATURE: 97.5 F | DIASTOLIC BLOOD PRESSURE: 63 MMHG | SYSTOLIC BLOOD PRESSURE: 97 MMHG

## 2020-12-01 VITALS
TEMPERATURE: 97.5 F | HEART RATE: 87 BPM | RESPIRATION RATE: 16 BRPM | DIASTOLIC BLOOD PRESSURE: 65 MMHG | WEIGHT: 172.9 LBS | BODY MASS INDEX: 37.42 KG/M2 | SYSTOLIC BLOOD PRESSURE: 97 MMHG

## 2020-12-01 DIAGNOSIS — C85.80 MARGINAL ZONE LYMPHOMA (HCC): ICD-10-CM

## 2020-12-01 DIAGNOSIS — C85.80 MARGINAL ZONE B-CELL LYMPHOMA (HCC): Primary | ICD-10-CM

## 2020-12-01 LAB
ABSOLUTE EOS #: 0.12 K/UL (ref 0–0.44)
ABSOLUTE IMMATURE GRANULOCYTE: 0.03 K/UL (ref 0–0.3)
ABSOLUTE LYMPH #: 2.85 K/UL (ref 1.1–3.7)
ABSOLUTE MONO #: 0.77 K/UL (ref 0.1–1.2)
ALBUMIN SERPL-MCNC: 4.4 G/DL (ref 3.5–5.2)
ALBUMIN/GLOBULIN RATIO: ABNORMAL (ref 1–2.5)
ALP BLD-CCNC: 61 U/L (ref 35–104)
ALT SERPL-CCNC: 32 U/L (ref 5–33)
ANION GAP SERPL CALCULATED.3IONS-SCNC: 10 MMOL/L (ref 9–17)
AST SERPL-CCNC: 36 U/L
BASOPHILS # BLD: 1 % (ref 0–2)
BASOPHILS ABSOLUTE: 0.06 K/UL (ref 0–0.2)
BILIRUB SERPL-MCNC: 0.15 MG/DL (ref 0.3–1.2)
BUN BLDV-MCNC: 19 MG/DL (ref 8–23)
BUN/CREAT BLD: 28 (ref 9–20)
CALCIUM SERPL-MCNC: 9.3 MG/DL (ref 8.6–10.4)
CHLORIDE BLD-SCNC: 106 MMOL/L (ref 98–107)
CO2: 24 MMOL/L (ref 20–31)
CREAT SERPL-MCNC: 0.67 MG/DL (ref 0.5–0.9)
DIFFERENTIAL TYPE: NORMAL
EOSINOPHILS RELATIVE PERCENT: 2 % (ref 1–4)
GFR AFRICAN AMERICAN: >60 ML/MIN
GFR NON-AFRICAN AMERICAN: >60 ML/MIN
GFR SERPL CREATININE-BSD FRML MDRD: ABNORMAL ML/MIN/{1.73_M2}
GFR SERPL CREATININE-BSD FRML MDRD: ABNORMAL ML/MIN/{1.73_M2}
GLUCOSE BLD-MCNC: 143 MG/DL (ref 70–99)
HCT VFR BLD CALC: 44.9 % (ref 36.3–47.1)
HEMOGLOBIN: 14.1 G/DL (ref 11.9–15.1)
IMMATURE GRANULOCYTES: 0 %
LYMPHOCYTES # BLD: 35 % (ref 24–43)
MCH RBC QN AUTO: 30.9 PG (ref 25.2–33.5)
MCHC RBC AUTO-ENTMCNC: 31.4 G/DL (ref 28.4–34.8)
MCV RBC AUTO: 98.5 FL (ref 82.6–102.9)
MONOCYTES # BLD: 9 % (ref 3–12)
NRBC AUTOMATED: 0 PER 100 WBC
PDW BLD-RTO: 13 % (ref 11.8–14.4)
PLATELET # BLD: 250 K/UL (ref 138–453)
PLATELET ESTIMATE: NORMAL
PMV BLD AUTO: 9.3 FL (ref 8.1–13.5)
POTASSIUM SERPL-SCNC: 4.2 MMOL/L (ref 3.7–5.3)
RBC # BLD: 4.56 M/UL (ref 3.95–5.11)
RBC # BLD: NORMAL 10*6/UL
SEG NEUTROPHILS: 53 % (ref 36–65)
SEGMENTED NEUTROPHILS ABSOLUTE COUNT: 4.34 K/UL (ref 1.5–8.1)
SODIUM BLD-SCNC: 140 MMOL/L (ref 135–144)
TOTAL PROTEIN: 6.7 G/DL (ref 6.4–8.3)
WBC # BLD: 8.2 K/UL (ref 3.5–11.3)
WBC # BLD: NORMAL 10*3/UL

## 2020-12-01 PROCEDURE — 96415 CHEMO IV INFUSION ADDL HR: CPT

## 2020-12-01 PROCEDURE — 6360000002 HC RX W HCPCS: Performed by: INTERNAL MEDICINE

## 2020-12-01 PROCEDURE — 85025 COMPLETE CBC W/AUTO DIFF WBC: CPT

## 2020-12-01 PROCEDURE — 2580000003 HC RX 258: Performed by: INTERNAL MEDICINE

## 2020-12-01 PROCEDURE — 96375 TX/PRO/DX INJ NEW DRUG ADDON: CPT

## 2020-12-01 PROCEDURE — 99211 OFF/OP EST MAY X REQ PHY/QHP: CPT | Performed by: INTERNAL MEDICINE

## 2020-12-01 PROCEDURE — 80053 COMPREHEN METABOLIC PANEL: CPT

## 2020-12-01 PROCEDURE — 36591 DRAW BLOOD OFF VENOUS DEVICE: CPT

## 2020-12-01 PROCEDURE — 99214 OFFICE O/P EST MOD 30 MIN: CPT | Performed by: INTERNAL MEDICINE

## 2020-12-01 PROCEDURE — 96413 CHEMO IV INFUSION 1 HR: CPT

## 2020-12-01 PROCEDURE — 6370000000 HC RX 637 (ALT 250 FOR IP): Performed by: INTERNAL MEDICINE

## 2020-12-01 RX ORDER — SODIUM CHLORIDE 9 MG/ML
20 INJECTION, SOLUTION INTRAVENOUS ONCE
Status: CANCELLED | OUTPATIENT
Start: 2021-01-26

## 2020-12-01 RX ORDER — HEPARIN SODIUM (PORCINE) LOCK FLUSH IV SOLN 100 UNIT/ML 100 UNIT/ML
500 SOLUTION INTRAVENOUS PRN
Status: DISCONTINUED | OUTPATIENT
Start: 2020-12-01 | End: 2020-12-02 | Stop reason: HOSPADM

## 2020-12-01 RX ORDER — SODIUM CHLORIDE 0.9 % (FLUSH) 0.9 %
5 SYRINGE (ML) INJECTION PRN
Status: CANCELLED | OUTPATIENT
Start: 2021-01-26

## 2020-12-01 RX ORDER — ACETAMINOPHEN 325 MG/1
650 TABLET ORAL ONCE
Status: CANCELLED | OUTPATIENT
Start: 2021-01-26

## 2020-12-01 RX ORDER — MEPERIDINE HYDROCHLORIDE 50 MG/ML
12.5 INJECTION INTRAMUSCULAR; INTRAVENOUS; SUBCUTANEOUS ONCE
Status: CANCELLED | OUTPATIENT
Start: 2021-01-26

## 2020-12-01 RX ORDER — SODIUM CHLORIDE 9 MG/ML
INJECTION, SOLUTION INTRAVENOUS CONTINUOUS
Status: CANCELLED | OUTPATIENT
Start: 2021-01-26

## 2020-12-01 RX ORDER — SODIUM CHLORIDE 0.9 % (FLUSH) 0.9 %
10 SYRINGE (ML) INJECTION PRN
Status: CANCELLED | OUTPATIENT
Start: 2021-01-26

## 2020-12-01 RX ORDER — HEPARIN SODIUM (PORCINE) LOCK FLUSH IV SOLN 100 UNIT/ML 100 UNIT/ML
500 SOLUTION INTRAVENOUS PRN
Status: CANCELLED | OUTPATIENT
Start: 2021-01-26

## 2020-12-01 RX ORDER — SODIUM CHLORIDE 9 MG/ML
20 INJECTION, SOLUTION INTRAVENOUS ONCE
Status: COMPLETED | OUTPATIENT
Start: 2020-12-01 | End: 2020-12-01

## 2020-12-01 RX ORDER — DIPHENHYDRAMINE HYDROCHLORIDE 50 MG/ML
50 INJECTION INTRAMUSCULAR; INTRAVENOUS ONCE
Status: CANCELLED | OUTPATIENT
Start: 2021-01-26

## 2020-12-01 RX ORDER — METHYLPREDNISOLONE SODIUM SUCCINATE 125 MG/2ML
125 INJECTION, POWDER, LYOPHILIZED, FOR SOLUTION INTRAMUSCULAR; INTRAVENOUS ONCE
Status: CANCELLED | OUTPATIENT
Start: 2021-01-26

## 2020-12-01 RX ORDER — DIPHENHYDRAMINE HYDROCHLORIDE 50 MG/ML
25 INJECTION INTRAMUSCULAR; INTRAVENOUS ONCE
Status: CANCELLED | OUTPATIENT
Start: 2021-01-26

## 2020-12-01 RX ORDER — DIPHENHYDRAMINE HYDROCHLORIDE 50 MG/ML
25 INJECTION INTRAMUSCULAR; INTRAVENOUS ONCE
Status: COMPLETED | OUTPATIENT
Start: 2020-12-01 | End: 2020-12-01

## 2020-12-01 RX ORDER — ALPRAZOLAM 1 MG/1
TABLET ORAL
Qty: 90 TABLET | Refills: 0 | Status: SHIPPED | OUTPATIENT
Start: 2020-12-01 | End: 2021-01-12

## 2020-12-01 RX ORDER — EPINEPHRINE 1 MG/ML
0.3 INJECTION, SOLUTION, CONCENTRATE INTRAVENOUS PRN
Status: CANCELLED | OUTPATIENT
Start: 2021-01-26

## 2020-12-01 RX ORDER — 0.9 % SODIUM CHLORIDE 0.9 %
10 VIAL (ML) INJECTION ONCE
Status: CANCELLED | OUTPATIENT
Start: 2021-01-26

## 2020-12-01 RX ORDER — SODIUM CHLORIDE 0.9 % (FLUSH) 0.9 %
10 SYRINGE (ML) INJECTION PRN
Status: DISCONTINUED | OUTPATIENT
Start: 2020-12-01 | End: 2020-12-02 | Stop reason: HOSPADM

## 2020-12-01 RX ORDER — ACETAMINOPHEN 325 MG/1
650 TABLET ORAL ONCE
Status: COMPLETED | OUTPATIENT
Start: 2020-12-01 | End: 2020-12-01

## 2020-12-01 RX ADMIN — Medication 10 ML: at 14:27

## 2020-12-01 RX ADMIN — HEPARIN 500 UNITS: 100 SYRINGE at 14:27

## 2020-12-01 RX ADMIN — RITUXIMAB 700 MG: 10 INJECTION, SOLUTION INTRAVENOUS at 12:25

## 2020-12-01 RX ADMIN — SODIUM CHLORIDE 20 ML/HR: 9 INJECTION, SOLUTION INTRAVENOUS at 11:00

## 2020-12-01 RX ADMIN — DIPHENHYDRAMINE HYDROCHLORIDE 25 MG: 50 INJECTION, SOLUTION INTRAMUSCULAR; INTRAVENOUS at 11:54

## 2020-12-01 RX ADMIN — Medication 10 ML: at 11:00

## 2020-12-01 RX ADMIN — ACETAMINOPHEN 650 MG: 325 TABLET ORAL at 11:54

## 2020-12-01 ASSESSMENT — PAIN SCALES - GENERAL: PAINLEVEL_OUTOF10: 0

## 2020-12-01 NOTE — TELEPHONE ENCOUNTER
[address] FOR MD VISIT  DR Martin Press IN TO SEE PT  ORDERS RECEIVED  PROCEED W/ RITUXAN TODAY  RV 8 WKS MD VISIT AND [address] [address]  MD VISIT 1/26/21 @ 11:15AM [place] @ 11AM  AVS PRINTED WITH INSTRUCTIONS AND GIVEN TO PT  PT REMAINS IN [address] [place] [address]

## 2020-12-01 NOTE — TELEPHONE ENCOUNTER
Carole Rome is calling to request a refill on the following medication(s):    Medication Request:  Requested Prescriptions     Pending Prescriptions Disp Refills    ALPRAZolam (XANAX) 1 MG tablet [Pharmacy Med Name: ALPRAZolam 1 MG TABLET] 90 tablet 0     Sig: TAKE ONE TABLET BY MOUTH THREE TIMES A DAY AS NEEDED FOR SLEEP OR ANXIETY       Last Visit Date (If Applicable):  9/15/4501    Next Visit Date:    1/4/2021        Last Refill:  10/21/2020

## 2020-12-01 NOTE — PLAN OF CARE
Problem: Safety:  Goal: Free from accidental physical injury  Description: Free from accidental physical injury  12/1/2020 1141 by Patricio Bartlett RN  Outcome: Completed  12/1/2020 1141 by Patricio Bartlett RN  Outcome: Ongoing  Goal: Free from intentional harm  Description: Free from intentional harm  12/1/2020 1141 by Patricio Bartlett RN  Outcome: Completed  12/1/2020 1141 by Patricio Bartlett RN  Outcome: Ongoing

## 2020-12-01 NOTE — PROGRESS NOTES
Patient arrive via wheelchair for cycle 8 day 1 treatment and physician visit. Denies complaint or concern. Vitals as charted. Port accessed; specimen sent. Physician met with and assess patient; labs reviewed; ok to proceed with treatment. Patient premedicated. Rituxan infused with no sign of adverse reaction; line flushed. Port de-accessed without difficulty. Patient off unit via wheelchair at discharge; AVS per front office.

## 2020-12-06 NOTE — PROGRESS NOTES
_           Chief Complaint   Patient presents with    Follow-up     DIAGNOSIS:        Marginal Zone lymphoma  Persistent leukocytosis  Persistent thrombocytopenia  Multiple comorbidities as listed     CURRENT THERAPY:         Started treatment with rituximab 5/2/2019. Week #8 June 21, 2019. Maintenance Rituxan every 2 months for 2 years started October 17, 2019. BRIEF CASE HISTORY:      Ms. Poly Almaguer is a very pleasant 70 y.o. female with history of multiple comorbidities including COPD and diabetes. She quit smoking years ago. The patient was recently hospitalized with chest infection. The patient was noted to have leukocytosis. She also had persistent thrombocytopenia. She is referred for further evaluation. Patient denies any active bleeding. She has easy bruising. No fever or night sweats. No weight loss or decreased appetite. No palpable lymph nodes. No history of repeated infections. Patient has history of smoking for more than 50 years. Denies alcohol drinking  She had flow cytometry and bone marrow test. Results showed evidence of marginal zone lymphoma. Start the rituximab with good results. Due to increasing symptoms and further problems related to lymphoma, patient was started on single agent rituximab on 5/20/19. Patient received maintenance rituximab after induction. INTERIM HISTORY:   Patient is seen for follow up leukocytosis and marginal zone lymphoma. She had very good response to induction rituximab. She is due today for rituximab maintenance. She is clinically stable. No weakness or fatigue. No dizziness. No fever or infections. No palpable lymph nodes. No recent infections. No recent hospitalizations. No weight loss or decreased appetite.        PAST MEDICAL HISTORY: has a past medical history of COPD (chronic obstructive pulmonary disease) ([address] Depression, Diabetes mellitus ([facility] [address] Hyperlipidemia, PTSD (post-traumatic stress disorder), and Tubulovillous adenoma. PAST SURGICAL HISTORY: has a past surgical history that includes Hysterectomy; Colonoscopy; Colonoscopy (06/07/2016); and Tunneled venous port placement (04/08/2019). CURRENT MEDICATIONS:  has a current medication list which includes the following prescription(s): albuterol-ipratropium, duloxetine, lisinopril, simvastatin, metformin, aspirin ec, blood glucose test strips, lancets, OXYGEN, prochlorperazine, gabapentin, esomeprazole, alprazolam, and zinc sulfate. ALLERGIES:  has No Known Allergies. FAMILY HISTORY: Negative for any hematological or oncological conditions. SOCIAL HISTORY:  reports that she quit smoking about 13 years ago. Her smoking use included cigarettes. She has a 10.00 pack-year smoking history. She has never used smokeless tobacco. She reports that she does not drink alcohol or use drugs. REVIEW OF SYSTEMS:     · General: Positive for weakness and fatigue. No unanticipated weight loss or decreased appetite. No fever or chills. · Eyes: No blurred vision, eye pain or double vision. · Ears: No hearing problems or drainage. No tinnitus. · Throat: No sore throat, problems with swallowing or dysphagia. · Respiratory: No cough, sputum or hemoptysis. No shortness of breath. No pleuritic chest pain. · Cardiovascular: No chest pain, orthopnea or PND. No lower extremity edema. No palpitation. · Gastrointestinal: No problems with swallowing. No abdominal pain or bloating. No nausea or vomiting. No diarrhea or constipation. No GI bleeding. · Genitourinary: No dysuria, hematuria, frequency or urgency. · Musculoskeletal: No muscle aches or pains. No limitation of movement. No back pain. No gait disturbance, No joint complaints. · Dermatologic: No skin rashes or pruritus. No skin lesions or discolorations.    · Psychiatric: No depression, anxiety, or stress or signs of schizophrenia. No change in mood or affect. · Hematologic: No history of bleeding tendency. Easy bruises no ecchymosis. No history of clotting problems. · Infectious disease: No fever, chills or frequent infections. · Endocrine: No problems with obesity. No polydipsia or polyuria. No temperature intolerance. · Neurologic: No headaches or dizziness. No weakness or numbness of the extremities. No changes in balance, coordination,  memory, mentation, behavior. · Allergic/Immunologic: No nasal congestion or hives. No repeated infections. PHYSICAL EXAM:  The patient is not in acute distress. Vital signs: Blood pressure 97/63, pulse 87, temperature 97.5 °F (36.4 °C), temperature source Oral, resp. rate 16, weight 172 lb 14.4 oz (78.4 kg), not currently breastfeeding. HEENT:  Eyes are normal. Ears, nose and throat are normal.  Neck: Supple. No lymph node enlargement. No thyroid enlargement. Trachea is centrally located. Chest:  Clear to auscultation. No wheezes or crepitations. Heart: Regular sinus rhythm. Abdomen: Soft, nontender. No hepatosplenomegaly. No masses. Extremities:  With no edema. Lymph Nodes:  No cervical, axillary or inguinal lymph node enlargement. Neurologic:  Conscious and oriented. No focal neurological deficits. Psychosocial: No depression, anxiety or stress. Skin: No rashes, bruises or ecchymoses. Review of Diagnostic data:   Recent Labs     12/01/20  1100   WBC 8.2   HGB 14.1   HCT 44.9   MCV 98.5        Peripheral blood flow cytometry:  Peripheral blood flow cytometric immunophenotyping:         Peripheral blood is positive for a monoclonal B-cell population,   positive for CD19, CD20, CD22,   CD45, FMC7 and lambda light chain.      Comment:  Morphology and immunophenotype of circulating monoclonal   B-cells are most consistent with a marginal zone lymphoma.  Bone   marrow evaluation and/or lymph node biopsy if possible are recommended   for confirmation and additional characterization. Bone marrow test August 27, 2018: Final Diagnosis   PERIPHERAL BLOOD:   -LYMPHOCYTOSIS WITH \" VILLOUS\" LYMPHOCYTES. -MODERATE THROMBOCYTOPENIA. BONE MARROW BIOPSY, ASPIRATE SMEAR AND CLOT SECTION:   - MARGINAL ZONE LYMPHOMA. -NORMOCELLULAR TRILINEAGE HEMATOPOIETIC MARROW WITH MARKEDLY   DECREASED IRON STORES. Chemistry        Component Value Date/Time     12/01/2020 1100    K 4.2 12/01/2020 1100     12/01/2020 1100    CO2 24 12/01/2020 1100    BUN 19 12/01/2020 1100    CREATININE 0.67 12/01/2020 1100        Component Value Date/Time    CALCIUM 9.3 12/01/2020 1100    ALKPHOS 61 12/01/2020 1100    AST 36 (H) 12/01/2020 1100    ALT 32 12/01/2020 1100    BILITOT 0.15 (L) 12/01/2020 1100        Lab Results   Component Value Date    WBC 8.2 12/01/2020    HGB 14.1 12/01/2020    HCT 44.9 12/01/2020    MCV 98.5 12/01/2020     12/01/2020         IMPRESSION:  Marginal Zone lymphoma  Persistent leukocytosis  Persistent thrombocytopenia  Positive FANNY with elevated antihistone. Multiple comorbidities as listed  Acute renal failure, corrected. PLAN: I reviewed the labs as above and discussed with the patient. I explained the significance of these abnormalities in layman language. I explained to patient the nature of low grade NHL. Patient received immunotherapy using rituximab. She had good results induction rituximab treatment. We will continue maintenance rituximab. Explained the benefits and side effects and she understands and agrees. Maintenance rituximab treatment will be every 2 months for 2 years. Proceed with rituximab today. KATHRIN corrected. Return visit in 8 weeks with repeated labs. Patient's questions were answered to the best of her satisfaction and she verbalized full understanding and agreement.

## 2020-12-16 ENCOUNTER — TELEPHONE (OUTPATIENT)
Dept: FAMILY MEDICINE CLINIC | Age: 75
End: 2020-12-16

## 2020-12-16 RX ORDER — HYDROCODONE BITARTRATE AND ACETAMINOPHEN 7.5; 325 MG/1; MG/1
2 TABLET ORAL EVERY 6 HOURS PRN
Qty: 180 TABLET | Refills: 0 | Status: SHIPPED | OUTPATIENT
Start: 2020-12-16 | End: 2021-02-05 | Stop reason: SDUPTHER

## 2020-12-16 RX ORDER — FLUCONAZOLE 150 MG/1
150 TABLET ORAL ONCE
Qty: 1 TABLET | Refills: 0 | Status: SHIPPED | OUTPATIENT
Start: 2020-12-16 | End: 2022-06-02 | Stop reason: SDUPTHER

## 2020-12-16 NOTE — TELEPHONE ENCOUNTER
Ina Stockton is calling to request a refill on the following medication(s):    Medication Request:  Requested Prescriptions     Pending Prescriptions Disp Refills    HYDROcodone-acetaminophen (NORCO) 7.5-325 MG per tablet 180 tablet 0     Sig: Take 2 tablets by mouth every 6 hours as needed for Pain for up to 30 days.        Last Visit Date (If Applicable):  5/05/9537    Next Visit Date:    1/4/2021

## 2020-12-16 NOTE — TELEPHONE ENCOUNTER
Patient is requesting pain medication be refilled at [facility] PH# [phone]  FX# [phone]  The medication that she stated was not listed so I could not enter it. Patient can be reached at [phone]. Okay to leave a message.  Last OV 09/21/2020  Next OV 01/04/2021

## 2021-01-04 ENCOUNTER — OFFICE VISIT (OUTPATIENT)
Dept: FAMILY MEDICINE CLINIC | Age: 76
End: 2021-01-04
Payer: COMMERCIAL

## 2021-01-04 ENCOUNTER — HOSPITAL ENCOUNTER (OUTPATIENT)
Age: 76
Setting detail: SPECIMEN
Discharge: HOME | End: 2021-01-04
Payer: COMMERCIAL

## 2021-01-04 VITALS
DIASTOLIC BLOOD PRESSURE: 78 MMHG | OXYGEN SATURATION: 93 % | WEIGHT: 172.9 LBS | SYSTOLIC BLOOD PRESSURE: 116 MMHG | HEIGHT: 57 IN | HEART RATE: 64 BPM | TEMPERATURE: 96.3 F | BODY MASS INDEX: 37.3 KG/M2

## 2021-01-04 DIAGNOSIS — Z51.81 ENCOUNTER FOR THERAPEUTIC DRUG LEVEL MONITORING: ICD-10-CM

## 2021-01-04 DIAGNOSIS — J44.9 CHRONIC OBSTRUCTIVE PULMONARY DISEASE, UNSPECIFIED COPD TYPE (HCC): ICD-10-CM

## 2021-01-04 DIAGNOSIS — E78.2 MIXED HYPERLIPIDEMIA: ICD-10-CM

## 2021-01-04 DIAGNOSIS — I10 ESSENTIAL HYPERTENSION: ICD-10-CM

## 2021-01-04 DIAGNOSIS — F43.10 POST TRAUMATIC STRESS DISORDER (PTSD): ICD-10-CM

## 2021-01-04 DIAGNOSIS — E11.8 TYPE 2 DIABETES MELLITUS WITH COMPLICATION, WITHOUT LONG-TERM CURRENT USE OF INSULIN (HCC): ICD-10-CM

## 2021-01-04 DIAGNOSIS — M15.9 PRIMARY OSTEOARTHRITIS INVOLVING MULTIPLE JOINTS: Primary | ICD-10-CM

## 2021-01-04 PROCEDURE — 99214 OFFICE O/P EST MOD 30 MIN: CPT | Performed by: FAMILY MEDICINE

## 2021-01-04 ASSESSMENT — ENCOUNTER SYMPTOMS
HEARTBURN: 0
RHINORRHEA: 0
BLURRED VISION: 0
TROUBLE SWALLOWING: 0
COUGH: 0
SPUTUM PRODUCTION: 0
ORTHOPNEA: 0
SHORTNESS OF BREATH: 1
DIFFICULTY BREATHING: 0
HOARSE VOICE: 0
EYES NEGATIVE: 1
ALLERGIC/IMMUNOLOGIC NEGATIVE: 1
SORE THROAT: 0
HEMOPTYSIS: 0
GASTROINTESTINAL NEGATIVE: 1
WHEEZING: 0
CHEST TIGHTNESS: 0
VISUAL CHANGE: 0
FREQUENT THROAT CLEARING: 0

## 2021-01-04 ASSESSMENT — COPD QUESTIONNAIRES: COPD: 1

## 2021-01-04 NOTE — PATIENT INSTRUCTIONS
Patient Education        Preventing Falls: Care Instructions  Your Care Instructions     Getting around your home safely can be a challenge if you have injuries or health problems that make it easy for you to fall. Loose rugs and furniture in walkways are among the dangers for many older people who have problems walking or who have poor eyesight. People who have conditions such as arthritis, osteoporosis, or dementia also have to be careful not to fall. You can make your home safer with a few simple measures. Follow-up care is a key part of your treatment and safety. Be sure to make and go to all appointments, and call your doctor if you are having problems. It's also a good idea to know your test results and keep a list of the medicines you take. How can you care for yourself at home? Taking care of yourself  · You may get dizzy if you do not drink enough water. To prevent dehydration, drink plenty of fluids, enough so that your urine is light yellow or clear like water. Choose water and other caffeine-free clear liquids. If you have kidney, heart, or liver disease and have to limit fluids, talk with your doctor before you increase the amount of fluids you drink. · Exercise regularly to improve your strength, muscle tone, and balance. Walk if you can. Swimming may be a good choice if you cannot walk easily. · Have your vision and hearing checked each year or any time you notice a change. If you have trouble seeing and hearing, you might not be able to avoid objects and could lose your balance. · Know the side effects of the medicines you take. Ask your doctor or pharmacist whether the medicines you take can affect your balance. Sleeping pills or sedatives can affect your balance. · Limit the amount of alcohol you drink. Alcohol can impair your balance and other senses. · Ask your doctor whether calluses or corns on your feet need to be removed.  If you wear loose-fitting shoes because of calluses or corns, you can lose your balance and fall. · Talk to your doctor if you have numbness in your feet. Preventing falls at home  · Remove raised doorway thresholds, throw rugs, and clutter. Repair loose carpet or raised areas in the floor. · Move furniture and electrical cords to keep them out of walking paths. · Use nonskid floor wax, and wipe up spills right away, especially on ceramic tile floors. · If you use a walker or cane, put rubber tips on it. If you use crutches, clean the bottoms of them regularly with an abrasive pad, such as steel wool. · Keep your house well lit, especially [place], and outside walkways. Use night-lights in areas such as hallways and bathrooms. Add extra light switches or use remote switches (such as switches that go on or off when you clap your hands) to make it easier to turn lights on if you have to get up during the night. · Install sturdy handrails on stairways. · Move items in your cabinets so that the things you use a lot are on the lower shelves (about waist level). · Keep a cordless phone and a flashlight with new batteries by your bed. If possible, put a phone in each of the main rooms of your house, or carry a cell phone in case you fall and cannot reach a phone. Or, you can wear a device around your neck or wrist. You push a button that sends a signal for help. · Wear low-heeled shoes that fit well and give your feet good support. Use footwear with nonskid soles. Check the heels and soles of your shoes for wear. Repair or replace worn heels or soles. · Do not wear socks without shoes on wood floors. · Walk on the grass when the sidewalks are slippery. If you live in an area that gets snow and ice in the winter, sprinkle salt on slippery steps and sidewalks. Preventing falls in the bath  · Install grab bars and nonskid mats inside and outside your shower or tub and near the toilet and sinks. · Use shower chairs and bath benches.   · Use a hand-held shower head

## 2021-01-04 NOTE — ASSESSMENT & PLAN NOTE
Controlled on pain medication  Patient has been weaning herself down and is currently on Norco 7.5mg TID  Med contract signed and UDS sent

## 2021-01-04 NOTE — PROGRESS NOTES
[facility] Progress Note    Date:1/4/2021         Patient Name:Samara Smith     YOB: 1945     Age:75 y.o. Assessment/Plan        Problem List Items Addressed This Visit        Circulatory    Essential hypertension     · well controlled  · compliant with meds as per medication list.  · Continue current treatment regimen. · Continue current medications. · Dietary sodium restriction. · Weight loss  · Discussed the role of hypertension in development of other disease courses such as CHF and atherosclerosis. · Encouraged patient to avoid sodium in the diet and reminded that the majority of sodium comes from packaged foods in cans and boxes which should be avoided where possible. · Encouraged good hydration and avoidance of caffeine where possible. · Discussed goals for blood pressure monitoring which should be 130/80. Respiratory    COPD (chronic obstructive pulmonary disease) (HCC)     Stable on Combivent and prn albuterol            Endocrine    Type 2 diabetes mellitus with complication, without long-term current use of insulin (HCC)     Stable on Metformin            Musculoskeletal and Integument    Osteoarthritis - Primary     Controlled on pain medication  Patient has been weaning herself down and is currently on Norco 7.5mg TID  Med contract signed and UDS sent            Other    Post traumatic stress disorder (PTSD)     Discussed  Takes Cymbalta and Xanax prn - has been on other medications before  Stable  Will disuss possibly weaning down Xanax at future visit                Return in about 3 months (around 4/4/2021). Electronically signed by Jake Jiang DO on 1/4/21         Subjective     Marcello Knox is a 70 y.o. female presenting today for   Chief Complaint   Patient presents with    3 Month Follow-Up   . Marcello Knox is a 70 y.o. female who presents for follow up of PTSD and anxiety disorder. Current symptoms: occasional flashbacks.  She denies current normal. Exacerbating diseases include diabetes and obesity. She has no history of chronic renal disease, hypothyroidism, liver disease or nephrotic syndrome. Associated symptoms include shortness of breath. Pertinent negatives include no chest pain, focal sensory loss, focal weakness, leg pain or myalgias. Current antihyperlipidemic treatment includes statins. The current treatment provides significant improvement of lipids. COPD  She complains of shortness of breath. There is no chest tightness, cough, difficulty breathing, frequent throat clearing, hemoptysis, hoarse voice, sputum production or wheezing. This is a chronic problem. The current episode started more than 1 year ago. The problem occurs daily. The problem has been unchanged. Associated symptoms include dyspnea on exertion and orthopnea. Pertinent negatives include no appetite change, chest pain, ear congestion, ear pain, fever, headaches, heartburn, malaise/fatigue, myalgias, nasal congestion, PND, postnasal drip, rhinorrhea, sneezing, sore throat, sweats, trouble swallowing or weight loss. Her symptoms are aggravated by strenuous activity. Her symptoms are alleviated by rest, beta-agonist and steroid inhaler (supplemental O2). She reports significant improvement on treatment. Review of Systems   Review of Systems   Constitutional: Negative. Negative for appetite change, fatigue, fever, malaise/fatigue and weight loss. HENT: Negative. Negative for ear pain, hoarse voice, postnasal drip, rhinorrhea, sneezing, sore throat and trouble swallowing. Eyes: Negative. Negative for blurred vision. Respiratory: Positive for shortness of breath. Negative for cough, hemoptysis, sputum production and wheezing. Cardiovascular: Positive for dyspnea on exertion. Negative for chest pain, palpitations, orthopnea and PND. Gastrointestinal: Negative. Negative for heartburn. Endocrine: Negative. Negative for polydipsia, polyphagia and polyuria. Genitourinary: Negative. Musculoskeletal: Negative. Negative for myalgias and neck pain. Skin: Negative. Allergic/Immunologic: Negative. Neurological: Negative. Negative for focal weakness, weakness and headaches. Hematological: Negative. Psychiatric/Behavioral: Negative. All other systems reviewed and are negative. Medications     Current Outpatient Medications   Medication Sig Dispense Refill    HYDROcodone-acetaminophen (NORCO) 7.5-325 MG per tablet Take 2 tablets by mouth every 6 hours as needed for Pain for up to 30 days. 180 tablet 0    albuterol-ipratropium (COMBIVENT RESPIMAT)  MCG/ACT AERS inhaler INHALE ONE INHALATION BY MOUTH FOUR TIMES A DAY 1 Inhaler 2    DULoxetine (CYMBALTA) 60 MG extended release capsule       lisinopril (PRINIVIL;ZESTRIL) 10 MG tablet TAKE ONE TABLET BY MOUTH DAILY 90 tablet 2    simvastatin (ZOCOR) 40 MG tablet TAKE ONE TABLET BY MOUTH DAILY 90 tablet 2    metFORMIN (GLUCOPHAGE-XR) 750 MG extended release tablet TAKE ONE TABLET BY MOUTH DAILY WITH BREAKFAST 90 tablet 2    aspirin EC 81 MG EC tablet Take 1 tablet by mouth daily 90 tablet 3    blood glucose monitor strips 1 strip by Other route TrueTest Strips     3 times a day & as needed for symptoms of irregular blood glucose.  Lancets MISC 1 each by Other route 2 times daily Indications: [place] Glucose Meter 100 each 3    OXYGEN Inhale 3 L into the lungs continuous      prochlorperazine (COMPAZINE) 10 MG tablet Take 1 tablet by mouth every 6 hours as needed (CHEMO INDUCED NAUSEA) 120 tablet 3    gabapentin (NEURONTIN) 600 MG tablet Take 600 mg by mouth 3 times daily. Maria De Jesus Cardoso esomeprazole (Ads Click) 40 MG delayed release capsule TAKE ONE CAPSULE BY MOUTH DAILY (Patient taking differently: TAKE ONE CAPSULE BY MOUTH DAILY / taking PRN) 90 capsule 3     No current facility-administered medications for this visit.         Past History    Past Medical History:   has a past medical history of COPD (chronic obstructive pulmonary disease) ([facility] [address] Depression, Diabetes mellitus ([address] Hyperlipidemia, PTSD (post-traumatic stress disorder), and Tubulovillous adenoma. Social History:   reports that she quit smoking about 13 years ago. Her smoking use included cigarettes. She has a 10.00 pack-year smoking history. She has never used smokeless tobacco. She reports that she does not drink alcohol or use drugs. Family History:   Family History   Problem Relation Age of Onset    Heart Disease Mother     Heart Disease Brother        Surgical History:   Past Surgical History:   Procedure Laterality Date    COLONOSCOPY      colon polyps    COLONOSCOPY  06/07/2016    severe diverticulosis TUBULOVILLOUS ADENOMA.       HYSTERECTOMY      TUNNELED VENOUS PORT PLACEMENT  04/08/2019    chemo port to right chest        Physical Examination      Vitals:  /78   Pulse 64   Temp 96.3 °F (35.7 °C)   Ht 4' 9.01\" (1.448 m)   Wt 172 lb 14.4 oz (78.4 kg)   SpO2 93%   BMI 37.40 kg/m²     Physical Exam  Constitutional:       General: She is not in acute distress. Appearance: Normal appearance. She is obese. She is not ill-appearing, toxic-appearing or diaphoretic. HENT:      Head: Normocephalic and atraumatic. Right Ear: Tympanic membrane, ear canal and external ear normal. There is no impacted cerumen. Left Ear: Tympanic membrane, ear canal and external ear normal. There is no impacted cerumen. Nose: Nose normal. No congestion or rhinorrhea. Mouth/Throat:      Mouth: Mucous membranes are moist.      Pharynx: Oropharynx is clear. No oropharyngeal exudate or posterior oropharyngeal erythema. Eyes:      General: No scleral icterus. Right eye: No discharge. Left eye: No discharge. Extraocular Movements: Extraocular movements intact. Conjunctiva/sclera: Conjunctivae normal.      Pupils: Pupils are equal, round, and reactive to light.    Neck:      Musculoskeletal: Imaging Last 24 Hours:  US RETROPERITONEAL COMPLETE  Narrative: EXAMINATION:  RETROPERITONEAL ULTRASOUND OF THE KIDNEYS AND URINARY BLADDER    8/24/2019    COMPARISON:  None. HISTORY:  ORDERING SYSTEM PROVIDED HISTORY: RENAL FAILURE, ACUTE (KIDNEY INJURY)    FINDINGS:    Kidneys: The right kidney measures 10.2 cm in length and the left kidney measures 10.9  cm in length. Kidneys demonstrate normal cortical echogenicity. No evidence of  hydronephrosis or intrarenal stones. Bladder:    Unremarkable appearance of the bladder. No significant post void residual.  Impression: Unremarkable ultrasound of the kidneys and urinary bladder. XR CHEST PORTABLE  Narrative: EXAMINATION:  ONE XRAY VIEW OF THE CHEST    8/24/2019 6:28 am    COMPARISON:  08/22/2019    HISTORY:  ORDERING SYSTEM PROVIDED HISTORY: shortness of breath  TECHNOLOGIST PROVIDED HISTORY:  shortness of breath  Reason for Exam: sob  Acuity: Unknown  Type of Exam: Unknown    FINDINGS:  Right IJ infusion port remains in place. Stable cardiomediastinal  silhouette. Mild seizure prominence unchanged. No focal consolidation. Penetration of right hemidiaphragm noted. Impression: Mild seizure prominence stable. No acute process.

## 2021-01-04 NOTE — LETTER
CONTROLLED SUBSTANCE MEDICATION AGREEMENT     Patient Name: Javier Hare  Patient YOB: 1945   I understand, that controlled substance medications may be used to help better manage my symptoms and to improve my ability to function at home, work and in social settings. However, I also understand that these medications do have risks, which have been discussed with me, including possible development of physical or psychological dependence. I understand that successful treatment requires mutual trust and honesty between me and my provider. I understand and agree that following this Medication Agreement is necessary in continuing my provider-patient relationship and the success of my treatment plan. Explanation from my Provider: Benefits and Goals of Controlled Substance Medications: There are two potential goals for your treatment: (1) decreased pain and suffering (2) improved daily life functions. There are many possible treatments for your chronic condition(s). Alternatives such as physical therapy, yoga, massage, home daily exercise, meditation, relaxation techniques, injections, chiropractic manipulations, surgery, cognitive therapy, hypnosis and many medications that are not habit-forming may be used. Use of controlled substance medications may be helpful, but they are unlikely to resolve all symptoms or restore all function.    Explanation from my Provider: Risks of Controlled Substance Medications: Opioid pain medications: These medications can lead to problems such as addiction/dependence, sedation, lightheadedness/dizziness, memory issues, falls, constipation, nausea, or vomiting. They may also impair the ability to drive or operate machinery. Additionally, these medications may lower testosterone levels, leading to loss of bone strength, stamina and sex drive. They may cause problems with breathing, sleep apnea and reduced coughing, which is especially dangerous for patients with lung disease. Overdose or dangerous interactions with alcohol and other medications may occur, leading to death. Hyperalgesia may develop, which means patients receiving opioids for the treatment of pain may become more sensitive to certain painful stimuli, and in some cases, experience pain from ordinarily non-painful stimuli. Women between the ages of 14-53 who could become pregnant should carefully weigh the risks and benefits of opioids with their physicians, as these medications increase the risk of pregnancy complications, including miscarriage,  delivery and stillbirth. It is also possible for babies to be born addicted to opioids. Opioid dependence withdrawal symptoms may include; feelings of uneasiness, increased pain, irritability, belly pain, diarrhea, sweats and goose-flesh. Benzodiazepines and non-benzodiazepine sleep medications: These medications can lead to problems such as addiction/dependence, sedation, fatigue, lightheadedness, dizziness, incoordination, falls, depression, hallucinations, and impaired judgment, memory and concentration. The ability to drive and operate machinery may also be affected. Abnormal sleep-related behaviors have been reported, including sleepwalking, driving, making telephone calls, eating, or having sex while not fully awake. These medications can suppress breathing and worsen sleep apnea, particularly when combined with alcohol or other sedating medications, potentially leading to death. Dependence withdrawal symptoms may include tremors, anxiety, hallucinations and seizures. Stimulants:  Common adverse effects include addiction/dependence, increased blood  pressure and heart rate, decreased appetite, nausea, involuntary weight loss, insomnia,                                                                                                                     Initials:_______   irritability, and headaches. These risks may increase when these medications are combined with other stimulants, such as caffeine pills or energy drinks, certain weight loss supplements and oral decongestants. Dependence withdrawal symptoms may include depressed mood, loss of interest, suicidal thoughts, anxiety, fatigue, appetite changes and agitation. Testosterone replacement therapy:  Potential side effects include increased risk of stroke and heart attack, blood clots, increased blood pressure, increased cholesterol, enlarged prostate, sleep apnea, irritability/aggression and other mood disorders, and decreased fertility. I agree and understand that I and my prescriber have the following rights and responsibilities regarding my treatment plan:     1. MY RIGHTS:  To be informed of my treatment and medication plan. To be an active participant in my health and wellbeing. 2. MY RESPONSIBILITY AND UNDERSTANDING FOR USE OF MEDICATIONS  ? I will take medications at the dose and frequency as directed. For my safety, I will not increase or change how I take my medications without the recommendation of my healthcare provider. ? I will actively participate in any program recommended by my provider which may improve function, including social, physical, psychological programs. ? I will not take my medications with alcohol or other drugs not prescribed to me. I understand that drinking alcohol with my medications increases the chances of side effects, including reduced breathing rate and could lead to personal injury when operating machinery. ? I understand that if I have a history of substance use disorders, including alcohol or other illicit drugs, that I may be at increased risk of addiction to my medications. ? I agree to notify my provider immediately if I should become pregnant so that my treatment plan can be adjusted. ? I agree and understand that I shall only receive controlled substance medications from the prescriber that signed this agreement unless there is written agreement among other prescribers of controlled substances outlining the responsibility of the medications being prescribed. ? I understand that the if the controlled medication is not helping to achieve goals, the dosage may be tapered and no longer prescribed. 3. MY RESPONSIBILITY FOR COMMUNICATION / PRESCRIPTION RENEWALS  ? I agree that all controlled substance medications that I take will be prescribed only by my provider. If another healthcare provider prescribes me medication in an emergency, I will notify my provider within seventy-two (72) hours. ? I will arrange for refills at the prescribed interval ONLY during regular office hours. I will not ask for refills earlier than agreed, after-hours, on holidays or weekends. Refills may take up to 72 hours for processing and prescriptions to reach the pharmacy. ? I will inform my other health care providers that I am taking these medications and of the existence of this [id number]. In the event of an emergency, I will provide the same information to the emergency department prescribers. ? I will keep my provider updated on the pharmacy I am using for controlled medication prescription filling. Initials:_______  4. MY RESPONSIBILITY FOR PROTECTING MEDICATIONS  ? I will protect my prescriptions and medications. I understand that lost or misplaced prescriptions will not be replaced. ? I will keep medications only for my own use and will not share them with others. I will keep all medications away from children. ? I agree that if my medications are adjusted or discontinued, I will properly dispose of any remaining medications. I understand that I will be required to dispose of any remaining controlled medications as, directed by my prescriber, prior to being provided with any prescriptions for other controlled medications. Medication drop box locations can be found at: [employer]    5. MY RESPONSIBILITY WITH ILLEGAL DRUGS   ? I will not use illegal or street drugs or another person's prescription medications not prescribed to me.   ? If there are identified addiction type symptoms, then referral to a program may be provided by my provider and I agree to follow through with this recommendation.   6. MY RESPONSIBILITY FOR COOPERATION WITH INVESTIGATIONS ? I understand that my provider will comply with any applicable law and may discuss my use and/or possible misuse/abuse of controlled substances and alcohol, as appropriate, with any health care provider involved in my care, pharmacist, or legal authority. ? I authorize my provider and pharmacy to cooperate fully with law enforcement agencies (as permitted by law) in the investigation of any possible misuse, sale, or other diversion of my controlled substances. ? I agree to waive any applicable privilege or right of privacy or confidentiality with respect to these authorizations. 7. PROVIDERS RIGHT TO MONITOR FOR SAFETY: PRESCRIPTION MONITORING / DRUG TESTING  ? I consent to drug/toxicology screening and will submit to a drug screen upon my providers request to assure I am only taking the prescribed drugs for my safety monitoring. I understand that a drug screen is a laboratory test in which a sample of my urine, blood or saliva is checked to see what drugs I have been taking. This may entail an observed urine specimen, which means that a nurse or other health care provider may watch me provide urine, and I will cooperate if I am asked to provide an observed specimen. ? I understand that my provider will check a copy of my State Prescription Monitoring Program () Report in order to safely prescribe medications. ? Pill Counts: I consent to pill counts when requested. I may be asked to bring all my prescribed controlled substance medications, in their original bottles, to all of my scheduled appointments. In addition, my provider may ask me to come to the practice at any time for a random pill count. 8. TERMINATION OF THIS AGREEMENT  For my safety, my prescriber has the right to stop prescribing controlled substance medications and may end this agreement. Initials:_______  ?  Conditions that may result in termination of this agreement: a. I do not show any improvement in pain, or my activity has not improved. b. I develop rapid tolerance or loss of improvement, as described in my treatment plan.  c. I develop significant side effects from the medication. d. My behavior is not consistent with the responsibilities outlined above, thereby causing safety concerns to continue prescribing controlled substance medications. e. I fail to follow the terms of this agreement. f. Other:____________________________       UNDERSTANDING THIS MEDICATION AGREEMENT:    I have read the above and have had all my questions answered. For chronic disease management, I know that my symptoms can be managed with many types of treatments. A chronic medication trial may be part of my treatment, but I must be an active participant in my care. Medication therapy is only one part of my symptom management plan. In some cases, there may be limited scientific evidence to support the chronic use of certain medications to improve symptoms and daily function. Furthermore, in certain circumstances, there may be scientific information that suggests that the use of chronic controlled substances may worsen my symptoms and increase my risk of unintentional death directly related to this medication therapy. I know that if my provider feels my risk from controlled medications is greater than my benefit, I will have my controlled substance medication(s) compassionately lowered or removed altogether. I further agree to allow this office to contact my HIPAA contact if there are concerns about my safety and use of the controlled medications. I have agreed to use the prescribed controlled substance medications to me as instructed by my provider and as stated in this Medication Agreement. My initial on each page and my signature below shows that I have read each page and I have had the opportunity to ask questions with answers provided by my provider.

## 2021-01-04 NOTE — ASSESSMENT & PLAN NOTE
Discussed  Takes Cymbalta and Xanax prn - has been on other medications before  Stable  Will disuss possibly weaning down Xanax at future visit

## 2021-01-05 DIAGNOSIS — Z51.81 ENCOUNTER FOR THERAPEUTIC DRUG LEVEL MONITORING: ICD-10-CM

## 2021-01-05 DIAGNOSIS — M15.9 PRIMARY OSTEOARTHRITIS INVOLVING MULTIPLE JOINTS: ICD-10-CM

## 2021-01-05 LAB
-: NORMAL
REASON FOR REJECTION: NORMAL
ZZ NTE CLEAN UP: ORDERED TEST: NORMAL
ZZ NTE WITH NAME CLEAN UP: SPECIMEN SOURCE: NORMAL

## 2021-01-06 ENCOUNTER — TELEPHONE (OUTPATIENT)
Dept: FAMILY MEDICINE CLINIC | Age: 76
End: 2021-01-06

## 2021-01-06 LAB
6-ACETYLMORPHINE, UR: NORMAL
7-AMINOCLONAZEPAM, URINE: NORMAL
ALPHA-OH-ALPRAZ, URINE: NORMAL
ALPRAZOLAM, URINE: NORMAL
AMPHETAMINES, URINE: NORMAL
BARBITURATES, URINE: NORMAL
BENZOYLECGONINE, UR: NORMAL
BUPRENORPHINE URINE: NORMAL
CARISOPRODOL, UR: NORMAL
CLONAZEPAM, URINE: NORMAL
CODEINE, URINE: NORMAL
CREATININE URINE: NORMAL
DIAZEPAM, URINE: NORMAL
DRUGS EXPECTED, UR: NORMAL
EER HI RES INTERP UR: NORMAL
ETHYL GLUCURONIDE UR: NORMAL
FENTANYL URINE: NORMAL
HYDROCODONE, URINE: NORMAL
HYDROMORPHONE, URINE: NORMAL
LORAZEPAM, URINE: NORMAL
MARIJUANA METAB, UR: NORMAL
MDA, UR: NORMAL
MDEA, EVE, UR: NORMAL
MDMA URINE: NORMAL
MEPERIDINE METAB, UR: NORMAL
METHADONE, URINE: NORMAL
METHAMPHETAMINE, URINE: NORMAL
METHYLPHENIDATE: NORMAL
MIDAZOLAM, URINE: NORMAL
MORPHINE URINE: NORMAL
NORBUPRENORPHINE, URINE: NORMAL
NORDIAZEPAM, URINE: NORMAL
NORFENTANYL, URINE: NORMAL
NORHYDROCODONE, URINE: NORMAL
NOROXYCODONE, URINE: NORMAL
NOROXYMORPHONE, URINE: NORMAL
OXAZEPAM, URINE: NORMAL
OXYCODONE URINE: NORMAL
OXYMORPHONE, URINE: NORMAL
PAIN MANAGEMENT DRUG PANEL INTERP, URINE: NORMAL
PAIN MGT DRUG PANEL, HI RES, UR: NORMAL
PCP,URINE: NORMAL
PHENTERMINE, UR: NORMAL
PROPOXYPHENE, URINE: NORMAL
TAPENTADOL, URINE: NORMAL
TAPENTADOL-O-SULFATE, URINE: NORMAL
TEMAZEPAM, URINE: NORMAL
TRAMADOL, URINE: NORMAL
ZOLPIDEM, URINE: NORMAL

## 2021-01-06 NOTE — TELEPHONE ENCOUNTER
Guerline Treviño thank you. We'll just do it the next time Abiola Douglas is in the office. No worries. Even though she is on multiple high risk medications, she is going through cancer treatments, has a vast medical and psych history, and her UDS is more to stay in line with [facility] chronic pain guidelines than me suspecting her of abuse. She actually has weaned herself down on the pain medication on her own which was a pleasant surprise.

## 2021-01-11 DIAGNOSIS — F41.9 ANXIETY: ICD-10-CM

## 2021-01-12 RX ORDER — ALPRAZOLAM 1 MG/1
1 TABLET ORAL 3 TIMES DAILY PRN
Qty: 90 TABLET | Refills: 0 | Status: SHIPPED | OUTPATIENT
Start: 2021-01-12 | End: 2021-02-25

## 2021-01-12 NOTE — TELEPHONE ENCOUNTER
Oriana Wright is calling to request a refill on the following medication(s):    Medication Request:  Requested Prescriptions     Pending Prescriptions Disp Refills    ALPRAZolam (XANAX) 1 MG tablet [Pharmacy Med Name: ALPRAZolam 1 MG TABLET] 90 tablet 0     Sig: TAKE ONE TABLET BY MOUTH THREE TIMES A DAY AS NEEDED FOR ANXIETY OR SLEEP       Last Visit Date (If Applicable):  3/2/4390    Next Visit Date:    4/8/2021      Last Refill:  12/1/2020

## 2021-01-13 DIAGNOSIS — E11.8 TYPE 2 DIABETES MELLITUS WITH COMPLICATION, WITHOUT LONG-TERM CURRENT USE OF INSULIN (HCC): Primary | ICD-10-CM

## 2021-01-13 RX ORDER — METFORMIN HYDROCHLORIDE 750 MG/1
750 TABLET, EXTENDED RELEASE ORAL
Qty: 90 TABLET | Refills: 1 | Status: SHIPPED | OUTPATIENT
Start: 2021-01-13 | End: 2021-07-09 | Stop reason: SDUPTHER

## 2021-01-13 NOTE — TELEPHONE ENCOUNTER
Milvia Gil is calling to request a refill on the following medication(s):    Medication Request:  Requested Prescriptions     Pending Prescriptions Disp Refills    metFORMIN (GLUCOPHAGE-XR) 750 MG extended release tablet 90 tablet 3     Sig: Take 1 tablet by mouth daily (with breakfast)       Last Visit Date (If Applicable):  0/9/1290    Next Visit Date:    4/8/2021

## 2021-01-21 ENCOUNTER — HOSPITAL ENCOUNTER (OUTPATIENT)
Facility: MEDICAL CENTER | Age: 76
End: 2021-01-21
Payer: COMMERCIAL

## 2021-01-26 ENCOUNTER — TELEPHONE (OUTPATIENT)
Dept: ONCOLOGY | Age: 76
End: 2021-01-26

## 2021-01-26 ENCOUNTER — HOSPITAL ENCOUNTER (OUTPATIENT)
Dept: INFUSION THERAPY | Facility: MEDICAL CENTER | Age: 76
Discharge: HOME | End: 2021-01-26
Payer: COMMERCIAL

## 2021-01-26 ENCOUNTER — OFFICE VISIT (OUTPATIENT)
Dept: ONCOLOGY | Age: 76
End: 2021-01-26
Payer: COMMERCIAL

## 2021-01-26 VITALS
TEMPERATURE: 97.7 F | HEART RATE: 62 BPM | SYSTOLIC BLOOD PRESSURE: 110 MMHG | BODY MASS INDEX: 36.99 KG/M2 | DIASTOLIC BLOOD PRESSURE: 69 MMHG | RESPIRATION RATE: 16 BRPM | WEIGHT: 171 LBS

## 2021-01-26 VITALS — SYSTOLIC BLOOD PRESSURE: 108 MMHG | HEART RATE: 62 BPM | DIASTOLIC BLOOD PRESSURE: 65 MMHG

## 2021-01-26 DIAGNOSIS — C85.80 MARGINAL ZONE LYMPHOMA (HCC): ICD-10-CM

## 2021-01-26 DIAGNOSIS — C85.80 MARGINAL ZONE B-CELL LYMPHOMA (HCC): Primary | ICD-10-CM

## 2021-01-26 DIAGNOSIS — C85.80 MARGINAL ZONE LYMPHOMA (HCC): Primary | ICD-10-CM

## 2021-01-26 LAB
ABSOLUTE EOS #: 0.11 K/UL (ref 0–0.44)
ABSOLUTE IMMATURE GRANULOCYTE: 0.02 K/UL (ref 0–0.3)
ABSOLUTE LYMPH #: 3.09 K/UL (ref 1.1–3.7)
ABSOLUTE MONO #: 0.78 K/UL (ref 0.1–1.2)
ALBUMIN SERPL-MCNC: 3.8 G/DL (ref 3.5–5.2)
ALBUMIN/GLOBULIN RATIO: ABNORMAL (ref 1–2.5)
ALP BLD-CCNC: 60 U/L (ref 35–104)
ALT SERPL-CCNC: 14 U/L (ref 5–33)
ANION GAP SERPL CALCULATED.3IONS-SCNC: 12 MMOL/L (ref 9–17)
AST SERPL-CCNC: 16 U/L
BASOPHILS # BLD: 1 % (ref 0–2)
BASOPHILS ABSOLUTE: 0.06 K/UL (ref 0–0.2)
BILIRUB SERPL-MCNC: 0.4 MG/DL (ref 0.3–1.2)
BUN BLDV-MCNC: 23 MG/DL (ref 8–23)
BUN/CREAT BLD: 33 (ref 9–20)
CALCIUM SERPL-MCNC: 9.7 MG/DL (ref 8.6–10.4)
CHLORIDE BLD-SCNC: 103 MMOL/L (ref 98–107)
CO2: 23 MMOL/L (ref 20–31)
CREAT SERPL-MCNC: 0.69 MG/DL (ref 0.5–0.9)
DIFFERENTIAL TYPE: NORMAL
EOSINOPHILS RELATIVE PERCENT: 1 % (ref 1–4)
GFR AFRICAN AMERICAN: >60 ML/MIN
GFR NON-AFRICAN AMERICAN: >60 ML/MIN
GFR SERPL CREATININE-BSD FRML MDRD: ABNORMAL ML/MIN/{1.73_M2}
GFR SERPL CREATININE-BSD FRML MDRD: ABNORMAL ML/MIN/{1.73_M2}
GLUCOSE BLD-MCNC: 130 MG/DL (ref 70–99)
HCT VFR BLD CALC: 44.1 % (ref 36.3–47.1)
HEMOGLOBIN: 13.8 G/DL (ref 11.9–15.1)
IMMATURE GRANULOCYTES: 0 %
LYMPHOCYTES # BLD: 38 % (ref 24–43)
MCH RBC QN AUTO: 30.9 PG (ref 25.2–33.5)
MCHC RBC AUTO-ENTMCNC: 31.3 G/DL (ref 28.4–34.8)
MCV RBC AUTO: 98.7 FL (ref 82.6–102.9)
MONOCYTES # BLD: 10 % (ref 3–12)
NRBC AUTOMATED: 0 PER 100 WBC
PDW BLD-RTO: 13.1 % (ref 11.8–14.4)
PLATELET # BLD: 267 K/UL (ref 138–453)
PLATELET ESTIMATE: NORMAL
PMV BLD AUTO: 9.5 FL (ref 8.1–13.5)
POTASSIUM SERPL-SCNC: 4.3 MMOL/L (ref 3.7–5.3)
RBC # BLD: 4.47 M/UL (ref 3.95–5.11)
RBC # BLD: NORMAL 10*6/UL
SEG NEUTROPHILS: 50 % (ref 36–65)
SEGMENTED NEUTROPHILS ABSOLUTE COUNT: 4.04 K/UL (ref 1.5–8.1)
SODIUM BLD-SCNC: 138 MMOL/L (ref 135–144)
TOTAL PROTEIN: 6.7 G/DL (ref 6.4–8.3)
WBC # BLD: 8.1 K/UL (ref 3.5–11.3)
WBC # BLD: NORMAL 10*3/UL

## 2021-01-26 PROCEDURE — 96413 CHEMO IV INFUSION 1 HR: CPT

## 2021-01-26 PROCEDURE — 96415 CHEMO IV INFUSION ADDL HR: CPT

## 2021-01-26 PROCEDURE — 99211 OFF/OP EST MAY X REQ PHY/QHP: CPT | Performed by: INTERNAL MEDICINE

## 2021-01-26 PROCEDURE — 36593 DECLOT VASCULAR DEVICE: CPT

## 2021-01-26 PROCEDURE — 6370000000 HC RX 637 (ALT 250 FOR IP): Performed by: INTERNAL MEDICINE

## 2021-01-26 PROCEDURE — 2580000003 HC RX 258: Performed by: INTERNAL MEDICINE

## 2021-01-26 PROCEDURE — 80053 COMPREHEN METABOLIC PANEL: CPT

## 2021-01-26 PROCEDURE — 85025 COMPLETE CBC W/AUTO DIFF WBC: CPT

## 2021-01-26 PROCEDURE — 96375 TX/PRO/DX INJ NEW DRUG ADDON: CPT

## 2021-01-26 PROCEDURE — 6360000002 HC RX W HCPCS: Performed by: INTERNAL MEDICINE

## 2021-01-26 PROCEDURE — 99214 OFFICE O/P EST MOD 30 MIN: CPT | Performed by: INTERNAL MEDICINE

## 2021-01-26 RX ORDER — HEPARIN SODIUM (PORCINE) LOCK FLUSH IV SOLN 100 UNIT/ML 100 UNIT/ML
500 SOLUTION INTRAVENOUS PRN
Status: CANCELLED | OUTPATIENT
Start: 2021-05-04

## 2021-01-26 RX ORDER — DIPHENHYDRAMINE HYDROCHLORIDE 50 MG/ML
50 INJECTION INTRAMUSCULAR; INTRAVENOUS ONCE
Status: CANCELLED | OUTPATIENT
Start: 2021-05-04 | End: 2021-03-23

## 2021-01-26 RX ORDER — DIPHENHYDRAMINE HYDROCHLORIDE 50 MG/ML
25 INJECTION INTRAMUSCULAR; INTRAVENOUS ONCE
Status: COMPLETED | OUTPATIENT
Start: 2021-01-26 | End: 2021-01-26

## 2021-01-26 RX ORDER — DIPHENHYDRAMINE HYDROCHLORIDE 50 MG/ML
25 INJECTION INTRAMUSCULAR; INTRAVENOUS ONCE
Status: CANCELLED | OUTPATIENT
Start: 2021-05-04

## 2021-01-26 RX ORDER — SODIUM CHLORIDE 9 MG/ML
INJECTION, SOLUTION INTRAVENOUS CONTINUOUS
Status: CANCELLED | OUTPATIENT
Start: 2021-05-04

## 2021-01-26 RX ORDER — MEPERIDINE HYDROCHLORIDE 50 MG/ML
12.5 INJECTION INTRAMUSCULAR; INTRAVENOUS; SUBCUTANEOUS ONCE
Status: CANCELLED | OUTPATIENT
Start: 2021-05-04 | End: 2021-03-23

## 2021-01-26 RX ORDER — SODIUM CHLORIDE 9 MG/ML
20 INJECTION, SOLUTION INTRAVENOUS ONCE
Status: CANCELLED | OUTPATIENT
Start: 2021-05-04 | End: 2021-03-23

## 2021-01-26 RX ORDER — ACETAMINOPHEN 325 MG/1
650 TABLET ORAL ONCE
Status: CANCELLED | OUTPATIENT
Start: 2021-05-04

## 2021-01-26 RX ORDER — EPINEPHRINE 1 MG/ML
0.3 INJECTION, SOLUTION, CONCENTRATE INTRAVENOUS PRN
Status: CANCELLED | OUTPATIENT
Start: 2021-05-04

## 2021-01-26 RX ORDER — SODIUM CHLORIDE 0.9 % (FLUSH) 0.9 %
10 SYRINGE (ML) INJECTION PRN
Status: DISCONTINUED | OUTPATIENT
Start: 2021-01-26 | End: 2021-01-27 | Stop reason: HOSPADM

## 2021-01-26 RX ORDER — ACETAMINOPHEN 325 MG/1
650 TABLET ORAL ONCE
Status: COMPLETED | OUTPATIENT
Start: 2021-01-26 | End: 2021-01-26

## 2021-01-26 RX ORDER — METHYLPREDNISOLONE SODIUM SUCCINATE 125 MG/2ML
125 INJECTION, POWDER, LYOPHILIZED, FOR SOLUTION INTRAMUSCULAR; INTRAVENOUS ONCE
Status: CANCELLED | OUTPATIENT
Start: 2021-05-04 | End: 2021-03-23

## 2021-01-26 RX ORDER — SODIUM CHLORIDE 9 MG/ML
20 INJECTION, SOLUTION INTRAVENOUS ONCE
Status: COMPLETED | OUTPATIENT
Start: 2021-01-26 | End: 2021-01-26

## 2021-01-26 RX ORDER — SODIUM CHLORIDE 0.9 % (FLUSH) 0.9 %
5 SYRINGE (ML) INJECTION PRN
Status: CANCELLED | OUTPATIENT
Start: 2021-05-04

## 2021-01-26 RX ORDER — SODIUM CHLORIDE 0.9 % (FLUSH) 0.9 %
10 SYRINGE (ML) INJECTION PRN
Status: CANCELLED | OUTPATIENT
Start: 2021-05-04

## 2021-01-26 RX ORDER — 0.9 % SODIUM CHLORIDE 0.9 %
10 VIAL (ML) INJECTION ONCE
Status: CANCELLED | OUTPATIENT
Start: 2021-05-04 | End: 2021-03-23

## 2021-01-26 RX ORDER — HEPARIN SODIUM (PORCINE) LOCK FLUSH IV SOLN 100 UNIT/ML 100 UNIT/ML
500 SOLUTION INTRAVENOUS PRN
Status: DISCONTINUED | OUTPATIENT
Start: 2021-01-26 | End: 2021-01-27 | Stop reason: HOSPADM

## 2021-01-26 RX ADMIN — SODIUM CHLORIDE, PRESERVATIVE FREE 10 ML: 5 INJECTION INTRAVENOUS at 11:14

## 2021-01-26 RX ADMIN — RITUXIMAB 700 MG: 10 INJECTION, SOLUTION INTRAVENOUS at 13:25

## 2021-01-26 RX ADMIN — ALTEPLASE 2 MG: 2.2 INJECTION, POWDER, LYOPHILIZED, FOR SOLUTION INTRAVENOUS at 11:43

## 2021-01-26 RX ADMIN — SODIUM CHLORIDE 20 ML/HR: 9 INJECTION, SOLUTION INTRAVENOUS at 11:14

## 2021-01-26 RX ADMIN — SODIUM CHLORIDE, PRESERVATIVE FREE 10 ML: 5 INJECTION INTRAVENOUS at 15:35

## 2021-01-26 RX ADMIN — HEPARIN 500 UNITS: 100 SYRINGE at 15:35

## 2021-01-26 RX ADMIN — ACETAMINOPHEN 650 MG: 325 TABLET ORAL at 12:49

## 2021-01-26 RX ADMIN — DIPHENHYDRAMINE HYDROCHLORIDE 25 MG: 50 INJECTION, SOLUTION INTRAMUSCULAR; INTRAVENOUS at 12:49

## 2021-01-26 ASSESSMENT — PAIN SCALES - GENERAL: PAINLEVEL_OUTOF10: 0

## 2021-01-26 NOTE — PROGRESS NOTES
Patient arrived by wheelchair per self for cycle 9 day 1 treatment and physician visit. Patient states she vomited last night but feels better today. No other complaints or concerns. Port accessed. Flushes well but no blood return. Lab called to bedside to draw labs. Cathflo injected into port. No blood return from port after 30 minutes. Labs reviewed. Physician at bedside. Okay to treat. Brisk blood return obtained from port after 60 minutes. Patient premedicated. Rituxan initiated at 100 ml/hr for 30 minutes with no sign adverse reaction. Rituxan increased to 200 ml/hr for remainder of infusion. Report given to [facility].

## 2021-01-26 NOTE — PROGRESS NOTES
Received report from [employer]. Rituxan completed with no adverse reaction; line flushed. Patient monitored 30 minutes. Port de-accessed without difficulty. Patient off unit via wheelchair at discharge; AVS per front office staff. Writer assist patient to main lobby where [address] will get her car.

## 2021-01-27 NOTE — PROGRESS NOTES
_           Chief Complaint   Patient presents with    Follow-up     DIAGNOSIS:        Marginal Zone lymphoma  Persistent leukocytosis  Persistent thrombocytopenia  Multiple comorbidities as listed     CURRENT THERAPY:         Started treatment with rituximab 5/2/2019. Week #8 June 21, 2019. Maintenance Rituxan every 2 months for 2 years started October 17, 2019. BRIEF CASE HISTORY:      Ms. Alanna Greenfield is a very pleasant 70 y.o. female with history of multiple comorbidities including COPD and diabetes. She quit smoking years ago. The patient was recently hospitalized with chest infection. The patient was noted to have leukocytosis. She also had persistent thrombocytopenia. She is referred for further evaluation. Patient denies any active bleeding. She has easy bruising. No fever or night sweats. No weight loss or decreased appetite. No palpable lymph nodes. No history of repeated infections. Patient has history of smoking for more than 50 years. Denies alcohol drinking  She had flow cytometry and bone marrow test. Results showed evidence of marginal zone lymphoma. Start the rituximab with good results. Due to increasing symptoms and further problems related to lymphoma, patient was started on single agent rituximab on 5/20/19. Patient received maintenance rituximab after induction. INTERIM HISTORY:   Patient is seen for follow up leukocytosis and marginal zone lymphoma. She had very good response to induction rituximab. She is due today for rituximab maintenance. She is clinically stable. No weakness or fatigue. No dizziness. No fever or infections. No palpable lymph nodes. No recent infections. No recent hospitalizations. No weight loss or decreased appetite.        PAST MEDICAL HISTORY: has a past medical history of COPD (chronic obstructive pulmonary disease) ([address] Depression, Diabetes mellitus ([facility] [address] Hyperlipidemia, PTSD (post-traumatic stress disorder), and Tubulovillous adenoma. PAST SURGICAL HISTORY: has a past surgical history that includes Hysterectomy; Colonoscopy; Colonoscopy (06/07/2016); and Tunneled venous port placement (04/08/2019). CURRENT MEDICATIONS:  has a current medication list which includes the following prescription(s): duloxetine, aspirin ec, metformin, alprazolam, albuterol-ipratropium, lisinopril, simvastatin, blood glucose test strips, lancets, OXYGEN, prochlorperazine, gabapentin, and esomeprazole, and the following Facility-Administered Medications: riTUXimab (RITUXAN) 700 mg in sodium chloride 0.9 % 180 mL chemo IVPB, sodium chloride flush, and heparin flush. ALLERGIES:  has No Known Allergies. FAMILY HISTORY: Negative for any hematological or oncological conditions. SOCIAL HISTORY:  reports that she quit smoking about 13 years ago. Her smoking use included cigarettes. She has a 10.00 pack-year smoking history. She has never used smokeless tobacco. She reports that she does not drink alcohol or use drugs. REVIEW OF SYSTEMS:     · General: Positive for weakness and fatigue. No unanticipated weight loss or decreased appetite. No fever or chills. · Eyes: No blurred vision, eye pain or double vision. · Ears: No hearing problems or drainage. No tinnitus. · Throat: No sore throat, problems with swallowing or dysphagia. · Respiratory: No cough, sputum or hemoptysis. No shortness of breath. No pleuritic chest pain. · Cardiovascular: No chest pain, orthopnea or PND. No lower extremity edema. No palpitation. · Gastrointestinal: No problems with swallowing. No abdominal pain or bloating. No nausea or vomiting. No diarrhea or constipation. No GI bleeding. · Genitourinary: No dysuria, hematuria, frequency or urgency. · Musculoskeletal: No muscle aches or pains. No limitation of movement. No back pain.  No gait disturbance, No joint complaints. · Dermatologic: No skin rashes or pruritus. No skin lesions or discolorations. · Psychiatric: No depression, anxiety, or stress or signs of schizophrenia. No change in mood or affect. · Hematologic: No history of bleeding tendency. Easy bruises no ecchymosis. No history of clotting problems. · Infectious disease: No fever, chills or frequent infections. · Endocrine: No problems with obesity. No polydipsia or polyuria. No temperature intolerance. · Neurologic: No headaches or dizziness. No weakness or numbness of the extremities. No changes in balance, coordination,  memory, mentation, behavior. · Allergic/Immunologic: No nasal congestion or hives. No repeated infections. PHYSICAL EXAM:  The patient is not in acute distress. Vital signs: Blood pressure 110/69, pulse 62, temperature 97.7 °F (36.5 °C), temperature source Temporal, resp. rate 16, weight 171 lb (77.6 kg), not currently breastfeeding. HEENT:  Eyes are normal. Ears, nose and throat are normal.  Neck: Supple. No lymph node enlargement. No thyroid enlargement. Trachea is centrally located. Chest:  Clear to auscultation. No wheezes or crepitations. Heart: Regular sinus rhythm. Abdomen: Soft, nontender. No hepatosplenomegaly. No masses. Extremities:  With no edema. Lymph Nodes:  No cervical, axillary or inguinal lymph node enlargement. Neurologic:  Conscious and oriented. No focal neurological deficits. Psychosocial: No depression, anxiety or stress. Skin: No rashes, bruises or ecchymoses. Review of Diagnostic data:   Recent Labs     01/26/21  1114   WBC 8.1   HGB 13.8   HCT 44.1   MCV 98.7        Peripheral blood flow cytometry:  Peripheral blood flow cytometric immunophenotyping:         Peripheral blood is positive for a monoclonal B-cell population,   positive for CD19, CD20, CD22,   CD45, FMC7 and lambda light chain.      Comment:  Morphology and immunophenotype of circulating monoclonal   B-cells are most consistent with a marginal zone lymphoma.  Bone   marrow evaluation and/or lymph node biopsy if possible are recommended   for confirmation and additional characterization. Bone marrow test August 27, 2018: Final Diagnosis   PERIPHERAL BLOOD:   -LYMPHOCYTOSIS WITH \" VILLOUS\" LYMPHOCYTES. -MODERATE THROMBOCYTOPENIA. BONE MARROW BIOPSY, ASPIRATE SMEAR AND CLOT SECTION:   - MARGINAL ZONE LYMPHOMA. -NORMOCELLULAR TRILINEAGE HEMATOPOIETIC MARROW WITH MARKEDLY   DECREASED IRON STORES. Chemistry        Component Value Date/Time     01/26/2021 1114    K 4.3 01/26/2021 1114     01/26/2021 1114    CO2 23 01/26/2021 1114    BUN 23 01/26/2021 1114    CREATININE 0.69 01/26/2021 1114        Component Value Date/Time    CALCIUM 9.7 01/26/2021 1114    ALKPHOS 60 01/26/2021 1114    AST 16 01/26/2021 1114    ALT 14 01/26/2021 1114    BILITOT 0.40 01/26/2021 1114        Lab Results   Component Value Date    WBC 8.1 01/26/2021    HGB 13.8 01/26/2021    HCT 44.1 01/26/2021    MCV 98.7 01/26/2021     01/26/2021         IMPRESSION:  Marginal Zone lymphoma  Persistent leukocytosis  Persistent thrombocytopenia  Positive FANNY with elevated antihistone. Multiple comorbidities as listed  Acute renal failure, corrected. PLAN: I reviewed the labs as above and discussed with the patient. I explained the significance of these abnormalities in layman language. I explained to patient the nature of low grade NHL. Patient received immunotherapy using rituximab. She had good results induction rituximab treatment. We will continue maintenance rituximab. Explained the benefits and side effects and she understands and agrees. Maintenance rituximab treatment will be every 2 months for 2 years. Proceed with rituximab today. KATHRIN corrected. Return visit in 8 weeks with repeated labs.   Patient's questions were answered to the best of her satisfaction and she verbalized full understanding and agreement.

## 2021-02-04 RX ORDER — GABAPENTIN 600 MG/1
600 TABLET ORAL 3 TIMES DAILY
Qty: 90 TABLET | Refills: 0 | Status: SHIPPED | OUTPATIENT
Start: 2021-02-04 | End: 2021-03-15

## 2021-02-05 ENCOUNTER — TELEPHONE (OUTPATIENT)
Dept: FAMILY MEDICINE CLINIC | Age: 76
End: 2021-02-05

## 2021-02-05 DIAGNOSIS — M15.9 PRIMARY OSTEOARTHRITIS INVOLVING MULTIPLE JOINTS: ICD-10-CM

## 2021-02-05 RX ORDER — HYDROCODONE BITARTRATE AND ACETAMINOPHEN 7.5; 325 MG/1; MG/1
2 TABLET ORAL EVERY 6 HOURS PRN
Qty: 180 TABLET | Refills: 0 | Status: SHIPPED | OUTPATIENT
Start: 2021-02-05 | End: 2021-03-25 | Stop reason: SDUPTHER

## 2021-02-05 NOTE — TELEPHONE ENCOUNTER
Susannah Siddiqui is calling to request a refill on the following medication(s):    Medication Request:  Requested Prescriptions     Pending Prescriptions Disp Refills    HYDROcodone-acetaminophen (NORCO) 7.5-325 MG per tablet 180 tablet 0     Sig: Take 2 tablets by mouth every 6 hours as needed for Pain for up to 30 days.        Last Visit Date (If Applicable):  9/1/9252    Next Visit Date:    4/8/2021

## 2021-02-24 DIAGNOSIS — F41.9 ANXIETY: ICD-10-CM

## 2021-02-25 RX ORDER — ALPRAZOLAM 1 MG/1
1 TABLET ORAL 3 TIMES DAILY PRN
Qty: 90 TABLET | Refills: 0 | Status: SHIPPED | OUTPATIENT
Start: 2021-02-25 | End: 2021-03-26 | Stop reason: SDUPTHER

## 2021-02-25 NOTE — TELEPHONE ENCOUNTER
Zachery Alonso is calling to request a refill on the following medication(s):    Medication Request:  Requested Prescriptions     Pending Prescriptions Disp Refills    ALPRAZolam (XANAX) 1 MG tablet [Pharmacy Med Name: ALPRAZolam 1 MG TABLET] 90 tablet 0     Sig: TAKE ONE TABLET BY MOUTH THREE TIMES A DAY AS NEEDED FOR SLEEP OR ANXIETY       Last Visit Date (If Applicable):  6/7/2847    Next Visit Date:    4/8/2021      Last Refill:  1/12/2021

## 2021-03-03 RX ORDER — FLUOXETINE HYDROCHLORIDE 20 MG/1
CAPSULE ORAL
Qty: 90 CAPSULE | Refills: 1 | Status: SHIPPED | OUTPATIENT
Start: 2021-03-03 | End: 2021-09-01 | Stop reason: SDUPTHER

## 2021-03-03 NOTE — TELEPHONE ENCOUNTER
Jonathan Villa is calling to request a refill on the following medication(s):    Medication Request:  Requested Prescriptions     Pending Prescriptions Disp Refills    FLUoxetine (PROZAC) 20 MG capsule 90 capsule 1     Sig: TAKE ONE CAPSULE BY MOUTH DAILY       Last Visit Date (If Applicable):  8/6/5215    Next Visit Date:    4/8/2021

## 2021-03-15 RX ORDER — GABAPENTIN 600 MG/1
TABLET ORAL
Qty: 90 TABLET | Refills: 0 | Status: SHIPPED | OUTPATIENT
Start: 2021-03-15 | End: 2021-04-14

## 2021-03-16 ENCOUNTER — HOSPITAL ENCOUNTER (OUTPATIENT)
Facility: MEDICAL CENTER | Age: 76
End: 2021-03-16

## 2021-03-16 ENCOUNTER — HOSPITAL ENCOUNTER (OUTPATIENT)
Facility: MEDICAL CENTER | Age: 76
End: 2021-03-16
Payer: COMMERCIAL

## 2021-03-20 NOTE — PLAN OF CARE
Pediatric Sick Visit        Subjective   Patient ID: Savannah is a 5 year old female   Pediatric Historian: father    Chief Complaint   Patient presents with   • Office Visit     here with her father, the father complains that the pt is congested and runny nose        HPI She started going to day care a month ago. She was sick a week ago with colds , congestion and runny nose, also with diarrhrea and vomiting. No fever noted. Vomting and diarrhea got better but the colds continue and had some cough. Nobody is sick at home at this time, No known covid symptoms in school. Father refuse to consent for covid testing.  Review of Systems   HENT: Positive for congestion and rhinorrhea.    Respiratory: Positive for cough.    Gastrointestinal: Positive for diarrhea and vomiting.   All other systems reviewed and are negative.    All other systems reviewed and are negative.    Objective   Vitals:Pulse 104   Temp 98.9 °F (37.2 °C) (Temporal)   Ht 3' 4\" (1.016 m)   Wt 15.6 kg (34 lb 6.4 oz)   BMI 15.12 kg/m²   BSA 0.66 m²   Physical Exam  Constitutional:       General: She is active.      Appearance: Normal appearance.   HENT:      Right Ear: Tympanic membrane normal.      Left Ear: Tympanic membrane normal.      Nose: Congestion and rhinorrhea present.      Mouth/Throat:      Mouth: Mucous membranes are moist.      Pharynx: Oropharynx is clear.   Neck:      Musculoskeletal: Normal range of motion and neck supple.   Cardiovascular:      Rate and Rhythm: Regular rhythm.      Heart sounds: Normal heart sounds.   Pulmonary:      Effort: Pulmonary effort is normal.      Breath sounds: Normal breath sounds.   Abdominal:      General: Abdomen is flat.      Palpations: Abdomen is soft.   Neurological:      General: No focal deficit present.      Mental Status: She is alert.         Assessment   Problem List Items Addressed This Visit     None      Visit Diagnoses     Viral syndrome    -  Primary          PLANS:  PUSH liquids  Eat  Problem: Risk for Impaired Skin Integrity  Goal: Tissue integrity - skin and mucous membranes  Structural intactness and normal physiological function of skin and  mucous membranes. Outcome: Ongoing  Patient is up with standby  Turns herself per self   No skin issues noted     Problem: Falls - Risk of:  Goal: Will remain free from falls  Will remain free from falls   Outcome: Ongoing  Siderails up x 2  Hourly rounding. Call light in reach. Instructed to call for assist before attempting out of bed. Remains free from falls and accidental injury at this time. Floor free from obstacles, and bed is locked and in lowest position. Adequate lighting provided. Bed alarm on. Fall sticker on wristband.  Fall Sign posted in doorway    Goal: Absence of physical injury  Absence of physical injury   Outcome: Ongoing      Problem: Pain:  Goal: Pain level will decrease  Pain level will decrease   Outcome: Ongoing  No C/O of pain healthy   Enough rest and sleep  Vaporizer , NSS       Continue Supportive care, maintain hydration. Return to clinic for new/worsening symptoms including but not limited to fever >/= 101F for 3+ days, decreasing po or uo, resp distress. Parents educated on signs/symptoms requiring immediate medical attention.       The patient and parent indicated understanding of the diagnosis and agreed with the plan of care. All questions answered.

## 2021-03-23 ENCOUNTER — HOSPITAL ENCOUNTER (OUTPATIENT)
Dept: INFUSION THERAPY | Facility: MEDICAL CENTER | Age: 76
End: 2021-03-23
Payer: COMMERCIAL

## 2021-03-25 ENCOUNTER — TELEPHONE (OUTPATIENT)
Dept: FAMILY MEDICINE CLINIC | Age: 76
End: 2021-03-25

## 2021-03-25 DIAGNOSIS — M15.9 PRIMARY OSTEOARTHRITIS INVOLVING MULTIPLE JOINTS: ICD-10-CM

## 2021-03-25 RX ORDER — HYDROCODONE BITARTRATE AND ACETAMINOPHEN 7.5; 325 MG/1; MG/1
2 TABLET ORAL EVERY 6 HOURS PRN
Qty: 180 TABLET | Refills: 0 | Status: SHIPPED | OUTPATIENT
Start: 2021-03-25 | End: 2021-05-14 | Stop reason: SDUPTHER

## 2021-03-25 NOTE — TELEPHONE ENCOUNTER
[facility] is calling to request a refill on the following medication(s):    Medication Request:  Requested Prescriptions     Pending Prescriptions Disp Refills    HYDROcodone-acetaminophen (NORCO) 7.5-325 MG per tablet 180 tablet 0     Sig: Take 2 tablets by mouth every 6 hours as needed for Pain for up to 30 days.        Last Visit Date (If Applicable):  3/8/4168    Next Visit Date:    4/8/2021        Last Refill:  2/5/2021

## 2021-03-25 NOTE — TELEPHONE ENCOUNTER
Patient requesting a medication refill.   Medication: alprazolam 1 mg,hydrocodone 7.5-325 mg  [facility]  Last office visit:   Next office visit: 4/8/2021

## 2021-03-26 DIAGNOSIS — F41.9 ANXIETY: ICD-10-CM

## 2021-03-26 RX ORDER — ALPRAZOLAM 1 MG/1
1 TABLET ORAL 3 TIMES DAILY PRN
Qty: 90 TABLET | Refills: 0 | Status: SHIPPED | OUTPATIENT
Start: 2021-03-27 | End: 2021-05-14 | Stop reason: SDUPTHER

## 2021-03-26 NOTE — TELEPHONE ENCOUNTER
[facility] is calling to request a refill on the following medication(s):    Medication Request:  Requested Prescriptions     Pending Prescriptions Disp Refills    ALPRAZolam (XANAX) 1 MG tablet 90 tablet 0     Sig: Take 1 tablet by mouth 3 times daily as needed for Sleep or Anxiety for up to 30 days.        Last Visit Date (If Applicable):  0/7/4792    Next Visit Date:    4/8/2021

## 2021-04-07 ENCOUNTER — TELEPHONE (OUTPATIENT)
Dept: ONCOLOGY | Age: 76
End: 2021-04-07

## 2021-04-08 ENCOUNTER — OFFICE VISIT (OUTPATIENT)
Dept: FAMILY MEDICINE CLINIC | Age: 76
End: 2021-04-08
Payer: COMMERCIAL

## 2021-04-08 VITALS
SYSTOLIC BLOOD PRESSURE: 118 MMHG | WEIGHT: 163 LBS | DIASTOLIC BLOOD PRESSURE: 80 MMHG | OXYGEN SATURATION: 93 % | HEART RATE: 95 BPM | BODY MASS INDEX: 35.26 KG/M2

## 2021-04-08 DIAGNOSIS — Z00.00 ROUTINE GENERAL MEDICAL EXAMINATION AT A HEALTH CARE FACILITY: ICD-10-CM

## 2021-04-08 DIAGNOSIS — Z12.11 ENCOUNTER FOR SCREENING FOR MALIGNANT NEOPLASM OF COLON: Primary | ICD-10-CM

## 2021-04-08 DIAGNOSIS — E11.8 TYPE 2 DIABETES MELLITUS WITH COMPLICATION, WITHOUT LONG-TERM CURRENT USE OF INSULIN (HCC): ICD-10-CM

## 2021-04-08 DIAGNOSIS — I50.9 CHRONIC CONGESTIVE HEART FAILURE, UNSPECIFIED HEART FAILURE TYPE (HCC): ICD-10-CM

## 2021-04-08 DIAGNOSIS — I10 ESSENTIAL HYPERTENSION: ICD-10-CM

## 2021-04-08 DIAGNOSIS — K46.9 NON-RECURRENT ABDOMINAL HERNIA WITHOUT OBSTRUCTION OR GANGRENE, UNSPECIFIED HERNIA TYPE: ICD-10-CM

## 2021-04-08 DIAGNOSIS — J44.9 CHRONIC OBSTRUCTIVE PULMONARY DISEASE, UNSPECIFIED COPD TYPE (HCC): ICD-10-CM

## 2021-04-08 DIAGNOSIS — E78.2 MIXED HYPERLIPIDEMIA: ICD-10-CM

## 2021-04-08 PROBLEM — E66.01 MORBID OBESITY WITH BMI OF 40.0-44.9, ADULT (HCC): Status: RESOLVED | Noted: 2020-09-21 | Resolved: 2021-04-08

## 2021-04-08 PROBLEM — D69.6 THROMBOCYTOPENIA (HCC): Status: RESOLVED | Noted: 2018-08-09 | Resolved: 2021-04-08

## 2021-04-08 PROCEDURE — 99214 OFFICE O/P EST MOD 30 MIN: CPT | Performed by: FAMILY MEDICINE

## 2021-04-08 PROCEDURE — G0439 PPPS, SUBSEQ VISIT: HCPCS | Performed by: FAMILY MEDICINE

## 2021-04-08 ASSESSMENT — PATIENT HEALTH QUESTIONNAIRE - PHQ9
1. LITTLE INTEREST OR PLEASURE IN DOING THINGS: 0
SUM OF ALL RESPONSES TO PHQ QUESTIONS 1-9: 0
1. LITTLE INTEREST OR PLEASURE IN DOING THINGS: 1
SUM OF ALL RESPONSES TO PHQ QUESTIONS 1-9: 0
2. FEELING DOWN, DEPRESSED OR HOPELESS: 0
SUM OF ALL RESPONSES TO PHQ QUESTIONS 1-9: 0

## 2021-04-08 ASSESSMENT — ENCOUNTER SYMPTOMS
SPUTUM PRODUCTION: 0
HEARTBURN: 0
COUGH: 0
FREQUENT THROAT CLEARING: 0
EYES NEGATIVE: 1
TROUBLE SWALLOWING: 0
VISUAL CHANGE: 0
CHEST TIGHTNESS: 0
WHEEZING: 0
GASTROINTESTINAL NEGATIVE: 1
ABDOMINAL PAIN: 0
ALLERGIC/IMMUNOLOGIC NEGATIVE: 1
HEMOPTYSIS: 0
ORTHOPNEA: 0
SHORTNESS OF BREATH: 1
VOMITING: 0
BOWEL INCONTINENCE: 0
HOARSE VOICE: 0
BLURRED VISION: 0
DIFFICULTY BREATHING: 0
RHINORRHEA: 0
NAUSEA: 0
SORE THROAT: 0

## 2021-04-08 ASSESSMENT — COPD QUESTIONNAIRES: COPD: 1

## 2021-04-08 NOTE — PROGRESS NOTES
[facility] Progress Note    Date:4/8/2021         Patient Name:Samara Smith     YOB: 1945     Age:75 y.o. Assessment/Plan        Problem List Items Addressed This Visit        Circulatory    Congestive heart failure (HCC)      Well-controlled, continue current treatment plan, lifestyle modifications recommended and medication adherence emphasized         Essential hypertension      Well-controlled, continue current treatment plan, lifestyle modifications recommended and medication adherence emphasized            Respiratory    COPD (chronic obstructive pulmonary disease) ([address]      Well-controlled, continue current treatment plan, lifestyle modifications recommended and medication adherence emphasized            Endocrine    Type 2 diabetes mellitus with complication, without long-term current use of insulin (HCC)      Well-controlled, continue current treatment plan, lifestyle modifications recommended and medication adherence emphasized            Other    Mixed hyperlipidemia      Well-controlled, continue current treatment plan, lifestyle modifications recommended and medication adherence emphasized           Other Visit Diagnoses     Encounter for screening for malignant neoplasm of colon    -  Primary    Routine general medical examination at a health care facility        Non-recurrent abdominal hernia without obstruction or gangrene, unspecified hernia type        Relevant Medications    Elastic Bandages & Supports (ABDOMINAL BINDER/ELASTIC MED) MISC           Return in 3 months (on 7/8/2021) for Medicare Annual Wellness Visit in 1 year. Electronically signed by Maryann Gutierrez DO on 4/8/21         Subjective     Rhoda Multani is a 70 y.o. female presenting today for   Chief Complaint   Patient presents with    3 Month Follow-Up    Medicare AWV   . Fall  The accident occurred more than 1 week ago (apparently this was months ago). Fall occurred: chasing after her cat.  Impact surface: and no weight loss. There are no hypoglycemic complications. Symptoms are stable. Current diabetic treatment includes oral agent (monotherapy). She is compliant with treatment all of the time. Her weight is stable. Her home blood glucose trend is decreasing steadily. An ACE inhibitor/angiotensin II receptor blocker is being taken. Hyperlipidemia  This is a chronic problem. The current episode started more than 1 year ago. The problem is controlled. Recent lipid tests were reviewed and are normal. Exacerbating diseases include diabetes and obesity. She has no history of chronic renal disease, hypothyroidism, liver disease or nephrotic syndrome. Associated symptoms include shortness of breath. Pertinent negatives include no chest pain, focal sensory loss, focal weakness, leg pain or myalgias. Current antihyperlipidemic treatment includes statins. The current treatment provides significant improvement of lipids. COPD  She complains of shortness of breath. There is no chest tightness, cough, difficulty breathing, frequent throat clearing, hemoptysis, hoarse voice, sputum production or wheezing. This is a chronic problem. The current episode started more than 1 year ago. The problem occurs daily. The problem has been unchanged. Associated symptoms include dyspnea on exertion and orthopnea. Pertinent negatives include no appetite change, chest pain, ear congestion, ear pain, fever, headaches, heartburn, malaise/fatigue, myalgias, nasal congestion, PND, postnasal drip, rhinorrhea, sneezing, sore throat, sweats, trouble swallowing or weight loss. Her symptoms are aggravated by strenuous activity. Her symptoms are alleviated by rest, beta-agonist and steroid inhaler (supplemental O2). She reports significant improvement on treatment. Review of Systems   Review of Systems   Constitutional: Negative. Negative for appetite change, fatigue, fever, malaise/fatigue and weight loss. HENT: Negative.   Negative for ear pain, hoarse voice, postnasal drip, rhinorrhea, sneezing, sore throat and trouble swallowing. Eyes: Negative. Negative for blurred vision. Respiratory: Positive for shortness of breath. Negative for cough, hemoptysis, sputum production and wheezing. Cardiovascular: Positive for dyspnea on exertion. Negative for chest pain, palpitations, orthopnea and PND. Gastrointestinal: Negative. Negative for abdominal pain, bowel incontinence, heartburn, nausea and vomiting. Endocrine: Negative. Negative for polydipsia, polyphagia and polyuria. Genitourinary: Negative. Negative for hematuria. Musculoskeletal: Negative. Negative for myalgias and neck pain. Skin: Negative. Allergic/Immunologic: Negative. Neurological: Negative. Negative for tingling, focal weakness, loss of consciousness, weakness, numbness and headaches. Hematological: Negative. Psychiatric/Behavioral: Negative. All other systems reviewed and are negative. Medications     Current Outpatient Medications   Medication Sig Dispense Refill    Elastic Bandages & Supports (ABDOMINAL BINDER/ELASTIC MED) MISC Use daily as much as possible for abdominal hernia 1 each 0    ALPRAZolam (XANAX) 1 MG tablet Take 1 tablet by mouth 3 times daily as needed for Sleep or Anxiety for up to 30 days. 90 tablet 0    HYDROcodone-acetaminophen (NORCO) 7.5-325 MG per tablet Take 2 tablets by mouth every 6 hours as needed for Pain for up to 30 days.  180 tablet 0    gabapentin (NEURONTIN) 600 MG tablet TAKE ONE TABLET BY MOUTH THREE TIMES A DAY 90 tablet 0    FLUoxetine (PROZAC) 20 MG capsule TAKE ONE CAPSULE BY MOUTH DAILY 90 capsule 1    DULoxetine (CYMBALTA) 60 MG extended release capsule Take 1 capsule by mouth daily 90 capsule 1    aspirin EC 81 MG EC tablet Take 1 tablet by mouth daily 90 tablet 1    metFORMIN (GLUCOPHAGE-XR) 750 MG extended release tablet Take 1 tablet by mouth daily (with breakfast) 90 tablet 1    (73.9 kg)   SpO2 93%   BMI 35.26 kg/m²     Physical Exam  Constitutional:       General: She is not in acute distress. Appearance: Normal appearance. She is obese. She is not ill-appearing, toxic-appearing or diaphoretic. HENT:      Head: Normocephalic and atraumatic. Right Ear: Tympanic membrane, ear canal and external ear normal. There is no impacted cerumen. Left Ear: Tympanic membrane, ear canal and external ear normal. There is no impacted cerumen. Nose: Nose normal. No congestion or rhinorrhea. Mouth/Throat:      Mouth: Mucous membranes are moist.      Pharynx: Oropharynx is clear. No oropharyngeal exudate or posterior oropharyngeal erythema. Eyes:      General: No scleral icterus. Right eye: No discharge. Left eye: No discharge. Extraocular Movements: Extraocular movements intact. Conjunctiva/sclera: Conjunctivae normal.      Pupils: Pupils are equal, round, and reactive to light. Neck:      Musculoskeletal: Normal range of motion and neck supple. Cardiovascular:      Rate and Rhythm: Normal rate and regular rhythm. Pulses: Normal pulses. Heart sounds: Normal heart sounds. No murmur. No friction rub. No gallop. Pulmonary:      Effort: Pulmonary effort is normal. No respiratory distress. Breath sounds: Normal breath sounds. No stridor. No wheezing, rhonchi or rales. Comments: On supplemental O2 via [place] - gets very out of breath with walking  Chest:      Chest wall: No tenderness. Abdominal:      General: Abdomen is flat. Bowel sounds are normal. There is no distension. Palpations: Abdomen is soft. There is no mass. Tenderness: There is no abdominal tenderness. There is no right CVA tenderness, left CVA tenderness, guarding or rebound. Hernia: No hernia is present. Musculoskeletal:      Lumbar back: She exhibits decreased range of motion, tenderness, pain and spasm.  She exhibits no bony tenderness, no swelling, no edema, no deformity, no laceration and normal pulse. Skin:     General: Skin is warm. Capillary Refill: Capillary refill takes less than 2 seconds. Coloration: Skin is not jaundiced or pale. Findings: No bruising, erythema, lesion or rash. Neurological:      General: No focal deficit present. Mental Status: She is alert and oriented to person, place, and time. Mental status is at baseline. Cranial Nerves: No cranial nerve deficit. Sensory: No sensory deficit. Motor: Motor function is intact. No weakness. Coordination: Coordination abnormal.      Gait: Gait abnormal and tandem walk abnormal.      Deep Tendon Reflexes: Reflexes normal.   Psychiatric:         Mood and Affect: Mood normal.         Behavior: Behavior normal.         Thought Content: Thought content normal.         Judgment: Judgment normal.         Labs/Imaging/Diagnostics   Labs:  Hemoglobin A1C   Date Value Ref Range Status   09/21/2020 5.7 4.0 - 6.0 % Final       Imaging Last 24 Hours:  US RETROPERITONEAL COMPLETE  Narrative: EXAMINATION:  RETROPERITONEAL ULTRASOUND OF THE KIDNEYS AND URINARY BLADDER    8/24/2019    COMPARISON:  None. HISTORY:  ORDERING SYSTEM PROVIDED HISTORY: RENAL FAILURE, ACUTE (KIDNEY INJURY)    FINDINGS:    Kidneys: The right kidney measures 10.2 cm in length and the left kidney measures 10.9  cm in length. Kidneys demonstrate normal cortical echogenicity. No evidence of  hydronephrosis or intrarenal stones. Bladder:    Unremarkable appearance of the bladder. No significant post void residual.  Impression: Unremarkable ultrasound of the kidneys and urinary bladder.   XR CHEST PORTABLE  Narrative: EXAMINATION:  ONE XRAY VIEW OF THE CHEST    8/24/2019 6:28 am    COMPARISON:  08/22/2019    HISTORY:  ORDERING SYSTEM PROVIDED HISTORY: shortness of breath  TECHNOLOGIST PROVIDED HISTORY:  shortness of breath  Reason for Exam: sob  Acuity: Unknown  Type of Exam: Unknown    FINDINGS:  Right IJ infusion port remains in place. Stable cardiomediastinal  silhouette. Mild seizure prominence unchanged. No focal consolidation. Penetration of right hemidiaphragm noted. Impression: Mild seizure prominence stable. No acute process.

## 2021-04-08 NOTE — PATIENT INSTRUCTIONS
Personalized Preventive Plan for Fabiola Mckoy - 4/8/2021  Medicare offers a range of preventive health benefits. Some of the tests and screenings are paid in full while other may be subject to a deductible, co-insurance, and/or copay. Some of these benefits include a comprehensive review of your medical history including lifestyle, illnesses that may run in your family, and various assessments and screenings as appropriate. After reviewing your medical record and screening and assessments performed today your provider may have ordered immunizations, labs, imaging, and/or referrals for you. A list of these orders (if applicable) as well as your Preventive Care list are included within your After Visit Summary for your review. Other Preventive Recommendations:    · A preventive eye exam performed by an eye specialist is recommended every 1-2 years to screen for glaucoma; cataracts, macular degeneration, and other eye disorders. · A preventive dental visit is recommended every 6 months. · Try to get at least 150 minutes of exercise per week or 10,000 steps per day on a pedometer . · Order or download the FREE \"Exercise & Physical Activity: Your Everyday Guide\" from [employer]. Call 2-506.536.2720 or search [employer] online. · You need 3922-3634 mg of calcium and 4933-5921 IU of vitamin D per day. It is possible to meet your calcium requirement with diet alone, but a vitamin D supplement is usually necessary to meet this goal.  · When exposed to the sun, use a sunscreen that protects against both UVA and UVB radiation with an SPF of 30 or greater. Reapply every 2 to 3 hours or after sweating, drying off with a towel, or swimming. · Always wear a seat belt when traveling in a car. Always wear a helmet when riding a bicycle or motorcycle.

## 2021-04-08 NOTE — PROGRESS NOTES
Medicare Annual Wellness Visit  Name: Sam Postin Date: 2021   MRN: [id number] Sex: Female   Age: 70 y.o. Ethnicity: Non-/Non    : 1945 Race: Alaina Jarvis is here for 3 Month Follow-Up and Medicare AWV    Screenings for behavioral, psychosocial and functional/safety risks, and cognitive dysfunction are all negative except as indicated below. These results, as well as other patient data from the [address] form, are documented in Flowsheets linked to this Encounter. No Known Allergies    Prior to Visit Medications    Medication Sig Taking? Authorizing Provider   ALPRAZolam Leveda Silvius) 1 MG tablet Take 1 tablet by mouth 3 times daily as needed for Sleep or Anxiety for up to 30 days. Ubaldo Slim, DO   HYDROcodone-acetaminophen (NORCO) 7.5-325 MG per tablet Take 2 tablets by mouth every 6 hours as needed for Pain for up to 30 days. Ubaldo Slim, DO   gabapentin (NEURONTIN) 600 MG tablet TAKE ONE TABLET BY MOUTH THREE TIMES A DAY  Ubaldo Slim, DO   FLUoxetine (PROZAC) 20 MG capsule TAKE ONE CAPSULE BY MOUTH DAILY  Ubaldo Slim, DO   DULoxetine (CYMBALTA) 60 MG extended release capsule Take 1 capsule by mouth daily  Ubaldo Slim, DO   aspirin EC 81 MG EC tablet Take 1 tablet by mouth daily  Ubaldo Slim, DO   metFORMIN (GLUCOPHAGE-XR) 750 MG extended release tablet Take 1 tablet by mouth daily (with breakfast)  Ubaldo Slim, DO   albuterol-ipratropium (COMBIVENT RESPIMAT)  MCG/ACT AERS inhaler INHALE ONE INHALATION BY MOUTH FOUR TIMES A DAY  Ubaldo Slim, DO   lisinopril (PRINIVIL;ZESTRIL) 10 MG tablet TAKE ONE TABLET BY MOUTH DAILY  Arik Valero MD   simvastatin (ZOCOR) 40 MG tablet TAKE ONE TABLET BY MOUTH DAILY  Arik Valero MD   blood glucose monitor strips 1 strip by Other route TrueTest Strips     3 times a day & as needed for symptoms of irregular blood glucose.   Historical Provider, MD   Lancets MISC 1 each by Other route 2 times daily Indications: [employer] Glucose Meter  Arik Valero MD   OXYGEN Inhale 3 L into the lungs continuous  Historical Provider, MD   prochlorperazine (COMPAZINE) 10 MG tablet Take 1 tablet by mouth every 6 hours as needed (CHEMO INDUCED NAUSEA)  January Evans MD   esomeprazole ([address]) 40 MG delayed release capsule TAKE ONE CAPSULE BY MOUTH DAILY  Patient taking differently: TAKE ONE CAPSULE BY MOUTH DAILY / taking PRN  Shaw Mott MD       Past Medical History:   Diagnosis Date    [address] ([facility] obstructive [facility] disease) [address]     Depression     Diabetes mellitus ([facility]     Hyperlipidemia     PTSD (post-traumatic stress disorder)     Tubulovillous adenoma        Past Surgical History:   Procedure Laterality Date    COLONOSCOPY      colon polyps    COLONOSCOPY  06/07/2016    severe diverticulosis TUBULOVILLOUS ADENOMA.       HYSTERECTOMY      TUNNELED VENOUS PORT PLACEMENT  04/08/2019    chemo port to right chest       Family History   Problem Relation Age of Onset    Heart Disease Mother     Heart Disease Brother        CareTeam (Including outside providers/suppliers regularly involved in providing care):   Patient Care Team:  Vilma Vázquez DO as PCP - General (Family Medicine)  Vilma Vázquez DO as PCP - [facility] Empaneled Provider  Dale Rodriguez MD as Consulting Physician (Otolaryngology)    Wt Readings from Last 3 Encounters:   04/08/21 163 lb (73.9 kg)   01/26/21 171 lb (77.6 kg)   01/04/21 172 lb 14.4 oz (78.4 kg)     Vitals:    04/08/21 1453   BP: 118/80   Pulse: 95   SpO2: 93%   Weight: 163 lb (73.9 kg)     Body mass index is 35.26 kg/m². Based upon direct observation of the patient, evaluation of cognition reveals recent and remote memory intact. Patient's complete Health Risk Assessment and screening values have been reviewed and are found in Flowsheets.  The following problems were reviewed today and where indicated follow up appointments were made and/or referrals ordered. Positive Risk Factor Screenings with Interventions:     Fall Risk:  2 or more falls in past year?: no  Fall with injury in past year?: (!) yes  Fall Risk Interventions:    · Home safety tips provided        General Health and ACP:  General  In general, how would you say your health is?: Fair  In the past 7 days, have you experienced any of the following?  New or Increased Pain, New or Increased Fatigue, Loneliness, Social Isolation, Stress or Anger?: None of These  Do you get the social and emotional support that you need?: Yes  Do you have a Living Will?: (!) No  Advance Directives     Power of [facility] Will ACP-Advance Directive ACP-Power of     Not on File Not on File Not on File Not on File      General Health Risk Interventions:  · No Living Will: Advance Care Planning addressed with patient today    Health Habits/Nutrition:  Health Habits/Nutrition  Do you exercise for at least 20 minutes 2-3 times per week?: (!) No  Have you lost any weight without trying in the past 3 months?: No  Do you eat only one meal per day?: No  Have you seen the dentist within the past year?: (!) No     Health Habits/Nutrition Interventions:  · Inadequate physical activity:  patient is not ready to increase his/her physical activity level at this time  · Dental exam overdue:  patient encouraged to make appointment with his/her dentist    Hearing/Vision:  No exam data present  Hearing/Vision  Do you or your family notice any trouble with your hearing that hasn't been managed with hearing aids?: No  Do you have difficulty driving, watching TV, or doing any of your daily activities because of your eyesight?: No  Have you had an eye exam within the past year?: (!) No  Hearing/Vision Interventions:  · Vision concerns:  patient encouraged to make appointment with his/her eye specialist     ADL:  ADLs  In the past 7 days, did you need help from Services Due: see orders and patient instructions/AVS.  . Recommended screening schedule for the next 5-10 years is provided to the patient in written form: see Patient Instructions/AVS.    Alessandro Palomino was seen today for 3 month follow-up and medicare aw.     Diagnoses and all orders for this visit:    Encounter for screening for malignant neoplasm of colon    Routine general medical examination at a health care facility           [address] [place] [address]

## 2021-04-09 NOTE — ASSESSMENT & PLAN NOTE
Well-controlled, continue current treatment plan, lifestyle modifications recommended and medication adherence emphasized

## 2021-04-14 RX ORDER — GABAPENTIN 600 MG/1
TABLET ORAL
Qty: 90 TABLET | Refills: 2 | Status: SHIPPED | OUTPATIENT
Start: 2021-04-14 | End: 2021-07-20

## 2021-04-14 NOTE — TELEPHONE ENCOUNTER
Channing Cao is calling to request a refill on the following medication(s):    Medication Request:  Requested Prescriptions     Pending Prescriptions Disp Refills    gabapentin (NEURONTIN) 600 MG tablet [Pharmacy Med Name: GABAPENTIN 600 MG TABLET] 90 tablet 2     Sig: TAKE ONE TABLET BY MOUTH THREE TIMES A DAY       Last Visit Date (If Applicable):  1/1/5840    Next Visit Date:    7/9/2021

## 2021-04-27 ENCOUNTER — HOSPITAL ENCOUNTER (OUTPATIENT)
Facility: MEDICAL CENTER | Age: 76
End: 2021-04-27
Payer: COMMERCIAL

## 2021-05-04 ENCOUNTER — HOSPITAL ENCOUNTER (OUTPATIENT)
Dept: INFUSION THERAPY | Facility: MEDICAL CENTER | Age: 76
Discharge: HOME | End: 2021-05-04
Payer: COMMERCIAL

## 2021-05-04 ENCOUNTER — TELEPHONE (OUTPATIENT)
Dept: ONCOLOGY | Age: 76
End: 2021-05-04

## 2021-05-04 ENCOUNTER — OFFICE VISIT (OUTPATIENT)
Dept: ONCOLOGY | Age: 76
End: 2021-05-04
Payer: COMMERCIAL

## 2021-05-04 VITALS
WEIGHT: 170.3 LBS | DIASTOLIC BLOOD PRESSURE: 35 MMHG | SYSTOLIC BLOOD PRESSURE: 116 MMHG | TEMPERATURE: 98 F | RESPIRATION RATE: 18 BRPM | BODY MASS INDEX: 36.84 KG/M2 | HEART RATE: 83 BPM

## 2021-05-04 DIAGNOSIS — C85.80 MARGINAL ZONE B-CELL LYMPHOMA (HCC): Primary | ICD-10-CM

## 2021-05-04 DIAGNOSIS — C85.80 MARGINAL ZONE LYMPHOMA (HCC): ICD-10-CM

## 2021-05-04 DIAGNOSIS — C85.80 MARGINAL ZONE LYMPHOMA (HCC): Primary | ICD-10-CM

## 2021-05-04 LAB
ABSOLUTE EOS #: 0.13 K/UL (ref 0–0.44)
ABSOLUTE IMMATURE GRANULOCYTE: 0.04 K/UL (ref 0–0.3)
ABSOLUTE LYMPH #: 3.08 K/UL (ref 1.1–3.7)
ABSOLUTE MONO #: 0.9 K/UL (ref 0.1–1.2)
ALBUMIN SERPL-MCNC: 4 G/DL (ref 3.5–5.2)
ALBUMIN/GLOBULIN RATIO: ABNORMAL (ref 1–2.5)
ALP BLD-CCNC: 62 U/L (ref 35–104)
ALT SERPL-CCNC: 8 U/L (ref 5–33)
ANION GAP SERPL CALCULATED.3IONS-SCNC: 14 MMOL/L (ref 9–17)
AST SERPL-CCNC: 16 U/L
BASOPHILS # BLD: 0 % (ref 0–2)
BASOPHILS ABSOLUTE: 0.04 K/UL (ref 0–0.2)
BILIRUB SERPL-MCNC: 0.34 MG/DL (ref 0.3–1.2)
BUN BLDV-MCNC: 17 MG/DL (ref 8–23)
BUN/CREAT BLD: 24 (ref 9–20)
CALCIUM SERPL-MCNC: 9.2 MG/DL (ref 8.6–10.4)
CHLORIDE BLD-SCNC: 104 MMOL/L (ref 98–107)
CO2: 22 MMOL/L (ref 20–31)
CREAT SERPL-MCNC: 0.71 MG/DL (ref 0.5–0.9)
DIFFERENTIAL TYPE: NORMAL
EOSINOPHILS RELATIVE PERCENT: 1 % (ref 1–4)
GFR AFRICAN AMERICAN: >60 ML/MIN
GFR NON-AFRICAN AMERICAN: >60 ML/MIN
GFR SERPL CREATININE-BSD FRML MDRD: ABNORMAL ML/MIN/{1.73_M2}
GFR SERPL CREATININE-BSD FRML MDRD: ABNORMAL ML/MIN/{1.73_M2}
GLUCOSE BLD-MCNC: 128 MG/DL (ref 70–99)
HCT VFR BLD CALC: 46.8 % (ref 36.3–47.1)
HEMOGLOBIN: 14.2 G/DL (ref 11.9–15.1)
IMMATURE GRANULOCYTES: 0 %
LYMPHOCYTES # BLD: 29 % (ref 24–43)
MCH RBC QN AUTO: 30.1 PG (ref 25.2–33.5)
MCHC RBC AUTO-ENTMCNC: 30.3 G/DL (ref 28.4–34.8)
MCV RBC AUTO: 99.2 FL (ref 82.6–102.9)
MONOCYTES # BLD: 8 % (ref 3–12)
NRBC AUTOMATED: NORMAL PER 100 WBC
PDW BLD-RTO: 12.9 % (ref 11.8–14.4)
PLATELET # BLD: 298 K/UL (ref 138–453)
PLATELET ESTIMATE: NORMAL
PMV BLD AUTO: 9.1 FL (ref 8.1–13.5)
POTASSIUM SERPL-SCNC: 4.3 MMOL/L (ref 3.7–5.3)
RBC # BLD: 4.72 M/UL (ref 3.95–5.11)
RBC # BLD: NORMAL 10*6/UL
SEG NEUTROPHILS: 61 % (ref 36–65)
SEGMENTED NEUTROPHILS ABSOLUTE COUNT: 6.62 K/UL (ref 1.5–8.1)
SODIUM BLD-SCNC: 140 MMOL/L (ref 135–144)
TOTAL PROTEIN: 6.9 G/DL (ref 6.4–8.3)
WBC # BLD: 10.8 K/UL (ref 3.5–11.3)
WBC # BLD: NORMAL 10*3/UL

## 2021-05-04 PROCEDURE — 85025 COMPLETE CBC W/AUTO DIFF WBC: CPT

## 2021-05-04 PROCEDURE — 99214 OFFICE O/P EST MOD 30 MIN: CPT | Performed by: INTERNAL MEDICINE

## 2021-05-04 PROCEDURE — 2580000003 HC RX 258: Performed by: INTERNAL MEDICINE

## 2021-05-04 PROCEDURE — 96375 TX/PRO/DX INJ NEW DRUG ADDON: CPT

## 2021-05-04 PROCEDURE — 96415 CHEMO IV INFUSION ADDL HR: CPT

## 2021-05-04 PROCEDURE — 36591 DRAW BLOOD OFF VENOUS DEVICE: CPT

## 2021-05-04 PROCEDURE — 96376 TX/PRO/DX INJ SAME DRUG ADON: CPT

## 2021-05-04 PROCEDURE — 99211 OFF/OP EST MAY X REQ PHY/QHP: CPT | Performed by: INTERNAL MEDICINE

## 2021-05-04 PROCEDURE — 80053 COMPREHEN METABOLIC PANEL: CPT

## 2021-05-04 PROCEDURE — 6370000000 HC RX 637 (ALT 250 FOR IP): Performed by: INTERNAL MEDICINE

## 2021-05-04 PROCEDURE — 6360000002 HC RX W HCPCS: Performed by: INTERNAL MEDICINE

## 2021-05-04 PROCEDURE — 96413 CHEMO IV INFUSION 1 HR: CPT

## 2021-05-04 RX ORDER — SODIUM CHLORIDE 0.9 % (FLUSH) 0.9 %
10 SYRINGE (ML) INJECTION PRN
Status: CANCELLED | OUTPATIENT
Start: 2021-06-29

## 2021-05-04 RX ORDER — HEPARIN SODIUM (PORCINE) LOCK FLUSH IV SOLN 100 UNIT/ML 100 UNIT/ML
500 SOLUTION INTRAVENOUS PRN
Status: CANCELLED | OUTPATIENT
Start: 2021-06-29

## 2021-05-04 RX ORDER — MEPERIDINE HYDROCHLORIDE 50 MG/ML
12.5 INJECTION INTRAMUSCULAR; INTRAVENOUS; SUBCUTANEOUS ONCE
Status: CANCELLED | OUTPATIENT
Start: 2021-06-29 | End: 2021-06-29

## 2021-05-04 RX ORDER — DIPHENHYDRAMINE HYDROCHLORIDE 50 MG/ML
50 INJECTION INTRAMUSCULAR; INTRAVENOUS ONCE
Status: CANCELLED | OUTPATIENT
Start: 2021-06-29 | End: 2021-06-29

## 2021-05-04 RX ORDER — ACETAMINOPHEN 325 MG/1
650 TABLET ORAL ONCE
Status: COMPLETED | OUTPATIENT
Start: 2021-05-04 | End: 2021-05-04

## 2021-05-04 RX ORDER — SODIUM CHLORIDE 9 MG/ML
INJECTION, SOLUTION INTRAVENOUS CONTINUOUS
Status: CANCELLED | OUTPATIENT
Start: 2021-06-29

## 2021-05-04 RX ORDER — SODIUM CHLORIDE 0.9 % (FLUSH) 0.9 %
5 SYRINGE (ML) INJECTION PRN
Status: CANCELLED | OUTPATIENT
Start: 2021-06-29

## 2021-05-04 RX ORDER — HEPARIN SODIUM (PORCINE) LOCK FLUSH IV SOLN 100 UNIT/ML 100 UNIT/ML
500 SOLUTION INTRAVENOUS PRN
Status: DISCONTINUED | OUTPATIENT
Start: 2021-05-04 | End: 2021-05-05 | Stop reason: HOSPADM

## 2021-05-04 RX ORDER — ACETAMINOPHEN 325 MG/1
650 TABLET ORAL ONCE
Status: CANCELLED | OUTPATIENT
Start: 2021-06-29

## 2021-05-04 RX ORDER — SODIUM CHLORIDE 9 MG/ML
20 INJECTION, SOLUTION INTRAVENOUS ONCE
Status: COMPLETED | OUTPATIENT
Start: 2021-05-04 | End: 2021-05-04

## 2021-05-04 RX ORDER — METHYLPREDNISOLONE SODIUM SUCCINATE 125 MG/2ML
125 INJECTION, POWDER, LYOPHILIZED, FOR SOLUTION INTRAMUSCULAR; INTRAVENOUS ONCE
Status: CANCELLED | OUTPATIENT
Start: 2021-06-29 | End: 2021-06-29

## 2021-05-04 RX ORDER — EPINEPHRINE 1 MG/ML
0.3 INJECTION, SOLUTION, CONCENTRATE INTRAVENOUS PRN
Status: CANCELLED | OUTPATIENT
Start: 2021-06-29

## 2021-05-04 RX ORDER — SODIUM CHLORIDE 0.9 % (FLUSH) 0.9 %
10 SYRINGE (ML) INJECTION PRN
Status: DISCONTINUED | OUTPATIENT
Start: 2021-05-04 | End: 2021-05-05 | Stop reason: HOSPADM

## 2021-05-04 RX ORDER — 0.9 % SODIUM CHLORIDE 0.9 %
10 VIAL (ML) INJECTION ONCE
Status: CANCELLED | OUTPATIENT
Start: 2021-06-29 | End: 2021-06-29

## 2021-05-04 RX ORDER — DIPHENHYDRAMINE HYDROCHLORIDE 50 MG/ML
25 INJECTION INTRAMUSCULAR; INTRAVENOUS ONCE
Status: COMPLETED | OUTPATIENT
Start: 2021-05-04 | End: 2021-05-04

## 2021-05-04 RX ORDER — DIPHENHYDRAMINE HYDROCHLORIDE 50 MG/ML
25 INJECTION INTRAMUSCULAR; INTRAVENOUS ONCE
Status: CANCELLED | OUTPATIENT
Start: 2021-06-29

## 2021-05-04 RX ORDER — SODIUM CHLORIDE 9 MG/ML
20 INJECTION, SOLUTION INTRAVENOUS ONCE
Status: CANCELLED | OUTPATIENT
Start: 2021-06-29 | End: 2021-06-29

## 2021-05-04 RX ADMIN — SODIUM CHLORIDE 20 ML/HR: 9 INJECTION, SOLUTION INTRAVENOUS at 10:50

## 2021-05-04 RX ADMIN — SODIUM CHLORIDE, PRESERVATIVE FREE 10 ML: 5 INJECTION INTRAVENOUS at 14:39

## 2021-05-04 RX ADMIN — SODIUM CHLORIDE, PRESERVATIVE FREE 10 ML: 5 INJECTION INTRAVENOUS at 10:50

## 2021-05-04 RX ADMIN — ACETAMINOPHEN 650 MG: 325 TABLET ORAL at 12:01

## 2021-05-04 RX ADMIN — DIPHENHYDRAMINE HYDROCHLORIDE 25 MG: 50 INJECTION, SOLUTION INTRAMUSCULAR; INTRAVENOUS at 12:01

## 2021-05-04 RX ADMIN — RITUXIMAB 700 MG: 10 INJECTION, SOLUTION INTRAVENOUS at 12:29

## 2021-05-04 RX ADMIN — HEPARIN 500 UNITS: 100 SYRINGE at 14:39

## 2021-05-04 ASSESSMENT — PAIN SCALES - GENERAL: PAINLEVEL_OUTOF10: 0

## 2021-05-04 NOTE — TELEPHONE ENCOUNTER
Margoth Lopez MD VISIT & TX  PROCEED W/ RITUXAN TODAY  RV 8 WKS MD VISIT AND Elenita DAVILA VISIT 6/29/21 @ 10:30AM TX @ 10AM  AVS PRINTED W/ INSTRUCTIONS AND GIVEN TO PT ON EXIT

## 2021-05-04 NOTE — PROGRESS NOTES
Paula arrived by wheelchair per self for cycle 10 day 1 treatment and physician visit. Patient denies complaints or concerns. Port accessed;labs sent. Labs reviewed. Physician at bedside. Okay to treat. Patient premedicated. Rituxan initiated at 100 ml/hr for 30 minutes with no sign adverse reaction. Rituxan increased to 200 ml/hr for remainder of infusion;line flushed. Port flushed and heparinized with intact smith needle removed per protocol. Patient left unit per wheelchair at discharge wirh AVS in hand.

## 2021-05-05 NOTE — PROGRESS NOTES
_           Chief Complaint   Patient presents with    Follow-up     DIAGNOSIS:        Marginal Zone lymphoma  Persistent leukocytosis  Persistent thrombocytopenia  Multiple comorbidities as listed     CURRENT THERAPY:         Started treatment with rituximab 5/2/2019. Week #8 June 21, 2019. Maintenance Rituxan every 2 months for 2 years started October 17, 2019. BRIEF CASE HISTORY:      Ms. Diane Muñiz is a very pleasant 78 y.o. female with history of multiple comorbidities including COPD and diabetes. She quit smoking years ago. The patient was recently hospitalized with chest infection. The patient was noted to have leukocytosis. She also had persistent thrombocytopenia. She is referred for further evaluation. Patient denies any active bleeding. She has easy bruising. No fever or night sweats. No weight loss or decreased appetite. No palpable lymph nodes. No history of repeated infections. Patient has history of smoking for more than 50 years. Denies alcohol drinking  She had flow cytometry and bone marrow test. Results showed evidence of marginal zone lymphoma. Start the rituximab with good results. Due to increasing symptoms and further problems related to lymphoma, patient was started on single agent rituximab on 5/20/19. Patient received maintenance rituximab after induction. INTERIM HISTORY:   Patient is seen for follow up leukocytosis and marginal zone lymphoma. She had very good response to induction rituximab. She is due today for rituximab maintenance. She is clinically stable. No weakness or fatigue. No dizziness. No fever or infections. No palpable lymph nodes. No recent infections. No recent hospitalizations. No weight loss or decreased appetite.        PAST MEDICAL HISTORY: has a past medical history of COPD (chronic obstructive pulmonary disease) ([address] Depression, Diabetes mellitus ([facility] [address] Hyperlipidemia, PTSD (post-traumatic stress disorder), and Tubulovillous adenoma. PAST SURGICAL HISTORY: has a past surgical history that includes Hysterectomy; Colonoscopy; Colonoscopy (06/07/2016); and Tunneled venous port placement (04/08/2019). CURRENT MEDICATIONS:  has a current medication list which includes the following prescription(s): gabapentin, fluoxetine, duloxetine, aspirin ec, metformin, albuterol-ipratropium, lisinopril, simvastatin, blood glucose test strips, lancets, OXYGEN, prochlorperazine, esomeprazole, and abdominal binder/elastic med, and the following Facility-Administered Medications: riTUXimab (RITUXAN) 700 mg in sodium chloride 0.9 % 180 mL chemo IVPB, sodium chloride flush, and heparin flush. ALLERGIES:  has No Known Allergies. FAMILY HISTORY: Negative for any hematological or oncological conditions. SOCIAL HISTORY:  reports that she quit smoking about 13 years ago. Her smoking use included cigarettes. She has a 10.00 pack-year smoking history. She has never used smokeless tobacco. She reports that she does not drink alcohol or use drugs. REVIEW OF SYSTEMS:     · General: Positive for weakness and fatigue. No unanticipated weight loss or decreased appetite. No fever or chills. · Eyes: No blurred vision, eye pain or double vision. · Ears: No hearing problems or drainage. No tinnitus. · Throat: No sore throat, problems with swallowing or dysphagia. · Respiratory: No cough, sputum or hemoptysis. No shortness of breath. No pleuritic chest pain. · Cardiovascular: No chest pain, orthopnea or PND. No lower extremity edema. No palpitation. · Gastrointestinal: No problems with swallowing. No abdominal pain or bloating. No nausea or vomiting. No diarrhea or constipation. No GI bleeding. · Genitourinary: No dysuria, hematuria, frequency or urgency. · Musculoskeletal: No muscle aches or pains. No limitation of movement. No back pain.  No gait disturbance, No joint complaints. · Dermatologic: No skin rashes or pruritus. No skin lesions or discolorations. · Psychiatric: No depression, anxiety, or stress or signs of schizophrenia. No change in mood or affect. · Hematologic: No history of bleeding tendency. Easy bruises no ecchymosis. No history of clotting problems. · Infectious disease: No fever, chills or frequent infections. · Endocrine: No problems with obesity. No polydipsia or polyuria. No temperature intolerance. · Neurologic: No headaches or dizziness. No weakness or numbness of the extremities. No changes in balance, coordination,  memory, mentation, behavior. · Allergic/Immunologic: No nasal congestion or hives. No repeated infections. PHYSICAL EXAM:  The patient is not in acute distress. Vital signs: Blood pressure (!) 116/35, pulse 83, temperature 98 °F (36.7 °C), temperature source Oral, resp. rate 18, weight 170 lb 4.8 oz (77.2 kg), not currently breastfeeding. HEENT:  Eyes are normal. Ears, nose and throat are normal.  Neck: Supple. No lymph node enlargement. No thyroid enlargement. Trachea is centrally located. Chest:  Clear to auscultation. No wheezes or crepitations. Heart: Regular sinus rhythm. Abdomen: Soft, nontender. No hepatosplenomegaly. No masses. Extremities:  With no edema. Lymph Nodes:  No cervical, axillary or inguinal lymph node enlargement. Neurologic:  Conscious and oriented. No focal neurological deficits. Psychosocial: No depression, anxiety or stress. Skin: No rashes, bruises or ecchymoses. Review of Diagnostic data:   Recent Labs     05/04/21  1050   WBC 10.8   HGB 14.2   HCT 46.8   MCV 99.2        Peripheral blood flow cytometry:  Peripheral blood flow cytometric immunophenotyping:         Peripheral blood is positive for a monoclonal B-cell population,   positive for CD19, CD20, CD22,   CD45, FMC7 and lambda light chain.      Comment:  Morphology and immunophenotype of circulating monoclonal   B-cells are most consistent with a marginal zone lymphoma.  Bone   marrow evaluation and/or lymph node biopsy if possible are recommended   for confirmation and additional characterization. Bone marrow test August 27, 2018: Final Diagnosis   PERIPHERAL BLOOD:   -LYMPHOCYTOSIS WITH \" VILLOUS\" LYMPHOCYTES. -MODERATE THROMBOCYTOPENIA. BONE MARROW BIOPSY, ASPIRATE SMEAR AND CLOT SECTION:   - MARGINAL ZONE LYMPHOMA. -NORMOCELLULAR TRILINEAGE HEMATOPOIETIC MARROW WITH MARKEDLY   DECREASED IRON STORES. Chemistry        Component Value Date/Time     05/04/2021 1050    K 4.3 05/04/2021 1050     05/04/2021 1050    CO2 22 05/04/2021 1050    BUN 17 05/04/2021 1050    CREATININE 0.71 05/04/2021 1050        Component Value Date/Time    CALCIUM 9.2 05/04/2021 1050    ALKPHOS 62 05/04/2021 1050    AST 16 05/04/2021 1050    ALT 8 05/04/2021 1050    BILITOT 0.34 05/04/2021 1050        Lab Results   Component Value Date    WBC 10.8 05/04/2021    HGB 14.2 05/04/2021    HCT 46.8 05/04/2021    MCV 99.2 05/04/2021     05/04/2021         IMPRESSION:  Marginal Zone lymphoma  Persistent leukocytosis  Persistent thrombocytopenia  Positive FANNY with elevated antihistone. Multiple comorbidities as listed  Acute renal failure, corrected. PLAN: I reviewed the labs as above and discussed with the patient. I explained the significance of these abnormalities in layman language. I explained to patient the nature of low grade NHL. Patient received immunotherapy using rituximab. She had good results induction rituximab treatment. We will continue maintenance rituximab. Explained the benefits and side effects and she understands and agrees. Maintenance rituximab treatment will be every 2 months for 2 years. Proceed with rituximab today. KATHRIN corrected. Return visit in 8 weeks with repeated labs.   Patient's questions were answered to the best of her satisfaction and she verbalized full understanding and agreement.

## 2021-05-14 ENCOUNTER — TELEPHONE (OUTPATIENT)
Dept: FAMILY MEDICINE CLINIC | Age: 76
End: 2021-05-14

## 2021-05-14 DIAGNOSIS — F41.9 ANXIETY: ICD-10-CM

## 2021-05-14 DIAGNOSIS — M15.9 PRIMARY OSTEOARTHRITIS INVOLVING MULTIPLE JOINTS: ICD-10-CM

## 2021-05-14 RX ORDER — HYDROCODONE BITARTRATE AND ACETAMINOPHEN 7.5; 325 MG/1; MG/1
2 TABLET ORAL EVERY 6 HOURS PRN
Qty: 180 TABLET | Refills: 0 | Status: SHIPPED | OUTPATIENT
Start: 2021-05-14 | End: 2021-06-29

## 2021-05-14 RX ORDER — ALPRAZOLAM 1 MG/1
1 TABLET ORAL 3 TIMES DAILY PRN
Qty: 90 TABLET | Refills: 0 | Status: SHIPPED | OUTPATIENT
Start: 2021-05-14 | End: 2021-07-09 | Stop reason: SDUPTHER

## 2021-05-14 NOTE — TELEPHONE ENCOUNTER
Basim Doyle is calling to request a refill on the following medication(s):    Medication Request:  Requested Prescriptions     Pending Prescriptions Disp Refills    ALPRAZolam (XANAX) 1 MG tablet 90 tablet 0     Sig: Take 1 tablet by mouth 3 times daily as needed for Sleep or Anxiety for up to 30 days.  HYDROcodone-acetaminophen (NORCO) 7.5-325 MG per tablet 180 tablet 0     Sig: Take 2 tablets by mouth every 6 hours as needed for Pain for up to 30 days.        Last Visit Date (If Applicable):  4/7/2408    Next Visit Date:    7/9/2021

## 2021-05-26 DIAGNOSIS — E11.8 TYPE 2 DIABETES MELLITUS WITH COMPLICATION, WITHOUT LONG-TERM CURRENT USE OF INSULIN (HCC): ICD-10-CM

## 2021-05-26 DIAGNOSIS — E78.2 MIXED HYPERLIPIDEMIA: Primary | ICD-10-CM

## 2021-05-26 RX ORDER — LISINOPRIL 10 MG/1
10 TABLET ORAL DAILY
Qty: 90 TABLET | Refills: 1 | Status: SHIPPED | OUTPATIENT
Start: 2021-05-26 | End: 2021-11-04 | Stop reason: SDUPTHER

## 2021-05-26 RX ORDER — SIMVASTATIN 40 MG
40 TABLET ORAL NIGHTLY
Qty: 90 TABLET | Refills: 1 | Status: SHIPPED | OUTPATIENT
Start: 2021-05-26 | End: 2022-01-12

## 2021-05-26 NOTE — TELEPHONE ENCOUNTER
Kwaku Lopes is calling to request a refill on the following medication(s):    Medication Request:  Requested Prescriptions     Pending Prescriptions Disp Refills    lisinopril (PRINIVIL;ZESTRIL) 10 MG tablet 90 tablet 0    simvastatin (ZOCOR) 40 MG tablet 90 tablet 0       Last Visit Date (If Applicable):  5/9/3321    Next Visit Date:    7/9/2021

## 2021-06-22 ENCOUNTER — HOSPITAL ENCOUNTER (OUTPATIENT)
Facility: MEDICAL CENTER | Age: 76
End: 2021-06-22
Payer: COMMERCIAL

## 2021-06-29 ENCOUNTER — HOSPITAL ENCOUNTER (OUTPATIENT)
Dept: INFUSION THERAPY | Facility: MEDICAL CENTER | Age: 76
Discharge: HOME | End: 2021-06-29
Payer: COMMERCIAL

## 2021-06-29 ENCOUNTER — OFFICE VISIT (OUTPATIENT)
Dept: ONCOLOGY | Age: 76
End: 2021-06-29
Payer: COMMERCIAL

## 2021-06-29 ENCOUNTER — TELEPHONE (OUTPATIENT)
Dept: ONCOLOGY | Age: 76
End: 2021-06-29

## 2021-06-29 VITALS — DIASTOLIC BLOOD PRESSURE: 41 MMHG | HEART RATE: 74 BPM | SYSTOLIC BLOOD PRESSURE: 84 MMHG

## 2021-06-29 VITALS
DIASTOLIC BLOOD PRESSURE: 43 MMHG | WEIGHT: 175 LBS | SYSTOLIC BLOOD PRESSURE: 80 MMHG | TEMPERATURE: 97.6 F | BODY MASS INDEX: 37.86 KG/M2 | HEART RATE: 63 BPM

## 2021-06-29 DIAGNOSIS — C85.80 MARGINAL ZONE LYMPHOMA (HCC): ICD-10-CM

## 2021-06-29 DIAGNOSIS — C85.80 MARGINAL ZONE LYMPHOMA (HCC): Primary | ICD-10-CM

## 2021-06-29 DIAGNOSIS — C85.80 MARGINAL ZONE B-CELL LYMPHOMA (HCC): Primary | ICD-10-CM

## 2021-06-29 LAB
ABSOLUTE EOS #: 0.13 K/UL (ref 0–0.44)
ABSOLUTE IMMATURE GRANULOCYTE: 0.03 K/UL (ref 0–0.3)
ABSOLUTE LYMPH #: 3.38 K/UL (ref 1.1–3.7)
ABSOLUTE MONO #: 0.76 K/UL (ref 0.1–1.2)
ALBUMIN SERPL-MCNC: 3.8 G/DL (ref 3.5–5.2)
ALBUMIN/GLOBULIN RATIO: ABNORMAL (ref 1–2.5)
ALP BLD-CCNC: 60 U/L (ref 35–104)
ALT SERPL-CCNC: <5 U/L (ref 5–33)
ANION GAP SERPL CALCULATED.3IONS-SCNC: 10 MMOL/L (ref 9–17)
AST SERPL-CCNC: 16 U/L
BASOPHILS # BLD: 1 % (ref 0–2)
BASOPHILS ABSOLUTE: 0.04 K/UL (ref 0–0.2)
BILIRUB SERPL-MCNC: 0.11 MG/DL (ref 0.3–1.2)
BUN BLDV-MCNC: 21 MG/DL (ref 8–23)
BUN/CREAT BLD: 27 (ref 9–20)
CALCIUM SERPL-MCNC: 9.2 MG/DL (ref 8.6–10.4)
CHLORIDE BLD-SCNC: 105 MMOL/L (ref 98–107)
CO2: 25 MMOL/L (ref 20–31)
CREAT SERPL-MCNC: 0.79 MG/DL (ref 0.5–0.9)
DIFFERENTIAL TYPE: NORMAL
EOSINOPHILS RELATIVE PERCENT: 2 % (ref 1–4)
GFR AFRICAN AMERICAN: >60 ML/MIN
GFR NON-AFRICAN AMERICAN: >60 ML/MIN
GFR SERPL CREATININE-BSD FRML MDRD: ABNORMAL ML/MIN/{1.73_M2}
GFR SERPL CREATININE-BSD FRML MDRD: ABNORMAL ML/MIN/{1.73_M2}
GLUCOSE BLD-MCNC: 111 MG/DL (ref 70–99)
HCT VFR BLD CALC: 41.2 % (ref 36.3–47.1)
HEMOGLOBIN: 13.1 G/DL (ref 11.9–15.1)
IMMATURE GRANULOCYTES: 0 %
LYMPHOCYTES # BLD: 39 % (ref 24–43)
MCH RBC QN AUTO: 31.2 PG (ref 25.2–33.5)
MCHC RBC AUTO-ENTMCNC: 31.8 G/DL (ref 28.4–34.8)
MCV RBC AUTO: 98.1 FL (ref 82.6–102.9)
MONOCYTES # BLD: 9 % (ref 3–12)
NRBC AUTOMATED: 0 PER 100 WBC
PDW BLD-RTO: 13.3 % (ref 11.8–14.4)
PLATELET # BLD: 269 K/UL (ref 138–453)
PLATELET ESTIMATE: NORMAL
PMV BLD AUTO: 9.2 FL (ref 8.1–13.5)
POTASSIUM SERPL-SCNC: 4.6 MMOL/L (ref 3.7–5.3)
RBC # BLD: 4.2 M/UL (ref 3.95–5.11)
RBC # BLD: NORMAL 10*6/UL
SEG NEUTROPHILS: 49 % (ref 36–65)
SEGMENTED NEUTROPHILS ABSOLUTE COUNT: 4.27 K/UL (ref 1.5–8.1)
SODIUM BLD-SCNC: 140 MMOL/L (ref 135–144)
TOTAL PROTEIN: 6.5 G/DL (ref 6.4–8.3)
WBC # BLD: 8.6 K/UL (ref 3.5–11.3)
WBC # BLD: NORMAL 10*3/UL

## 2021-06-29 PROCEDURE — 99211 OFF/OP EST MAY X REQ PHY/QHP: CPT | Performed by: INTERNAL MEDICINE

## 2021-06-29 PROCEDURE — 96413 CHEMO IV INFUSION 1 HR: CPT

## 2021-06-29 PROCEDURE — 99214 OFFICE O/P EST MOD 30 MIN: CPT | Performed by: INTERNAL MEDICINE

## 2021-06-29 PROCEDURE — 2580000003 HC RX 258: Performed by: INTERNAL MEDICINE

## 2021-06-29 PROCEDURE — 85025 COMPLETE CBC W/AUTO DIFF WBC: CPT

## 2021-06-29 PROCEDURE — 6370000000 HC RX 637 (ALT 250 FOR IP): Performed by: INTERNAL MEDICINE

## 2021-06-29 PROCEDURE — 96375 TX/PRO/DX INJ NEW DRUG ADDON: CPT

## 2021-06-29 PROCEDURE — 80053 COMPREHEN METABOLIC PANEL: CPT

## 2021-06-29 PROCEDURE — 96415 CHEMO IV INFUSION ADDL HR: CPT

## 2021-06-29 PROCEDURE — 6360000002 HC RX W HCPCS: Performed by: INTERNAL MEDICINE

## 2021-06-29 PROCEDURE — 36591 DRAW BLOOD OFF VENOUS DEVICE: CPT

## 2021-06-29 RX ORDER — HEPARIN SODIUM (PORCINE) LOCK FLUSH IV SOLN 100 UNIT/ML 100 UNIT/ML
500 SOLUTION INTRAVENOUS PRN
Status: DISCONTINUED | OUTPATIENT
Start: 2021-06-29 | End: 2021-06-30 | Stop reason: HOSPADM

## 2021-06-29 RX ORDER — ACETAMINOPHEN 325 MG/1
650 TABLET ORAL ONCE
Status: COMPLETED | OUTPATIENT
Start: 2021-06-29 | End: 2021-06-29

## 2021-06-29 RX ORDER — DIPHENHYDRAMINE HYDROCHLORIDE 50 MG/ML
25 INJECTION INTRAMUSCULAR; INTRAVENOUS ONCE
Status: COMPLETED | OUTPATIENT
Start: 2021-06-29 | End: 2021-06-29

## 2021-06-29 RX ORDER — SODIUM CHLORIDE 9 MG/ML
20 INJECTION, SOLUTION INTRAVENOUS ONCE
Status: COMPLETED | OUTPATIENT
Start: 2021-06-29 | End: 2021-06-29

## 2021-06-29 RX ORDER — SODIUM CHLORIDE 0.9 % (FLUSH) 0.9 %
10 SYRINGE (ML) INJECTION PRN
Status: DISCONTINUED | OUTPATIENT
Start: 2021-06-29 | End: 2021-06-30 | Stop reason: HOSPADM

## 2021-06-29 RX ADMIN — HEPARIN 500 UNITS: 100 SYRINGE at 15:43

## 2021-06-29 RX ADMIN — SODIUM CHLORIDE, PRESERVATIVE FREE 10 ML: 5 INJECTION INTRAVENOUS at 10:40

## 2021-06-29 RX ADMIN — DIPHENHYDRAMINE HYDROCHLORIDE 25 MG: 50 INJECTION INTRAMUSCULAR; INTRAVENOUS at 12:48

## 2021-06-29 RX ADMIN — RITUXIMAB 700 MG: 10 INJECTION, SOLUTION INTRAVENOUS at 13:14

## 2021-06-29 RX ADMIN — SODIUM CHLORIDE, PRESERVATIVE FREE 10 ML: 5 INJECTION INTRAVENOUS at 15:43

## 2021-06-29 RX ADMIN — ACETAMINOPHEN 650 MG: 325 TABLET ORAL at 12:49

## 2021-06-29 RX ADMIN — SODIUM CHLORIDE 20 ML/HR: 9 INJECTION, SOLUTION INTRAVENOUS at 10:40

## 2021-06-29 ASSESSMENT — PAIN SCALES - GENERAL: PAINLEVEL_OUTOF10: 9

## 2021-06-29 NOTE — PROGRESS NOTES
Pt arrives per wheelchair per self and labs and orders reviewed and pt seen per md. O2 on at 3L/NC and uses O2 at home and has portable O2 with pt. NS infusing before and after premed and rituxan and rate is 100 ml/hr for 30 min, 200 ml/hr for 1 hour. See flowsheet for all vital signs. Pt BP improved and md notified of low BP , but increased to 95/47. Pt napping after benadryl and NS infusing post rituxan and all 500 ml infused Pt discharged per wheelchair with Patsy Jiang with AVS with next appts from . No reactions or complaints and blood return present throughout infusion.

## 2021-06-29 NOTE — TELEPHONE ENCOUNTER
Geoffrey Segura MD VISIT & [id number] [facility] IN TO SEE PATIENT  ORDERS RECEIVED  PROCEED WITH RITUXAN TODAY  RV 8 WEEKS WITH MD VISIT & [address] [facility]  MD VISIT  8/24/21 @ 11:15AM [place] @11AM  AVS PRINTED AND GIVEN TO PATIENT WITH INSTRUCTIONS  PATIENT REMAINS IN [address] [place] [address]

## 2021-07-06 NOTE — PROGRESS NOTES
_           Chief Complaint   Patient presents with    Follow-up     DIAGNOSIS:        Marginal Zone lymphoma  Persistent leukocytosis  Persistent thrombocytopenia  Multiple comorbidities as listed     CURRENT THERAPY:         Started treatment with rituximab 5/2/2019. Week #8 June 21, 2019. Maintenance Rituxan every 2 months for 2 years started October 17, 2019. BRIEF CASE HISTORY:      Ms. Tye Smith is a very pleasant 78 y.o. female with history of multiple comorbidities including COPD and diabetes. She quit smoking years ago. The patient was recently hospitalized with chest infection. The patient was noted to have leukocytosis. She also had persistent thrombocytopenia. She is referred for further evaluation. Patient denies any active bleeding. She has easy bruising. No fever or night sweats. No weight loss or decreased appetite. No palpable lymph nodes. No history of repeated infections. Patient has history of smoking for more than 50 years. Denies alcohol drinking  She had flow cytometry and bone marrow test. Results showed evidence of marginal zone lymphoma. Start the rituximab with good results. Due to increasing symptoms and further problems related to lymphoma, patient was started on single agent rituximab on 5/20/19. Patient received maintenance rituximab after induction. INTERIM HISTORY:   Patient is seen for follow up leukocytosis and marginal zone lymphoma. She had very good response to induction rituximab. She is due today for rituximab maintenance. She is clinically stable. No weakness or fatigue. No dizziness. No fever or infections. No palpable lymph nodes. No recent infections. No recent hospitalizations. No weight loss or decreased appetite.        PAST MEDICAL HISTORY: has a past medical history of COPD (chronic obstructive pulmonary disease) ([address] Depression, Diabetes mellitus ([facility] [address] Hyperlipidemia, PTSD (post-traumatic stress disorder), and Tubulovillous adenoma. PAST SURGICAL HISTORY: has a past surgical history that includes Hysterectomy; Colonoscopy; Colonoscopy (06/07/2016); and Tunneled venous port placement (04/08/2019). CURRENT MEDICATIONS:  has a current medication list which includes the following prescription(s): lisinopril, simvastatin, gabapentin, fluoxetine, duloxetine, aspirin ec, metformin, albuterol-ipratropium, blood glucose test strips, lancets, OXYGEN, prochlorperazine, esomeprazole, and abdominal binder/elastic med. ALLERGIES:  has No Known Allergies. FAMILY HISTORY: Negative for any hematological or oncological conditions. SOCIAL HISTORY:  reports that she quit smoking about 13 years ago. Her smoking use included cigarettes. She has a 10.00 pack-year smoking history. She has never used smokeless tobacco. She reports that she does not drink alcohol and does not use drugs. REVIEW OF SYSTEMS:     · General: Positive for weakness and fatigue. No unanticipated weight loss or decreased appetite. No fever or chills. · Eyes: No blurred vision, eye pain or double vision. · Ears: No hearing problems or drainage. No tinnitus. · Throat: No sore throat, problems with swallowing or dysphagia. · Respiratory: No cough, sputum or hemoptysis. No shortness of breath. No pleuritic chest pain. · Cardiovascular: No chest pain, orthopnea or PND. No lower extremity edema. No palpitation. · Gastrointestinal: No problems with swallowing. No abdominal pain or bloating. No nausea or vomiting. No diarrhea or constipation. No GI bleeding. · Genitourinary: No dysuria, hematuria, frequency or urgency. · Musculoskeletal: No muscle aches or pains. No limitation of movement. No back pain. No gait disturbance, No joint complaints. · Dermatologic: No skin rashes or pruritus. No skin lesions or discolorations.    · Psychiatric: No depression, anxiety, or stress or signs of schizophrenia. No change in mood or affect. · Hematologic: No history of bleeding tendency. Easy bruises no ecchymosis. No history of clotting problems. · Infectious disease: No fever, chills or frequent infections. · Endocrine: No problems with obesity. No polydipsia or polyuria. No temperature intolerance. · Neurologic: No headaches or dizziness. No weakness or numbness of the extremities. No changes in balance, coordination,  memory, mentation, behavior. · Allergic/Immunologic: No nasal congestion or hives. No repeated infections. PHYSICAL EXAM:  The patient is not in acute distress. Vital signs: Blood pressure (!) 80/43, pulse 63, temperature 97.6 °F (36.4 °C), temperature source Oral, weight 175 lb (79.4 kg), not currently breastfeeding. HEENT:  Eyes are normal. Ears, nose and throat are normal.  Neck: Supple. No lymph node enlargement. No thyroid enlargement. Trachea is centrally located. Chest:  Clear to auscultation. No wheezes or crepitations. Heart: Regular sinus rhythm. Abdomen: Soft, nontender. No hepatosplenomegaly. No masses. Extremities:  With no edema. Lymph Nodes:  No cervical, axillary or inguinal lymph node enlargement. Neurologic:  Conscious and oriented. No focal neurological deficits. Psychosocial: No depression, anxiety or stress. Skin: No rashes, bruises or ecchymoses. Review of Diagnostic data:   Recent Labs     06/29/21  1040   WBC 8.6   HGB 13.1   HCT 41.2   MCV 98.1        Peripheral blood flow cytometry:  Peripheral blood flow cytometric immunophenotyping:         Peripheral blood is positive for a monoclonal B-cell population,   positive for CD19, CD20, CD22,   CD45, FMC7 and lambda light chain.      Comment:  Morphology and immunophenotype of circulating monoclonal   B-cells are most consistent with a marginal zone lymphoma.  Bone   marrow evaluation and/or lymph node biopsy if possible are recommended   for confirmation and additional characterization. Bone marrow test August 27, 2018: Final Diagnosis   PERIPHERAL BLOOD:   -LYMPHOCYTOSIS WITH \" VILLOUS\" LYMPHOCYTES. -MODERATE THROMBOCYTOPENIA. BONE MARROW BIOPSY, ASPIRATE SMEAR AND CLOT SECTION:   - MARGINAL ZONE LYMPHOMA. -NORMOCELLULAR TRILINEAGE HEMATOPOIETIC MARROW WITH MARKEDLY   DECREASED IRON STORES. Chemistry        Component Value Date/Time     06/29/2021 1040    K 4.6 06/29/2021 1040     06/29/2021 1040    CO2 25 06/29/2021 1040    BUN 21 06/29/2021 1040    CREATININE 0.79 06/29/2021 1040        Component Value Date/Time    CALCIUM 9.2 06/29/2021 1040    ALKPHOS 60 06/29/2021 1040    AST 16 06/29/2021 1040    ALT <5 (L) 06/29/2021 1040    BILITOT 0.11 (L) 06/29/2021 1040        Lab Results   Component Value Date    WBC 8.6 06/29/2021    HGB 13.1 06/29/2021    HCT 41.2 06/29/2021    MCV 98.1 06/29/2021     06/29/2021         IMPRESSION:  Marginal Zone lymphoma  Persistent leukocytosis  Persistent thrombocytopenia  Positive FANNY with elevated antihistone. Multiple comorbidities as listed  Acute renal failure, corrected. PLAN: I reviewed the labs as above and discussed with the patient. I explained the significance of these abnormalities in layman language. I explained to patient the nature of low grade NHL. Patient received immunotherapy using rituximab. She had good results induction rituximab treatment. We will continue maintenance rituximab. Explained the benefits and side effects and she understands and agrees. Maintenance rituximab treatment will be every 2 months for 2 years. Proceed with rituximab today. KATHRIN improved. Return visit in 8 weeks with repeated labs. Patient's questions were answered to the best of her satisfaction and she verbalized full understanding and agreement.

## 2021-07-09 DIAGNOSIS — I50.9 ACUTE ON CHRONIC CONGESTIVE HEART FAILURE, UNSPECIFIED HEART FAILURE TYPE (HCC): ICD-10-CM

## 2021-07-09 DIAGNOSIS — M15.9 PRIMARY OSTEOARTHRITIS INVOLVING MULTIPLE JOINTS: ICD-10-CM

## 2021-07-09 DIAGNOSIS — E11.8 TYPE 2 DIABETES MELLITUS WITH COMPLICATION, WITHOUT LONG-TERM CURRENT USE OF INSULIN (HCC): ICD-10-CM

## 2021-07-09 DIAGNOSIS — F41.9 ANXIETY: ICD-10-CM

## 2021-07-09 RX ORDER — METFORMIN HYDROCHLORIDE 750 MG/1
750 TABLET, EXTENDED RELEASE ORAL
Qty: 90 TABLET | Refills: 1 | Status: SHIPPED | OUTPATIENT
Start: 2021-07-09 | End: 2022-01-26

## 2021-07-09 RX ORDER — HYDROCODONE BITARTRATE AND ACETAMINOPHEN 7.5; 325 MG/1; MG/1
2 TABLET ORAL EVERY 6 HOURS PRN
Qty: 180 TABLET | Refills: 0 | Status: SHIPPED | OUTPATIENT
Start: 2021-07-09 | End: 2021-09-01 | Stop reason: SDUPTHER

## 2021-07-09 RX ORDER — ALPRAZOLAM 1 MG/1
1 TABLET ORAL 3 TIMES DAILY PRN
Qty: 90 TABLET | Refills: 0 | Status: SHIPPED | OUTPATIENT
Start: 2021-07-09 | End: 2021-08-20 | Stop reason: SDUPTHER

## 2021-07-09 RX ORDER — ASPIRIN 81 MG/1
81 TABLET ORAL DAILY
Qty: 90 TABLET | Refills: 1 | Status: SHIPPED | OUTPATIENT
Start: 2021-07-09 | End: 2021-11-04 | Stop reason: SDUPTHER

## 2021-07-09 NOTE — TELEPHONE ENCOUNTER
Royanne Buerger is calling to request a refill on the following medication(s):    Medication Request:  Requested Prescriptions     Pending Prescriptions Disp Refills    metFORMIN (GLUCOPHAGE-XR) 750 MG extended release tablet 90 tablet 1     Sig: Take 1 tablet by mouth daily (with breakfast)    HYDROcodone-acetaminophen (NORCO) 7.5-325 MG per tablet 180 tablet 0     Sig: Take 2 tablets by mouth every 6 hours as needed for Pain for up to 30 days.  aspirin EC 81 MG EC tablet 90 tablet 1     Sig: Take 1 tablet by mouth daily    ALPRAZolam (XANAX) 1 MG tablet 90 tablet 0     Sig: Take 1 tablet by mouth 3 times daily as needed for Sleep or Anxiety for up to 30 days.        Last Visit Date (If Applicable):  3/9/8643    Next Visit Date:    Visit date not found

## 2021-07-20 RX ORDER — GABAPENTIN 600 MG/1
TABLET ORAL
Qty: 90 TABLET | Refills: 1 | Status: SHIPPED | OUTPATIENT
Start: 2021-07-20 | End: 2021-09-22 | Stop reason: SDUPTHER

## 2021-07-20 NOTE — TELEPHONE ENCOUNTER
Julian Cruz is calling to request a refill on the following medication(s):    Medication Request:  Requested Prescriptions     Pending Prescriptions Disp Refills    gabapentin (NEURONTIN) 600 MG tablet [Pharmacy Med Name: GABAPENTIN 600 MG TABLET] 90 tablet 1     Sig: TAKE ONE TABLET BY MOUTH THREE TIMES A DAY       Last Visit Date (If Applicable):  3/5/1481    Next Visit Date:    Visit date not found

## 2021-08-19 ENCOUNTER — HOSPITAL ENCOUNTER (OUTPATIENT)
Facility: MEDICAL CENTER | Age: 76
End: 2021-08-19
Payer: COMMERCIAL

## 2021-08-20 DIAGNOSIS — F41.9 ANXIETY: ICD-10-CM

## 2021-08-20 RX ORDER — ALPRAZOLAM 1 MG/1
1 TABLET ORAL 3 TIMES DAILY PRN
Qty: 90 TABLET | Refills: 0 | Status: SHIPPED | OUTPATIENT
Start: 2021-08-20 | End: 2021-09-27 | Stop reason: SDUPTHER

## 2021-08-20 NOTE — TELEPHONE ENCOUNTER
[facility] is calling to request a refill on the following medication(s):    Medication Request:  Requested Prescriptions     Pending Prescriptions Disp Refills    ALPRAZolam (XANAX) 1 MG tablet 90 tablet 0     Sig: Take 1 tablet by mouth 3 times daily as needed for Sleep or Anxiety for up to 30 days.        Last Visit Date (If Applicable):  2/3/7839    Next Visit Date:    Visit date not found

## 2021-08-24 ENCOUNTER — TELEPHONE (OUTPATIENT)
Dept: ONCOLOGY | Age: 76
End: 2021-08-24

## 2021-08-24 ENCOUNTER — HOSPITAL ENCOUNTER (OUTPATIENT)
Dept: INFUSION THERAPY | Facility: MEDICAL CENTER | Age: 76
Discharge: HOME | End: 2021-08-24
Payer: COMMERCIAL

## 2021-08-24 ENCOUNTER — OFFICE VISIT (OUTPATIENT)
Dept: ONCOLOGY | Age: 76
End: 2021-08-24
Payer: COMMERCIAL

## 2021-08-24 VITALS
DIASTOLIC BLOOD PRESSURE: 42 MMHG | SYSTOLIC BLOOD PRESSURE: 96 MMHG | BODY MASS INDEX: 36.6 KG/M2 | HEART RATE: 59 BPM | WEIGHT: 169.2 LBS | TEMPERATURE: 98 F | RESPIRATION RATE: 18 BRPM

## 2021-08-24 DIAGNOSIS — C85.80 MARGINAL ZONE LYMPHOMA (HCC): Primary | ICD-10-CM

## 2021-08-24 DIAGNOSIS — C85.80 MARGINAL ZONE B-CELL LYMPHOMA (HCC): ICD-10-CM

## 2021-08-24 LAB
ABSOLUTE EOS #: 0.03 K/UL (ref 0–0.44)
ABSOLUTE IMMATURE GRANULOCYTE: 0.06 K/UL (ref 0–0.3)
ABSOLUTE LYMPH #: 1.67 K/UL (ref 1.1–3.7)
ABSOLUTE MONO #: 0.91 K/UL (ref 0.1–1.2)
ALBUMIN SERPL-MCNC: 4.1 G/DL (ref 3.5–5.2)
ALBUMIN/GLOBULIN RATIO: ABNORMAL (ref 1–2.5)
ALP BLD-CCNC: 66 U/L (ref 35–104)
ALT SERPL-CCNC: 12 U/L (ref 5–33)
ANION GAP SERPL CALCULATED.3IONS-SCNC: 16 MMOL/L (ref 9–17)
AST SERPL-CCNC: 16 U/L
BASOPHILS # BLD: 0 % (ref 0–2)
BASOPHILS ABSOLUTE: 0.05 K/UL (ref 0–0.2)
BILIRUB SERPL-MCNC: 0.29 MG/DL (ref 0.3–1.2)
BUN BLDV-MCNC: 14 MG/DL (ref 8–23)
BUN/CREAT BLD: 13 (ref 9–20)
CALCIUM SERPL-MCNC: 9.2 MG/DL (ref 8.6–10.4)
CHLORIDE BLD-SCNC: 105 MMOL/L (ref 98–107)
CO2: 19 MMOL/L (ref 20–31)
CREAT SERPL-MCNC: 1.06 MG/DL (ref 0.5–0.9)
DIFFERENTIAL TYPE: ABNORMAL
EOSINOPHILS RELATIVE PERCENT: 0 % (ref 1–4)
GFR AFRICAN AMERICAN: >60 ML/MIN
GFR NON-AFRICAN AMERICAN: 51 ML/MIN
GFR SERPL CREATININE-BSD FRML MDRD: ABNORMAL ML/MIN/{1.73_M2}
GFR SERPL CREATININE-BSD FRML MDRD: ABNORMAL ML/MIN/{1.73_M2}
GLUCOSE BLD-MCNC: 100 MG/DL (ref 70–99)
HCT VFR BLD CALC: 43.3 % (ref 36.3–47.1)
HEMOGLOBIN: 13.5 G/DL (ref 11.9–15.1)
IMMATURE GRANULOCYTES: 1 %
LYMPHOCYTES # BLD: 14 % (ref 24–43)
MCH RBC QN AUTO: 30.3 PG (ref 25.2–33.5)
MCHC RBC AUTO-ENTMCNC: 31.2 G/DL (ref 28.4–34.8)
MCV RBC AUTO: 97.1 FL (ref 82.6–102.9)
MONOCYTES # BLD: 8 % (ref 3–12)
NRBC AUTOMATED: 0 PER 100 WBC
PDW BLD-RTO: 13.3 % (ref 11.8–14.4)
PLATELET # BLD: 263 K/UL (ref 138–453)
PLATELET ESTIMATE: ABNORMAL
PMV BLD AUTO: 9.1 FL (ref 8.1–13.5)
POTASSIUM SERPL-SCNC: 4.5 MMOL/L (ref 3.7–5.3)
RBC # BLD: 4.46 M/UL (ref 3.95–5.11)
RBC # BLD: ABNORMAL 10*6/UL
SEG NEUTROPHILS: 77 % (ref 36–65)
SEGMENTED NEUTROPHILS ABSOLUTE COUNT: 9.25 K/UL (ref 1.5–8.1)
SODIUM BLD-SCNC: 140 MMOL/L (ref 135–144)
TOTAL PROTEIN: 6.7 G/DL (ref 6.4–8.3)
WBC # BLD: 12 K/UL (ref 3.5–11.3)
WBC # BLD: ABNORMAL 10*3/UL

## 2021-08-24 PROCEDURE — 2580000003 HC RX 258: Performed by: INTERNAL MEDICINE

## 2021-08-24 PROCEDURE — 99211 OFF/OP EST MAY X REQ PHY/QHP: CPT | Performed by: INTERNAL MEDICINE

## 2021-08-24 PROCEDURE — 6360000002 HC RX W HCPCS: Performed by: INTERNAL MEDICINE

## 2021-08-24 PROCEDURE — 96415 CHEMO IV INFUSION ADDL HR: CPT

## 2021-08-24 PROCEDURE — 96413 CHEMO IV INFUSION 1 HR: CPT

## 2021-08-24 PROCEDURE — 80053 COMPREHEN METABOLIC PANEL: CPT

## 2021-08-24 PROCEDURE — 6370000000 HC RX 637 (ALT 250 FOR IP): Performed by: INTERNAL MEDICINE

## 2021-08-24 PROCEDURE — 99214 OFFICE O/P EST MOD 30 MIN: CPT | Performed by: INTERNAL MEDICINE

## 2021-08-24 PROCEDURE — 96375 TX/PRO/DX INJ NEW DRUG ADDON: CPT

## 2021-08-24 PROCEDURE — 85025 COMPLETE CBC W/AUTO DIFF WBC: CPT

## 2021-08-24 RX ORDER — EPINEPHRINE 1 MG/ML
0.3 INJECTION, SOLUTION, CONCENTRATE INTRAVENOUS PRN
Status: CANCELLED | OUTPATIENT
Start: 2021-08-24

## 2021-08-24 RX ORDER — DIPHENHYDRAMINE HYDROCHLORIDE 50 MG/ML
25 INJECTION INTRAMUSCULAR; INTRAVENOUS ONCE
Status: COMPLETED | OUTPATIENT
Start: 2021-08-24 | End: 2021-08-24

## 2021-08-24 RX ORDER — SODIUM CHLORIDE 9 MG/ML
INJECTION, SOLUTION INTRAVENOUS CONTINUOUS
Status: CANCELLED | OUTPATIENT
Start: 2021-08-24

## 2021-08-24 RX ORDER — 0.9 % SODIUM CHLORIDE 0.9 %
10 VIAL (ML) INJECTION ONCE
Status: CANCELLED | OUTPATIENT
Start: 2021-08-24 | End: 2021-08-24

## 2021-08-24 RX ORDER — ACETAMINOPHEN 325 MG/1
650 TABLET ORAL ONCE
Status: COMPLETED | OUTPATIENT
Start: 2021-08-24 | End: 2021-08-24

## 2021-08-24 RX ORDER — ACETAMINOPHEN 325 MG/1
650 TABLET ORAL ONCE
Status: CANCELLED | OUTPATIENT
Start: 2021-08-24

## 2021-08-24 RX ORDER — SODIUM CHLORIDE 9 MG/ML
20 INJECTION, SOLUTION INTRAVENOUS ONCE
Status: COMPLETED | OUTPATIENT
Start: 2021-08-24 | End: 2021-08-24

## 2021-08-24 RX ORDER — MEPERIDINE HYDROCHLORIDE 50 MG/ML
12.5 INJECTION INTRAMUSCULAR; INTRAVENOUS; SUBCUTANEOUS ONCE
Status: CANCELLED | OUTPATIENT
Start: 2021-08-24 | End: 2021-08-24

## 2021-08-24 RX ORDER — DIPHENHYDRAMINE HYDROCHLORIDE 50 MG/ML
25 INJECTION INTRAMUSCULAR; INTRAVENOUS ONCE
Status: CANCELLED | OUTPATIENT
Start: 2021-08-24

## 2021-08-24 RX ORDER — SODIUM CHLORIDE 0.9 % (FLUSH) 0.9 %
10 SYRINGE (ML) INJECTION PRN
Status: DISCONTINUED | OUTPATIENT
Start: 2021-08-24 | End: 2021-08-25 | Stop reason: HOSPADM

## 2021-08-24 RX ORDER — SODIUM CHLORIDE 0.9 % (FLUSH) 0.9 %
5 SYRINGE (ML) INJECTION PRN
Status: CANCELLED | OUTPATIENT
Start: 2021-08-24

## 2021-08-24 RX ORDER — DIPHENHYDRAMINE HYDROCHLORIDE 50 MG/ML
50 INJECTION INTRAMUSCULAR; INTRAVENOUS ONCE
Status: CANCELLED | OUTPATIENT
Start: 2021-08-24 | End: 2021-08-24

## 2021-08-24 RX ORDER — SODIUM CHLORIDE 9 MG/ML
20 INJECTION, SOLUTION INTRAVENOUS ONCE
Status: CANCELLED | OUTPATIENT
Start: 2021-08-24 | End: 2021-08-24

## 2021-08-24 RX ORDER — HEPARIN SODIUM (PORCINE) LOCK FLUSH IV SOLN 100 UNIT/ML 100 UNIT/ML
500 SOLUTION INTRAVENOUS PRN
Status: DISCONTINUED | OUTPATIENT
Start: 2021-08-24 | End: 2021-08-25 | Stop reason: HOSPADM

## 2021-08-24 RX ORDER — METHYLPREDNISOLONE SODIUM SUCCINATE 125 MG/2ML
125 INJECTION, POWDER, LYOPHILIZED, FOR SOLUTION INTRAMUSCULAR; INTRAVENOUS ONCE
Status: CANCELLED | OUTPATIENT
Start: 2021-08-24 | End: 2021-08-24

## 2021-08-24 RX ADMIN — ACETAMINOPHEN 650 MG: 325 TABLET ORAL at 13:26

## 2021-08-24 RX ADMIN — HEPARIN 500 UNITS: 100 SYRINGE at 16:01

## 2021-08-24 RX ADMIN — SODIUM CHLORIDE, PRESERVATIVE FREE 10 ML: 5 INJECTION INTRAVENOUS at 16:00

## 2021-08-24 RX ADMIN — WATER 2.2 ML: 1 INJECTION INTRAMUSCULAR; INTRAVENOUS; SUBCUTANEOUS at 12:08

## 2021-08-24 RX ADMIN — DIPHENHYDRAMINE HYDROCHLORIDE 25 MG: 50 INJECTION, SOLUTION INTRAMUSCULAR; INTRAVENOUS at 13:27

## 2021-08-24 RX ADMIN — ALTEPLASE 2 MG: 2.2 INJECTION, POWDER, LYOPHILIZED, FOR SOLUTION INTRAVENOUS at 12:08

## 2021-08-24 RX ADMIN — SODIUM CHLORIDE 20 ML/HR: 9 INJECTION, SOLUTION INTRAVENOUS at 13:00

## 2021-08-24 RX ADMIN — RITUXIMAB 700 MG: 10 INJECTION, SOLUTION INTRAVENOUS at 14:05

## 2021-08-24 ASSESSMENT — PAIN SCALES - GENERAL: PAINLEVEL_OUTOF10: 0

## 2021-08-24 NOTE — PLAN OF CARE
Problem: SAFETY  Goal: Free from accidental physical injury  8/24/2021 1643 by Ayad Munson RN  Outcome: Completed  8/24/2021 1643 by Ayad Munson, RN  Outcome: Met This Shift

## 2021-08-24 NOTE — PATIENT INSTRUCTIONS
Proceed with treatment today.  Last rituxan  RV 3-4 months with labs at [facility]

## 2021-08-24 NOTE — FLOWSHEET NOTE
Situation:  Writer visited with Ms. Smith as she was leaving the hospital.    Assessment:  Ms. Narcisa Lopez was in a wheelchair escorted by the [address]. She smiled and greeted writer. She shared that today was her last day of treatment after 3 years of cancer. She expressed gratitude to her doctor and to God. She is wondering what plans God has for her. She talked about her siblings and how they are looking out for her. She spoke of future hopes. She accessed her sense of humor and positive attitude during the conversation. Intervention:  Writer provided supportive presence and explored Pt's coping and needs; offered words of encouragement and support; congratulated and affirmed Pt. Outcome:  Ms. Narcisa Lopez thanked writer and entered her car to leave the hospital.        08/24/21 1626   Encounter Summary   Services provided to: Patient   Referral/Consult From: [facility] Family members   Continue Visiting   (8/24/21)   Complexity of Encounter Moderate   Length of Encounter 15 minutes   Spiritual Assessment Completed Yes   Spiritual/Hoahaoism   Type Spiritual support   Assessment Calm; Approachable;Coping; Hopeful   Intervention Active listening;Explored feelings, thoughts, concerns;Sustaining presence/ Ministry of presence; Discussed illness/injury and it's impact   Outcome Receptive; Hopeful;Encouraged;Coping;Engaged in conversation;Expressed gratitude     Electronically signed by Kelsey Gaines, Oncology [facility] [address] [facility], [facility] Radiation Oncology  8/24/2021  4:27 PM

## 2021-08-24 NOTE — TELEPHONE ENCOUNTER
Alison Munguia FOR MD VISIT  DR Jocelyn Patterson IN TO SEE PATIENT  ORDERS RECEIVED  PROCEED WITH TX TODAY, LAST RITUXAN  RV 3-4 WEEKS WITH LABS AT RV  PORT FLUSH 10/19/21 @1:30PM  RN DRAW CDP CMP 12/7/21 @3:30PM  MD VISIT 12/7/21 @4:15PM  AVS PRINTED AND GIVEN TO PATIENT WITH INSTRUCTIONS  PATIENT REMAINS IN [address] [place] [address]

## 2021-08-30 DIAGNOSIS — E11.8 TYPE 2 DIABETES MELLITUS WITH COMPLICATION, WITHOUT LONG-TERM CURRENT USE OF INSULIN (HCC): Primary | ICD-10-CM

## 2021-08-30 NOTE — TELEPHONE ENCOUNTER
Ashley Cartwright is calling to request a refill on the following medication(s):    Medication Request:  Requested Prescriptions     Pending Prescriptions Disp Refills    glucose monitoring (FREESTYLE FREEDOM) kit 1 kit 0     Si kit by Does not apply route daily as needed (testing once daily)    blood glucose test strips (ASCENSIA AUTODISC VI;ONE TOUCH ULTRA TEST VI) strip 100 strip 5     Sig: Use with associated glucose meter.  Test one time daily and additional times if abnormal readings    Lancets MISC 100 each 5     Sig: Inject 1 each into the skin daily    Isopropyl Alcohol (ALCOHOL WIPES) 70 % MISC       Sig: Apply topically       Last Visit Date (If Applicable):  6014    Next Visit Date:    Visit date not found

## 2021-08-31 RX ORDER — BLOOD-GLUCOSE METER
1 KIT MISCELLANEOUS DAILY PRN
Qty: 1 KIT | Refills: 0 | Status: SHIPPED | OUTPATIENT
Start: 2021-08-31 | End: 2021-09-14 | Stop reason: SDUPTHER

## 2021-08-31 RX ORDER — ISOPROPYL ALCOHOL 700 MG/ML
CLOTH TOPICAL
Qty: 100 EACH | Refills: 0 | Status: SHIPPED | OUTPATIENT
Start: 2021-08-31

## 2021-08-31 RX ORDER — LANCETS 30 GAUGE
1 EACH MISCELLANEOUS DAILY
Qty: 100 EACH | Refills: 5 | Status: SHIPPED | OUTPATIENT
Start: 2021-08-31

## 2021-08-31 NOTE — PROGRESS NOTES
_           Chief Complaint   Patient presents with    Follow-up    Pain     having chest pain on & off     DIAGNOSIS:        Marginal Zone lymphoma  Persistent leukocytosis  Persistent thrombocytopenia  Multiple comorbidities as listed     CURRENT THERAPY:         Started treatment with rituximab 5/2/2019. Week #8 June 21, 2019. Maintenance Rituxan every 2 months for 2 years started October 17, 2019. completed 2 years in August 2021. BRIEF CASE HISTORY:      Ms. Jr Coffey is a very pleasant 78 y.o. female with history of multiple comorbidities including COPD and diabetes. She quit smoking years ago. The patient was recently hospitalized with chest infection. The patient was noted to have leukocytosis. She also had persistent thrombocytopenia. She is referred for further evaluation. Patient denies any active bleeding. She has easy bruising. No fever or night sweats. No weight loss or decreased appetite. No palpable lymph nodes. No history of repeated infections. Patient has history of smoking for more than 50 years. Denies alcohol drinking  She had flow cytometry and bone marrow test. Results showed evidence of marginal zone lymphoma. Start the rituximab with good results. Due to increasing symptoms and further problems related to lymphoma, patient was started on single agent rituximab on 5/20/19. Patient received maintenance rituximab after induction. INTERIM HISTORY:   Patient is seen for follow up leukocytosis and marginal zone lymphoma. She had very good response to induction rituximab. She is due today for rituximab maintenance. She is clinically stable. No weakness or fatigue. No dizziness. No fever or infections. No palpable lymph nodes. No recent infections. No recent hospitalizations. No weight loss or decreased appetite.        PAST MEDICAL HISTORY: has a past medical history of COPD (chronic obstructive pulmonary disease) ([facility] [address] Depression, Diabetes mellitus ([facility] [address] Hyperlipidemia, PTSD (post-traumatic stress disorder), and Tubulovillous adenoma. PAST SURGICAL HISTORY: has a past surgical history that includes Hysterectomy; Colonoscopy; Colonoscopy (06/07/2016); and Tunneled venous port placement (04/08/2019). CURRENT MEDICATIONS:  has a current medication list which includes the following prescription(s): alprazolam, gabapentin, metformin, aspirin ec, lisinopril, simvastatin, fluoxetine, duloxetine, albuterol-ipratropium, blood glucose test strips, lancets, OXYGEN, prochlorperazine, esomeprazole, and abdominal binder/elastic med. ALLERGIES:  has No Known Allergies. FAMILY HISTORY: Negative for any hematological or oncological conditions. SOCIAL HISTORY:  reports that she quit smoking about 13 years ago. Her smoking use included cigarettes. She has a 10.00 pack-year smoking history. She has never used smokeless tobacco. She reports that she does not drink alcohol and does not use drugs. REVIEW OF SYSTEMS:     · General: Positive for weakness and fatigue. No unanticipated weight loss or decreased appetite. No fever or chills. · Eyes: No blurred vision, eye pain or double vision. · Ears: No hearing problems or drainage. No tinnitus. · Throat: No sore throat, problems with swallowing or dysphagia. · Respiratory: No cough, sputum or hemoptysis. No shortness of breath. No pleuritic chest pain. · Cardiovascular: No chest pain, orthopnea or PND. No lower extremity edema. No palpitation. · Gastrointestinal: No problems with swallowing. No abdominal pain or bloating. No nausea or vomiting. No diarrhea or constipation. No GI bleeding. · Genitourinary: No dysuria, hematuria, frequency or urgency. · Musculoskeletal: No muscle aches or pains. No limitation of movement. No back pain. No gait disturbance, No joint complaints.   · Dermatologic: No skin rashes or pruritus. No skin lesions or discolorations. · Psychiatric: No depression, anxiety, or stress or signs of schizophrenia. No change in mood or affect. · Hematologic: No history of bleeding tendency. Easy bruises no ecchymosis. No history of clotting problems. · Infectious disease: No fever, chills or frequent infections. · Endocrine: No problems with obesity. No polydipsia or polyuria. No temperature intolerance. · Neurologic: No headaches or dizziness. No weakness or numbness of the extremities. No changes in balance, coordination,  memory, mentation, behavior. · Allergic/Immunologic: No nasal congestion or hives. No repeated infections. PHYSICAL EXAM:  The patient is not in acute distress. Vital signs: Blood pressure (!) 96/42, pulse 59, temperature 98 °F (36.7 °C), temperature source Oral, resp. rate 18, weight 169 lb 3.2 oz (76.7 kg), not currently breastfeeding. HEENT:  Eyes are normal. Ears, nose and throat are normal.  Neck: Supple. No lymph node enlargement. No thyroid enlargement. Trachea is centrally located. Chest:  Clear to auscultation. No wheezes or crepitations. Heart: Regular sinus rhythm. Abdomen: Soft, nontender. No hepatosplenomegaly. No masses. Extremities:  With no edema. Lymph Nodes:  No cervical, axillary or inguinal lymph node enlargement. Neurologic:  Conscious and oriented. No focal neurological deficits. Psychosocial: No depression, anxiety or stress. Skin: No rashes, bruises or ecchymoses. Review of Diagnostic data:   Recent Labs     08/24/21  1201   WBC 12.0*   HGB 13.5   HCT 43.3   MCV 97.1        Peripheral blood flow cytometry:  Peripheral blood flow cytometric immunophenotyping:         Peripheral blood is positive for a monoclonal B-cell population,   positive for CD19, CD20, CD22,   CD45, FMC7 and lambda light chain.      Comment:  Morphology and immunophenotype of circulating monoclonal   B-cells are most consistent with a marginal zone lymphoma.  Bone   marrow evaluation and/or lymph node biopsy if possible are recommended   for confirmation and additional characterization. Bone marrow test August 27, 2018: Final Diagnosis   PERIPHERAL BLOOD:   -LYMPHOCYTOSIS WITH \" VILLOUS\" LYMPHOCYTES. -MODERATE THROMBOCYTOPENIA. BONE MARROW BIOPSY, ASPIRATE SMEAR AND CLOT SECTION:   - MARGINAL ZONE LYMPHOMA. -NORMOCELLULAR TRILINEAGE HEMATOPOIETIC MARROW WITH MARKEDLY   DECREASED IRON STORES. Chemistry        Component Value Date/Time     08/24/2021 1201    K 4.5 08/24/2021 1201     08/24/2021 1201    CO2 19 (L) 08/24/2021 1201    BUN 14 08/24/2021 1201    CREATININE 1.06 (H) 08/24/2021 1201        Component Value Date/Time    CALCIUM 9.2 08/24/2021 1201    ALKPHOS 66 08/24/2021 1201    AST 16 08/24/2021 1201    ALT 12 08/24/2021 1201    BILITOT 0.29 (L) 08/24/2021 1201        Lab Results   Component Value Date    WBC 12.0 (H) 08/24/2021    HGB 13.5 08/24/2021    HCT 43.3 08/24/2021    MCV 97.1 08/24/2021     08/24/2021         IMPRESSION:  Marginal Zone lymphoma  Persistent leukocytosis  Persistent thrombocytopenia  Positive FANNY with elevated antihistone. Multiple comorbidities as listed  Acute renal failure, corrected. PLAN: I reviewed the labs as above and discussed with the patient. I explained the significance of these abnormalities in layman language. I explained to patient the nature of low grade NHL. Patient received immunotherapy using rituximab. She had good results induction rituximab treatment. We will complete maintenance rituximab. Explained the benefits and side effects and she understands and agrees. Proceed with rituximab today. KATHRIN improved. Return visit in 3-4 months with repeated labs. Patient's questions were answered to the best of her satisfaction and she verbalized full understanding and agreement.

## 2021-09-01 DIAGNOSIS — F43.10 POST TRAUMATIC STRESS DISORDER (PTSD): Primary | ICD-10-CM

## 2021-09-01 DIAGNOSIS — M15.9 PRIMARY OSTEOARTHRITIS INVOLVING MULTIPLE JOINTS: ICD-10-CM

## 2021-09-01 RX ORDER — HYDROCODONE BITARTRATE AND ACETAMINOPHEN 7.5; 325 MG/1; MG/1
2 TABLET ORAL EVERY 6 HOURS PRN
Qty: 180 TABLET | Refills: 0 | Status: SHIPPED | OUTPATIENT
Start: 2021-09-01 | End: 2021-10-05 | Stop reason: SDUPTHER

## 2021-09-01 RX ORDER — FLUOXETINE HYDROCHLORIDE 20 MG/1
CAPSULE ORAL
Qty: 90 CAPSULE | Refills: 1 | Status: SHIPPED | OUTPATIENT
Start: 2021-09-01 | End: 2021-11-04 | Stop reason: SDUPTHER

## 2021-09-01 NOTE — TELEPHONE ENCOUNTER
Kim Moss is calling to request a refill on the following medication(s):    Medication Request:  Requested Prescriptions     Pending Prescriptions Disp Refills    HYDROcodone-acetaminophen (NORCO) 7.5-325 MG per tablet 180 tablet 0     Sig: Take 2 tablets by mouth every 6 hours as needed for Pain for up to 30 days.     FLUoxetine (PROZAC) 20 MG capsule 90 capsule 1     Sig: TAKE ONE CAPSULE BY MOUTH DAILY       Last Visit Date (If Applicable):  1/0/3968    Next Visit Date:    Visit date not found

## 2021-09-14 DIAGNOSIS — E11.8 TYPE 2 DIABETES MELLITUS WITH COMPLICATION, WITHOUT LONG-TERM CURRENT USE OF INSULIN (HCC): ICD-10-CM

## 2021-09-14 RX ORDER — BLOOD-GLUCOSE METER
1 KIT MISCELLANEOUS DAILY PRN
Qty: 1 KIT | Refills: 0 | Status: SHIPPED | OUTPATIENT
Start: 2021-09-14

## 2021-09-14 RX ORDER — LANCETS 30 GAUGE
1 EACH MISCELLANEOUS 2 TIMES DAILY
Qty: 100 EACH | Refills: 3 | Status: SHIPPED | OUTPATIENT
Start: 2021-09-14

## 2021-09-14 NOTE — TELEPHONE ENCOUNTER
Noble Cheadle is calling to request a refill on the following medication(s):    Medication Request:  Requested Prescriptions     Pending Prescriptions Disp Refills    glucose monitoring (FREESTYLE FREEDOM) kit 1 kit 0     Si kit by Does not apply route daily as needed (testing once daily)    Lancets MISC 100 each 3     Si each by Other route 2 times daily Indications: [employer] Glucose Meter    blood glucose test strips (ASCENSIA AUTODISC VI;ONE TOUCH ULTRA TEST VI) strip 100 strip 5     Sig: Use with associated glucose meter.  Test one time daily and additional times if abnormal readings       Last Visit Date (If Applicable):  1135    Next Visit Date:    Visit date not found

## 2021-09-21 ENCOUNTER — TELEPHONE (OUTPATIENT)
Dept: FAMILY MEDICINE CLINIC | Age: 76
End: 2021-09-21

## 2021-09-21 NOTE — TELEPHONE ENCOUNTER
Patient called asking if you will increase her gabapentin to 4 per day as needed instead of 3. She states some days she has to take 1 later at night 11 or 12 in addition to 3 per day. She doesn't want to run out. Patient states this has been discussed. Please advise and I will load for you    Also~~~~~patient just finished her last chemo treatment~~~It's been 3 years of treatment.    She is so excited and very hopeful!!

## 2021-09-22 NOTE — TELEPHONE ENCOUNTER
Awesome to hear!!!!!  Yes that is fine for the increase in Gabapentin please prep and I will sign thank you

## 2021-09-22 NOTE — TELEPHONE ENCOUNTER
Jj Ruiz is calling to request a refill on the following medication(s):    Medication Request:  Requested Prescriptions     Pending Prescriptions Disp Refills    gabapentin (NEURONTIN) 600 MG tablet 120 tablet 1     Sig: TAKE ONE TABLET BY MOUTH  Four  TIMES A DAY as needed       Last Visit Date (If Applicable):  0/4/7816    Next Visit Date:    Visit date not found

## 2021-09-23 DIAGNOSIS — F41.9 ANXIETY: ICD-10-CM

## 2021-09-23 NOTE — TELEPHONE ENCOUNTER
Neema Spears is calling to request a refill on the following medication(s):    Medication Request:  Requested Prescriptions     Pending Prescriptions Disp Refills    ALPRAZolam (XANAX) 1 MG tablet 90 tablet 0     Sig: Take 1 tablet by mouth 3 times daily as needed for Sleep or Anxiety for up to 30 days.        Last Visit Date (If Applicable):  6/5/0422    Next Visit Date:    Visit date not found

## 2021-09-27 RX ORDER — GABAPENTIN 600 MG/1
TABLET ORAL
Qty: 120 TABLET | Refills: 1 | Status: SHIPPED | OUTPATIENT
Start: 2021-09-27 | End: 2021-11-04 | Stop reason: SDUPTHER

## 2021-09-27 RX ORDER — ALPRAZOLAM 1 MG/1
1 TABLET ORAL 3 TIMES DAILY PRN
Qty: 90 TABLET | Refills: 0 | Status: SHIPPED | OUTPATIENT
Start: 2021-09-27 | End: 2021-11-04 | Stop reason: SDUPTHER

## 2021-10-04 DIAGNOSIS — M15.9 PRIMARY OSTEOARTHRITIS INVOLVING MULTIPLE JOINTS: ICD-10-CM

## 2021-10-05 RX ORDER — HYDROCODONE BITARTRATE AND ACETAMINOPHEN 7.5; 325 MG/1; MG/1
2 TABLET ORAL EVERY 6 HOURS PRN
Qty: 180 TABLET | Refills: 0 | Status: SHIPPED | OUTPATIENT
Start: 2021-10-05 | End: 2021-11-04 | Stop reason: SDUPTHER

## 2021-10-07 ENCOUNTER — OFFICE VISIT (OUTPATIENT)
Dept: FAMILY MEDICINE CLINIC | Age: 76
End: 2021-10-07
Payer: MEDICARE

## 2021-10-07 VITALS
SYSTOLIC BLOOD PRESSURE: 116 MMHG | DIASTOLIC BLOOD PRESSURE: 78 MMHG | OXYGEN SATURATION: 93 % | HEART RATE: 78 BPM | WEIGHT: 159 LBS | BODY MASS INDEX: 34.4 KG/M2

## 2021-10-07 DIAGNOSIS — E78.2 MIXED HYPERLIPIDEMIA: ICD-10-CM

## 2021-10-07 DIAGNOSIS — D17.21 LIPOMA OF RIGHT SHOULDER: ICD-10-CM

## 2021-10-07 DIAGNOSIS — K46.9 ABDOMINAL HERNIA WITHOUT OBSTRUCTION AND WITHOUT GANGRENE, RECURRENCE NOT SPECIFIED, UNSPECIFIED HERNIA TYPE: ICD-10-CM

## 2021-10-07 DIAGNOSIS — Z91.81 AT HIGH RISK FOR FALLS: ICD-10-CM

## 2021-10-07 DIAGNOSIS — E11.8 TYPE 2 DIABETES MELLITUS WITH COMPLICATION, WITHOUT LONG-TERM CURRENT USE OF INSULIN (HCC): Primary | ICD-10-CM

## 2021-10-07 DIAGNOSIS — I10 ESSENTIAL HYPERTENSION: ICD-10-CM

## 2021-10-07 PROCEDURE — G8399 PT W/DXA RESULTS DOCUMENT: HCPCS | Performed by: FAMILY MEDICINE

## 2021-10-07 PROCEDURE — G8417 CALC BMI ABV UP PARAM F/U: HCPCS | Performed by: FAMILY MEDICINE

## 2021-10-07 PROCEDURE — 99214 OFFICE O/P EST MOD 30 MIN: CPT | Performed by: FAMILY MEDICINE

## 2021-10-07 PROCEDURE — 1123F ACP DISCUSS/DSCN MKR DOCD: CPT | Performed by: FAMILY MEDICINE

## 2021-10-07 PROCEDURE — G8427 DOCREV CUR MEDS BY ELIG CLIN: HCPCS | Performed by: FAMILY MEDICINE

## 2021-10-07 PROCEDURE — 1036F TOBACCO NON-USER: CPT | Performed by: FAMILY MEDICINE

## 2021-10-07 PROCEDURE — 1090F PRES/ABSN URINE INCON ASSESS: CPT | Performed by: FAMILY MEDICINE

## 2021-10-07 PROCEDURE — 4040F PNEUMOC VAC/ADMIN/RCVD: CPT | Performed by: FAMILY MEDICINE

## 2021-10-07 PROCEDURE — G8484 FLU IMMUNIZE NO ADMIN: HCPCS | Performed by: FAMILY MEDICINE

## 2021-10-07 PROCEDURE — 3017F COLORECTAL CA SCREEN DOC REV: CPT | Performed by: FAMILY MEDICINE

## 2021-10-07 PROCEDURE — 3046F HEMOGLOBIN A1C LEVEL >9.0%: CPT | Performed by: FAMILY MEDICINE

## 2021-10-07 PROCEDURE — 2022F DILAT RTA XM EVC RTNOPTHY: CPT | Performed by: FAMILY MEDICINE

## 2021-10-07 SDOH — ECONOMIC STABILITY: FOOD INSECURITY: WITHIN THE PAST 12 MONTHS, THE FOOD YOU BOUGHT JUST DIDN'T LAST AND YOU DIDN'T HAVE MONEY TO GET MORE.: NEVER TRUE

## 2021-10-07 SDOH — ECONOMIC STABILITY: FOOD INSECURITY: WITHIN THE PAST 12 MONTHS, YOU WORRIED THAT YOUR FOOD WOULD RUN OUT BEFORE YOU GOT MONEY TO BUY MORE.: NEVER TRUE

## 2021-10-07 ASSESSMENT — PATIENT HEALTH QUESTIONNAIRE - PHQ9
2. FEELING DOWN, DEPRESSED OR HOPELESS: 0
SUM OF ALL RESPONSES TO PHQ QUESTIONS 1-9: 0
SUM OF ALL RESPONSES TO PHQ9 QUESTIONS 1 & 2: 0
SUM OF ALL RESPONSES TO PHQ QUESTIONS 1-9: 0
1. LITTLE INTEREST OR PLEASURE IN DOING THINGS: 0
SUM OF ALL RESPONSES TO PHQ QUESTIONS 1-9: 0

## 2021-10-07 ASSESSMENT — SOCIAL DETERMINANTS OF HEALTH (SDOH): HOW HARD IS IT FOR YOU TO PAY FOR THE VERY BASICS LIKE FOOD, HOUSING, MEDICAL CARE, AND HEATING?: NOT HARD AT ALL

## 2021-10-07 NOTE — PROGRESS NOTES
[facility] Progress Note    Date:10/7/2021         Patient Name:Samara Smith     YOB: 1945     Age:76 y.o. Assessment/Plan        Problem List Items Addressed This Visit        Circulatory    Essential hypertension      Well-controlled, continue current medications, continue current treatment plan, medication adherence emphasized and lifestyle modifications recommended            Endocrine    Type 2 diabetes mellitus with complication, without long-term current use of insulin (HCC) - Primary      Well-controlled, continue current medications and continue current treatment plan            Other    Mixed hyperlipidemia      Well-controlled, continue current medications and continue current treatment plan           Other Visit Diagnoses     Lipoma of right shoulder        Relevant Orders    US EXTREMITY RIGHT NON VASC LIMITED    Abdominal hernia without obstruction and without gangrene, recurrence not specified, unspecified hernia type        Relevant Orders    Za Sarmiento DO, General Surgery, [place]    At high risk for falls               Return in about 3 months (around 1/7/2022). Electronically signed by Alayna Banuelos DO on 10/22/21         Subjective     Anna Alaniz is a 78 y.o. female presenting today for   Chief Complaint   Patient presents with   Ileene Me   . HERNIA:  Anna Alaniz is an 78 y.o. female who was referred for evaluation of a  ventral hernia. Symptoms were first noted several years ago. Symptoms or injury did not occur at work. Pain is dull, intermittent and is not reducible. The patient has no symptoms of  chronic constipation, chronic cough, difficulty urinating. There does not have previous hx of groin surgery. Hypertension  This is a chronic problem. The current episode started more than 1 year ago. The problem has been gradually improving since onset. The problem is controlled. Associated symptoms include anxiety.  Pertinent negatives include no Hematological: Negative. Psychiatric/Behavioral: Negative. All other systems reviewed and are negative. Medications     Current Outpatient Medications   Medication Sig Dispense Refill    HYDROcodone-acetaminophen (NORCO) 7.5-325 MG per tablet Take 2 tablets by mouth every 6 hours as needed for Pain for up to 30 days. 180 tablet 0    gabapentin (NEURONTIN) 600 MG tablet TAKE ONE TABLET BY MOUTH  Four  TIMES A DAY as needed 120 tablet 1    ALPRAZolam (XANAX) 1 MG tablet Take 1 tablet by mouth 3 times daily as needed for Sleep or Anxiety for up to 30 days. 90 tablet 0    glucose monitoring (FREESTYLE FREEDOM) kit 1 kit by Does not apply route daily as needed (testing once daily) 1 kit 0    Lancets MISC 1 each by Other route 2 times daily Indications: [employer] Glucose Meter 100 each 3    blood glucose test strips (ASCENSIA AUTODISC VI;ONE TOUCH ULTRA TEST VI) strip Use with associated glucose meter.  Test one time daily and additional times if abnormal readings 100 strip 5    FLUoxetine (PROZAC) 20 MG capsule TAKE ONE CAPSULE BY MOUTH DAILY 90 capsule 1    Lancets MISC Inject 1 each into the skin daily 100 each 5    Isopropyl Alcohol (ALCOHOL WIPES) 70 % MISC Use as directed 100 each 0    metFORMIN (GLUCOPHAGE-XR) 750 MG extended release tablet Take 1 tablet by mouth daily (with breakfast) 90 tablet 1    aspirin EC 81 MG EC tablet Take 1 tablet by mouth daily 90 tablet 1    lisinopril (PRINIVIL;ZESTRIL) 10 MG tablet Take 1 tablet by mouth daily 90 tablet 1    simvastatin (ZOCOR) 40 MG tablet Take 1 tablet by mouth nightly 90 tablet 1    Elastic Bandages & Supports (ABDOMINAL BINDER/ELASTIC MED) MISC Use daily as much as possible for abdominal hernia (Patient not taking: Reported on 8/24/2021) 1 each 0    DULoxetine (CYMBALTA) 60 MG extended release capsule Take 1 capsule by mouth daily 90 capsule 1    albuterol-ipratropium (COMBIVENT RESPIMAT)  MCG/ACT AERS inhaler INHALE ONE INHALATION BY MOUTH FOUR TIMES A DAY 1 Inhaler 2    blood glucose monitor strips 1 strip by Other route TrueTest Strips     3 times a day & as needed for symptoms of irregular blood glucose.  OXYGEN Inhale 3 L into the lungs continuous      prochlorperazine (COMPAZINE) 10 MG tablet Take 1 tablet by mouth every 6 hours as needed (CHEMO INDUCED NAUSEA) 120 tablet 3    esomeprazole (NEXIUM) 40 MG delayed release capsule TAKE ONE CAPSULE BY MOUTH DAILY (Patient taking differently: TAKE ONE CAPSULE BY MOUTH DAILY / taking PRN) 90 capsule 3     No current facility-administered medications for this visit. Past History    Past Medical History:   has a past medical history of COPD (chronic obstructive pulmonary disease) ([facility] [address] Depression, Diabetes mellitus ([facility] [address] Hyperlipidemia, PTSD (post-traumatic stress disorder), and Tubulovillous adenoma. Social History:   reports that she quit smoking about 13 years ago. Her smoking use included cigarettes. She has a 10.00 pack-year smoking history. She has never used smokeless tobacco. She reports that she does not drink alcohol and does not use drugs. Family History:   Family History   Problem Relation Age of Onset    Heart Disease Mother     Heart Disease Brother        Surgical History:   Past Surgical History:   Procedure Laterality Date    COLONOSCOPY      colon polyps    COLONOSCOPY  06/07/2016    severe diverticulosis TUBULOVILLOUS ADENOMA.       HYSTERECTOMY      TUNNELED VENOUS PORT PLACEMENT  04/08/2019    chemo port to right chest        Physical Examination      Vitals:  /78 (Site: Left Upper Arm, Position: Sitting, Cuff Size: Small Adult)   Pulse 78   Wt 159 lb (72.1 kg)   SpO2 93%   BMI 34.40 kg/m²     Physical Exam  Constitutional:       General: She is not in acute distress. Appearance: Normal appearance. She is obese. She is not ill-appearing, toxic-appearing or diaphoretic. HENT:      Head: Normocephalic and atraumatic.       Right Ear: Tympanic membrane, ear canal and external ear normal. There is no impacted cerumen. Left Ear: Tympanic membrane, ear canal and external ear normal. There is no impacted cerumen. Nose: Nose normal. No congestion or rhinorrhea. Mouth/Throat:      Mouth: Mucous membranes are moist.      Pharynx: Oropharynx is clear. No oropharyngeal exudate or posterior oropharyngeal erythema. Eyes:      General: No scleral icterus. Right eye: No discharge. Left eye: No discharge. Extraocular Movements: Extraocular movements intact. Conjunctiva/sclera: Conjunctivae normal.      Pupils: Pupils are equal, round, and reactive to light. Cardiovascular:      Rate and Rhythm: Normal rate and regular rhythm. Pulses: Normal pulses. Heart sounds: Normal heart sounds. No murmur heard. No friction rub. No gallop. Pulmonary:      Effort: Pulmonary effort is normal. No respiratory distress. Breath sounds: Normal breath sounds. No stridor. No wheezing, rhonchi or rales. Comments: On supplemental O2 via NC - gets very out of breath with walking  Chest:      Chest wall: No tenderness. Abdominal:      General: Abdomen is flat. Bowel sounds are normal. There is no distension. Palpations: Abdomen is soft. There is no mass. Tenderness: There is no abdominal tenderness. There is no right CVA tenderness, left CVA tenderness, guarding or rebound. Hernia: No hernia is present. Musculoskeletal:      Cervical back: Normal range of motion and neck supple. Lumbar back: Spasms and tenderness present. No swelling, edema, deformity, lacerations or bony tenderness. Decreased range of motion. Skin:     General: Skin is warm. Capillary Refill: Capillary refill takes less than 2 seconds. Coloration: Skin is not jaundiced or pale. Findings: No bruising, erythema, lesion or rash. Neurological:      General: No focal deficit present.       Mental Status: She is alert and oriented to person, place, and time. Mental status is at baseline. Cranial Nerves: No cranial nerve deficit. Sensory: No sensory deficit. Motor: Motor function is intact. No weakness. Coordination: Coordination abnormal.      Gait: Gait abnormal and tandem walk abnormal.      Deep Tendon Reflexes: Reflexes normal.   Psychiatric:         Mood and Affect: Mood normal.         Behavior: Behavior normal.         Thought Content: Thought content normal.         Judgment: Judgment normal.         Labs/Imaging/Diagnostics   Labs:  Hemoglobin A1C   Date Value Ref Range Status   09/21/2020 5.7 4.0 - 6.0 % Final       Imaging Last 24 Hours:  US RETROPERITONEAL COMPLETE  Narrative: EXAMINATION:  RETROPERITONEAL ULTRASOUND OF THE KIDNEYS AND URINARY BLADDER    8/24/2019    COMPARISON:  None. HISTORY:  ORDERING SYSTEM PROVIDED HISTORY: RENAL FAILURE, ACUTE (KIDNEY INJURY)    FINDINGS:    Kidneys: The right kidney measures 10.2 cm in length and the left kidney measures 10.9  cm in length. Kidneys demonstrate normal cortical echogenicity. No evidence of  hydronephrosis or intrarenal stones. Bladder:    Unremarkable appearance of the bladder. No significant post void residual.  Impression: Unremarkable ultrasound of the kidneys and urinary bladder. XR CHEST PORTABLE  Narrative: EXAMINATION:  ONE XRAY VIEW OF THE CHEST    8/24/2019 6:28 am    COMPARISON:  08/22/2019    HISTORY:  ORDERING SYSTEM PROVIDED HISTORY: shortness of breath  TECHNOLOGIST PROVIDED HISTORY:  shortness of breath  Reason for Exam: sob  Acuity: Unknown  Type of Exam: Unknown    FINDINGS:  Right IJ infusion port remains in place. Stable cardiomediastinal  silhouette. Mild seizure prominence unchanged. No focal consolidation. Penetration of right hemidiaphragm noted. Impression: Mild seizure prominence stable. No acute process.     On the basis of positive falls risk screening, assessment and plan is as follows:

## 2021-10-22 ASSESSMENT — ENCOUNTER SYMPTOMS
GASTROINTESTINAL NEGATIVE: 1
VISUAL CHANGE: 0
ALLERGIC/IMMUNOLOGIC NEGATIVE: 1
ORTHOPNEA: 0
EYES NEGATIVE: 1
RESPIRATORY NEGATIVE: 1
BLURRED VISION: 0

## 2021-10-22 NOTE — PATIENT INSTRUCTIONS
Patient Education        Preventing Falls: Care Instructions  Your Care Instructions     Getting around your home safely can be a challenge if you have injuries or health problems that make it easy for you to fall. Loose rugs and furniture in walkways are among the dangers for many older people who have problems walking or who have poor eyesight. People who have conditions such as arthritis, osteoporosis, or dementia also have to be careful not to fall. You can make your home safer with a few simple measures. Follow-up care is a key part of your treatment and safety. Be sure to make and go to all appointments, and call your doctor if you are having problems. It's also a good idea to know your test results and keep a list of the medicines you take. How can you care for yourself at home? Taking care of yourself  · You may get dizzy if you do not drink enough water. To prevent dehydration, drink plenty of fluids. Choose water and other clear liquids. If you have kidney, heart, or liver disease and have to limit fluids, talk with your doctor before you increase the amount of fluids you drink. · Exercise regularly to improve your strength, muscle tone, and balance. Walk if you can. Swimming may be a good choice if you cannot walk easily. · Have your vision and hearing checked each year or any time you notice a change. If you have trouble seeing and hearing, you might not be able to avoid objects and could lose your balance. · Know the side effects of the medicines you take. Ask your doctor or pharmacist whether the medicines you take can affect your balance. Sleeping pills or sedatives can affect your balance. · Limit the amount of alcohol you drink. Alcohol can impair your balance and other senses. · Ask your doctor whether calluses or corns on your feet need to be removed. If you wear loose-fitting shoes because of calluses or corns, you can lose your balance and fall.   · Talk to your doctor if you have Patient's  just found out he tested positive for COVID19.  He has no symptoms.  Wife also has no symptoms but is requesting message to PCP requesting order for COVID19 test.  Message to PCP.    Koki Novak RN  Triage Nurse Advisor    Reason for Disposition    [1] COVID-19 EXPOSURE (Close Contact) AND [2] within last 14 days BUT [3] NO symptoms    Additional Information    Negative: COVID-19 has been diagnosed by a healthcare provider (HCP)    Negative: COVID-19 lab test positive    Negative: [1] Symptoms of COVID-19 (e.g., cough, fever, SOB, or others) AND [2] lives in an area with community spread    Negative: [1] Symptoms of COVID-19 (e.g., cough, fever, SOB, or others) AND [2] within 14 days of EXPOSURE (close contact) with diagnosed or suspected COVID-19 patient    Negative: [1] Symptoms of COVID-19 (e.g., cough, fever, SOB, or others) AND [2] within 14 days of travel from high-risk area for COVID-19 community spread (identified by CDC)    Negative: [1] Difficulty breathing (shortness of breath) occurs AND [2] onset > 14 days after COVID-19 EXPOSURE (Close Contact) AND [3] no community spread where patient lives    Negative: [1] Dry cough occurs AND [2] onset > 14 days after COVID-19 EXPOSURE AND [3] no community spread where patient lives    Negative: [1] Wet cough (i.e., white-yellow, yellow, green, or tatum colored sputum) AND [2] onset > 14 days after COVID-19 EXPOSURE AND [3] no community spread where patient lives    Negative: [1] Common cold symptoms AND [2] onset > 14 days after COVID-19 EXPOSURE AND [3] no community spread where patient lives    Negative: [1] COVID-19 EXPOSURE (Close Contact) within last 14 days AND [2] needs COVID-19 lab test to return to work AND [3] NO symptoms    Negative: [1] COVID-19 EXPOSURE (Close Contact) within last 14 days AND [2] exposed person is a healthcare worker who was NOT using all recommended personal protective equipment (i.e., a respirator-N95 mask, eye  numbness in your feet. Preventing falls at home  · Remove raised doorway thresholds, throw rugs, and clutter. Repair loose carpet or raised areas in the floor. · Move furniture and electrical cords to keep them out of walking paths. · Use nonskid floor wax, and wipe up spills right away, especially on ceramic tile floors. · If you use a walker or cane, put rubber tips on it. If you use crutches, clean the bottoms of them regularly with an abrasive pad, such as steel wool. · Keep your house well lit, especially [place], and outside walkways. Use night-lights in areas such as hallways and bathrooms. Add extra light switches or use remote switches (such as switches that go on or off when you clap your hands) to make it easier to turn lights on if you have to get up during the night. · Install sturdy handrails on stairways. · Move items in your cabinets so that the things you use a lot are on the lower shelves (about waist level). · Keep a cordless phone and a flashlight with new batteries by your bed. If possible, put a phone in each of the main rooms of your house, or carry a cell phone in case you fall and cannot reach a phone. Or, you can wear a device around your neck or wrist. You push a button that sends a signal for help. · Wear low-heeled shoes that fit well and give your feet good support. Use footwear with nonskid soles. Check the heels and soles of your shoes for wear. Repair or replace worn heels or soles. · Do not wear socks without shoes on wood floors. · Walk on the grass when the sidewalks are slippery. If you live in an area that gets snow and ice in the winter, sprinkle salt on slippery steps and sidewalks. Preventing falls in the bath  · Install grab bars and nonskid mats inside and outside your shower or tub and near the toilet and sinks. · Use shower chairs and bath benches. · Use a hand-held shower head that will allow you to sit while showering.   · Get into a tub or protection, gloves, and gown) AND [3] NO symptoms    Protocols used: CORONAVIRUS (COVID-19) EXPOSURE-A- 8.4.20       shower by putting the weaker leg in first. Get out of a tub or shower with your strong side first.  · Repair loose toilet seats and consider installing a raised toilet seat to make getting on and off the toilet easier. · Keep your bathroom door unlocked while you are in the shower. Where can you learn more? Go to [email or web address] and sign in to your [employer] account. Enter [phone] in the [facility] box to learn more about \"Preventing Falls: Care Instructions. \"     If you do not have an account, please click on the \"Sign Up Now\" link. Current as of: December 7, 2020               Content Version: 13.0  © 2006-2021 Healthwise, Inc[employer]. Care instructions adapted under license by [facility] ([facility]). If you have questions about a medical condition or this instruction, always ask your healthcare professional. [place] [address] any warranty or liability for your use of this information.

## 2021-11-01 ENCOUNTER — OFFICE VISIT (OUTPATIENT)
Dept: SURGERY | Age: 76
End: 2021-11-01
Payer: COMMERCIAL

## 2021-11-01 VITALS
HEIGHT: 57 IN | WEIGHT: 159 LBS | HEART RATE: 115 BPM | OXYGEN SATURATION: 99 % | RESPIRATION RATE: 12 BRPM | BODY MASS INDEX: 34.3 KG/M2

## 2021-11-01 DIAGNOSIS — R10.31 ABDOMINAL WALL PAIN IN BOTH LOWER QUADRANTS: Primary | ICD-10-CM

## 2021-11-01 DIAGNOSIS — R10.32 ABDOMINAL WALL PAIN IN BOTH LOWER QUADRANTS: Primary | ICD-10-CM

## 2021-11-01 DIAGNOSIS — D17.21 LIPOMA OF RIGHT SHOULDER: ICD-10-CM

## 2021-11-01 PROCEDURE — 99203 OFFICE O/P NEW LOW 30 MIN: CPT | Performed by: SURGERY

## 2021-11-01 NOTE — PROGRESS NOTES
[facility] General Surgery   History & Physical  Orlando Holder DO  Pt Name: Nathaniel Strickland  MRN: [id number]  YOB: 1945  Date of evaluation: 11/1/2021  Primary Care Physician: Ramsey Sylvester DO    Chief Complaint: right shoulder lipoma, lower abdominal pain      SUBJECTIVE:    History of Present Illness: This is a 78 y.o.  female who presents for evaluation for several issues:    1. R shoulder lipoma - present for some time, pt feels like it is slowly growing, feels this may be responsible for occasional numbness in her right hand 4th/5th digits, reports occasionally her right shoulder feels \"hot\". R shoulder is otherwise nontender. 2. Lower abdominal pain. Unclear how long this has been present, pt reports she had undergone CT abd/pelv, last one in the [facility] system is from 2018 with no evidence of abd wall hernia on review of imaging and radiology report. Pt reports newer onset loose stools, she has recently completed chemotherapy for lymphoma. Chart review performed to add information to the HPI: Yes    Past Medical History   has a past medical history of COPD (chronic obstructive pulmonary disease) ([facility] [address] Depression, Diabetes mellitus ([facility] [address] Hyperlipidemia, PTSD (post-traumatic stress disorder), and Tubulovillous adenoma. Past Surgical History   has a past surgical history that includes Hysterectomy; Colonoscopy; Colonoscopy (06/07/2016); and Tunneled venous port placement (04/08/2019). Family History  family history includes Heart Disease in her brother and mother. Social History  Tobacco use:  reports that she quit smoking about 13 years ago. Her smoking use included cigarettes. She has a 10.00 pack-year smoking history. She has never used smokeless tobacco.  Alcohol use:  reports no history of alcohol use. Drug use:  reports no history of drug use.       Medications  Current Medications:   Current Outpatient Medications   Medication Sig Dispense Refill    HYDROcodone-acetaminophen (NORCO) 7.5-325 MG per tablet Take 2 tablets by mouth every 6 hours as needed for Pain for up to 30 days. 180 tablet 0    gabapentin (NEURONTIN) 600 MG tablet TAKE ONE TABLET BY MOUTH  Four  TIMES A DAY as needed 120 tablet 1    glucose monitoring (FREESTYLE FREEDOM) kit 1 kit by Does not apply route daily as needed (testing once daily) 1 kit 0    Lancets MISC 1 each by Other route 2 times daily Indications: [facility] Glucose Meter 100 each 3    blood glucose test strips (ASCENSIA AUTODISC VI;ONE TOUCH ULTRA TEST VI) strip Use with associated glucose meter. Test one time daily and additional times if abnormal readings 100 strip 5    FLUoxetine (PROZAC) 20 MG capsule TAKE ONE CAPSULE BY MOUTH DAILY 90 capsule 1    Lancets MISC Inject 1 each into the skin daily 100 each 5    Isopropyl Alcohol (ALCOHOL WIPES) 70 % MISC Use as directed 100 each 0    metFORMIN (GLUCOPHAGE-XR) 750 MG extended release tablet Take 1 tablet by mouth daily (with breakfast) 90 tablet 1    aspirin EC 81 MG EC tablet Take 1 tablet by mouth daily 90 tablet 1    lisinopril (PRINIVIL;ZESTRIL) 10 MG tablet Take 1 tablet by mouth daily 90 tablet 1    simvastatin (ZOCOR) 40 MG tablet Take 1 tablet by mouth nightly 90 tablet 1    Elastic Bandages & Supports (ABDOMINAL BINDER/ELASTIC MED) MISC Use daily as much as possible for abdominal hernia (Patient not taking: Reported on 8/24/2021) 1 each 0    DULoxetine (CYMBALTA) 60 MG extended release capsule Take 1 capsule by mouth daily 90 capsule 1    albuterol-ipratropium (COMBIVENT RESPIMAT)  MCG/ACT AERS inhaler INHALE ONE INHALATION BY MOUTH FOUR TIMES A DAY 1 Inhaler 2    blood glucose monitor strips 1 strip by Other route TrueTest Strips     3 times a day & as needed for symptoms of irregular blood glucose.       OXYGEN Inhale 3 L into the lungs continuous      prochlorperazine (COMPAZINE) 10 MG tablet Take 1 tablet by mouth every 6 hours as needed (CHEMO INDUCED NAUSEA) 120 tablet 3    esomeprazole (NEXIUM) 40 MG delayed release capsule TAKE ONE CAPSULE BY MOUTH DAILY (Patient taking differently: TAKE ONE CAPSULE BY MOUTH DAILY / taking PRN) 90 capsule 3     No current facility-administered medications for this visit. Home Medications:   Prior to Admission medications    Medication Sig Start Date End Date Taking? Authorizing Provider   HYDROcodone-acetaminophen (NORCO) 7.5-325 MG per tablet Take 2 tablets by mouth every 6 hours as needed for Pain for up to 30 days. 10/5/21 11/4/21  Whitley Hernandez, DO   gabapentin (NEURONTIN) 600 MG tablet TAKE ONE TABLET BY MOUTH  Four  TIMES A DAY as needed 9/27/21 10/27/21  Whitley Hernandez DO   glucose monitoring (FREESTYLE FREEDOM) kit 1 kit by Does not apply route daily as needed (testing once daily) 9/14/21   Whitley Hernandez, DO   Lancets MISC 1 each by Other route 2 times daily Indications: [facility] [place] Glucose Meter 9/14/21   Whitley Hernandez, DO   blood glucose test strips (ASCENSIA AUTODISC VI;ONE TOUCH ULTRA TEST VI) strip Use with associated glucose meter.  Test one time daily and additional times if abnormal readings 9/14/21   Whitley Hernandez DO   FLUoxetine (PROZAC) 20 MG capsule TAKE ONE CAPSULE BY MOUTH DAILY 9/1/21   Whitley Hernandez, DO   Lancets MISC Inject 1 each into the skin daily 8/31/21   Whitley Hernandez, DO   Isopropyl Alcohol (ALCOHOL WIPES) 70 % MISC Use as directed 8/31/21   Whitley Hernandez DO   metFORMIN (GLUCOPHAGE-XR) 750 MG extended release tablet Take 1 tablet by mouth daily (with breakfast) 7/9/21   Whitley Hernandez DO   aspirin EC 81 MG EC tablet Take 1 tablet by mouth daily 7/9/21   Whitley Hernandez DO   lisinopril (PRINIVIL;ZESTRIL) 10 MG tablet Take 1 tablet by mouth daily 5/26/21   Whitley Hernandez DO   simvastatin (ZOCOR) 40 MG tablet Take 1 tablet by mouth nightly 5/26/21   Whitley Hernandez, DO   Elastic Bandages & Supports (ABDOMINAL BINDER/ELASTIC MED) MISC Use daily as much as possible for abdominal hernia  Patient not taking: Reported on 8/24/2021 4/8/21   Herberth Ryan DO   DULoxetine (CYMBALTA) 60 MG extended release capsule Take 1 capsule by mouth daily 1/25/21   Herberth Ryan DO   albuterol-ipratropium (COMBIVENT RESPIMAT)  MCG/ACT AERS inhaler INHALE ONE INHALATION BY MOUTH FOUR TIMES A DAY 10/21/20   Herberth Ryan, DO   blood glucose monitor strips 1 strip by Other route TrueTest Strips     3 times a day & as needed for symptoms of irregular blood glucose. Historical Provider, MD   OXYGEN Inhale 3 L into the lungs continuous    Historical Provider, MD   prochlorperazine (COMPAZINE) 10 MG tablet Take 1 tablet by mouth every 6 hours as needed (CHEMO INDUCED NAUSEA) 4/30/19   Ruddy Hammond MD   esomeprazole ([address]) 40 MG delayed release capsule TAKE ONE CAPSULE BY MOUTH DAILY  Patient taking differently: TAKE ONE CAPSULE BY MOUTH DAILY / taking PRN 1/14/19   Ana Paula Joshi MD       Allergies  Patient has no known allergies. Review of Systems:  General: Denies any fever, chills. Eyes: Denies any changes in vision, diplopia or eye pain  Ears, Nose, Mouth: Denies changes in hearing/tinnitus or drainage from ears, no rhinorrhea or bloody nose, no difficulty chewing  Throat: no difficulty swallowing, no throat pain  Respiratory: Denies any shortness of breath or cough. Cardiac: Denies any chest pain, palpitations, claudication or edema. Gastrointestinal: loose stools  Genitourinary: Denies any frequency, urgency, hesitancy or incontinence. Musculoskeletal: abd wall pain. R shoulder lipoma. Skin: Denies rashes or lesions  Psychiatric: Denies any recent changes in mood or affect  Hematologic: Denies bruising or bleeding easily.     PHYSICAL EXAMINATION  Vitals:   Vitals:    11/01/21 1339   Pulse: 115   Resp: 12   SpO2: 99%       General Appearance:  awake, alert, no acute distress, well

## 2021-11-04 DIAGNOSIS — F43.10 POST TRAUMATIC STRESS DISORDER (PTSD): ICD-10-CM

## 2021-11-04 DIAGNOSIS — K21.9 GASTROESOPHAGEAL REFLUX DISEASE: ICD-10-CM

## 2021-11-04 DIAGNOSIS — F41.9 ANXIETY: ICD-10-CM

## 2021-11-04 DIAGNOSIS — M15.9 PRIMARY OSTEOARTHRITIS INVOLVING MULTIPLE JOINTS: ICD-10-CM

## 2021-11-04 DIAGNOSIS — I50.9 ACUTE ON CHRONIC CONGESTIVE HEART FAILURE, UNSPECIFIED HEART FAILURE TYPE (HCC): ICD-10-CM

## 2021-11-04 DIAGNOSIS — E11.8 TYPE 2 DIABETES MELLITUS WITH COMPLICATION, WITHOUT LONG-TERM CURRENT USE OF INSULIN (HCC): ICD-10-CM

## 2021-11-04 RX ORDER — ALPRAZOLAM 1 MG/1
1 TABLET ORAL 3 TIMES DAILY PRN
Qty: 90 TABLET | Refills: 0 | Status: SHIPPED | OUTPATIENT
Start: 2021-11-04 | End: 2021-12-07

## 2021-11-04 RX ORDER — LISINOPRIL 10 MG/1
10 TABLET ORAL DAILY
Qty: 90 TABLET | Refills: 1 | Status: SHIPPED | OUTPATIENT
Start: 2021-11-04 | End: 2022-01-06 | Stop reason: SDUPTHER

## 2021-11-04 RX ORDER — ASPIRIN 81 MG/1
81 TABLET ORAL DAILY
Qty: 90 TABLET | Refills: 1 | Status: SHIPPED | OUTPATIENT
Start: 2021-11-04 | End: 2022-04-21 | Stop reason: SDUPTHER

## 2021-11-04 RX ORDER — HYDROCODONE BITARTRATE AND ACETAMINOPHEN 7.5; 325 MG/1; MG/1
2 TABLET ORAL EVERY 8 HOURS PRN
Qty: 180 TABLET | Refills: 0 | Status: SHIPPED | OUTPATIENT
Start: 2021-11-07 | End: 2021-12-09 | Stop reason: SDUPTHER

## 2021-11-04 RX ORDER — FLUOXETINE HYDROCHLORIDE 20 MG/1
CAPSULE ORAL
Qty: 90 CAPSULE | Refills: 1 | Status: SHIPPED | OUTPATIENT
Start: 2021-11-04 | End: 2022-02-22 | Stop reason: SDUPTHER

## 2021-11-04 RX ORDER — ESOMEPRAZOLE MAGNESIUM 40 MG/1
CAPSULE, DELAYED RELEASE ORAL
Qty: 90 CAPSULE | Refills: 3 | Status: SHIPPED | OUTPATIENT
Start: 2021-11-04

## 2021-11-04 RX ORDER — GABAPENTIN 600 MG/1
TABLET ORAL
Qty: 120 TABLET | Refills: 1 | Status: SHIPPED | OUTPATIENT
Start: 2021-11-04 | End: 2022-03-04

## 2021-11-05 ENCOUNTER — TELEPHONE (OUTPATIENT)
Dept: FAMILY MEDICINE CLINIC | Age: 76
End: 2021-11-05

## 2021-11-05 NOTE — TELEPHONE ENCOUNTER
Patient called stating that her prescription for Hydrocodone states she can't receive it till 11/7/21. She states she has to have someone  her scripts for her. She always requests medications together for that reason. She is asking if this can be so her brother can  the script today with her other ones?

## 2021-11-12 ENCOUNTER — TELEPHONE (OUTPATIENT)
Dept: ONCOLOGY | Age: 76
End: 2021-11-12

## 2021-11-12 NOTE — TELEPHONE ENCOUNTER
PER TRIAGE CANCEL PORT FLUSH ON 11/16/21. PT WILL HAVE PORT FLUSHED ON 12/7/21 WITH RN DRAW. WRITER CALLED PT AND NOTIFIED HER OF CANCELLATION.

## 2021-11-16 ENCOUNTER — HOSPITAL ENCOUNTER (OUTPATIENT)
Dept: INFUSION THERAPY | Facility: MEDICAL CENTER | Age: 76
End: 2021-11-16
Payer: COMMERCIAL

## 2021-12-01 ENCOUNTER — HOSPITAL ENCOUNTER (OUTPATIENT)
Facility: MEDICAL CENTER | Age: 76
End: 2021-12-01

## 2021-12-06 DIAGNOSIS — F41.9 ANXIETY: ICD-10-CM

## 2021-12-07 NOTE — TELEPHONE ENCOUNTER
Edwin Ram is calling to request a refill on the following medication(s):    Medication Request:  Requested Prescriptions     Pending Prescriptions Disp Refills    ALPRAZolam (XANAX) 1 MG tablet [Pharmacy Med Name: ALPRAZolam 1 MG TABLET] 90 tablet 0     Sig: TAKE ONE TABLET BY MOUTH THREE TIMES A DAY AS NEEDED OR ANXIETY FOR UP TO 30 DAYS       Last Visit Date (If Applicable):  20/3/3119    Next Visit Date:    1/10/2022      Last Refill:  11/4/2021

## 2021-12-08 ENCOUNTER — TELEPHONE (OUTPATIENT)
Dept: FAMILY MEDICINE CLINIC | Age: 76
End: 2021-12-08

## 2021-12-08 RX ORDER — ALPRAZOLAM 1 MG/1
1 TABLET ORAL 3 TIMES DAILY PRN
Qty: 90 TABLET | Refills: 0 | Status: SHIPPED | OUTPATIENT
Start: 2021-12-08 | End: 2022-01-06 | Stop reason: SDUPTHER

## 2021-12-08 NOTE — TELEPHONE ENCOUNTER
----- Message from Betty Blum sent at 12/8/2021  4:44 PM EST -----  Subject: Refill Request    QUESTIONS  Name of Medication? HYDROcodone-acetaminophen (NORCO) 7.5-325 MG per   tablet  Patient-reported dosage and instructions? Take 2 tablets by mouth every 6   hours as needed for Pain for up to 30 days. How many days do you have left? 3  Preferred Pharmacy? [facility]  Pharmacy phone number (if available)? [phone]  ---------------------------------------------------------------------------  --------------,  Name of Medication? ALPRAZolam (XANAX) 1 MG tablet  Patient-reported dosage and instructions? Take 1 tablet by mouth 3 times   daily as needed for Sleep for up to 30 days. How many days do you have left? 3  Preferred Pharmacy? [facility]  Pharmacy phone number (if available)? [phone]  ---------------------------------------------------------------------------  --------------  CALL BACK INFO  What is the best way for the office to contact you? OK to leave message on   voicemail  Preferred Call Back Phone Number?  [phone]

## 2021-12-09 ENCOUNTER — TELEPHONE (OUTPATIENT)
Dept: FAMILY MEDICINE CLINIC | Age: 76
End: 2021-12-09

## 2021-12-09 DIAGNOSIS — M15.9 PRIMARY OSTEOARTHRITIS INVOLVING MULTIPLE JOINTS: ICD-10-CM

## 2021-12-09 RX ORDER — HYDROCODONE BITARTRATE AND ACETAMINOPHEN 7.5; 325 MG/1; MG/1
2 TABLET ORAL EVERY 8 HOURS PRN
Qty: 180 TABLET | Refills: 0 | Status: SHIPPED | OUTPATIENT
Start: 2021-12-09 | End: 2022-01-06 | Stop reason: SDUPTHER

## 2021-12-09 NOTE — TELEPHONE ENCOUNTER
----- Message from Cuevas Amauri sent at 12/8/2021  4:44 PM EST -----  Subject: Refill Request    QUESTIONS  Name of Medication? HYDROcodone-acetaminophen (NORCO) 7.5-325 MG per   tablet  Patient-reported dosage and instructions? Take 2 tablets by mouth every 6   hours as needed for Pain for up to 30 days. How many days do you have left? 3  Preferred Pharmacy? [address] [facility]  Pharmacy phone number (if available)? [phone]  ---------------------------------------------------------------------------  --------------,  Name of Medication? ALPRAZolam (XANAX) 1 MG tablet  Patient-reported dosage and instructions? Take 1 tablet by mouth 3 times   daily as needed for Sleep for up to 30 days. How many days do you have left? 3  Preferred Pharmacy? [facility]  Pharmacy phone number (if available)? [phone]  ---------------------------------------------------------------------------  --------------  CALL BACK INFO  What is the best way for the office to contact you? OK to leave message on   voicemail  Preferred Call Back Phone Number?  [phone]

## 2021-12-29 ENCOUNTER — HOSPITAL ENCOUNTER (OUTPATIENT)
Facility: MEDICAL CENTER | Age: 76
End: 2021-12-29
Payer: MEDICARE

## 2022-01-06 ENCOUNTER — OFFICE VISIT (OUTPATIENT)
Dept: ONCOLOGY | Age: 77
End: 2022-01-06
Payer: MEDICARE

## 2022-01-06 ENCOUNTER — HOSPITAL ENCOUNTER (OUTPATIENT)
Dept: INFUSION THERAPY | Facility: MEDICAL CENTER | Age: 77
Discharge: HOME | End: 2022-01-06
Payer: MEDICARE

## 2022-01-06 ENCOUNTER — TELEPHONE (OUTPATIENT)
Dept: ONCOLOGY | Age: 77
End: 2022-01-06

## 2022-01-06 VITALS
DIASTOLIC BLOOD PRESSURE: 67 MMHG | RESPIRATION RATE: 16 BRPM | SYSTOLIC BLOOD PRESSURE: 90 MMHG | BODY MASS INDEX: 35.06 KG/M2 | HEART RATE: 93 BPM | TEMPERATURE: 97.5 F | WEIGHT: 162 LBS

## 2022-01-06 DIAGNOSIS — C85.80 MARGINAL ZONE LYMPHOMA (HCC): Primary | ICD-10-CM

## 2022-01-06 DIAGNOSIS — E11.8 TYPE 2 DIABETES MELLITUS WITH COMPLICATION, WITHOUT LONG-TERM CURRENT USE OF INSULIN (HCC): ICD-10-CM

## 2022-01-06 DIAGNOSIS — M15.9 PRIMARY OSTEOARTHRITIS INVOLVING MULTIPLE JOINTS: ICD-10-CM

## 2022-01-06 DIAGNOSIS — F41.9 ANXIETY: ICD-10-CM

## 2022-01-06 DIAGNOSIS — C85.80 MARGINAL ZONE LYMPHOMA (HCC): ICD-10-CM

## 2022-01-06 DIAGNOSIS — C85.80 MARGINAL ZONE B-CELL LYMPHOMA (HCC): ICD-10-CM

## 2022-01-06 LAB
ABSOLUTE EOS #: 0.13 K/UL (ref 0–0.44)
ABSOLUTE IMMATURE GRANULOCYTE: 0.03 K/UL (ref 0–0.3)
ABSOLUTE LYMPH #: 3.34 K/UL (ref 1.1–3.7)
ABSOLUTE MONO #: 0.94 K/UL (ref 0.1–1.2)
ALBUMIN SERPL-MCNC: 4.1 G/DL (ref 3.5–5.2)
ALBUMIN/GLOBULIN RATIO: ABNORMAL (ref 1–2.5)
ALP BLD-CCNC: 76 U/L (ref 35–104)
ALT SERPL-CCNC: 7 U/L (ref 5–33)
ANION GAP SERPL CALCULATED.3IONS-SCNC: 14 MMOL/L (ref 9–17)
AST SERPL-CCNC: 13 U/L
BASOPHILS # BLD: 1 % (ref 0–2)
BASOPHILS ABSOLUTE: 0.06 K/UL (ref 0–0.2)
BILIRUB SERPL-MCNC: 0.18 MG/DL (ref 0.3–1.2)
BUN BLDV-MCNC: 11 MG/DL (ref 8–23)
BUN/CREAT BLD: 12 (ref 9–20)
CALCIUM SERPL-MCNC: 9.4 MG/DL (ref 8.6–10.4)
CHLORIDE BLD-SCNC: 103 MMOL/L (ref 98–107)
CO2: 24 MMOL/L (ref 20–31)
CREAT SERPL-MCNC: 0.89 MG/DL (ref 0.5–0.9)
DIFFERENTIAL TYPE: NORMAL
EOSINOPHILS RELATIVE PERCENT: 1 % (ref 1–4)
GFR AFRICAN AMERICAN: >60 ML/MIN
GFR NON-AFRICAN AMERICAN: >60 ML/MIN
GFR SERPL CREATININE-BSD FRML MDRD: ABNORMAL ML/MIN/{1.73_M2}
GFR SERPL CREATININE-BSD FRML MDRD: ABNORMAL ML/MIN/{1.73_M2}
GLUCOSE BLD-MCNC: 127 MG/DL (ref 70–99)
HCT VFR BLD CALC: 46 % (ref 36.3–47.1)
HEMOGLOBIN: 14.3 G/DL (ref 11.9–15.1)
IMMATURE GRANULOCYTES: 0 %
LYMPHOCYTES # BLD: 32 % (ref 24–43)
MCH RBC QN AUTO: 31.2 PG (ref 25.2–33.5)
MCHC RBC AUTO-ENTMCNC: 31.1 G/DL (ref 28.4–34.8)
MCV RBC AUTO: 100.2 FL (ref 82.6–102.9)
MONOCYTES # BLD: 9 % (ref 3–12)
NRBC AUTOMATED: 0 PER 100 WBC
PDW BLD-RTO: 13.5 % (ref 11.8–14.4)
PLATELET # BLD: 302 K/UL (ref 138–453)
PLATELET ESTIMATE: NORMAL
PMV BLD AUTO: 9.4 FL (ref 8.1–13.5)
POTASSIUM SERPL-SCNC: 3.8 MMOL/L (ref 3.7–5.3)
RBC # BLD: 4.59 M/UL (ref 3.95–5.11)
RBC # BLD: NORMAL 10*6/UL
SEG NEUTROPHILS: 57 % (ref 36–65)
SEGMENTED NEUTROPHILS ABSOLUTE COUNT: 6.11 K/UL (ref 1.5–8.1)
SODIUM BLD-SCNC: 141 MMOL/L (ref 135–144)
TOTAL PROTEIN: 6.7 G/DL (ref 6.4–8.3)
WBC # BLD: 10.6 K/UL (ref 3.5–11.3)
WBC # BLD: NORMAL 10*3/UL

## 2022-01-06 PROCEDURE — 6360000002 HC RX W HCPCS: Performed by: INTERNAL MEDICINE

## 2022-01-06 PROCEDURE — 85025 COMPLETE CBC W/AUTO DIFF WBC: CPT

## 2022-01-06 PROCEDURE — 99214 OFFICE O/P EST MOD 30 MIN: CPT | Performed by: INTERNAL MEDICINE

## 2022-01-06 PROCEDURE — 36591 DRAW BLOOD OFF VENOUS DEVICE: CPT

## 2022-01-06 PROCEDURE — 80053 COMPREHEN METABOLIC PANEL: CPT

## 2022-01-06 PROCEDURE — 2580000003 HC RX 258: Performed by: INTERNAL MEDICINE

## 2022-01-06 PROCEDURE — 99211 OFF/OP EST MAY X REQ PHY/QHP: CPT | Performed by: INTERNAL MEDICINE

## 2022-01-06 RX ORDER — HYDROCODONE BITARTRATE AND ACETAMINOPHEN 7.5; 325 MG/1; MG/1
2 TABLET ORAL EVERY 8 HOURS PRN
Qty: 180 TABLET | Refills: 0 | Status: SHIPPED | OUTPATIENT
Start: 2022-01-06 | End: 2022-03-15 | Stop reason: SDUPTHER

## 2022-01-06 RX ORDER — FLUCONAZOLE 100 MG/1
100 TABLET ORAL DAILY
Qty: 7 TABLET | Refills: 0 | Status: SHIPPED | OUTPATIENT
Start: 2022-01-06 | End: 2022-01-13

## 2022-01-06 RX ORDER — LISINOPRIL 10 MG/1
10 TABLET ORAL DAILY
Qty: 90 TABLET | Refills: 1 | Status: SHIPPED | OUTPATIENT
Start: 2022-01-06

## 2022-01-06 RX ORDER — PREDNISONE 1 MG/1
5 TABLET ORAL DAILY
Qty: 14 TABLET | Refills: 0 | Status: SHIPPED | OUTPATIENT
Start: 2022-01-06 | End: 2022-01-20

## 2022-01-06 RX ORDER — ALPRAZOLAM 1 MG/1
1 TABLET ORAL 3 TIMES DAILY PRN
Qty: 90 TABLET | Refills: 0 | Status: SHIPPED | OUTPATIENT
Start: 2022-01-06 | End: 2022-02-22 | Stop reason: SDUPTHER

## 2022-01-06 RX ORDER — HEPARIN SODIUM (PORCINE) LOCK FLUSH IV SOLN 100 UNIT/ML 100 UNIT/ML
500 SOLUTION INTRAVENOUS PRN
Status: DISPENSED | OUTPATIENT
Start: 2022-01-06 | End: 2022-01-06

## 2022-01-06 RX ORDER — SODIUM CHLORIDE 0.9 % (FLUSH) 0.9 %
5-40 SYRINGE (ML) INJECTION PRN
Status: ACTIVE | OUTPATIENT
Start: 2022-01-06 | End: 2022-01-06

## 2022-01-06 RX ADMIN — HEPARIN SODIUM (PORCINE) LOCK FLUSH IV SOLN 100 UNIT/ML 500 UNITS: 100 SOLUTION at 10:43

## 2022-01-06 RX ADMIN — SODIUM CHLORIDE, PRESERVATIVE FREE 10 ML: 5 INJECTION INTRAVENOUS at 10:43

## 2022-01-06 NOTE — PROGRESS NOTES
Patient arrive ambulatory for lab draw and  MD visit. Denies complaint or concern. Port accessed without difficulty; capped. Port flushed and heparinized with intact smith needle removed per protocol. Patient ambulate off unit per self at discharge.

## 2022-01-06 NOTE — TELEPHONE ENCOUNTER
[place] ARRIVES VIA WHEELCHAIR FOR MD VISIT  DR Raimundo Kevin IN TO SEE PATIENT  ORDERS RECEIVED  RV 3-4 MONTHS WITH LABS AT RV  PREDNISONE & DIFLUCAN  PORT FLUSH 03/03/22 @10:30AM  RN DRAW CDP CMP 04/26/22 @10:30AM   MD VISIT 04/26/22 @11:15AM  SCRIPTS CALLED INTO PATIENT'S PHARMACY  AVS PRINTED AND GIVEN TO PATIENT WITH INSTRUCTIONS    JEN LOTT STATED THAT PT'S CT SCAN NEEDS TO BE RESCHEDULED. WRITER SPOKE TO DR ESTEVEZ (NOTHING WAS MENTIONED IN INSTRUCTIONS), HE SUGGESTED PT TO F/U WITH THE OFFICE THAT ORDERED THE SCAN. WRITER DID PROVIDE PT WITH CENTRAL SCHEDULING'S PHONE NUMBER.    PATIENT DISCHARGED VIA WHEELCHAIR

## 2022-01-07 ENCOUNTER — TELEPHONE (OUTPATIENT)
Dept: FAMILY MEDICINE CLINIC | Age: 77
End: 2022-01-07

## 2022-01-07 NOTE — PROGRESS NOTES
_           Chief Complaint   Patient presents with    Follow-up    Discuss Labs    Medication Refill     DIAGNOSIS:        Marginal Zone lymphoma  Persistent leukocytosis  Persistent thrombocytopenia  Multiple comorbidities as listed     CURRENT THERAPY:         Started treatment with rituximab 5/2/2019. Week #8 June 21, 2019. Maintenance Rituxan every 2 months for 2 years started October 17, 2019. completed 2 years in August 2021. BRIEF CASE HISTORY:      Ms. Beverley Fuchs is a very pleasant 78 y.o. female with history of multiple comorbidities including COPD and diabetes. She quit smoking years ago. The patient was recently hospitalized with chest infection. The patient was noted to have leukocytosis. She also had persistent thrombocytopenia. She is referred for further evaluation. Patient denies any active bleeding. She has easy bruising. No fever or night sweats. No weight loss or decreased appetite. No palpable lymph nodes. No history of repeated infections. Patient has history of smoking for more than 50 years. Denies alcohol drinking  She had flow cytometry and bone marrow test. Results showed evidence of marginal zone lymphoma. Start the rituximab with good results. Due to increasing symptoms and further problems related to lymphoma, patient was started on single agent rituximab on 5/20/19. Patient received maintenance rituximab after induction. INTERIM HISTORY:   Patient is seen for follow up leukocytosis and marginal zone lymphoma. She had very good response to induction rituximab. She completed 2 years of maintenance rituximab. She is clinically stable. No weakness or fatigue. No dizziness. No fever or infections. No palpable lymph nodes. No recent infections. No recent hospitalizations. No weight loss or decreased appetite.        PAST MEDICAL HISTORY: has a past medical history of COPD (chronic obstructive pulmonary disease) ([facility] [address] Depression, Diabetes mellitus ([facility] [address] Hyperlipidemia, PTSD (post-traumatic stress disorder), and Tubulovillous adenoma. PAST SURGICAL HISTORY: has a past surgical history that includes Hysterectomy; Colonoscopy; Colonoscopy (06/07/2016); and Tunneled venous port placement (04/08/2019). CURRENT MEDICATIONS:  has a current medication list which includes the following prescription(s): hydrocodone-acetaminophen, alprazolam, fluconazole, prednisone, lisinopril, fluoxetine, aspirin ec, esomeprazole, gabapentin, glucose monitoring, lancets, blood glucose test strips, lancets, alcohol wipes, metformin, duloxetine, albuterol-ipratropium, blood glucose test strips, OXYGEN, prochlorperazine, simvastatin, and abdominal binder/elastic med, and the following Facility-Administered Medications: heparin flush and sodium chloride flush. ALLERGIES:  has No Known Allergies. FAMILY HISTORY: Negative for any hematological or oncological conditions. SOCIAL HISTORY:  reports that she quit smoking about 14 years ago. Her smoking use included cigarettes. She has a 10.00 pack-year smoking history. She has never used smokeless tobacco. She reports that she does not drink alcohol and does not use drugs. REVIEW OF SYSTEMS:     · General: Positive for weakness and fatigue. No unanticipated weight loss or decreased appetite. No fever or chills. · Eyes: No blurred vision, eye pain or double vision. · Ears: No hearing problems or drainage. No tinnitus. · Throat: No sore throat, problems with swallowing or dysphagia. · Respiratory: No cough, sputum or hemoptysis. No shortness of breath. No pleuritic chest pain. · Cardiovascular: No chest pain, orthopnea or PND. No lower extremity edema. No palpitation. · Gastrointestinal: No problems with swallowing. No abdominal pain or bloating. No nausea or vomiting. No diarrhea or constipation. No GI bleeding.    · Genitourinary: No dysuria, hematuria, frequency or urgency. · Musculoskeletal: No muscle aches or pains. No limitation of movement. No back pain. No gait disturbance, No joint complaints. · Dermatologic: No skin rashes or pruritus. No skin lesions or discolorations. · Psychiatric: No depression, anxiety, or stress or signs of schizophrenia. No change in mood or affect. · Hematologic: No history of bleeding tendency. Easy bruises no ecchymosis. No history of clotting problems. · Infectious disease: No fever, chills or frequent infections. · Endocrine: No problems with obesity. No polydipsia or polyuria. No temperature intolerance. · Neurologic: No headaches or dizziness. No weakness or numbness of the extremities. No changes in balance, coordination,  memory, mentation, behavior. · Allergic/Immunologic: No nasal congestion or hives. No repeated infections. PHYSICAL EXAM:  The patient is not in acute distress. Vital signs: Blood pressure 90/67, pulse 93, temperature 97.5 °F (36.4 °C), temperature source Oral, resp. rate 16, weight 162 lb (73.5 kg), not currently breastfeeding. HEENT:  Eyes are normal. Ears, nose and throat are normal.  Neck: Supple. No lymph node enlargement. No thyroid enlargement. Trachea is centrally located. Chest:  Clear to auscultation. No wheezes or crepitations. Heart: Regular sinus rhythm. Abdomen: Soft, nontender. No hepatosplenomegaly. No masses. Extremities:  With no edema. Lymph Nodes:  No cervical, axillary or inguinal lymph node enlargement. Neurologic:  Conscious and oriented. No focal neurological deficits. Psychosocial: No depression, anxiety or stress. Skin: No rashes, bruises or ecchymoses.       Review of Diagnostic data:   Recent Labs     01/06/22  0955   WBC 10.6   HGB 14.3   HCT 46.0   .2        Peripheral blood flow cytometry:  Peripheral blood flow cytometric immunophenotyping:         Peripheral blood is positive for a monoclonal B-cell population,   positive for CD19, CD20, CD22,   CD45, FMC7 and lambda light chain. Comment:  Morphology and immunophenotype of circulating monoclonal   B-cells are most consistent with a marginal zone lymphoma.  Bone   marrow evaluation and/or lymph node biopsy if possible are recommended   for confirmation and additional characterization. Bone marrow test August 27, 2018: Final Diagnosis   PERIPHERAL BLOOD:   -LYMPHOCYTOSIS WITH \" VILLOUS\" LYMPHOCYTES. -MODERATE THROMBOCYTOPENIA. BONE MARROW BIOPSY, ASPIRATE SMEAR AND CLOT SECTION:   - MARGINAL ZONE LYMPHOMA. -NORMOCELLULAR TRILINEAGE HEMATOPOIETIC MARROW WITH MARKEDLY   DECREASED IRON STORES. Chemistry        Component Value Date/Time     01/06/2022 0955    K 3.8 01/06/2022 0955     01/06/2022 0955    CO2 24 01/06/2022 0955    BUN 11 01/06/2022 0955    CREATININE 0.89 01/06/2022 0955        Component Value Date/Time    CALCIUM 9.4 01/06/2022 0955    ALKPHOS 76 01/06/2022 0955    AST 13 01/06/2022 0955    ALT 7 01/06/2022 0955    BILITOT 0.18 (L) 01/06/2022 0955        Lab Results   Component Value Date    WBC 10.6 01/06/2022    HGB 14.3 01/06/2022    HCT 46.0 01/06/2022    .2 01/06/2022     01/06/2022         IMPRESSION:  Marginal Zone lymphoma  Persistent leukocytosis  Persistent thrombocytopenia  Positive FANNY with elevated antihistone. Multiple comorbidities as listed  Acute renal failure, corrected. PLAN: I reviewed the labs as above and discussed with the patient. I explained the significance of these abnormalities in layman language. I explained to patient the nature of low grade NHL. Patient received immunotherapy using rituximab. She had good results induction rituximab treatment. She completed maintenance rituximab. KATHRIN improved. Normal kidney function. Return visit in 3-4 months with repeated labs.   Patient's questions were answered to the best of her satisfaction and she verbalized full understanding and agreement.

## 2022-01-07 NOTE — TELEPHONE ENCOUNTER
----- Message from [facility] sent at 1/7/2022  9:41 AM EST -----  Subject: Message to Provider    QUESTIONS  Information for Provider? Pt called in she will be at her appt on Monday  ---------------------------------------------------------------------------  --------------  CALL BACK INFO  What is the best way for the office to contact you? Do not leave any   message, patient will call back for answer  Preferred Call Back Phone Number? [phone]  ---------------------------------------------------------------------------  --------------  SCRIPT ANSWERS  Relationship to Patient?  Self

## 2022-01-10 DIAGNOSIS — E78.2 MIXED HYPERLIPIDEMIA: ICD-10-CM

## 2022-01-11 NOTE — TELEPHONE ENCOUNTER
Michelle Evans is calling to request a refill on the following medication(s):    Medication Request:  Requested Prescriptions     Pending Prescriptions Disp Refills    simvastatin (ZOCOR) 40 MG tablet [Pharmacy Med Name: SIMVASTATIN 40 MG TABLET] 90 tablet 1     Sig: TAKE ONE TABLET BY MOUTH ONCE NIGHTLY       Last Visit Date (If Applicable):  94/2/2884    Next Visit Date:    Visit date not found

## 2022-01-12 RX ORDER — SIMVASTATIN 40 MG
TABLET ORAL
Qty: 90 TABLET | Refills: 1 | Status: SHIPPED | OUTPATIENT
Start: 2022-01-12 | End: 2022-04-28

## 2022-01-25 DIAGNOSIS — E11.8 TYPE 2 DIABETES MELLITUS WITH COMPLICATION, WITHOUT LONG-TERM CURRENT USE OF INSULIN (HCC): ICD-10-CM

## 2022-01-26 RX ORDER — METFORMIN HYDROCHLORIDE 750 MG/1
TABLET, EXTENDED RELEASE ORAL
Qty: 90 TABLET | Refills: 1 | Status: SHIPPED | OUTPATIENT
Start: 2022-01-26

## 2022-01-26 NOTE — TELEPHONE ENCOUNTER
Adam Melara is calling to request a refill on the following medication(s):    Medication Request:  Requested Prescriptions     Pending Prescriptions Disp Refills    metFORMIN (GLUCOPHAGE-XR) 750 MG extended release tablet [Pharmacy Med Name: metFORMIN HCL  MG TABLET] 90 tablet 1     Sig: TAKE ONE TABLET BY MOUTH DAILY WITH BREAKFAST       Last Visit Date (If Applicable):  61/5/2132    Next Visit Date:    2/22/2022

## 2022-02-22 ENCOUNTER — OFFICE VISIT (OUTPATIENT)
Dept: FAMILY MEDICINE CLINIC | Age: 77
End: 2022-02-22
Payer: MEDICARE

## 2022-02-22 VITALS — BODY MASS INDEX: 33.33 KG/M2 | WEIGHT: 154 LBS

## 2022-02-22 DIAGNOSIS — M15.9 PRIMARY OSTEOARTHRITIS INVOLVING MULTIPLE JOINTS: ICD-10-CM

## 2022-02-22 DIAGNOSIS — F41.9 ANXIETY: ICD-10-CM

## 2022-02-22 DIAGNOSIS — Z51.81 ENCOUNTER FOR THERAPEUTIC DRUG LEVEL MONITORING: ICD-10-CM

## 2022-02-22 DIAGNOSIS — F41.8 ANXIOUS DEPRESSION: ICD-10-CM

## 2022-02-22 DIAGNOSIS — I50.9 CHRONIC CONGESTIVE HEART FAILURE, UNSPECIFIED HEART FAILURE TYPE (HCC): ICD-10-CM

## 2022-02-22 DIAGNOSIS — E11.8 TYPE 2 DIABETES MELLITUS WITH COMPLICATION, WITHOUT LONG-TERM CURRENT USE OF INSULIN (HCC): Primary | ICD-10-CM

## 2022-02-22 DIAGNOSIS — F43.10 POST TRAUMATIC STRESS DISORDER (PTSD): ICD-10-CM

## 2022-02-22 DIAGNOSIS — E78.2 MIXED HYPERLIPIDEMIA: ICD-10-CM

## 2022-02-22 DIAGNOSIS — J44.9 CHRONIC OBSTRUCTIVE PULMONARY DISEASE, UNSPECIFIED COPD TYPE (HCC): ICD-10-CM

## 2022-02-22 DIAGNOSIS — I10 ESSENTIAL HYPERTENSION: ICD-10-CM

## 2022-02-22 PROBLEM — M15.0 PRIMARY OSTEOARTHRITIS INVOLVING MULTIPLE JOINTS: Status: ACTIVE | Noted: 2022-02-22

## 2022-02-22 LAB — HBA1C MFR BLD: 5.3 %

## 2022-02-22 PROCEDURE — 1123F ACP DISCUSS/DSCN MKR DOCD: CPT | Performed by: FAMILY MEDICINE

## 2022-02-22 PROCEDURE — 99214 OFFICE O/P EST MOD 30 MIN: CPT | Performed by: FAMILY MEDICINE

## 2022-02-22 PROCEDURE — 3017F COLORECTAL CA SCREEN DOC REV: CPT | Performed by: FAMILY MEDICINE

## 2022-02-22 PROCEDURE — G8399 PT W/DXA RESULTS DOCUMENT: HCPCS | Performed by: FAMILY MEDICINE

## 2022-02-22 PROCEDURE — G8427 DOCREV CUR MEDS BY ELIG CLIN: HCPCS | Performed by: FAMILY MEDICINE

## 2022-02-22 PROCEDURE — 3023F SPIROM DOC REV: CPT | Performed by: FAMILY MEDICINE

## 2022-02-22 PROCEDURE — 2022F DILAT RTA XM EVC RTNOPTHY: CPT | Performed by: FAMILY MEDICINE

## 2022-02-22 PROCEDURE — G8417 CALC BMI ABV UP PARAM F/U: HCPCS | Performed by: FAMILY MEDICINE

## 2022-02-22 PROCEDURE — 3044F HG A1C LEVEL LT 7.0%: CPT | Performed by: FAMILY MEDICINE

## 2022-02-22 PROCEDURE — 1090F PRES/ABSN URINE INCON ASSESS: CPT | Performed by: FAMILY MEDICINE

## 2022-02-22 PROCEDURE — 83036 HEMOGLOBIN GLYCOSYLATED A1C: CPT | Performed by: FAMILY MEDICINE

## 2022-02-22 PROCEDURE — G8484 FLU IMMUNIZE NO ADMIN: HCPCS | Performed by: FAMILY MEDICINE

## 2022-02-22 PROCEDURE — 1036F TOBACCO NON-USER: CPT | Performed by: FAMILY MEDICINE

## 2022-02-22 PROCEDURE — 4040F PNEUMOC VAC/ADMIN/RCVD: CPT | Performed by: FAMILY MEDICINE

## 2022-02-22 RX ORDER — FLUOXETINE HYDROCHLORIDE 20 MG/1
CAPSULE ORAL
Qty: 90 CAPSULE | Refills: 1 | Status: SHIPPED | OUTPATIENT
Start: 2022-02-22

## 2022-02-22 RX ORDER — ALPRAZOLAM 1 MG/1
1 TABLET ORAL 3 TIMES DAILY PRN
Qty: 90 TABLET | Refills: 0 | Status: SHIPPED | OUTPATIENT
Start: 2022-02-22 | End: 2022-04-21 | Stop reason: SDUPTHER

## 2022-02-22 RX ORDER — DULOXETIN HYDROCHLORIDE 60 MG/1
60 CAPSULE, DELAYED RELEASE ORAL DAILY
Qty: 90 CAPSULE | Refills: 1 | Status: SHIPPED | OUTPATIENT
Start: 2022-02-22

## 2022-02-22 ASSESSMENT — PATIENT HEALTH QUESTIONNAIRE - PHQ9
SUM OF ALL RESPONSES TO PHQ9 QUESTIONS 1 & 2: 0
SUM OF ALL RESPONSES TO PHQ QUESTIONS 1-9: 0
1. LITTLE INTEREST OR PLEASURE IN DOING THINGS: 0
SUM OF ALL RESPONSES TO PHQ QUESTIONS 1-9: 0
2. FEELING DOWN, DEPRESSED OR HOPELESS: 0

## 2022-02-22 ASSESSMENT — ENCOUNTER SYMPTOMS
ALLERGIC/IMMUNOLOGIC NEGATIVE: 1
EYES NEGATIVE: 1
DIFFICULTY BREATHING: 0
GASTROINTESTINAL NEGATIVE: 1
CHEST TIGHTNESS: 0
BLURRED VISION: 0
RESPIRATORY NEGATIVE: 1
ORTHOPNEA: 0
FREQUENT THROAT CLEARING: 0

## 2022-02-22 ASSESSMENT — COPD QUESTIONNAIRES: COPD: 1

## 2022-02-22 NOTE — LETTER
CONTROLLED SUBSTANCE MEDICATION AGREEMENT     Patient Name: Audelia Vanegas  Patient YOB: 1945   I understand, that controlled substance medications may be used to help better manage my symptoms and to improve my ability to function at home, work and in social settings. However, I also understand that these medications do have risks, which have been discussed with me, including possible development of physical or psychological dependence. I understand that successful treatment requires mutual trust and honesty between me and my provider. I understand and agree that following this Medication Agreement is necessary in continuing my provider-patient relationship and the success of my treatment plan. Explanation from my Provider: Benefits and Goals of Controlled Substance Medications: There are two potential goals for your treatment: (1) decreased pain and suffering (2) improved daily life functions. There are many possible treatments for your chronic condition(s). Alternatives such as physical therapy, yoga, massage, home daily exercise, meditation, relaxation techniques, injections, chiropractic manipulations, surgery, cognitive therapy, hypnosis and many medications that are not habit-forming may be used. Use of controlled substance medications may be helpful, but they are unlikely to resolve all symptoms or restore all function. Explanation from my Provider: Risks of Controlled Substance Medications:  Opioid pain medications: These medications can lead to problems such as addiction/dependence, sedation, lightheadedness/dizziness, memory issues, falls, constipation, nausea, or vomiting. They may also impair the ability to drive or operate machinery. Additionally, these medications may lower testosterone levels, leading to loss of bone strength, stamina and sex drive.   They may cause problems with breathing, sleep apnea and reduced coughing, which is especially dangerous for patients with lung disease. Overdose or dangerous interactions with alcohol and other medications may occur, leading to death. Hyperalgesia may develop, which means patients receiving opioids for the treatment of pain may become more sensitive to certain painful stimuli, and in some cases, experience pain from ordinarily non-painful stimuli. Women between the ages of 14-53 who could become pregnant should carefully weigh the risks and benefits of opioids with their physicians, as these medications increase the risk of pregnancy complications, including miscarriage,  delivery and stillbirth. It is also possible for babies to be born addicted to opioids. Opioid dependence withdrawal symptoms may include; feelings of uneasiness, increased pain, irritability, belly pain, diarrhea, sweats and goose-flesh. Benzodiazepines and non-benzodiazepine sleep medications: These medications can lead to problems such as addiction/dependence, sedation, fatigue, lightheadedness, dizziness, incoordination, falls, depression, hallucinations, and impaired judgment, memory and concentration. The ability to drive and operate machinery may also be affected. Abnormal sleep-related behaviors have been reported, including sleepwalking, driving, making telephone calls, eating, or having sex while not fully awake. These medications can suppress breathing and worsen sleep apnea, particularly when combined with alcohol or other sedating medications, potentially leading to death. Dependence withdrawal symptoms may include tremors, anxiety, hallucinations and seizures. Stimulants:  Common adverse effects include addiction/dependence, increased blood  pressure and heart rate, decreased appetite, nausea, involuntary weight loss, insomnia,                                                                                                                     Initials:_______   irritability, and headaches.   These risks may increase when these medications are combined with other stimulants, such as caffeine pills or energy drinks, certain weight loss supplements and oral decongestants. Dependence withdrawal symptoms may include depressed mood, loss of interest, suicidal thoughts, anxiety, fatigue, appetite changes and agitation. Testosterone replacement therapy:  Potential side effects include increased risk of stroke and heart attack, blood clots, increased blood pressure, increased cholesterol, enlarged prostate, sleep apnea, irritability/aggression and other mood disorders, and decreased fertility. I agree and understand that I and my prescriber have the following rights and responsibilities regarding my treatment plan:     1. MY RIGHTS:  To be informed of my treatment and medication plan. To be an active participant in my health and wellbeing. 2. MY RESPONSIBILITY AND UNDERSTANDING FOR USE OF MEDICATIONS   I will take medications at the dose and frequency as directed. For my safety, I will not increase or change how I take my medications without the recommendation of my healthcare provider.  I will actively participate in any program recommended by my provider which may improve function, including social, physical, psychological programs.  I will not take my medications with alcohol or other drugs not prescribed to me. I understand that drinking alcohol with my medications increases the chances of side effects, including reduced breathing rate and could lead to personal injury when operating machinery.  I understand that if I have a history of substance use disorders, including alcohol or other illicit drugs, that I may be at increased risk of addiction to my medications.  I agree to notify my provider immediately if I should become pregnant so that my treatment plan can be adjusted.    I agree and understand that I shall only receive controlled substance medications from the prescriber that signed this agreement unless there is written agreement among other prescribers of controlled substances outlining the responsibility of the medications being prescribed.  I understand that the if the controlled medication is not helping to achieve goals, the dosage may be tapered and no longer prescribed. 3. MY RESPONSIBILITY FOR COMMUNICATION / PRESCRIPTION RENEWALS   I agree that all controlled substance medications that I take will be prescribed only by my provider. If another healthcare provider prescribes me medication in an emergency, I will notify my provider within seventy-two (72) hours.  I will arrange for refills at the prescribed interval ONLY during regular office hours. I will not ask for refills earlier than agreed, after-hours, on holidays or weekends. Refills may take up to 72 hours for processing and prescriptions to reach the pharmacy.  I will inform my other health care providers that I am taking these medications and of the existence of this [id number]. In the event of an emergency, I will provide the same information to the emergency department prescribers.  I will keep my provider updated on the pharmacy I am using for controlled medication prescription filling. Initials:_______  4. MY RESPONSIBILITY FOR PROTECTING MEDICATIONS   I will protect my prescriptions and medications. I understand that lost or misplaced prescriptions will not be replaced.  I will keep medications only for my own use and will not share them with others. I will keep all medications away from children.  I agree that if my medications are adjusted or discontinued, I will properly dispose of any remaining medications. I understand that I will be required to dispose of any remaining controlled medications as, directed by my prescriber, prior to being provided with any prescriptions for other controlled medications.   Medication drop box locations can be found at: [email or web address]    5. MY RESPONSIBILITY WITH ILLEGAL DRUGS    I will not use illegal or street drugs or another person's prescription medications not prescribed to me.  If there are identified addiction type symptoms, then referral to a program may be provided by my provider and I agree to follow through with this recommendation. 6. MY RESPONSIBILITY FOR COOPERATION WITH INVESTIGATIONS   I understand that my provider will comply with any applicable law and may discuss my use and/or possible misuse/abuse of controlled substances and alcohol, as appropriate, with any health care provider involved in my care, pharmacist, or legal authority.  I authorize my provider and pharmacy to cooperate fully with law enforcement agencies (as permitted by law) in the investigation of any possible misuse, sale, or other diversion of my controlled substances.  I agree to waive any applicable privilege or right of privacy or confidentiality with respect to these authorizations. 7. PROVIDERS RIGHT TO MONITOR FOR SAFETY: PRESCRIPTION MONITORING / DRUG TESTING   I consent to drug/toxicology screening and will submit to a drug screen upon my providers request to assure I am only taking the prescribed drugs for my safety monitoring. I understand that a drug screen is a laboratory test in which a sample of my urine, blood or saliva is checked to see what drugs I have been taking. This may entail an observed urine specimen, which means that a nurse or other health care provider may watch me provide urine, and I will cooperate if I am asked to provide an observed specimen.  I understand that my provider will check a copy of my State Prescription Monitoring Program () Report in order to safely prescribe medications.  Pill Counts: I consent to pill counts when requested.   I may be asked to bring all my prescribed controlled substance medications, in their original bottles, to all of my scheduled appointments. In addition, my provider may ask me to come to the practice at any time for a random pill count. 8. TERMINATION OF THIS AGREEMENT  For my safety, my prescriber has the right to stop prescribing controlled substance medications and may end this agreement. Initials:_______   Conditions that may result in termination of this agreement:  a. I do not show any improvement in pain, or my activity has not improved. b. I develop rapid tolerance or loss of improvement, as described in my treatment plan.  c. I develop significant side effects from the medication. d. My behavior is not consistent with the responsibilities outlined above, thereby causing safety concerns to continue prescribing controlled substance medications. e. I fail to follow the terms of this agreement. f. Other:____________________________       UNDERSTANDING THIS MEDICATION AGREEMENT:    I have read the above and have had all my questions answered. For chronic disease management, I know that my symptoms can be managed with many types of treatments. A chronic medication trial may be part of my treatment, but I must be an active participant in my care. Medication therapy is only one part of my symptom management plan. In some cases, there may be limited scientific evidence to support the chronic use of certain medications to improve symptoms and daily function. Furthermore, in certain circumstances, there may be scientific information that suggests that the use of chronic controlled substances may worsen my symptoms and increase my risk of unintentional death directly related to this medication therapy. I know that if my provider feels my risk from controlled medications is greater than my benefit, I will have my controlled substance medication(s) compassionately lowered or removed altogether.      I further agree to allow this office to contact my HIPAA contact if there are concerns about my safety and use of the controlled medications. I have agreed to use the prescribed controlled substance medications to me as instructed by my provider and as stated in this Medication Agreement. My initial on each page and my signature below shows that I have read each page and I have had the opportunity to ask questions with answers provided by my provider.     Patient Name (Printed): _____________________________________  Patient Signature:  ______________________   Date: _____________    Prescriber Name (Printed): ___________________________________  Prescriber Signature: _____________________  Date: _____________

## 2022-02-22 NOTE — ASSESSMENT & PLAN NOTE
Borderline controlled, continue current medications, continue current treatment plan and medication adherence emphasized   On supplemental o2

## 2022-02-22 NOTE — PROGRESS NOTES
APSO Progress Note    Date:10/7/2021         Patient Name:Samara Smith     YOB: 1945     Age:76 y.o.     Assessment/Plan        Problem List Items Addressed This Visit        Circulatory    Congestive heart failure (HCC)      Well-controlled, continue current medications, continue current treatment plan, medication adherence emphasized and lifestyle modifications recommended         Essential hypertension      Well-controlled, continue current medications, continue current treatment plan, medication adherence emphasized and lifestyle modifications recommended            Respiratory    COPD with hypoxia (HCC)      Borderline controlled, continue current medications, continue current treatment plan and medication adherence emphasized   On supplemental o2            Endocrine    Type 2 diabetes mellitus with complication, without long-term current use of insulin (HCC) - Primary      Well-controlled, continue current medications, continue current treatment plan, medication adherence emphasized and lifestyle modifications recommended         Relevant Orders    POCT glycosylated hemoglobin (Hb A1C) (Completed)       Musculoskeletal and Integument    Primary osteoarthritis involving multiple joints      Borderline controlled, continue current medications and continue current treatment plan            Other    Post traumatic stress disorder (PTSD)      Borderline controlled, continue current medications and continue current treatment plan         Relevant Medications    FLUoxetine (PROZAC) 20 MG capsule    ALPRAZolam (XANAX) 1 MG tablet    DULoxetine (CYMBALTA) 60 MG extended release capsule    Other Relevant Orders    [address]    Mixed hyperlipidemia      Well-controlled, continue current medications, continue current treatment plan and medication adherence emphasized           Other Visit Diagnoses     Anxious depression        Relevant Medications    FLUoxetine (PROZAC) 20 MG capsule ALPRAZolam (XANAX) 1 MG tablet    DULoxetine (CYMBALTA) 60 MG extended release capsule    Other Relevant Orders    [facility]    Anxiety        Relevant Medications    FLUoxetine (PROZAC) 20 MG capsule    ALPRAZolam (XANAX) 1 MG tablet    DULoxetine (CYMBALTA) 60 MG extended release capsule    Other Relevant Orders    DRUG SCREEN, PAIN    Encounter for therapeutic drug level monitoring         Relevant Orders    DRUG SCREEN, PAIN           Return in about 3 months (around 5/22/2022). Total time spent was between Time personally spent assessing and managing the patient on the date of service: Est: 30-39 minutes ([id number]) mins. This included time spent reviewing the patient's medical record (e.g., recent visits, labs, and studies); seeing the patient in the office (face-to-face time); ordering medications, studies, procedures, or referrals; calling the patient or family later in the day with results and further recommendations; and documenting the visit in the medical record. Electronically signed by Celia Vail DO on 10/22/21         Subjective     Misha Jaquez is a 78 y.o. female presenting today for   Chief Complaint   Patient presents with   [facility] Follow-up     broken right foot    . Misha Jaquez is a 70 y.o. female who presents for follow up of PTSD and anxiety disorder. Current symptoms: occasional flashbacks. She denies current suicidal and homicidal ideation. She complains of the following side effects from the treatment: none. - stable on cymbalta and xanax    Misha Jaquez is an 70 y.o. female who presents with arthralgias that began several years ago. Pain is located in multiple joints. The pain is described as intermittent, severe, aching and shooting. Associated symptoms include: crepitation and decreased range of motion. The patient has tried norco for pain, with moderate relief. Has weaned herself down to three tabs per day    Hypertension  This is a chronic problem. The current episode started more than 1 year ago. The problem has been gradually improving since onset. The problem is controlled. Associated symptoms include anxiety. Pertinent negatives include no blurred vision, neck pain, orthopnea, palpitations, peripheral edema or sweats. Past treatments include ACE inhibitors. The current treatment provides significant improvement. There is no history of chronic renal disease. Diabetes  She presents for her follow-up diabetic visit. She has type 2 diabetes mellitus. Her disease course has been stable. Pertinent negatives for hypoglycemia include no sweats. Pertinent negatives for diabetes include no blurred vision, no fatigue, no foot paresthesias, no foot ulcerations, no polydipsia, no polyphagia, no polyuria and no weakness. There are no hypoglycemic complications. Symptoms are stable. Current diabetic treatment includes oral agent (monotherapy). She is compliant with treatment all of the time. Her weight is stable. Her home blood glucose trend is decreasing steadily. An ACE inhibitor/angiotensin II receptor blocker is being taken. Hyperlipidemia  This is a chronic problem. The current episode started more than 1 year ago. The problem is controlled. Recent lipid tests were reviewed and are normal. Exacerbating diseases include diabetes and obesity. She has no history of chronic renal disease, hypothyroidism, liver disease or nephrotic syndrome. Pertinent negatives include no focal sensory loss, focal weakness or leg pain. Current antihyperlipidemic treatment includes statins. The current treatment provides significant improvement of lipids. COPD  There is no chest tightness, difficulty breathing or frequent throat clearing. This is a chronic problem. The current episode started more than 1 year ago. The problem occurs daily. The problem has been unchanged. Associated symptoms include orthopnea. Pertinent negatives include no ear congestion, nasal congestion or sweats. Her symptoms are aggravated by strenuous activity. Her symptoms are alleviated by rest, beta-agonist and steroid inhaler (supplemental O2). She reports significant improvement on treatment. Review of Systems   Review of Systems   Constitutional: Negative. Negative for fatigue. HENT: Negative. Eyes: Negative. Negative for blurred vision. Respiratory: Negative. Cardiovascular: Negative. Negative for palpitations and orthopnea. Gastrointestinal: Negative. Endocrine: Negative. Negative for polydipsia, polyphagia and polyuria. Genitourinary: Negative. Musculoskeletal: Negative. Negative for neck pain. Skin: Negative. Allergic/Immunologic: Negative. Neurological: Negative. Negative for focal weakness and weakness. Hematological: Negative. Psychiatric/Behavioral: Negative. All other systems reviewed and are negative. Medications     Current Outpatient Medications   Medication Sig Dispense Refill    FLUoxetine (PROZAC) 20 MG capsule TAKE ONE CAPSULE BY MOUTH DAILY 90 capsule 1    ALPRAZolam (XANAX) 1 MG tablet Take 1 tablet by mouth 3 times daily as needed for Sleep for up to 30 days.  90 tablet 0    DULoxetine (CYMBALTA) 60 MG extended release capsule Take 1 capsule by mouth daily 90 capsule 1    metFORMIN (GLUCOPHAGE-XR) 750 MG extended release tablet TAKE ONE TABLET BY MOUTH DAILY WITH BREAKFAST 90 tablet 1    simvastatin (ZOCOR) 40 MG tablet TAKE ONE TABLET BY MOUTH ONCE NIGHTLY 90 tablet 1    lisinopril (PRINIVIL;ZESTRIL) 10 MG tablet Take 1 tablet by mouth daily 90 tablet 1    aspirin EC 81 MG EC tablet Take 1 tablet by mouth daily 90 tablet 1    esomeprazole (NEXIUM) 40 MG delayed release capsule TAKE ONE CAPSULE BY MOUTH DAILY / taking PRN 90 capsule 3    glucose monitoring (FREESTYLE FREEDOM) kit 1 kit by Does not apply route daily as needed (testing once daily) 1 kit 0    Lancets MISC 1 each by Other route 2 times daily Indications: [employer] Glucose Meter 100 each 3    blood glucose test strips (ASCENSIA AUTODISC VI;ONE TOUCH ULTRA TEST VI) strip Use with associated glucose meter. Test one time daily and additional times if abnormal readings 100 strip 5    Lancets MISC Inject 1 each into the skin daily 100 each 5    Isopropyl Alcohol (ALCOHOL WIPES) 70 % MISC Use as directed 100 each 0    Elastic Bandages & Supports (ABDOMINAL BINDER/ELASTIC MED) MISC Use daily as much as possible for abdominal hernia 1 each 0    albuterol-ipratropium (COMBIVENT RESPIMAT)  MCG/ACT AERS inhaler INHALE ONE INHALATION BY MOUTH FOUR TIMES A DAY 1 Inhaler 2    blood glucose monitor strips 1 strip by Other route TrueTest Strips     3 times a day & as needed for symptoms of irregular blood glucose.  OXYGEN Inhale 3 L into the lungs continuous      prochlorperazine (COMPAZINE) 10 MG tablet Take 1 tablet by mouth every 6 hours as needed (CHEMO INDUCED NAUSEA) 120 tablet 3    gabapentin (NEURONTIN) 600 MG tablet TAKE ONE TABLET BY MOUTH  Four  TIMES A DAY as needed 120 tablet 1     No current facility-administered medications for this visit. Past History    Past Medical History:   has a past medical history of COPD (chronic obstructive pulmonary disease) ([facility] [address] Depression, Diabetes mellitus ([facility] [address] Hyperlipidemia, PTSD (post-traumatic stress disorder), and Tubulovillous adenoma. Social History:   reports that she quit smoking about 14 years ago. Her smoking use included cigarettes. She has a 10.00 pack-year smoking history. She has never used smokeless tobacco. She reports that she does not drink alcohol and does not use drugs.      Family History:   Family History   Problem Relation Age of Onset    Heart Disease Mother     Heart Disease Brother        Surgical History:   Past Surgical History:   Procedure Laterality Date    COLONOSCOPY      colon polyps    COLONOSCOPY  06/07/2016    severe diverticulosis TUBULOVILLOUS ADENOMA.       HYSTERECTOMY      TUNNELED VENOUS PORT PLACEMENT  04/08/2019    chemo port to right chest        Physical Examination      Vitals: Wt 154 lb (69.9 kg)   BMI 33.33 kg/m²     Physical Exam  Constitutional:       General: She is not in acute distress. Appearance: Normal appearance. She is obese. She is not ill-appearing, toxic-appearing or diaphoretic. HENT:      Head: Normocephalic and atraumatic. Right Ear: Tympanic membrane, ear canal and external ear normal. There is no impacted cerumen. Left Ear: Tympanic membrane, ear canal and external ear normal. There is no impacted cerumen. Nose: Nose normal. No congestion or rhinorrhea. Mouth/Throat:      Mouth: Mucous membranes are moist.      Pharynx: Oropharynx is clear. No oropharyngeal exudate or posterior oropharyngeal erythema. Eyes:      General: No scleral icterus. Right eye: No discharge. Left eye: No discharge. Extraocular Movements: Extraocular movements intact. Conjunctiva/sclera: Conjunctivae normal.      Pupils: Pupils are equal, round, and reactive to light. Cardiovascular:      Rate and Rhythm: Normal rate and regular rhythm. Pulses: Normal pulses. Heart sounds: Normal heart sounds. No murmur heard. No friction rub. No gallop. Pulmonary:      Effort: Pulmonary effort is normal. No respiratory distress. Breath sounds: Normal breath sounds. No stridor. No wheezing, rhonchi or rales. Comments: On supplemental O2 via [place] - gets very out of breath with walking  Chest:      Chest wall: No tenderness. Abdominal:      General: Abdomen is flat. Bowel sounds are normal. There is no distension. Palpations: Abdomen is soft. There is no mass. Tenderness: There is no abdominal tenderness. There is no right CVA tenderness, left CVA tenderness, guarding or rebound. Hernia: No hernia is present. Musculoskeletal:      Cervical back: Normal range of motion and neck supple.       Lumbar back: Spasms and tenderness present. No swelling, edema, deformity, lacerations or bony tenderness. Decreased range of motion. Skin:     General: Skin is warm. Capillary Refill: Capillary refill takes less than 2 seconds. Coloration: Skin is not jaundiced or pale. Findings: No bruising, erythema, lesion or rash. Neurological:      General: No focal deficit present. Mental Status: She is alert and oriented to person, place, and time. Mental status is at baseline. Cranial Nerves: No cranial nerve deficit. Sensory: No sensory deficit. Motor: Motor function is intact. No weakness. Coordination: Coordination abnormal.      Gait: Gait abnormal and tandem walk abnormal.      Deep Tendon Reflexes: Reflexes normal.   Psychiatric:         Mood and Affect: Mood normal.         Behavior: Behavior normal.         Thought Content: Thought content normal.         Judgment: Judgment normal.         Labs/Imaging/Diagnostics   Labs:  Hemoglobin A1C   Date Value Ref Range Status   02/22/2022 5.3 % Final       Imaging Last 24 Hours:  US RETROPERITONEAL COMPLETE  Narrative: EXAMINATION:  RETROPERITONEAL ULTRASOUND OF THE KIDNEYS AND URINARY BLADDER    8/24/2019    COMPARISON:  None. HISTORY:  ORDERING SYSTEM PROVIDED HISTORY: RENAL FAILURE, ACUTE (KIDNEY INJURY)    FINDINGS:    Kidneys: The right kidney measures 10.2 cm in length and the left kidney measures 10.9  cm in length. Kidneys demonstrate normal cortical echogenicity. No evidence of  hydronephrosis or intrarenal stones. Bladder:    Unremarkable appearance of the bladder. No significant post void residual.  Impression: Unremarkable ultrasound of the kidneys and urinary bladder.   XR CHEST PORTABLE  Narrative: EXAMINATION:  ONE XRAY VIEW OF THE CHEST    8/24/2019 6:28 am    COMPARISON:  08/22/2019    HISTORY:  ORDERING SYSTEM PROVIDED HISTORY: shortness of breath  TECHNOLOGIST PROVIDED HISTORY:  shortness of breath  Reason for Exam: sob  Acuity: Unknown  Type of Exam: Unknown    FINDINGS:  Right IJ infusion port remains in place. Stable cardiomediastinal  silhouette. Mild seizure prominence unchanged. No focal consolidation. Penetration of right hemidiaphragm noted. Impression: Mild seizure prominence stable. No acute process. On the basis of positive falls risk screening, assessment and plan is as follows: home safety tips provided.

## 2022-02-22 NOTE — PATIENT INSTRUCTIONS
Patient Education        Preventing Falls: Care Instructions  Your Care Instructions     Getting around your home safely can be a challenge if you have injuries or health problems that make it easy for you to fall. Loose rugs and furniture in walkways are among the dangers for many older people who have problems walking or who have poor eyesight. People who have conditions such as arthritis, osteoporosis, or dementia also have to be careful not to fall. You can make your home safer with a few simple measures. Follow-up care is a key part of your treatment and safety. Be sure to make and go to all appointments, and call your doctor if you are having problems. It's also a good idea to know your test results and keep a list of the medicines you take. How can you care for yourself at home? Taking care of yourself  · You may get dizzy if you do not drink enough water. To prevent dehydration, drink plenty of fluids. Choose water and other clear liquids. If you have kidney, heart, or liver disease and have to limit fluids, talk with your doctor before you increase the amount of fluids you drink. · Exercise regularly to improve your strength, muscle tone, and balance. Walk if you can. Swimming may be a good choice if you cannot walk easily. · Have your vision and hearing checked each year or any time you notice a change. If you have trouble seeing and hearing, you might not be able to avoid objects and could lose your balance. · Know the side effects of the medicines you take. Ask your doctor or pharmacist whether the medicines you take can affect your balance. Sleeping pills or sedatives can affect your balance. · Limit the amount of alcohol you drink. Alcohol can impair your balance and other senses. · Ask your doctor whether calluses or corns on your feet need to be removed. If you wear loose-fitting shoes because of calluses or corns, you can lose your balance and fall.   · Talk to your doctor if you have numbness in your feet. Preventing falls at home  · Remove raised doorway thresholds, throw rugs, and clutter. Repair loose carpet or raised areas in the floor. · Move furniture and electrical cords to keep them out of walking paths. · Use nonskid floor wax, and wipe up spills right away, especially on ceramic tile floors. · If you use a walker or cane, put rubber tips on it. If you use crutches, clean the bottoms of them regularly with an abrasive pad, such as steel wool. · Keep your house well lit, especially [place], and outside walkways. Use night-lights in areas such as hallways and bathrooms. Add extra light switches or use remote switches (such as switches that go on or off when you clap your hands) to make it easier to turn lights on if you have to get up during the night. · Install sturdy handrails on stairways. · Move items in your cabinets so that the things you use a lot are on the lower shelves (about waist level). · Keep a cordless phone and a flashlight with new batteries by your bed. If possible, put a phone in each of the main rooms of your house, or carry a cell phone in case you fall and cannot reach a phone. Or, you can wear a device around your neck or wrist. You push a button that sends a signal for help. · Wear low-heeled shoes that fit well and give your feet good support. Use footwear with nonskid soles. Check the heels and soles of your shoes for wear. Repair or replace worn heels or soles. · Do not wear socks without shoes on wood floors. · Walk on the grass when the sidewalks are slippery. If you live in an area that gets snow and ice in the winter, sprinkle salt on slippery steps and sidewalks. Preventing falls in the bath  · Install grab bars and nonskid mats inside and outside your shower or tub and near the toilet and sinks. · Use shower chairs and bath benches. · Use a hand-held shower head that will allow you to sit while showering.   · Get into a tub or shower by putting the weaker leg in first. Get out of a tub or shower with your strong side first.  · Repair loose toilet seats and consider installing a raised toilet seat to make getting on and off the toilet easier. · Keep your bathroom door unlocked while you are in the shower. Where can you learn more? Go to [email or web address] and sign in to your [employer] account. Enter [phone] in the [facility] box to learn more about \"Preventing Falls: Care Instructions. \"     If you do not have an account, please click on the \"Sign Up Now\" link. Current as of: September 8, 2021               Content Version: 13.1  © 8882-3292 [employer]wise, Incorporated. Care instructions adapted under license by [facility] ([facility]). If you have questions about a medical condition or this instruction, always ask your healthcare professional. [place] [address] any warranty or liability for your use of this information.

## 2022-03-02 ENCOUNTER — TELEPHONE (OUTPATIENT)
Dept: FAMILY MEDICINE CLINIC | Age: 77
End: 2022-03-02

## 2022-03-02 DIAGNOSIS — Z13.9 ENCOUNTER FOR SCREENING INVOLVING SOCIAL DETERMINANTS OF HEALTH (SDOH): Primary | ICD-10-CM

## 2022-03-02 NOTE — TELEPHONE ENCOUNTER
Caller to say that patient did not go to behavioral health appt. She is not wearing the boot for fracture, because it doesn't fit her. Pt told caller that she cannot walk across the room without getting sob. She told the caller that her neighbors help her with her food for the past 6 months. She says she has fallen 3-4 times in the past month. Pt has a guardian help button but it does not work, she told the  they are coming out to fix it. Pt kept going off subject when  asks her questions.

## 2022-03-03 ENCOUNTER — TELEPHONE (OUTPATIENT)
Dept: FAMILY MEDICINE CLINIC | Age: 77
End: 2022-03-03

## 2022-03-03 DIAGNOSIS — M15.9 PRIMARY OSTEOARTHRITIS INVOLVING MULTIPLE JOINTS: ICD-10-CM

## 2022-03-03 NOTE — TELEPHONE ENCOUNTER
Thank you for the update. Unfortunately May Nino is having a very rough time in many areas of life. If we could help facilitate the behavioral health appt being rescheduled I really think that would help.  And if we haven't gotten the community health workers involved that may really help her out as well

## 2022-03-03 NOTE — TELEPHONE ENCOUNTER
Brett Franks was contacted by casi Campos, regarding a Social Determinants of Health referral.     A voicemail message was left with the writer's contact information. First contact attempt.

## 2022-03-04 RX ORDER — GABAPENTIN 600 MG/1
600 TABLET ORAL 4 TIMES DAILY PRN
Qty: 120 TABLET | Refills: 1 | Status: SHIPPED | OUTPATIENT
Start: 2022-03-04 | End: 2022-05-23

## 2022-03-04 NOTE — TELEPHONE ENCOUNTER
Domitila Yu is calling to request a refill on the following medication(s):    Medication Request:  Requested Prescriptions     Pending Prescriptions Disp Refills    gabapentin (NEURONTIN) 600 MG tablet [Pharmacy Med Name: GABAPENTIN 600 MG TABLET] 120 tablet 1     Sig: TAKE ONE TABLET BY MOUTH FOUR TIMES A DAY AS NEEDED       Last Visit Date (If Applicable):  3/59/7152    Next Visit Date:    5/23/2022

## 2022-03-08 ENCOUNTER — TELEPHONE (OUTPATIENT)
Dept: FAMILY MEDICINE CLINIC | Age: 77
End: 2022-03-08
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
## 2022-03-15 ENCOUNTER — TELEPHONE (OUTPATIENT)
Dept: FAMILY MEDICINE CLINIC | Age: 77
End: 2022-03-15

## 2022-03-15 DIAGNOSIS — M15.9 PRIMARY OSTEOARTHRITIS INVOLVING MULTIPLE JOINTS: ICD-10-CM

## 2022-03-15 RX ORDER — HYDROCODONE BITARTRATE AND ACETAMINOPHEN 7.5; 325 MG/1; MG/1
1-2 TABLET ORAL EVERY 8 HOURS PRN
Qty: 180 TABLET | Refills: 0 | Status: SHIPPED | OUTPATIENT
Start: 2022-03-15 | End: 2022-04-21 | Stop reason: SDUPTHER

## 2022-03-15 NOTE — TELEPHONE ENCOUNTER
Ayleen Vidal is calling to request a refill on the following medication(s):    Medication Request:  Requested Prescriptions     Pending Prescriptions Disp Refills    HYDROcodone-acetaminophen (NORCO) 7.5-325 MG per tablet 180 tablet 0     Sig: Take 2 tablets by mouth every 8 hours as needed for Pain for up to 30 days.        Last Visit Date (If Applicable):  6/72/7704    Next Visit Date:    5/23/2022
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
## 2022-03-18 ENCOUNTER — HOSPITAL ENCOUNTER (OUTPATIENT)
Dept: INFUSION THERAPY | Facility: MEDICAL CENTER | Age: 77
End: 2022-03-18

## 2022-03-22 ENCOUNTER — CARE COORDINATION (OUTPATIENT)
Dept: CARE COORDINATION | Age: 77
End: 2022-03-22

## 2022-04-05 ENCOUNTER — CARE COORDINATION (OUTPATIENT)
Dept: CARE COORDINATION | Age: 77
End: 2022-04-05
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
## 2022-04-18 ENCOUNTER — HOSPITAL ENCOUNTER (OUTPATIENT)
Facility: MEDICAL CENTER | Age: 77
End: 2022-04-18

## 2022-04-19 DIAGNOSIS — I50.9 ACUTE ON CHRONIC CONGESTIVE HEART FAILURE, UNSPECIFIED HEART FAILURE TYPE (HCC): ICD-10-CM

## 2022-04-19 DIAGNOSIS — M15.9 PRIMARY OSTEOARTHRITIS INVOLVING MULTIPLE JOINTS: ICD-10-CM

## 2022-04-19 DIAGNOSIS — E11.8 TYPE 2 DIABETES MELLITUS WITH COMPLICATION, WITHOUT LONG-TERM CURRENT USE OF INSULIN (HCC): ICD-10-CM

## 2022-04-19 DIAGNOSIS — F41.9 ANXIETY: ICD-10-CM

## 2022-04-19 RX ORDER — METFORMIN HYDROCHLORIDE 750 MG/1
TABLET, EXTENDED RELEASE ORAL
Qty: 90 TABLET | Refills: 1 | Status: CANCELLED | OUTPATIENT
Start: 2022-04-19

## 2022-04-19 RX ORDER — GABAPENTIN 600 MG/1
600 TABLET ORAL 4 TIMES DAILY PRN
Qty: 120 TABLET | Refills: 1 | Status: CANCELLED | OUTPATIENT
Start: 2022-04-19 | End: 2022-06-18

## 2022-04-19 NOTE — TELEPHONE ENCOUNTER
Patient needs something sent in for her yeast infection.   From taking antibiotics    Send to [facility] on [address]

## 2022-04-21 ENCOUNTER — CARE COORDINATION (OUTPATIENT)
Dept: CARE COORDINATION | Age: 77
End: 2022-04-21

## 2022-04-21 RX ORDER — ASPIRIN 81 MG/1
81 TABLET ORAL DAILY
Qty: 90 TABLET | Refills: 1 | Status: SHIPPED | OUTPATIENT
Start: 2022-04-21

## 2022-04-21 RX ORDER — ALPRAZOLAM 1 MG/1
1 TABLET ORAL 3 TIMES DAILY PRN
Qty: 21 TABLET | Refills: 0 | Status: SHIPPED | OUTPATIENT
Start: 2022-04-21 | End: 2022-04-27

## 2022-04-21 RX ORDER — HYDROCODONE BITARTRATE AND ACETAMINOPHEN 7.5; 325 MG/1; MG/1
1-2 TABLET ORAL EVERY 8 HOURS PRN
Qty: 42 TABLET | Refills: 0 | Status: SHIPPED | OUTPATIENT
Start: 2022-04-21 | End: 2022-05-23 | Stop reason: SDUPTHER

## 2022-04-21 NOTE — CARE COORDINATION
Ambulatory Care Coordination Note  4/21/2022  CM Risk Score: 7  Charlson 10 Year Mortality Risk Score: 100%     ACC: Makenna Luong RN    Summary Note: ACM reviewed chart and DM, COPD and CHF management/educations  Continues with home O2  Chronic pain    No further concerns at this time    ACM will follow up with Ronda Walker next week for updates with well-being, DM, COPD and CHF management. Will discuss any home needs at that time. Care Coordination Interventions    Program Enrollment: Complex Care  Referral from Primary Care Provider: No  Suggested Interventions and Community Resources  Zone Management Tools: In Process (Comment: DM, COPD and CHF management)         Goals Addressed                 This Visit's Progress     Conditions and Symptoms   Improving     I will schedule office visits, as directed by my provider. I will keep my appointment or reschedule if I have to cancel. I will notify my provider of any barriers to my plan of care. I will follow my Zone Management tool to seek urgent or emergent care. I will notify my provider of any symptoms that indicate a worsening of my condition. Barriers: overwhelmed by complexity of regimen  Plan for overcoming my barriers: Willingness to work with ACM and PCP for DM, COPD and CHF management. Confidence: 7/10  Anticipated Goal Completion Date: 7/2022              Prior to Admission medications    Medication Sig Start Date End Date Taking? Authorizing Provider   ALPRAZolam Edith Herrera) 1 MG tablet Take 1 tablet by mouth 3 times daily as needed for Sleep for up to 7 days. 4/21/22 4/28/22  Venus Mast, DO   aspirin EC 81 MG EC tablet Take 1 tablet by mouth daily 4/21/22   Venus Mast DO   HYDROcodone-acetaminophen [facility]) 7.5-325 MG per tablet Take 1-2 tablets by mouth every 8 hours as needed for Pain for up to 7 days.  4/21/22 4/28/22  Venus Mast, DO   gabapentin (NEURONTIN) 600 MG tablet Take 1 tablet by mouth 4 times daily as needed (pain) for up to 60 days. TAKE ONE TABLET BY MOUTH  Four  TIMES A DAY as needed 3/4/22 5/3/22  Yenifer Garza, DO   FLUoxetine (PROZAC) 20 MG capsule TAKE ONE CAPSULE BY MOUTH DAILY 2/22/22   Yenifer Garza, DO   DULoxetine (CYMBALTA) 60 MG extended release capsule Take 1 capsule by mouth daily 2/22/22   Yenifer Garza, DO   metFORMIN (GLUCOPHAGE-XR) 750 MG extended release tablet TAKE ONE TABLET BY MOUTH DAILY WITH BREAKFAST 1/26/22   Yenifer Garza, DO   simvastatin (ZOCOR) 40 MG tablet TAKE ONE TABLET BY MOUTH ONCE NIGHTLY 1/12/22   Yenifer Garza, DO   lisinopril (PRINIVIL;ZESTRIL) 10 MG tablet Take 1 tablet by mouth daily 1/6/22   Christin Colvin MD   esomeprazole (NEXIUM) 40 MG delayed release capsule TAKE ONE CAPSULE BY MOUTH DAILY / taking PRN 11/4/21   Yenifer Garza, DO   glucose monitoring (FREESTYLE FREEDOM) kit 1 kit by Does not apply route daily as needed (testing once daily) 9/14/21   Yenifer Garza, DO   Lancets MISC 1 each by Other route 2 times daily Indications: [employer] Glucose Meter 9/14/21   Yenifer Garza, DO   blood glucose test strips (ASCENSIA AUTODISC VI;ONE TOUCH ULTRA TEST VI) strip Use with associated glucose meter.  Test one time daily and additional times if abnormal readings 9/14/21   Yenifer aGrza, DO   Lancets MISC Inject 1 each into the skin daily 8/31/21   Yenifer Garza, DO   Isopropyl Alcohol (ALCOHOL WIPES) 70 % MISC Use as directed 8/31/21   Yenifer Garza, DO   Elastic Bandages & Supports (ABDOMINAL BINDER/ELASTIC MED) MISC Use daily as much as possible for abdominal hernia 4/8/21   Yenifer Garza, DO   albuterol-ipratropium (COMBIVENT RESPIMAT)  MCG/ACT AERS inhaler INHALE ONE INHALATION BY MOUTH FOUR TIMES A DAY 10/21/20   Yenifer Garza, DO   blood glucose monitor strips 1 strip by Other route TrueTest Strips     3 times a day & as needed for symptoms of irregular blood glucose. Historical Provider, MD   OXYGEN Inhale 3 L into the lungs continuous    Historical Provider, MD   prochlorperazine (COMPAZINE) 10 MG tablet Take 1 tablet by mouth every 6 hours as needed (CHEMO INDUCED NAUSEA) 4/30/19   Joe Xavier MD       Future Appointments   Date Time Provider Thad Magallanesi   4/26/2022 10:30 AM [facility] STA CHAIR 18 [facility]   4/26/2022 11:15 AM Joe Xavier MD [facility]   5/23/2022  2:20 PM [facility]     ,   Diabetes Assessment    Medic Alert ID: No  Meal Planning: Avoidance of concentrated sweets   How often do you test your blood sugar?: Daily, Meals, Bedtime   Do you have barriers with adherence to non-pharmacologic self-management interventions?  (Nutrition/Exercise/Self-Monitoring): No   Have you ever had to go to the ED for symptoms of low blood sugar?: No       No patient-reported symptoms      ,   Congestive Heart Failure Assessment    Are you currently restricting fluids?: No Restriction  Do you understand a low sodium diet?: Yes  Do you understand how to read food labels?: Yes  How many restaurant meals do you eat per week?: 0  Do you salt your food before tasting it?: No     No patient-reported symptoms      Symptoms:  None: Yes      Symptom course: stable  Weight trend: stable  Salt intake watch compared to last visit: stable     ,   COPD Assessment    Does the patient understand envrionmental exposure?: Yes  Is the patient able to verbalize Rescue vs. Long Acting medications?: Yes  Does the patient use a space with inhaled medications?: No     No patient-reported symptoms         Symptoms:     Have you had a recent diagnosis of pneumonia either by PCP or at a hospital?: No      and   General Assessment    Do you have any symptoms that are causing concern?: No

## 2022-04-27 DIAGNOSIS — F41.9 ANXIETY: ICD-10-CM

## 2022-04-27 DIAGNOSIS — E78.2 MIXED HYPERLIPIDEMIA: ICD-10-CM

## 2022-04-28 ENCOUNTER — CARE COORDINATION (OUTPATIENT)
Dept: CARE COORDINATION | Age: 77
End: 2022-04-28

## 2022-04-28 RX ORDER — SIMVASTATIN 40 MG
TABLET ORAL
Qty: 90 TABLET | Refills: 1 | Status: SHIPPED | OUTPATIENT
Start: 2022-04-28

## 2022-04-28 RX ORDER — ALPRAZOLAM 1 MG/1
1 TABLET ORAL 3 TIMES DAILY PRN
Qty: 21 TABLET | Refills: 0 | Status: SHIPPED | OUTPATIENT
Start: 2022-04-28 | End: 2022-05-09 | Stop reason: SDUPTHER

## 2022-04-29 NOTE — CARE COORDINATION
Ambulatory Care Coordination Note  4/29/2022  CM Risk Score: 7  Charlson 10 Year Mortality Risk Score: 100%     ACC: Bailey Drafts, RN    Summary Note: ACM reviewed chart, no new concerns at this time, continues with chronic pain, tolerable. Continues with Home O2 , NC 3L continuously. Declines home health needs at this time. ACM will need to be reviewed with next outreach              Care Coordination Interventions    Program Enrollment: Complex Care  Referral from Primary Care Provider: No  Suggested Interventions and Community Resources  Zone Management Tools: In Process (Comment: DM, COPD and CHF management)         Goals Addressed    None         Prior to Admission medications    Medication Sig Start Date End Date Taking? Authorizing Provider   ALPRAZolam Roberto Cee) 1 MG tablet Take 1 tablet by mouth 3 times daily as needed for Sleep or Anxiety for up to 7 days. 4/28/22 5/5/22  Jamie Travis, DO   simvastatin (ZOCOR) 40 MG tablet TAKE ONE TABLET BY MOUTH ONCE NIGHTLY 4/28/22   Jamie Travis, DO   aspirin EC 81 MG EC tablet Take 1 tablet by mouth daily 4/21/22   Jamie Travis, DO   gabapentin (NEURONTIN) 600 MG tablet Take 1 tablet by mouth 4 times daily as needed (pain) for up to 60 days.  TAKE ONE TABLET BY MOUTH  Four  TIMES A DAY as needed 3/4/22 5/3/22  Jamie Travis, DO   FLUoxetine (PROZAC) 20 MG capsule TAKE ONE CAPSULE BY MOUTH DAILY 2/22/22   Jamie Travis, DO   DULoxetine (CYMBALTA) 60 MG extended release capsule Take 1 capsule by mouth daily 2/22/22   Jamie Travis, DO   metFORMIN (GLUCOPHAGE-XR) 750 MG extended release tablet TAKE ONE TABLET BY MOUTH DAILY WITH BREAKFAST 1/26/22   Jamie Travis, DO   lisinopril (PRINIVIL;ZESTRIL) 10 MG tablet Take 1 tablet by mouth daily 1/6/22   Aurora Gillespie MD   esomeprazole (NEXIUM) 40 MG delayed release capsule TAKE ONE CAPSULE BY MOUTH DAILY / taking PRN 11/4/21   Jamie Travis, DO   glucose monitoring (FREESTYLE FREEDOM) kit 1 kit by Does not apply route daily as needed (testing once daily) 9/14/21   Darrin Epley, DO   Lancets MISC 1 each by Other route 2 times daily Indications: [employer] Glucose Meter 9/14/21   Darrin Epley, DO   blood glucose test strips (ASCENSIA AUTODISC VI;ONE TOUCH ULTRA TEST VI) strip Use with associated glucose meter. Test one time daily and additional times if abnormal readings 9/14/21   Darrin Epley, DO   Lancets MISC Inject 1 each into the skin daily 8/31/21   Darrin Epley, DO   Isopropyl Alcohol (ALCOHOL WIPES) 70 % MISC Use as directed 8/31/21   Darrin Epley, DO   Elastic Bandages & Supports (ABDOMINAL BINDER/ELASTIC MED) MISC Use daily as much as possible for abdominal hernia 4/8/21   Darrin Epley, DO   albuterol-ipratropium (COMBIVENT RESPIMAT)  MCG/ACT AERS inhaler INHALE ONE INHALATION BY MOUTH FOUR TIMES A DAY 10/21/20   Darrin Epley, DO   blood glucose monitor strips 1 strip by Other route TrueTest Strips     3 times a day & as needed for symptoms of irregular blood glucose. Historical Provider, MD   OXYGEN Inhale 3 L into the lungs continuous    Historical Provider, MD   prochlorperazine (COMPAZINE) 10 MG tablet Take 1 tablet by mouth every 6 hours as needed (CHEMO INDUCED NAUSEA) 4/30/19   Keyshawn Denton MD       Future Appointments   Date Time Provider Thad Rebeca   5/23/2022  2:20 PM Darrin Epley, DO [facility]     ,   Diabetes Assessment    Medic Alert ID: No  Meal Planning: Avoidance of concentrated sweets   How often do you test your blood sugar?: Daily, Meals, Bedtime   Do you have barriers with adherence to non-pharmacologic self-management interventions?  (Nutrition/Exercise/Self-Monitoring): No   Have you ever had to go to the ED for symptoms of low blood sugar?: No          ,   Congestive Heart Failure Assessment    Are you currently restricting fluids?: No Restriction  Do you understand a low sodium diet?: Yes  Do you understand how to read food labels?: Yes  How many restaurant meals do you eat per week?: 0  Do you salt your food before tasting it?: No         Symptoms:        ,   COPD Assessment    Does the patient understand envrionmental exposure?: Yes  Is the patient able to verbalize Rescue vs. Long Acting medications?: Yes  Does the patient use a space with inhaled medications?: No            Symptoms:         and   General Assessment    Do you have any symptoms that are causing concern?: No

## 2022-05-05 ENCOUNTER — TELEPHONE (OUTPATIENT)
Dept: FAMILY MEDICINE CLINIC | Age: 77
End: 2022-05-05

## 2022-05-05 NOTE — TELEPHONE ENCOUNTER
Patient called and stated that pcp needs a urine sample and to sign medical papers. She stated that was told she can just come into the office at any time. She is asking if she can come into office today or just wait till tomorrow.  Thank you

## 2022-05-06 ENCOUNTER — NURSE ONLY (OUTPATIENT)
Dept: FAMILY MEDICINE CLINIC | Age: 77
End: 2022-05-06

## 2022-05-06 ENCOUNTER — TELEPHONE (OUTPATIENT)
Dept: FAMILY MEDICINE CLINIC | Age: 77
End: 2022-05-06

## 2022-05-06 ENCOUNTER — HOSPITAL ENCOUNTER (OUTPATIENT)
Age: 77
Setting detail: SPECIMEN
Discharge: HOME | End: 2022-05-06

## 2022-05-06 ENCOUNTER — CARE COORDINATION (OUTPATIENT)
Dept: CARE COORDINATION | Age: 77
End: 2022-05-06

## 2022-05-06 DIAGNOSIS — Z51.81 ENCOUNTER FOR THERAPEUTIC DRUG LEVEL MONITORING: ICD-10-CM

## 2022-05-06 DIAGNOSIS — F41.9 ANXIETY: ICD-10-CM

## 2022-05-06 DIAGNOSIS — G89.29 CHRONIC LOW BACK PAIN WITHOUT SCIATICA, UNSPECIFIED BACK PAIN LATERALITY: Primary | ICD-10-CM

## 2022-05-06 DIAGNOSIS — M54.50 CHRONIC LOW BACK PAIN WITHOUT SCIATICA, UNSPECIFIED BACK PAIN LATERALITY: Primary | ICD-10-CM

## 2022-05-06 PROCEDURE — 99999 PR OFFICE/OUTPT VISIT,PROCEDURE ONLY: CPT | Performed by: FAMILY MEDICINE

## 2022-05-06 NOTE — TELEPHONE ENCOUNTER
----- Message from [address] [place] [address] sent at 5/6/2022 10:57 AM EDT -----  Subject: Message to Provider    QUESTIONS  Information for Provider? pt called in and wanted to know if she still   needed to come in for a urine test and if she still had papers to fill   out. said she came last week and was unable to follow with test since she   couldn't go to rest room. please call her to advise as I do not see   anything in chart.   ---------------------------------------------------------------------------  --------------  CALL BACK INFO  What is the best way for the office to contact you? OK to leave message on   voicemail  Preferred Call Back Phone Number? [phone]  ---------------------------------------------------------------------------  --------------  SCRIPT ANSWERS  Relationship to Patient?  Self

## 2022-05-09 DIAGNOSIS — F41.9 ANXIETY: ICD-10-CM

## 2022-05-09 RX ORDER — ALPRAZOLAM 1 MG/1
1 TABLET ORAL 3 TIMES DAILY PRN
Qty: 90 TABLET | Refills: 0 | Status: SHIPPED | OUTPATIENT
Start: 2022-05-09 | End: 2022-05-23

## 2022-05-09 NOTE — TELEPHONE ENCOUNTER
Patient states she use to get Xanax 1 mg  Tree times daily #90 for month supply . She unable to get to the pharmacy weekly . Please let her know if 1 month supply is ok ?  Thank you

## 2022-05-10 DIAGNOSIS — M54.50 CHRONIC LOW BACK PAIN WITHOUT SCIATICA, UNSPECIFIED BACK PAIN LATERALITY: Primary | ICD-10-CM

## 2022-05-10 DIAGNOSIS — G89.29 CHRONIC LOW BACK PAIN WITHOUT SCIATICA, UNSPECIFIED BACK PAIN LATERALITY: Primary | ICD-10-CM

## 2022-05-10 DIAGNOSIS — F41.9 ANXIETY: ICD-10-CM

## 2022-05-10 LAB
6-ACETYLMORPHINE, UR: NOT DETECTED
7-AMINOCLONAZEPAM, URINE: NOT DETECTED
ALPHA-OH-ALPRAZ, URINE: PRESENT
ALPHA-OH-MIDAZOLAM, URINE: NOT DETECTED
ALPRAZOLAM, URINE: PRESENT
AMPHETAMINES, URINE: NOT DETECTED
BARBITURATES, URINE: NOT DETECTED
BENZOYLECGONINE, UR: NOT DETECTED
BUPRENORPHINE URINE: NOT DETECTED
CARISOPRODOL, UR: NOT DETECTED
CLONAZEPAM, URINE: NOT DETECTED
CODEINE, URINE: NOT DETECTED
CREATININE URINE: 230.9 MG/DL (ref 20–400)
DIAZEPAM, URINE: NOT DETECTED
DRUGS EXPECTED, UR: NORMAL
EER HI RES INTERP UR: NORMAL
ETHYL GLUCURONIDE UR: NOT DETECTED
FENTANYL URINE: NOT DETECTED
GABAPENTIN: PRESENT
HYDROCODONE, URINE: PRESENT
HYDROMORPHONE, URINE: NOT DETECTED
LORAZEPAM, URINE: NOT DETECTED
MARIJUANA METAB, UR: PRESENT
MDA, UR: NOT DETECTED
MDEA, EVE, UR: NOT DETECTED
MDMA URINE: NOT DETECTED
MEPERIDINE METAB, UR: NOT DETECTED
METHADONE, URINE: NOT DETECTED
METHAMPHETAMINE, URINE: NOT DETECTED
METHYLPHENIDATE: NOT DETECTED
MIDAZOLAM, URINE: NOT DETECTED
MORPHINE URINE: NOT DETECTED
NALOXONE URINE: NOT DETECTED
NORBUPRENORPHINE, URINE: NOT DETECTED
NORDIAZEPAM, URINE: NOT DETECTED
NORFENTANYL, URINE: NOT DETECTED
NORHYDROCODONE, URINE: PRESENT
NOROXYCODONE, URINE: NOT DETECTED
NOROXYMORPHONE, URINE: NOT DETECTED
OXAZEPAM, URINE: NOT DETECTED
OXYCODONE URINE: NOT DETECTED
OXYMORPHONE, URINE: NOT DETECTED
PAIN MANAGEMENT DRUG PANEL INTERP, URINE: NORMAL
PAIN MGT DRUG PANEL, HI RES, UR: NORMAL
PCP,URINE: NOT DETECTED
PHENTERMINE, UR: NOT DETECTED
PREGABALIN: NOT DETECTED
TAPENTADOL, URINE: NOT DETECTED
TAPENTADOL-O-SULFATE, URINE: NOT DETECTED
TEMAZEPAM, URINE: NOT DETECTED
TRAMADOL, URINE: NOT DETECTED
ZOLPIDEM METABOLITE (ZCA), URINE: NOT DETECTED
ZOLPIDEM, URINE: NOT DETECTED

## 2022-05-13 ENCOUNTER — TELEPHONE (OUTPATIENT)
Dept: FAMILY MEDICINE CLINIC | Age: 77
End: 2022-05-13

## 2022-05-13 NOTE — TELEPHONE ENCOUNTER
Patient called and said she does take Hemp oil and gummies because of her burning in mouth. Dr. Charu Cardona knew she was taking it for her mouth burning and if she has to stop she will.  She will be back in 30 days to take the urine drug test.

## 2022-05-20 DIAGNOSIS — M15.9 PRIMARY OSTEOARTHRITIS INVOLVING MULTIPLE JOINTS: ICD-10-CM

## 2022-05-20 NOTE — PROGRESS NOTES
Сергей Cardoza is calling to request a refill on the following medication(s):    Medication Request:  Requested Prescriptions     Pending Prescriptions Disp Refills    HYDROcodone-acetaminophen (NORCO) 7.5-325 MG per tablet 42 tablet 0     Sig: Take 1-2 tablets by mouth every 8 hours as needed for Pain for up to 7 days.        Last Visit Date (If Applicable):  8/51/36    Next Visit Date:    5-23-22

## 2022-05-21 DIAGNOSIS — M15.9 PRIMARY OSTEOARTHRITIS INVOLVING MULTIPLE JOINTS: ICD-10-CM

## 2022-05-21 DIAGNOSIS — F41.9 ANXIETY: ICD-10-CM

## 2022-05-23 ENCOUNTER — CARE COORDINATION (OUTPATIENT)
Dept: CARE COORDINATION | Age: 77
End: 2022-05-23

## 2022-05-23 ENCOUNTER — OFFICE VISIT (OUTPATIENT)
Dept: FAMILY MEDICINE CLINIC | Age: 77
End: 2022-05-23
Payer: MEDICAID

## 2022-05-23 VITALS
BODY MASS INDEX: 35.17 KG/M2 | DIASTOLIC BLOOD PRESSURE: 82 MMHG | SYSTOLIC BLOOD PRESSURE: 116 MMHG | WEIGHT: 163 LBS | TEMPERATURE: 97.6 F | HEART RATE: 111 BPM | OXYGEN SATURATION: 94 % | RESPIRATION RATE: 18 BRPM | HEIGHT: 57 IN

## 2022-05-23 DIAGNOSIS — M54.50 CHRONIC LOW BACK PAIN WITHOUT SCIATICA, UNSPECIFIED BACK PAIN LATERALITY: ICD-10-CM

## 2022-05-23 DIAGNOSIS — G89.29 CHRONIC LOW BACK PAIN WITHOUT SCIATICA, UNSPECIFIED BACK PAIN LATERALITY: ICD-10-CM

## 2022-05-23 DIAGNOSIS — E78.2 MIXED HYPERLIPIDEMIA: ICD-10-CM

## 2022-05-23 DIAGNOSIS — F43.10 POST TRAUMATIC STRESS DISORDER (PTSD): ICD-10-CM

## 2022-05-23 DIAGNOSIS — J44.9 COPD WITH HYPOXIA (HCC): ICD-10-CM

## 2022-05-23 DIAGNOSIS — I10 ESSENTIAL HYPERTENSION: Primary | ICD-10-CM

## 2022-05-23 DIAGNOSIS — E66.01 SEVERE OBESITY (BMI 35.0-39.9) WITH COMORBIDITY (HCC): ICD-10-CM

## 2022-05-23 DIAGNOSIS — E11.8 TYPE 2 DIABETES MELLITUS WITH COMPLICATION, WITHOUT LONG-TERM CURRENT USE OF INSULIN (HCC): ICD-10-CM

## 2022-05-23 DIAGNOSIS — R09.02 COPD WITH HYPOXIA (HCC): ICD-10-CM

## 2022-05-23 DIAGNOSIS — K14.6 BURNING MOUTH SYNDROME: ICD-10-CM

## 2022-05-23 RX ORDER — HYDROCODONE BITARTRATE AND ACETAMINOPHEN 7.5; 325 MG/1; MG/1
1-2 TABLET ORAL EVERY 8 HOURS PRN
Qty: 42 TABLET | Refills: 0 | Status: SHIPPED | OUTPATIENT
Start: 2022-05-23 | End: 2022-06-03 | Stop reason: SDUPTHER

## 2022-05-23 RX ORDER — GABAPENTIN 600 MG/1
TABLET ORAL
Qty: 120 TABLET | Refills: 1 | Status: SHIPPED | OUTPATIENT
Start: 2022-05-23 | End: 2022-09-13

## 2022-05-23 RX ORDER — ALPRAZOLAM 1 MG/1
TABLET ORAL
Qty: 90 TABLET | Refills: 0 | Status: SHIPPED | OUTPATIENT
Start: 2022-06-08 | End: 2022-07-08

## 2022-05-23 ASSESSMENT — PATIENT HEALTH QUESTIONNAIRE - PHQ9
SUM OF ALL RESPONSES TO PHQ QUESTIONS 1-9: 0
8. MOVING OR SPEAKING SO SLOWLY THAT OTHER PEOPLE COULD HAVE NOTICED. OR THE OPPOSITE, BEING SO FIGETY OR RESTLESS THAT YOU HAVE BEEN MOVING AROUND A LOT MORE THAN USUAL: 0
4. FEELING TIRED OR HAVING LITTLE ENERGY: 0
5. POOR APPETITE OR OVEREATING: 0
7. TROUBLE CONCENTRATING ON THINGS, SUCH AS READING THE NEWSPAPER OR WATCHING TELEVISION: 0
SUM OF ALL RESPONSES TO PHQ9 QUESTIONS 1 & 2: 0
2. FEELING DOWN, DEPRESSED OR HOPELESS: 0
1. LITTLE INTEREST OR PLEASURE IN DOING THINGS: 0
SUM OF ALL RESPONSES TO PHQ QUESTIONS 1-9: 0
6. FEELING BAD ABOUT YOURSELF - OR THAT YOU ARE A FAILURE OR HAVE LET YOURSELF OR YOUR FAMILY DOWN: 0
9. THOUGHTS THAT YOU WOULD BE BETTER OFF DEAD, OR OF HURTING YOURSELF: 0
10. IF YOU CHECKED OFF ANY PROBLEMS, HOW DIFFICULT HAVE THESE PROBLEMS MADE IT FOR YOU TO DO YOUR WORK, TAKE CARE OF THINGS AT HOME, OR GET ALONG WITH OTHER PEOPLE: 0
SUM OF ALL RESPONSES TO PHQ QUESTIONS 1-9: 0
SUM OF ALL RESPONSES TO PHQ QUESTIONS 1-9: 0
3. TROUBLE FALLING OR STAYING ASLEEP: 0

## 2022-05-23 ASSESSMENT — COPD QUESTIONNAIRES: COPD: 1

## 2022-05-23 ASSESSMENT — ENCOUNTER SYMPTOMS
GASTROINTESTINAL NEGATIVE: 1
FREQUENT THROAT CLEARING: 0
EYES NEGATIVE: 1
DIFFICULTY BREATHING: 0
RESPIRATORY NEGATIVE: 1
ALLERGIC/IMMUNOLOGIC NEGATIVE: 1
BLURRED VISION: 0
ORTHOPNEA: 0
CHEST TIGHTNESS: 0

## 2022-05-23 NOTE — ASSESSMENT & PLAN NOTE
Long discussion today about her positive UDS for marijuana and that no matter what the circumstances were, she cannot be on an illegal substance while on controlled medications.  I advised her again that she will need to produce a clean UDS before her next medication refill (they were refilled today)

## 2022-05-23 NOTE — ASSESSMENT & PLAN NOTE
Monitored by specialist- no acute findings meriting change in the plan   Takes CBD gummies and tincture

## 2022-05-23 NOTE — ASSESSMENT & PLAN NOTE
Stable on Xanax  Long discussion today about her positive UDS for marijuana and that no matter what the circumstances were, she cannot be on an illegal substance while on controlled medications.  I advised her again that she will need to produce a clean UDS before her next medication refill (they were refilled today)

## 2022-05-23 NOTE — PATIENT INSTRUCTIONS

## 2022-05-23 NOTE — TELEPHONE ENCOUNTER
Gasper Elliott is calling to request a refill on the following medication(s):    Medication Request:  Requested Prescriptions     Pending Prescriptions Disp Refills    ALPRAZolam (XANAX) 1 MG tablet [Pharmacy Med Name: ALPRAZolam 1 MG TABLET] 90 tablet      Sig: TAKE ONE TABLET BY MOUTH THREE TIMES A DAY AS NEEDED FOR SLEEP OR ANXIETY    gabapentin (NEURONTIN) 600 MG tablet [Pharmacy Med Name: GABAPENTIN 600 MG TABLET] 120 tablet 1     Sig: TAKE ONE TABLET BY MOUTH FOUR TIMES A DAY AS NEEDED FOR PAIN FOR UP TO 60 DAYS       Last Visit Date (If Applicable):  5/47/5385    Next Visit Date:    5/23/2022

## 2022-05-23 NOTE — PROGRESS NOTES
AP[facility] Progress Note    Date:5/23/2022         Patient Name:Samara Smith     Date of Birth:[birth year]     Age:77 y.o. Assessment/Plan        Problem List Items Addressed This Visit        Circulatory    Essential hypertension - Primary      Well-controlled, continue current medications, continue current treatment plan, medication adherence emphasized and lifestyle modifications recommended            Respiratory    COPD with hypoxia ([facility] [address]      Borderline controlled, continue current medications and continue current treatment plan             Digestive    Burning mouth syndrome      Monitored by specialist- no acute findings meriting change in the plan   Takes CBD gummies and tincture            Endocrine    Type 2 diabetes mellitus with complication, without long-term current use of insulin (HCC)      Well-controlled, continue current medications, continue current treatment plan, medication adherence emphasized and lifestyle modifications recommended            Other    Post traumatic stress disorder (PTSD)      Stable on Xanax  Long discussion today about her positive UDS for marijuana and that no matter what the circumstances were, she cannot be on an illegal substance while on controlled medications. I advised her again that she will need to produce a clean UDS before her next medication refill (they were refilled today)         Mixed hyperlipidemia      Well-controlled, continue current medications, continue current treatment plan, medication adherence emphasized and lifestyle modifications recommended         Chronic low back pain without sciatica      Long discussion today about her positive UDS for marijuana and that no matter what the circumstances were, she cannot be on an illegal substance while on controlled medications.  I advised her again that she will need to produce a clean UDS before her next medication refill (they were refilled today)           Other Visit Diagnoses     Severe obesity (BMI 35.0-39. 9) with comorbidity ([facility] [address]               Return in about 3 months (around 8/23/2022). Electronically signed by Jossy Leigh DO on 5/23/22       Total time spent was between Time personally spent assessing and managing the patient on the date of service: Est: 30-39 minutes (46819) mins. This included time spent reviewing the patient's medical record (e.g., recent visits, labs, and studies); seeing the patient in the office (face-to-face time); ordering medications, studies, procedures, or referrals; calling the patient or family later in the day with results and further recommendations; and documenting the visit in the medical record. Subjective     Carole Rome is a 78 y.o. female presenting today for   Chief Complaint   Patient presents with    Diabetes    COPD   . Carole Rome is a 70 y.o. female who presents for follow up of PTSD and anxiety disorder. Current symptoms: occasional flashbacks. She denies current suicidal and homicidal ideation. She complains of the following side effects from the treatment: none. - stable on cymbalta and xanax    Carole Rome is an 70 y.o. female who presents with arthralgias that began several years ago. Pain is located in multiple joints. The pain is described as intermittent, severe, aching and shooting. Associated symptoms include: crepitation and decreased range of motion. The patient has tried norco for pain, with moderate relief. Has weaned herself down to three tabs per day    Denies using marijuana. Did sit in a car with people smoking marijuana. Admits to taking CBD gummies and tincture for burning mouth syndrome    Diabetes  She presents for her follow-up diabetic visit. She has type 2 diabetes mellitus. Her disease course has been stable. Pertinent negatives for hypoglycemia include no sweats.  Pertinent negatives for diabetes include no blurred vision, no fatigue, no foot paresthesias, no foot ulcerations, no polydipsia, no polyphagia, HENT: Negative. Eyes: Negative. Negative for blurred vision. Respiratory: Negative. Cardiovascular: Negative. Negative for palpitations and orthopnea. Gastrointestinal: Negative. Endocrine: Negative. Negative for polydipsia, polyphagia and polyuria. Genitourinary: Negative. Musculoskeletal: Negative. Negative for neck pain. Skin: Negative. Allergic/Immunologic: Negative. Neurological: Negative. Negative for focal weakness and weakness. Hematological: Negative. Psychiatric/Behavioral: Negative. All other systems reviewed and are negative. Medications     Current Outpatient Medications   Medication Sig Dispense Refill    HYDROcodone-acetaminophen (NORCO) 7.5-325 MG per tablet Take 1-2 tablets by mouth every 8 hours as needed for Pain for up to 7 days.  42 tablet 0    [START ON 6/8/2022] ALPRAZolam (XANAX) 1 MG tablet TAKE ONE TABLET BY MOUTH THREE TIMES A DAY AS NEEDED FOR SLEEP OR ANXIETY 90 tablet 0    gabapentin (NEURONTIN) 600 MG tablet TAKE ONE TABLET BY MOUTH FOUR TIMES A DAY AS NEEDED FOR PAIN FOR UP TO 60 DAYS 120 tablet 1    simvastatin (ZOCOR) 40 MG tablet TAKE ONE TABLET BY MOUTH ONCE NIGHTLY 90 tablet 1    aspirin EC 81 MG EC tablet Take 1 tablet by mouth daily 90 tablet 1    FLUoxetine (PROZAC) 20 MG capsule TAKE ONE CAPSULE BY MOUTH DAILY 90 capsule 1    DULoxetine (CYMBALTA) 60 MG extended release capsule Take 1 capsule by mouth daily 90 capsule 1    metFORMIN (GLUCOPHAGE-XR) 750 MG extended release tablet TAKE ONE TABLET BY MOUTH DAILY WITH BREAKFAST 90 tablet 1    lisinopril (PRINIVIL;ZESTRIL) 10 MG tablet Take 1 tablet by mouth daily 90 tablet 1    esomeprazole (NEXIUM) 40 MG delayed release capsule TAKE ONE CAPSULE BY MOUTH DAILY / taking PRN 90 capsule 3    glucose monitoring (FREESTYLE FREEDOM) kit 1 kit by Does not apply route daily as needed (testing once daily) 1 kit 0    Lancets MISC 1 each by Other route 2 times daily Indications: [employer] Glucose Meter 100 each 3    blood glucose test strips (ASCENSIA AUTODISC VI;ONE TOUCH ULTRA TEST VI) strip Use with associated glucose meter. Test one time daily and additional times if abnormal readings 100 strip 5    Lancets MISC Inject 1 each into the skin daily 100 each 5    Isopropyl Alcohol (ALCOHOL WIPES) 70 % MISC Use as directed 100 each 0    Elastic Bandages & Supports (ABDOMINAL BINDER/ELASTIC MED) MISC Use daily as much as possible for abdominal hernia 1 each 0    albuterol-ipratropium (COMBIVENT RESPIMAT)  MCG/ACT AERS inhaler INHALE ONE INHALATION BY MOUTH FOUR TIMES A DAY 1 Inhaler 2    blood glucose monitor strips 1 strip by Other route TrueTest Strips     3 times a day & as needed for symptoms of irregular blood glucose.  OXYGEN Inhale 3 L into the lungs continuous      prochlorperazine (COMPAZINE) 10 MG tablet Take 1 tablet by mouth every 6 hours as needed (CHEMO INDUCED NAUSEA) 120 tablet 3     No current facility-administered medications for this visit. Past History    Past Medical History:   has a past medical history of COPD (chronic obstructive pulmonary disease) ([facility] [address] Depression, Diabetes mellitus ([facility] [address] Hyperlipidemia, PTSD (post-traumatic stress disorder), and Tubulovillous adenoma. Social History:   reports that she quit smoking about 14 years ago. Her smoking use included cigarettes. She has a 10.00 pack-year smoking history. She has never used smokeless tobacco. She reports that she does not drink alcohol and does not use drugs.      Family History:   Family History   Problem Relation Age of Onset    Heart Disease Mother     Heart Disease Brother        Surgical History:   Past Surgical History:   Procedure Laterality Date    COLONOSCOPY      colon polyps    COLONOSCOPY  06/07/2016    severe diverticulosis TUBULOVILLOUS ADENOMA.       HYSTERECTOMY      TUNNELED VENOUS PORT PLACEMENT  04/08/2019    chemo port to right chest        Physical Examination      Vitals:  /82 (Site: Left Upper Arm, Position: Sitting, Cuff Size: Large Adult)   Pulse 111   Temp 97.6 °F (36.4 °C) (Temporal)   Resp 18   Ht 4' 9\" (1.448 m)   Wt 163 lb (73.9 kg)   SpO2 94% Comment: On 2 liters of O2  BMI 35.27 kg/m²     Physical Exam  Constitutional:       General: She is not in acute distress. Appearance: Normal appearance. She is obese. She is not ill-appearing, toxic-appearing or diaphoretic. HENT:      Head: Normocephalic and atraumatic. Right Ear: Tympanic membrane, ear canal and external ear normal. There is no impacted cerumen. Left Ear: Tympanic membrane, ear canal and external ear normal. There is no impacted cerumen. Nose: Nose normal. No congestion or rhinorrhea. Mouth/Throat:      Mouth: Mucous membranes are moist.      Pharynx: Oropharynx is clear. No oropharyngeal exudate or posterior oropharyngeal erythema. Eyes:      General: No scleral icterus. Right eye: No discharge. Left eye: No discharge. Extraocular Movements: Extraocular movements intact. Conjunctiva/sclera: Conjunctivae normal.      Pupils: Pupils are equal, round, and reactive to light. Cardiovascular:      Rate and Rhythm: Normal rate and regular rhythm. Pulses: Normal pulses. Heart sounds: Normal heart sounds. No murmur heard. No friction rub. No gallop. Pulmonary:      Effort: Pulmonary effort is normal. No respiratory distress. Breath sounds: Normal breath sounds. No stridor. No wheezing, rhonchi or rales. Comments: On supplemental O2 via NC - gets very out of breath with walking  Chest:      Chest wall: No tenderness. Abdominal:      General: Abdomen is flat. Bowel sounds are normal. There is no distension. Palpations: Abdomen is soft. There is no mass. Tenderness: There is no abdominal tenderness. There is no right CVA tenderness, left CVA tenderness, guarding or rebound.       Hernia: No hernia is present. Musculoskeletal:      Cervical back: Normal range of motion and neck supple. Lumbar back: Spasms and tenderness present. No swelling, edema, deformity, lacerations or bony tenderness. Decreased range of motion. Skin:     General: Skin is warm. Capillary Refill: Capillary refill takes less than 2 seconds. Coloration: Skin is not jaundiced or pale. Findings: No bruising, erythema, lesion or rash. Neurological:      General: No focal deficit present. Mental Status: She is alert and oriented to person, place, and time. Mental status is at baseline. Cranial Nerves: No cranial nerve deficit. Sensory: No sensory deficit. Motor: Motor function is intact. No weakness. Coordination: Coordination abnormal.      Gait: Gait abnormal and tandem walk abnormal.      Deep Tendon Reflexes: Reflexes normal.   Psychiatric:         Mood and Affect: Mood normal.         Behavior: Behavior normal.         Thought Content: Thought content normal.         Judgment: Judgment normal.         Labs/Imaging/Diagnostics   Labs:  Hemoglobin A1C   Date Value Ref Range Status   02/22/2022 5.3 % Final       Imaging Last 24 Hours:  US RETROPERITONEAL COMPLETE  Narrative: EXAMINATION:  RETROPERITONEAL ULTRASOUND OF THE KIDNEYS AND URINARY BLADDER    8/24/2019    COMPARISON:  None. HISTORY:  ORDERING SYSTEM PROVIDED HISTORY: RENAL FAILURE, ACUTE (KIDNEY INJURY)    FINDINGS:    Kidneys: The right kidney measures 10.2 cm in length and the left kidney measures 10.9  cm in length. Kidneys demonstrate normal cortical echogenicity. No evidence of  hydronephrosis or intrarenal stones. Bladder:    Unremarkable appearance of the bladder. No significant post void residual.  Impression: Unremarkable ultrasound of the kidneys and urinary bladder.   XR CHEST PORTABLE  Narrative: EXAMINATION:  ONE XRAY VIEW OF THE CHEST    8/24/2019 6:28 am    COMPARISON:  08/22/2019    HISTORY:  [address] PROVIDED HISTORY: shortness of breath  TECHNOLOGIST PROVIDED HISTORY:  shortness of breath  Reason for Exam: sob  Acuity: Unknown  Type of Exam: Unknown    FINDINGS:  Right IJ infusion port remains in place. Stable cardiomediastinal  silhouette. Mild seizure prominence unchanged. No focal consolidation. Penetration of right hemidiaphragm noted. Impression: Mild seizure prominence stable. No acute process.

## 2022-05-26 ENCOUNTER — TELEPHONE (OUTPATIENT)
Dept: ONCOLOGY | Age: 77
End: 2022-05-26

## 2022-05-26 NOTE — TELEPHONE ENCOUNTER
Per Dr Marixa De Jesus, no refills on fluconazole and prednisone   Scripts faxed to pharmacy with refusal

## 2022-06-01 ENCOUNTER — CARE COORDINATION (OUTPATIENT)
Dept: CARE COORDINATION | Age: 77
End: 2022-06-01

## 2022-06-02 DIAGNOSIS — B37.9 YEAST INFECTION: ICD-10-CM

## 2022-06-02 DIAGNOSIS — M15.9 PRIMARY OSTEOARTHRITIS INVOLVING MULTIPLE JOINTS: ICD-10-CM

## 2022-06-02 NOTE — TELEPHONE ENCOUNTER
Last visit: 5/23/22  Last Med refill: 5/22/22  Does patient have enough medication for 72 hours:Next Visit Date:  Future Appointments   Date Time Provider Thad Jose   6/9/2022  2:30 PM [facility] ST[facility] CHAIR 01 [facility]   6/9/2022  3:00 PM Brennon Jameson MD [id number]   8/23/2022  3:00 PM Hugo Zuñiga  [address] [facility]   Topic Date Due    COVID-19 Vaccine (1) Never done    DTaP/Tdap/Td vaccine (1 - Tdap) Never done    Shingles vaccine (1 of 2) Never done    Colorectal Cancer Screen  06/07/2019    Diabetic retinal exam  08/15/2019    Diabetic foot exam  09/21/2021    Lipids  09/21/2021    Flu vaccine (Season Ended) 09/01/2022    A1C test (Diabetic or Prediabetic)  02/22/2023    Depression Monitoring  05/23/2023    DEXA (modify frequency per FRAX score)  Completed    Pneumococcal 65+ years Vaccine  Completed    Hepatitis C screen  Completed    Hepatitis A vaccine  Aged Out    Hib vaccine  Aged Out    Meningococcal (ACWY) vaccine  Aged Out       Hemoglobin A1C (%)   Date Value   02/22/2022 5.3   09/21/2020 5.7   05/23/2019 5.7             ( goal A1C is < 7)   Microalb/Crt.  Ratio (mcg/mg creat)   Date Value   09/21/2020 CANNOT BE CALCULATED     LDL Cholesterol (mg/dL)   Date Value   09/21/2020 54   05/23/2019 58       (goal LDL is <100)   AST (U/L)   Date Value   01/06/2022 13     ALT (U/L)   Date Value   01/06/2022 7     BUN (mg/dL)   Date Value   01/06/2022 11     BP Readings from Last 3 Encounters:   05/23/22 116/82   01/06/22 90/67   10/07/21 116/78          (goal 120/80)    All Future Testing planned in [facility]  Lab Frequency Next Occurrence   US EXTREMITY RIGHT NON VASC LIMITED Once 11/25/2021   CT ABDOMEN PELVIS W IV CONTRAST Additional Contrast? None Once 11/01/2021   Creatinine, Serum Once 11/01/2021   DRUG SCREEN, PAIN Once 06/10/2022   CBC Auto Differential q 3 months                Patient Active Problem List:     Obesity COPD with hypoxia ([facility] [address]     Colon polyps     Post traumatic stress disorder (PTSD)     Osteoarthritis     Type 2 diabetes mellitus with complication, without long-term current use of insulin (HCC)     Tubulovillous adenoma     Marginal zone lymphoma (HCC)     Mixed hyperlipidemia     Congestive heart failure (HCC)     Burning mouth syndrome     Chronic low back pain without sciatica     Essential hypertension     Primary osteoarthritis involving multiple joints

## 2022-06-03 ENCOUNTER — CARE COORDINATION (OUTPATIENT)
Dept: CARE COORDINATION | Age: 77
End: 2022-06-03

## 2022-06-03 RX ORDER — FLUCONAZOLE 150 MG/1
150 TABLET ORAL ONCE
Qty: 1 TABLET | Refills: 0 | Status: SHIPPED | OUTPATIENT
Start: 2022-06-03 | End: 2022-06-03

## 2022-06-03 RX ORDER — HYDROCODONE BITARTRATE AND ACETAMINOPHEN 7.5; 325 MG/1; MG/1
1-2 TABLET ORAL EVERY 8 HOURS PRN
Qty: 42 TABLET | Refills: 0 | Status: SHIPPED | OUTPATIENT
Start: 2022-06-03 | End: 2022-06-20 | Stop reason: SDUPTHER

## 2022-06-03 NOTE — TELEPHONE ENCOUNTER
No I would not advise that she stop all medication. She was to stop any usage or being around any use of marijuana as that was what was positive in her last UDS. She was to do a new UDS one month after and if it was clean then I can continue prescribing her controlled medications.  She needs to get the UDS done next week

## 2022-06-03 NOTE — TELEPHONE ENCOUNTER
Spoke with Pt on the phone to notify of needing a UDS, and Pt would like to know if she should stop all medication

## 2022-06-09 ENCOUNTER — HOSPITAL ENCOUNTER (OUTPATIENT)
Facility: MEDICAL CENTER | Age: 77
End: 2022-06-09
Payer: MEDICARE

## 2022-06-09 ENCOUNTER — TELEPHONE (OUTPATIENT)
Dept: ONCOLOGY | Age: 77
End: 2022-06-09

## 2022-06-09 ENCOUNTER — HOSPITAL ENCOUNTER (OUTPATIENT)
Dept: INFUSION THERAPY | Facility: MEDICAL CENTER | Age: 77
Discharge: HOME | End: 2022-06-09
Payer: MEDICARE

## 2022-06-09 ENCOUNTER — OFFICE VISIT (OUTPATIENT)
Dept: ONCOLOGY | Age: 77
End: 2022-06-09
Payer: MEDICARE

## 2022-06-09 VITALS
DIASTOLIC BLOOD PRESSURE: 69 MMHG | TEMPERATURE: 97.9 F | OXYGEN SATURATION: 98 % | WEIGHT: 162 LBS | BODY MASS INDEX: 35.06 KG/M2 | RESPIRATION RATE: 24 BRPM | SYSTOLIC BLOOD PRESSURE: 102 MMHG | HEART RATE: 99 BPM

## 2022-06-09 DIAGNOSIS — C85.80 MARGINAL ZONE LYMPHOMA (HCC): Primary | ICD-10-CM

## 2022-06-09 DIAGNOSIS — C85.80 MARGINAL ZONE B-CELL LYMPHOMA (HCC): Primary | ICD-10-CM

## 2022-06-09 DIAGNOSIS — C85.80 MARGINAL ZONE LYMPHOMA (HCC): ICD-10-CM

## 2022-06-09 LAB
ABSOLUTE EOS #: 0.05 K/UL (ref 0–0.44)
ABSOLUTE IMMATURE GRANULOCYTE: 0.02 K/UL (ref 0–0.3)
ABSOLUTE LYMPH #: 1.99 K/UL (ref 1.1–3.7)
ABSOLUTE MONO #: 0.54 K/UL (ref 0.1–1.2)
ALBUMIN SERPL-MCNC: 4 G/DL (ref 3.5–5.2)
ALP BLD-CCNC: 81 U/L (ref 35–104)
ALT SERPL-CCNC: 12 U/L (ref 5–33)
ANION GAP SERPL CALCULATED.3IONS-SCNC: 9 MMOL/L (ref 9–17)
AST SERPL-CCNC: 16 U/L
BASOPHILS # BLD: 0 % (ref 0–2)
BASOPHILS ABSOLUTE: 0.03 K/UL (ref 0–0.2)
BILIRUB SERPL-MCNC: 0.21 MG/DL (ref 0.3–1.2)
BUN BLDV-MCNC: 17 MG/DL (ref 8–23)
BUN/CREAT BLD: 24 (ref 9–20)
CALCIUM SERPL-MCNC: 9.2 MG/DL (ref 8.6–10.4)
CHLORIDE BLD-SCNC: 104 MMOL/L (ref 98–107)
CO2: 25 MMOL/L (ref 20–31)
CREAT SERPL-MCNC: 0.71 MG/DL (ref 0.5–0.9)
EOSINOPHILS RELATIVE PERCENT: 1 % (ref 1–4)
GFR AFRICAN AMERICAN: >60 ML/MIN
GFR NON-AFRICAN AMERICAN: >60 ML/MIN
GFR SERPL CREATININE-BSD FRML MDRD: ABNORMAL ML/MIN/{1.73_M2}
GLUCOSE BLD-MCNC: 113 MG/DL (ref 70–99)
HCT VFR BLD CALC: 46.3 % (ref 36.3–47.1)
HEMOGLOBIN: 14.3 G/DL (ref 11.9–15.1)
IMMATURE GRANULOCYTES: 0 %
LYMPHOCYTES # BLD: 23 % (ref 24–43)
MCH RBC QN AUTO: 30.9 PG (ref 25.2–33.5)
MCHC RBC AUTO-ENTMCNC: 30.9 G/DL (ref 28.4–34.8)
MCV RBC AUTO: 100 FL (ref 82.6–102.9)
MONOCYTES # BLD: 6 % (ref 3–12)
PDW BLD-RTO: 13 % (ref 11.8–14.4)
PLATELET # BLD: 265 K/UL (ref 138–453)
PMV BLD AUTO: 9 FL (ref 8.1–13.5)
POTASSIUM SERPL-SCNC: 5 MMOL/L (ref 3.7–5.3)
RBC # BLD: 4.63 M/UL (ref 3.95–5.11)
SEG NEUTROPHILS: 69 % (ref 36–65)
SEGMENTED NEUTROPHILS ABSOLUTE COUNT: 5.9 K/UL (ref 1.5–8.1)
SODIUM BLD-SCNC: 138 MMOL/L (ref 135–144)
TOTAL PROTEIN: 6.8 G/DL (ref 6.4–8.3)
WBC # BLD: 8.5 K/UL (ref 3.5–11.3)

## 2022-06-09 PROCEDURE — 2580000003 HC RX 258: Performed by: INTERNAL MEDICINE

## 2022-06-09 PROCEDURE — 99214 OFFICE O/P EST MOD 30 MIN: CPT | Performed by: INTERNAL MEDICINE

## 2022-06-09 PROCEDURE — 80053 COMPREHEN METABOLIC PANEL: CPT

## 2022-06-09 PROCEDURE — 96523 IRRIG DRUG DELIVERY DEVICE: CPT

## 2022-06-09 PROCEDURE — 99211 OFF/OP EST MAY X REQ PHY/QHP: CPT

## 2022-06-09 PROCEDURE — 85025 COMPLETE CBC W/AUTO DIFF WBC: CPT

## 2022-06-09 PROCEDURE — 1123F ACP DISCUSS/DSCN MKR DOCD: CPT | Performed by: INTERNAL MEDICINE

## 2022-06-09 PROCEDURE — 6360000002 HC RX W HCPCS: Performed by: INTERNAL MEDICINE

## 2022-06-09 PROCEDURE — 99211 OFF/OP EST MAY X REQ PHY/QHP: CPT | Performed by: INTERNAL MEDICINE

## 2022-06-09 RX ORDER — HEPARIN SODIUM (PORCINE) LOCK FLUSH IV SOLN 100 UNIT/ML 100 UNIT/ML
500 SOLUTION INTRAVENOUS PRN
Status: DISCONTINUED | OUTPATIENT
Start: 2022-06-09 | End: 2022-06-10 | Stop reason: HOSPADM

## 2022-06-09 RX ORDER — SODIUM CHLORIDE 9 MG/ML
25 INJECTION, SOLUTION INTRAVENOUS PRN
OUTPATIENT
Start: 2022-06-09

## 2022-06-09 RX ORDER — SODIUM CHLORIDE 0.9 % (FLUSH) 0.9 %
5-40 SYRINGE (ML) INJECTION PRN
Status: DISCONTINUED | OUTPATIENT
Start: 2022-06-09 | End: 2022-06-10 | Stop reason: HOSPADM

## 2022-06-09 RX ORDER — SODIUM CHLORIDE 9 MG/ML
25 INJECTION, SOLUTION INTRAVENOUS PRN
Status: CANCELLED | OUTPATIENT
Start: 2022-06-09

## 2022-06-09 RX ORDER — HEPARIN SODIUM (PORCINE) LOCK FLUSH IV SOLN 100 UNIT/ML 100 UNIT/ML
500 SOLUTION INTRAVENOUS PRN
Status: CANCELLED | OUTPATIENT
Start: 2022-06-09

## 2022-06-09 RX ORDER — SODIUM CHLORIDE 0.9 % (FLUSH) 0.9 %
5-40 SYRINGE (ML) INJECTION PRN
Status: CANCELLED | OUTPATIENT
Start: 2022-06-09

## 2022-06-09 RX ADMIN — HEPARIN 500 UNITS: 100 SYRINGE at 15:59

## 2022-06-09 RX ADMIN — SODIUM CHLORIDE, PRESERVATIVE FREE 40 ML: 5 INJECTION INTRAVENOUS at 15:15

## 2022-06-09 RX ADMIN — SODIUM CHLORIDE, PRESERVATIVE FREE 40 ML: 5 INJECTION INTRAVENOUS at 15:59

## 2022-06-09 RX ADMIN — SODIUM CHLORIDE, PRESERVATIVE FREE 40 ML: 5 INJECTION INTRAVENOUS at 15:20

## 2022-06-09 NOTE — PROGRESS NOTES
PT ARRIVES PER WHEELCHAIR PER SELF AND LABS DRAWN PER LAB, DUE TO TURNED AND REPOSITIONED AND HAD PT TAKE DEEP BREATHS AND COUGH AND FINALL GOT VERY GOOD BLOOD RETURN, BUT THEN WAS UNABLE TO OBTAIN SPECIMEN, ONLY HAD 8 ML FOR WASTE AND THEN WOULD NOT GIVE BLOOD MORE BLOOD. FLUSHES EASILY AND NS FLUSH AND HEPARIN INSTILLED AND PORT DE-ACCESSED AND PT TOLERATED WELL AND SEEN PER MD AND DISCHARGED PER WHEELCHAIR WITH ALENA LOTT AND AVS GIVEN TO PT PER  WITH NEXT APPTS.

## 2022-06-09 NOTE — TELEPHONE ENCOUNTER
[place] ARRIVES AMBULATORY FOR MD VISIT  DR Eber Haider IN TO SEE PATIENT  ORDERS RECEIVED  RV 3-4 MONTHS WITH LABS AT RV  PORT FLUSH 08/04/22 @1:30PM  LABS RN DRAW CDP CMP 04/26/22 @10:30AM  MD VISIT 09/13/22 @2:30PM  AVS PRINTED AND GIVEN TO PATIENT WITH INSTRUCTIONS  PATIENT DISCHARGED AMBULATORY

## 2022-06-12 NOTE — PROGRESS NOTES
_           Chief Complaint   Patient presents with    Follow-up    Discuss Labs     DIAGNOSIS:        Marginal Zone lymphoma  Persistent leukocytosis  Persistent thrombocytopenia  Multiple comorbidities as listed     CURRENT THERAPY:         Started treatment with rituximab 5/2/2019. Week #8 June 21, 2019. Maintenance Rituxan every 2 months for 2 years started October 17, 2019. completed 2 years in August 2021. BRIEF CASE HISTORY:      Ms. Poly Almaguer is a very pleasant 78 y.o. female with history of multiple comorbidities including COPD and diabetes. She quit smoking years ago. The patient was recently hospitalized with chest infection. The patient was noted to have leukocytosis. She also had persistent thrombocytopenia. She is referred for further evaluation. Patient denies any active bleeding. She has easy bruising. No fever or night sweats. No weight loss or decreased appetite. No palpable lymph nodes. No history of repeated infections. Patient has history of smoking for more than 50 years. Denies alcohol drinking  She had flow cytometry and bone marrow test. Results showed evidence of marginal zone lymphoma. Start the rituximab with good results. Due to increasing symptoms and further problems related to lymphoma, patient was started on single agent rituximab on 5/20/19. Patient received maintenance rituximab after induction. INTERIM HISTORY:   Patient is seen for follow up leukocytosis and marginal zone lymphoma. She had very good response to induction rituximab. She completed 2 years of maintenance rituximab. She is clinically stable. No weakness or fatigue. No dizziness. No fever or infections. No palpable lymph nodes. No recent infections. No recent hospitalizations. No weight loss or decreased appetite.        PAST MEDICAL HISTORY: has a past medical history of COPD (chronic obstructive pulmonary disease) ([facility] [address] Depression, Diabetes mellitus ([facility] [address] Hyperlipidemia, PTSD (post-traumatic stress disorder), and Tubulovillous adenoma. PAST SURGICAL HISTORY: has a past surgical history that includes Hysterectomy; Colonoscopy; Colonoscopy (06/07/2016); and Tunneled venous port placement (04/08/2019). CURRENT MEDICATIONS:  has a current medication list which includes the following prescription(s): NONFORMULARY, alprazolam, gabapentin, simvastatin, aspirin ec, fluoxetine, duloxetine, metformin, lisinopril, esomeprazole, albuterol-ipratropium, OXYGEN, glucose monitoring, lancets, blood glucose test strips, lancets, alcohol wipes, abdominal binder/elastic med, blood glucose test strips, and prochlorperazine. ALLERGIES:  has No Known Allergies. FAMILY HISTORY: Negative for any hematological or oncological conditions. SOCIAL HISTORY:  reports that she quit smoking about 14 years ago. Her smoking use included cigarettes. She has a 10.00 pack-year smoking history. She has never used smokeless tobacco. She reports that she does not drink alcohol and does not use drugs. REVIEW OF SYSTEMS:     · General: Positive for weakness and fatigue. No unanticipated weight loss or decreased appetite. No fever or chills. · Eyes: No blurred vision, eye pain or double vision. · Ears: No hearing problems or drainage. No tinnitus. · Throat: No sore throat, problems with swallowing or dysphagia. · Respiratory: No cough, sputum or hemoptysis. No shortness of breath. No pleuritic chest pain. · Cardiovascular: No chest pain, orthopnea or PND. No lower extremity edema. No palpitation. · Gastrointestinal: No problems with swallowing. No abdominal pain or bloating. No nausea or vomiting. No diarrhea or constipation. No GI bleeding. · Genitourinary: No dysuria, hematuria, frequency or urgency. · Musculoskeletal: No muscle aches or pains. No limitation of movement. No back pain.  No gait disturbance, No joint complaints. · Dermatologic: No skin rashes or pruritus. No skin lesions or discolorations. · Psychiatric: No depression, anxiety, or stress or signs of schizophrenia. No change in mood or affect. · Hematologic: No history of bleeding tendency. Easy bruises no ecchymosis. No history of clotting problems. · Infectious disease: No fever, chills or frequent infections. · Endocrine: No problems with obesity. No polydipsia or polyuria. No temperature intolerance. · Neurologic: No headaches or dizziness. No weakness or numbness of the extremities. No changes in balance, coordination,  memory, mentation, behavior. · Allergic/Immunologic: No nasal congestion or hives. No repeated infections. PHYSICAL EXAM:  The patient is not in acute distress. Vital signs: Blood pressure 102/69, pulse 99, temperature 97.9 °F (36.6 °C), temperature source Oral, resp. rate 24, weight 162 lb (73.5 kg), SpO2 98 %, not currently breastfeeding. HEENT:  Eyes are normal. Ears, nose and throat are normal.  Neck: Supple. No lymph node enlargement. No thyroid enlargement. Trachea is centrally located. Chest:  Clear to auscultation. No wheezes or crepitations. Heart: Regular sinus rhythm. Abdomen: Soft, nontender. No hepatosplenomegaly. No masses. Extremities:  With no edema. Lymph Nodes:  No cervical, axillary or inguinal lymph node enlargement. Neurologic:  Conscious and oriented. No focal neurological deficits. Psychosocial: No depression, anxiety or stress. Skin: No rashes, bruises or ecchymoses. Review of Diagnostic data:   Recent Labs     06/09/22  1535   WBC 8.5   HGB 14.3   HCT 46.3   .0        Peripheral blood flow cytometry:  Peripheral blood flow cytometric immunophenotyping:         Peripheral blood is positive for a monoclonal B-cell population,   positive for CD19, CD20, CD22,   CD45, FMC7 and lambda light chain.      Comment:  Morphology and immunophenotype of circulating monoclonal   B-cells are most consistent with a marginal zone lymphoma.  Bone   marrow evaluation and/or lymph node biopsy if possible are recommended   for confirmation and additional characterization. Bone marrow test August 27, 2018: Final Diagnosis   PERIPHERAL BLOOD:   -LYMPHOCYTOSIS WITH \" VILLOUS\" LYMPHOCYTES. -MODERATE THROMBOCYTOPENIA. BONE MARROW BIOPSY, ASPIRATE SMEAR AND CLOT SECTION:   - MARGINAL ZONE LYMPHOMA. -NORMOCELLULAR TRILINEAGE HEMATOPOIETIC MARROW WITH MARKEDLY   DECREASED IRON STORES. Chemistry        Component Value Date/Time     06/09/2022 1535    K 5.0 06/09/2022 1535     06/09/2022 1535    CO2 25 06/09/2022 1535    BUN 17 06/09/2022 1535    CREATININE 0.71 06/09/2022 1535        Component Value Date/Time    CALCIUM 9.2 06/09/2022 1535    ALKPHOS 81 06/09/2022 1535    AST 16 06/09/2022 1535    ALT 12 06/09/2022 1535    BILITOT 0.21 (L) 06/09/2022 1535        Lab Results   Component Value Date    WBC 8.5 06/09/2022    HGB 14.3 06/09/2022    HCT 46.3 06/09/2022    .0 06/09/2022     06/09/2022         IMPRESSION:  Marginal Zone lymphoma  Persistent leukocytosis  Persistent thrombocytopenia  Positive FANNY with elevated antihistone. Multiple comorbidities as listed  Acute renal failure, corrected. PLAN: I reviewed the labs as above and discussed with the patient. I explained the significance of these abnormalities in layman language. I explained to patient the nature of low grade NHL. Patient received immunotherapy using rituximab. She had good results induction rituximab treatment. She completed maintenance rituximab. KATHRIN improved. Normal kidney function. Return visit in 3-4 months with repeated labs. Patient's questions were answered to the best of her satisfaction and she verbalized full understanding and agreement.

## 2022-06-13 ENCOUNTER — TELEPHONE (OUTPATIENT)
Dept: FAMILY MEDICINE CLINIC | Age: 77
End: 2022-06-13

## 2022-06-13 DIAGNOSIS — R30.0 DYSURIA: Primary | ICD-10-CM

## 2022-06-13 DIAGNOSIS — F41.9 ANXIETY: ICD-10-CM

## 2022-06-13 DIAGNOSIS — M15.9 PRIMARY OSTEOARTHRITIS INVOLVING MULTIPLE JOINTS: ICD-10-CM

## 2022-06-13 NOTE — TELEPHONE ENCOUNTER
[facility] is calling to request a refill on the following medication(s):    Medication Request:  Requested Prescriptions     Pending Prescriptions Disp Refills    ALPRAZolam (XANAX) 1 MG tablet 90 tablet 0     Sig: TAKE ONE TABLET BY MOUTH THREE TIMES A DAY AS NEEDED FOR SLEEP OR ANXIETY    HYDROcodone-acetaminophen (NORCO) 7.5-325 MG per tablet 42 tablet 0     Sig: Take 1-2 tablets by mouth every 8 hours as needed for Pain for up to 7 days.        Last Visit Date (If Applicable):  5/31/2584    Next Visit Date:    8/23/2022

## 2022-06-13 NOTE — TELEPHONE ENCOUNTER
Patient stated she is still having UTI problems, there burning, polyuria, and small amounts of urine when passing

## 2022-06-14 RX ORDER — HYDROCODONE BITARTRATE AND ACETAMINOPHEN 7.5; 325 MG/1; MG/1
1-2 TABLET ORAL EVERY 8 HOURS PRN
Qty: 42 TABLET | Refills: 0 | OUTPATIENT
Start: 2022-06-14 | End: 2022-06-21

## 2022-06-14 RX ORDER — ALPRAZOLAM 1 MG/1
TABLET ORAL
Qty: 90 TABLET | Refills: 0 | OUTPATIENT
Start: 2022-06-14

## 2022-06-14 NOTE — TELEPHONE ENCOUNTER
PT ADVISED, Friday OR Monday she will be in.  She is very sorry shes been off lately, I explained to her if she has a UTI that could be the reason why

## 2022-06-14 NOTE — TELEPHONE ENCOUNTER
Needs to complete UDS before refill can be given.  If not, I can only send in a weaning script of each medication and refer her to a pain specialist and psychiatrist. I do not want to have to do that, so she must get her new UDS done

## 2022-06-20 ENCOUNTER — HOSPITAL ENCOUNTER (OUTPATIENT)
Age: 77
Setting detail: SPECIMEN
Discharge: HOME | End: 2022-06-20

## 2022-06-20 DIAGNOSIS — M54.50 CHRONIC LOW BACK PAIN WITHOUT SCIATICA, UNSPECIFIED BACK PAIN LATERALITY: ICD-10-CM

## 2022-06-20 DIAGNOSIS — F41.9 ANXIETY: ICD-10-CM

## 2022-06-20 DIAGNOSIS — G89.29 CHRONIC LOW BACK PAIN WITHOUT SCIATICA, UNSPECIFIED BACK PAIN LATERALITY: ICD-10-CM

## 2022-06-20 DIAGNOSIS — R30.0 DYSURIA: ICD-10-CM

## 2022-06-20 DIAGNOSIS — E11.8 TYPE 2 DIABETES MELLITUS WITH COMPLICATION, WITHOUT LONG-TERM CURRENT USE OF INSULIN (HCC): Primary | ICD-10-CM

## 2022-06-20 DIAGNOSIS — M15.9 PRIMARY OSTEOARTHRITIS INVOLVING MULTIPLE JOINTS: ICD-10-CM

## 2022-06-20 LAB
-: ABNORMAL
BACTERIA: ABNORMAL
BILIRUBIN URINE: NEGATIVE
CASTS UA: ABNORMAL /LPF (ref 0–2)
CASTS UA: ABNORMAL /LPF (ref 0–2)
COLOR: ABNORMAL
EPITHELIAL CELLS UA: ABNORMAL /HPF (ref 0–5)
GLUCOSE URINE: NEGATIVE
KETONES, URINE: ABNORMAL
LEUKOCYTE ESTERASE, URINE: ABNORMAL
NITRITE, URINE: NEGATIVE
PH UA: 5 (ref 5–8)
PROTEIN UA: ABNORMAL
RBC UA: ABNORMAL /HPF (ref 0–4)
REASON FOR REJECTION: NORMAL
SPECIFIC GRAVITY UA: 1.02 (ref 1–1.03)
TURBIDITY: ABNORMAL
URINE HGB: NEGATIVE
UROBILINOGEN, URINE: NORMAL
WBC UA: ABNORMAL /HPF (ref 0–5)
ZZ NTE CLEAN UP: ORDERED TEST: NORMAL
ZZ NTE WITH NAME CLEAN UP: SPECIMEN SOURCE: NORMAL

## 2022-06-20 RX ORDER — BLOOD-GLUCOSE METER
1 KIT MISCELLANEOUS DAILY
Qty: 1 KIT | Refills: 0 | Status: SHIPPED | OUTPATIENT
Start: 2022-06-20

## 2022-06-20 RX ORDER — HYDROCODONE BITARTRATE AND ACETAMINOPHEN 7.5; 325 MG/1; MG/1
1 TABLET ORAL EVERY 8 HOURS PRN
Qty: 21 TABLET | Refills: 0 | Status: SHIPPED | OUTPATIENT
Start: 2022-06-20 | End: 2022-06-27

## 2022-06-20 NOTE — TELEPHONE ENCOUNTER
I would like her to get her UDS done before we schedule an appointment so we can discuss the results. So if she can get the UDS done today we can try to fit her in Wednesday.   I will send in a new glucometer now

## 2022-06-20 NOTE — TELEPHONE ENCOUNTER
Must get UDS done or I will have to send in a weaning script or a referral to pain management with next refill

## 2022-06-20 NOTE — TELEPHONE ENCOUNTER
----- Message from Nguyen Hargrove LPN sent at 7/54/2318  9:15 AM EDT -----  Subject: Appointment Request    Reason for Call: Routine Existing Condition Follow Up    QUESTIONS  Type of Appointment? Established Patient  Reason for appointment request? No appointments available during search  Additional Information for Provider? Patient would like to know if she   could be worked into the schedule today to speak with Dr. Lion Moran she also   needs a new script for blood sugar meter   ---------------------------------------------------------------------------  --------------  CALL BACK INFO  What is the best way for the office to contact you? OK to leave message on   voicemail  Preferred Call Back Phone Number? [phone]  ---------------------------------------------------------------------------  --------------  SCRIPT ANSWERS  Relationship to Patient? Self  Is this follow up request related to routine Diabetes Management? No  Have you been diagnosed with COVID-19 in the past 10 days? No  (Service Expert  click yes below to proceed with [employer] As Usual   Scheduling)?  Yes

## 2022-06-21 ENCOUNTER — CARE COORDINATION (OUTPATIENT)
Dept: CARE COORDINATION | Age: 77
End: 2022-06-21

## 2022-06-21 LAB
CULTURE: ABNORMAL
SPECIMEN DESCRIPTION: ABNORMAL

## 2022-06-21 NOTE — CARE COORDINATION
Ambulatory Care Coordination Note  6/21/2022  CM Risk Score: 7  Charlson 10 Year Mortality Risk Score: 100%     ACC: Kymberly Sena RN    Summary Note: ACM reviewed chart and recent UTI symptoms, continues. Will reviewed for treatment per PCP  SOB at baseline, Home O2 continuous at 3L NC  No other concerns at this time  Reviewed COPD and CHF monitoring during inclement weather, staying cool and out of heat as much as possible. ACM Plan: Will reach out with potential UTI treatment plan, and symptoms review  COPD/CHF management during heat. Lab Results     None          Care Coordination Interventions    Program Enrollment: Complex Care  Referral from Primary Care Provider: No  Suggested Interventions and Community Resources  Zone Management Tools: In Process (Comment: DM, COPD and CHF management)         Goals Addressed                 This Visit's Progress     Conditions and Symptoms   Improving     I will schedule office visits, as directed by my provider. I will keep my appointment or reschedule if I have to cancel. I will notify my provider of any barriers to my plan of care. I will follow my Zone Management tool to seek urgent or emergent care. I will notify my provider of any symptoms that indicate a worsening of my condition. Barriers: overwhelmed by complexity of regimen  Plan for overcoming my barriers: Willingness to work with ACM and PCP for DM, COPD and CHF management. Confidence: 7/10  Anticipated Goal Completion Date: 7/2022              Prior to Admission medications    Medication Sig Start Date End Date Taking? Authorizing Provider   glucose monitoring (FREESTYLE FREEDOM) kit 1 kit by Does not apply route daily 6/20/22   Angela Merchant, DO   HYDROcodone-acetaminophen [facility]) 7.5-325 MG per tablet Take 1 tablet by mouth every 8 hours as needed for Pain for up to 7 days.  6/20/22 6/27/22  Angela Merchant DO   NONFORMULARY Hemp gummies    Historical Provider, MD   ALPRAZolam Gurdeep Nasuti) 1 MG tablet TAKE ONE TABLET BY MOUTH THREE TIMES A DAY AS NEEDED FOR SLEEP OR ANXIETY 6/8/22 7/8/22  [facility], DO   gabapentin (NEURONTIN) 600 MG tablet TAKE ONE TABLET BY MOUTH FOUR TIMES A DAY AS NEEDED FOR PAIN FOR UP TO 60 DAYS 5/23/22 7/22/22  [facility], DO   simvastatin (ZOCOR) 40 MG tablet TAKE ONE TABLET BY MOUTH ONCE NIGHTLY 4/28/22   [facility], DO   aspirin EC 81 MG EC tablet Take 1 tablet by mouth daily 4/21/22   [facility], DO   FLUoxetine (PROZAC) 20 MG capsule TAKE ONE CAPSULE BY MOUTH DAILY 2/22/22   [facility], DO   DULoxetine (CYMBALTA) 60 MG extended release capsule Take 1 capsule by mouth daily 2/22/22   [facility], DO   metFORMIN (GLUCOPHAGE-XR) 750 MG extended release tablet TAKE ONE TABLET BY MOUTH DAILY WITH BREAKFAST 1/26/22   [facility], DO   lisinopril (PRINIVIL;ZESTRIL) 10 MG tablet Take 1 tablet by mouth daily 1/6/22   Adrienne Woodson MD   esomeprazole (NEXIUM) 40 MG delayed release capsule TAKE ONE CAPSULE BY MOUTH DAILY / taking PRN 11/4/21   [facility], DO   glucose monitoring (FREESTYLE FREEDOM) kit 1 kit by Does not apply route daily as needed (testing once daily)  Patient not taking: Reported on 6/9/2022 9/14/21   [facility], DO   Lancets MISC 1 each by Other route 2 times daily Indications: [employer] Glucose Meter  Patient not taking: Reported on 6/9/2022 9/14/21   [facility], DO   blood glucose test strips (ASCENSIA AUTODISC VI;ONE TOUCH ULTRA TEST VI) strip Use with associated glucose meter.  Test one time daily and additional times if abnormal readings  Patient not taking: Reported on 6/9/2022 9/14/21   [facility], DO   Lancets MISC Inject 1 each into the skin daily  Patient not taking: Reported on 6/9/2022 8/31/21   [facility], DO   Isopropyl Alcohol (ALCOHOL WIPES) 70 % MISC Use as directed  Patient not taking: Reported on 6/9/2022 8/31/21 Krish Chavis, DO   Elastic Bandages & Supports (ABDOMINAL BINDER/ELASTIC MED) MISC Use daily as much as possible for abdominal hernia  Patient not taking: Reported on 6/9/2022 4/8/21   Krish Chavis DO   albuterol-ipratropium (COMBIVENT RESPIMAT)  MCG/ACT AERS inhaler INHALE ONE INHALATION BY MOUTH FOUR TIMES A DAY 10/21/20   Krish Chavis, DO   blood glucose monitor strips 1 strip by Other route TrueTest Strips     3 times a day & as needed for symptoms of irregular blood glucose. Patient not taking: Reported on 6/9/2022    Historical Provider, MD   OXYGEN Inhale 3 L into the lungs continuous    Historical Provider, MD   prochlorperazine (COMPAZINE) 10 MG tablet Take 1 tablet by mouth every 6 hours as needed (CHEMO INDUCED NAUSEA) 4/30/19   Libby Ellis MD       Future Appointments   Date Time Provider Thad Jose   8/4/2022  1:30 PM [facility]IR [facility]   8/23/2022  3:00 PM DO EVANS Singh [facility]   9/13/2022  2:30 PM STV STA CHAIR 18 STVZ STA MED [facility]   9/13/2022  3:15 PM Libby Ellis MD [facility]     ,   Diabetes Assessment    Medic Alert ID: No  Meal Planning: Avoidance of concentrated sweets   How often do you test your blood sugar?: Daily, Meals, Bedtime   Do you have barriers with adherence to non-pharmacologic self-management interventions?  (Nutrition/Exercise/Self-Monitoring): No   Have you ever had to go to the ED for symptoms of low blood sugar?: No       No patient-reported symptoms      ,   Congestive Heart Failure Assessment    Are you currently restricting fluids?: No Restriction  Do you understand a low sodium diet?: Yes  Do you understand how to read food labels?: Yes  How many restaurant meals do you eat per week?: 0  Do you salt your food before tasting it?: No     No patient-reported symptoms      Symptoms:  None: Yes      Symptom course: stable  Weight trend: stable  Salt intake watch

## 2022-06-22 DIAGNOSIS — M15.9 PRIMARY OSTEOARTHRITIS INVOLVING MULTIPLE JOINTS: Primary | ICD-10-CM

## 2022-06-22 DIAGNOSIS — R82.5 POSITIVE URINE DRUG SCREEN: ICD-10-CM

## 2022-06-22 DIAGNOSIS — N30.00 ACUTE CYSTITIS WITHOUT HEMATURIA: Primary | ICD-10-CM

## 2022-06-22 RX ORDER — NITROFURANTOIN 25; 75 MG/1; MG/1
100 CAPSULE ORAL 2 TIMES DAILY
Qty: 10 CAPSULE | Refills: 0 | Status: SHIPPED | OUTPATIENT
Start: 2022-06-22 | End: 2022-06-27

## 2022-06-24 ENCOUNTER — CARE COORDINATION (OUTPATIENT)
Dept: CARE COORDINATION | Age: 77
End: 2022-06-24

## 2022-06-27 ENCOUNTER — HOSPITAL ENCOUNTER (OUTPATIENT)
Age: 77
Setting detail: SPECIMEN
Discharge: HOME | End: 2022-06-27

## 2022-06-27 DIAGNOSIS — R82.5 POSITIVE URINE DRUG SCREEN: ICD-10-CM

## 2022-06-27 DIAGNOSIS — M15.9 PRIMARY OSTEOARTHRITIS INVOLVING MULTIPLE JOINTS: ICD-10-CM

## 2022-06-28 ENCOUNTER — TELEPHONE (OUTPATIENT)
Dept: FAMILY MEDICINE CLINIC | Age: 77
End: 2022-06-28

## 2022-06-28 NOTE — TELEPHONE ENCOUNTER
With the other controlled medications she is on, unfortunately she cannot have THC in her system. I would love for her to stay with me as a PCP, but I would have to refer her to pain management and psychiatry for the pain and anxiety medications. If she wants one PCP to prescribe everything, then yes, I'm very sorry to say, but she may be better off finding another PCP.

## 2022-07-01 DIAGNOSIS — F41.9 ANXIETY: ICD-10-CM

## 2022-07-01 DIAGNOSIS — M15.9 PRIMARY OSTEOARTHRITIS INVOLVING MULTIPLE JOINTS: Primary | ICD-10-CM

## 2022-07-01 LAB
6-ACETYLMORPHINE, UR: NOT DETECTED
7-AMINOCLONAZEPAM, URINE: NOT DETECTED
ALPHA-OH-ALPRAZ, URINE: PRESENT
ALPHA-OH-MIDAZOLAM, URINE: NOT DETECTED
ALPRAZOLAM, URINE: PRESENT
AMPHETAMINES, URINE: NOT DETECTED
BARBITURATES, URINE: NOT DETECTED
BENZOYLECGONINE, UR: NOT DETECTED
BUPRENORPHINE URINE: NOT DETECTED
CARISOPRODOL, UR: NOT DETECTED
CLONAZEPAM, URINE: NOT DETECTED
CODEINE, URINE: NOT DETECTED
CREATININE URINE: 279.1 MG/DL (ref 20–400)
DIAZEPAM, URINE: NOT DETECTED
DRUGS EXPECTED, UR: NORMAL
EER HI RES INTERP UR: NORMAL
ETHYL GLUCURONIDE UR: NOT DETECTED
FENTANYL URINE: NOT DETECTED
GABAPENTIN: PRESENT
HYDROCODONE, URINE: PRESENT
HYDROMORPHONE, URINE: NOT DETECTED
LORAZEPAM, URINE: NOT DETECTED
MARIJUANA METAB, UR: PRESENT
MDA, UR: NOT DETECTED
MDEA, EVE, UR: NOT DETECTED
MDMA URINE: NOT DETECTED
MEPERIDINE METAB, UR: NOT DETECTED
METHADONE, URINE: NOT DETECTED
METHAMPHETAMINE, URINE: NOT DETECTED
METHYLPHENIDATE: NOT DETECTED
MIDAZOLAM, URINE: NOT DETECTED
MORPHINE URINE: NOT DETECTED
NALOXONE URINE: NOT DETECTED
NORBUPRENORPHINE, URINE: NOT DETECTED
NORDIAZEPAM, URINE: NOT DETECTED
NORFENTANYL, URINE: NOT DETECTED
NORHYDROCODONE, URINE: PRESENT
NOROXYCODONE, URINE: NOT DETECTED
NOROXYMORPHONE, URINE: NOT DETECTED
OXAZEPAM, URINE: NOT DETECTED
OXYCODONE URINE: NOT DETECTED
OXYMORPHONE, URINE: NOT DETECTED
PAIN MANAGEMENT DRUG PANEL INTERP, URINE: NORMAL
PAIN MGT DRUG PANEL, HI RES, UR: NORMAL
PCP,URINE: NOT DETECTED
PHENTERMINE, UR: NOT DETECTED
PREGABALIN: NOT DETECTED
TAPENTADOL, URINE: NOT DETECTED
TAPENTADOL-O-SULFATE, URINE: NOT DETECTED
TEMAZEPAM, URINE: NOT DETECTED
TRAMADOL, URINE: NOT DETECTED
ZOLPIDEM METABOLITE (ZCA), URINE: NOT DETECTED
ZOLPIDEM, URINE: NOT DETECTED

## 2022-07-08 ENCOUNTER — CARE COORDINATION (OUTPATIENT)
Dept: CARE COORDINATION | Age: 77
End: 2022-07-08

## 2022-08-04 ENCOUNTER — HOSPITAL ENCOUNTER (OUTPATIENT)
Dept: INFUSION THERAPY | Facility: MEDICAL CENTER | Age: 77
Discharge: HOME | End: 2022-08-04
Payer: MEDICAID

## 2022-08-04 VITALS
HEART RATE: 88 BPM | SYSTOLIC BLOOD PRESSURE: 89 MMHG | TEMPERATURE: 97.6 F | DIASTOLIC BLOOD PRESSURE: 58 MMHG | RESPIRATION RATE: 16 BRPM

## 2022-08-04 PROCEDURE — 6360000002 HC RX W HCPCS: Performed by: INTERNAL MEDICINE

## 2022-08-04 PROCEDURE — 2580000003 HC RX 258: Performed by: INTERNAL MEDICINE

## 2022-08-04 PROCEDURE — 96523 IRRIG DRUG DELIVERY DEVICE: CPT

## 2022-08-04 RX ORDER — HEPARIN SODIUM (PORCINE) LOCK FLUSH IV SOLN 100 UNIT/ML 100 UNIT/ML
500 SOLUTION INTRAVENOUS PRN
Status: DISCONTINUED | OUTPATIENT
Start: 2022-08-04 | End: 2022-08-05 | Stop reason: HOSPADM

## 2022-08-04 RX ORDER — SODIUM CHLORIDE 0.9 % (FLUSH) 0.9 %
5-40 SYRINGE (ML) INJECTION PRN
Status: DISCONTINUED | OUTPATIENT
Start: 2022-08-04 | End: 2022-08-05 | Stop reason: HOSPADM

## 2022-08-04 RX ADMIN — HEPARIN 500 UNITS: 100 SYRINGE at 13:59

## 2022-08-04 RX ADMIN — SODIUM CHLORIDE, PRESERVATIVE FREE 40 ML: 5 INJECTION INTRAVENOUS at 13:59

## 2022-08-04 NOTE — PROGRESS NOTES
Patient arrive ambulatory for  port flush . Denies complaint or concern. Port accessed without difficulty  Port flushed and heparinized with intact smith needle removed per protocol. Patient ambulate off unit per self at discharge.
98

## 2022-08-04 NOTE — PLAN OF CARE
Problem: Chronic Conditions and Co-morbidities  Goal: Patient's chronic conditions and co-morbidity symptoms are monitored and maintained or improved  Outcome: Completed     Problem: Safety - Adult  Goal: Free from fall injury  Outcome: Completed     Problem: Safety - Adult  Goal: Free from fall injury  Outcome: Completed

## 2022-08-04 NOTE — PLAN OF CARE
Problem: Chronic Conditions and Co-morbidities  Goal: Patient's chronic conditions and co-morbidity symptoms are monitored and maintained or improved  Outcome: Completed     Problem: Safety - Adult  Goal: Free from fall injury  Outcome: Completed

## 2022-09-13 ENCOUNTER — HOSPITAL ENCOUNTER (OUTPATIENT)
Facility: MEDICAL CENTER | Age: 77
End: 2022-09-13
Payer: COMMERCIAL

## 2022-09-13 ENCOUNTER — OFFICE VISIT (OUTPATIENT)
Dept: ONCOLOGY | Age: 77
End: 2022-09-13
Payer: COMMERCIAL

## 2022-09-13 ENCOUNTER — TELEPHONE (OUTPATIENT)
Dept: ONCOLOGY | Age: 77
End: 2022-09-13

## 2022-09-13 ENCOUNTER — HOSPITAL ENCOUNTER (OUTPATIENT)
Dept: INFUSION THERAPY | Facility: MEDICAL CENTER | Age: 77
Discharge: HOME | End: 2022-09-13
Payer: COMMERCIAL

## 2022-09-13 VITALS
TEMPERATURE: 97.7 F | SYSTOLIC BLOOD PRESSURE: 82 MMHG | WEIGHT: 165 LBS | BODY MASS INDEX: 35.71 KG/M2 | DIASTOLIC BLOOD PRESSURE: 52 MMHG | OXYGEN SATURATION: 97 % | HEART RATE: 81 BPM

## 2022-09-13 DIAGNOSIS — C85.80 MARGINAL ZONE B-CELL LYMPHOMA (HCC): Primary | ICD-10-CM

## 2022-09-13 DIAGNOSIS — C85.80 MARGINAL ZONE LYMPHOMA (HCC): ICD-10-CM

## 2022-09-13 LAB
ABSOLUTE EOS #: 0.07 K/UL (ref 0–0.44)
ABSOLUTE IMMATURE GRANULOCYTE: 0.05 K/UL (ref 0–0.3)
ABSOLUTE LYMPH #: 2.94 K/UL (ref 1.1–3.7)
ABSOLUTE MONO #: 0.83 K/UL (ref 0.1–1.2)
ALBUMIN SERPL-MCNC: 4.1 G/DL (ref 3.5–5.2)
ALP BLD-CCNC: 74 U/L (ref 35–104)
ALT SERPL-CCNC: 14 U/L (ref 5–33)
ANION GAP SERPL CALCULATED.3IONS-SCNC: 12 MMOL/L (ref 9–17)
AST SERPL-CCNC: 14 U/L
BASOPHILS # BLD: 1 % (ref 0–2)
BASOPHILS ABSOLUTE: 0.05 K/UL (ref 0–0.2)
BILIRUB SERPL-MCNC: 0.2 MG/DL (ref 0.3–1.2)
BUN BLDV-MCNC: 15 MG/DL (ref 8–23)
BUN/CREAT BLD: 17 (ref 9–20)
CALCIUM SERPL-MCNC: 9.4 MG/DL (ref 8.6–10.4)
CHLORIDE BLD-SCNC: 105 MMOL/L (ref 98–107)
CO2: 23 MMOL/L (ref 20–31)
CREAT SERPL-MCNC: 0.87 MG/DL (ref 0.5–0.9)
EOSINOPHILS RELATIVE PERCENT: 1 % (ref 1–4)
GFR AFRICAN AMERICAN: >60 ML/MIN
GFR NON-AFRICAN AMERICAN: >60 ML/MIN
GFR SERPL CREATININE-BSD FRML MDRD: ABNORMAL ML/MIN/{1.73_M2}
GLUCOSE BLD-MCNC: 90 MG/DL (ref 70–99)
HCT VFR BLD CALC: 40.1 % (ref 36.3–47.1)
HEMOGLOBIN: 12.3 G/DL (ref 11.9–15.1)
IMMATURE GRANULOCYTES: 1 %
LYMPHOCYTES # BLD: 31 % (ref 24–43)
MCH RBC QN AUTO: 30.7 PG (ref 25.2–33.5)
MCHC RBC AUTO-ENTMCNC: 30.7 G/DL (ref 28.4–34.8)
MCV RBC AUTO: 100 FL (ref 82.6–102.9)
MONOCYTES # BLD: 9 % (ref 3–12)
NRBC AUTOMATED: 0 PER 100 WBC
PDW BLD-RTO: 13.5 % (ref 11.8–14.4)
PLATELET # BLD: 285 K/UL (ref 138–453)
PMV BLD AUTO: 9 FL (ref 8.1–13.5)
POTASSIUM SERPL-SCNC: 4.6 MMOL/L (ref 3.7–5.3)
RBC # BLD: 4.01 M/UL (ref 3.95–5.11)
SEG NEUTROPHILS: 57 % (ref 36–65)
SEGMENTED NEUTROPHILS ABSOLUTE COUNT: 5.61 K/UL (ref 1.5–8.1)
SODIUM BLD-SCNC: 140 MMOL/L (ref 135–144)
TOTAL PROTEIN: 6.9 G/DL (ref 6.4–8.3)
WBC # BLD: 9.6 K/UL (ref 3.5–11.3)

## 2022-09-13 PROCEDURE — 1090F PRES/ABSN URINE INCON ASSESS: CPT | Performed by: INTERNAL MEDICINE

## 2022-09-13 PROCEDURE — 3017F COLORECTAL CA SCREEN DOC REV: CPT | Performed by: INTERNAL MEDICINE

## 2022-09-13 PROCEDURE — 1036F TOBACCO NON-USER: CPT | Performed by: INTERNAL MEDICINE

## 2022-09-13 PROCEDURE — G8427 DOCREV CUR MEDS BY ELIG CLIN: HCPCS | Performed by: INTERNAL MEDICINE

## 2022-09-13 PROCEDURE — 6360000002 HC RX W HCPCS: Performed by: INTERNAL MEDICINE

## 2022-09-13 PROCEDURE — 36591 DRAW BLOOD OFF VENOUS DEVICE: CPT

## 2022-09-13 PROCEDURE — 99214 OFFICE O/P EST MOD 30 MIN: CPT | Performed by: INTERNAL MEDICINE

## 2022-09-13 PROCEDURE — 85025 COMPLETE CBC W/AUTO DIFF WBC: CPT

## 2022-09-13 PROCEDURE — 2580000003 HC RX 258: Performed by: INTERNAL MEDICINE

## 2022-09-13 PROCEDURE — 1123F ACP DISCUSS/DSCN MKR DOCD: CPT | Performed by: INTERNAL MEDICINE

## 2022-09-13 PROCEDURE — G8417 CALC BMI ABV UP PARAM F/U: HCPCS | Performed by: INTERNAL MEDICINE

## 2022-09-13 PROCEDURE — G8399 PT W/DXA RESULTS DOCUMENT: HCPCS | Performed by: INTERNAL MEDICINE

## 2022-09-13 PROCEDURE — 80053 COMPREHEN METABOLIC PANEL: CPT

## 2022-09-13 RX ORDER — SODIUM CHLORIDE 0.9 % (FLUSH) 0.9 %
5-40 SYRINGE (ML) INJECTION PRN
OUTPATIENT
Start: 2022-09-13

## 2022-09-13 RX ORDER — SODIUM CHLORIDE 9 MG/ML
25 INJECTION, SOLUTION INTRAVENOUS PRN
OUTPATIENT
Start: 2022-09-13

## 2022-09-13 RX ORDER — SODIUM CHLORIDE 0.9 % (FLUSH) 0.9 %
5-40 SYRINGE (ML) INJECTION PRN
Status: DISCONTINUED | OUTPATIENT
Start: 2022-09-13 | End: 2022-09-14 | Stop reason: HOSPADM

## 2022-09-13 RX ORDER — HEPARIN SODIUM (PORCINE) LOCK FLUSH IV SOLN 100 UNIT/ML 100 UNIT/ML
500 SOLUTION INTRAVENOUS PRN
Status: DISCONTINUED | OUTPATIENT
Start: 2022-09-13 | End: 2022-09-14 | Stop reason: HOSPADM

## 2022-09-13 RX ORDER — HEPARIN SODIUM (PORCINE) LOCK FLUSH IV SOLN 100 UNIT/ML 100 UNIT/ML
500 SOLUTION INTRAVENOUS PRN
OUTPATIENT
Start: 2022-09-13

## 2022-09-13 RX ADMIN — SODIUM CHLORIDE, PRESERVATIVE FREE 10 ML: 5 INJECTION INTRAVENOUS at 16:03

## 2022-09-13 RX ADMIN — HEPARIN 500 UNITS: 100 SYRINGE at 16:03

## 2022-09-13 NOTE — PROGRESS NOTES
_           Chief Complaint   Patient presents with    Follow-up     Review status of disease    Other     Discuss her hernia    Port coming out      DIAGNOSIS:        Marginal Zone lymphoma  Persistent leukocytosis  Persistent thrombocytopenia  Multiple comorbidities as listed     CURRENT THERAPY:         Started treatment with rituximab 5/2/2019. Week #8 June 21, 2019. Maintenance Rituxan every 2 months for 2 years started October 17, 2019. completed 2 years in August 2021. BRIEF CASE HISTORY:      Ms. Cherie Stoner is a very pleasant 78 y.o. female with history of multiple comorbidities including COPD and diabetes. She quit smoking years ago. The patient was recently hospitalized with chest infection. The patient was noted to have leukocytosis. She also had persistent thrombocytopenia. She is referred for further evaluation. Patient denies any active bleeding. She has easy bruising. No fever or night sweats. No weight loss or decreased appetite. No palpable lymph nodes. No history of repeated infections. Patient has history of smoking for more than 50 years. Denies alcohol drinking  She had flow cytometry and bone marrow test. Results showed evidence of marginal zone lymphoma. Start the rituximab with good results. Due to increasing symptoms and further problems related to lymphoma, patient was started on single agent rituximab on 5/20/19. Patient received maintenance rituximab after induction. INTERIM HISTORY:   Patient is seen for follow up leukocytosis and marginal zone lymphoma. She had very good response to induction rituximab. She completed 2 years of maintenance rituximab. She is clinically stable. No weakness or fatigue. No dizziness. No fever or infections. No palpable lymph nodes. No recent infections. No recent hospitalizations. No weight loss or decreased appetite.        PAST MEDICAL HISTORY: has a past medical history of COPD (chronic obstructive pulmonary disease) ([facility] [address] Depression, Diabetes mellitus ([facility] [address] Hyperlipidemia, PTSD (post-traumatic stress disorder), and Tubulovillous adenoma. PAST SURGICAL HISTORY: has a past surgical history that includes Hysterectomy; Colonoscopy; Colonoscopy (06/07/2016); and Tunneled venous port placement (04/08/2019). CURRENT MEDICATIONS:  has a current medication list which includes the following prescription(s): glucose monitoring, gabapentin, simvastatin, fluoxetine, duloxetine, metformin, lisinopril, esomeprazole, glucose monitoring, albuterol-ipratropium, OXYGEN, prochlorperazine, NONFORMULARY, aspirin ec, lancets, blood glucose test strips, lancets, alcohol wipes, abdominal binder/elastic med, and blood glucose test strips, and the following Facility-Administered Medications: sodium chloride flush and heparin flush. ALLERGIES:  has No Known Allergies. FAMILY HISTORY: Negative for any hematological or oncological conditions. SOCIAL HISTORY:  reports that she quit smoking about 14 years ago. Her smoking use included cigarettes. She has a 10.00 pack-year smoking history. She has never used smokeless tobacco. She reports that she does not drink alcohol and does not use drugs. REVIEW OF SYSTEMS:     General: Positive for weakness and fatigue. No unanticipated weight loss or decreased appetite. No fever or chills. Eyes: No blurred vision, eye pain or double vision. Ears: No hearing problems or drainage. No tinnitus. Throat: No sore throat, problems with swallowing or dysphagia. Respiratory: No cough, sputum or hemoptysis. No shortness of breath. No pleuritic chest pain. Cardiovascular: No chest pain, orthopnea or PND. No lower extremity edema. No palpitation. Gastrointestinal: No problems with swallowing. No abdominal pain or bloating. No nausea or vomiting. No diarrhea or constipation. No GI bleeding.    Genitourinary: No dysuria, hematuria, frequency or urgency. Musculoskeletal: No muscle aches or pains. No limitation of movement. No back pain. No gait disturbance, No joint complaints. Dermatologic: No skin rashes or pruritus. No skin lesions or discolorations. Psychiatric: No depression, anxiety, or stress or signs of schizophrenia. No change in mood or affect. Hematologic: No history of bleeding tendency. Easy bruises no ecchymosis. No history of clotting problems. Infectious disease: No fever, chills or frequent infections. Endocrine: No problems with obesity. No polydipsia or polyuria. No temperature intolerance. Neurologic: No headaches or dizziness. No weakness or numbness of the extremities. No changes in balance, coordination,  memory, mentation, behavior. Allergic/Immunologic: No nasal congestion or hives. No repeated infections. PHYSICAL EXAM:  The patient is not in acute distress. Vital signs: Blood pressure (!) 82/52, pulse 81, temperature 97.7 °F (36.5 °C), temperature source Oral, weight 165 lb (74.8 kg), SpO2 97 %, not currently breastfeeding. HEENT:  Eyes are normal. Ears, nose and throat are normal.  Neck: Supple. No lymph node enlargement. No thyroid enlargement. Trachea is centrally located. Chest:  Clear to auscultation. No wheezes or crepitations. Heart: Regular sinus rhythm. Abdomen: Soft, nontender. No hepatosplenomegaly. No masses. Extremities:  With no edema. Lymph Nodes:  No cervical, axillary or inguinal lymph node enlargement. Neurologic:  Conscious and oriented. No focal neurological deficits. Psychosocial: No depression, anxiety or stress. Skin: No rashes, bruises or ecchymoses.       Review of Diagnostic data:   Recent Labs     09/13/22  1446   WBC 9.6   HGB 12.3   HCT 40.1   .0        Peripheral blood flow cytometry:  Peripheral blood flow cytometric immunophenotyping:         Peripheral blood is positive for a monoclonal B-cell population, positive for CD19, CD20, CD22,   CD45, FMC7 and lambda light chain. Comment:  Morphology and immunophenotype of circulating monoclonal   B-cells are most consistent with a marginal zone lymphoma. Bone   marrow evaluation and/or lymph node biopsy if possible are recommended   for confirmation and additional characterization. Bone marrow test August 27, 2018: Final Diagnosis   PERIPHERAL BLOOD:   -LYMPHOCYTOSIS WITH \" VILLOUS\" LYMPHOCYTES. -MODERATE THROMBOCYTOPENIA. BONE MARROW BIOPSY, ASPIRATE SMEAR AND CLOT SECTION:   - MARGINAL ZONE LYMPHOMA. -NORMOCELLULAR TRILINEAGE HEMATOPOIETIC MARROW WITH MARKEDLY   DECREASED IRON STORES. Chemistry        Component Value Date/Time     09/13/2022 1446    K 4.6 09/13/2022 1446     09/13/2022 1446    CO2 23 09/13/2022 1446    BUN 15 09/13/2022 1446    CREATININE 0.87 09/13/2022 1446        Component Value Date/Time    CALCIUM 9.4 09/13/2022 1446    ALKPHOS 74 09/13/2022 1446    AST 14 09/13/2022 1446    ALT 14 09/13/2022 1446    BILITOT 0.2 (L) 09/13/2022 1446        Lab Results   Component Value Date    WBC 9.6 09/13/2022    HGB 12.3 09/13/2022    HCT 40.1 09/13/2022    .0 09/13/2022     09/13/2022         IMPRESSION:  Marginal Zone lymphoma  Persistent leukocytosis  Persistent thrombocytopenia  Positive FANNY with elevated antihistone. Multiple comorbidities as listed  Acute renal failure, corrected. PLAN: I reviewed the labs as above and discussed with the patient. I explained the significance of these abnormalities in layman language. I explained to patient the nature of low grade NHL. Patient received immunotherapy using rituximab. She had good results induction rituximab treatment. She completed maintenance rituximab. KATHRIN improved. Normal kidney function. Return visit in 3-4 months with repeated labs.   Patient's questions were answered to the best of her satisfaction and she verbalized full understanding and agreement.

## 2022-09-13 NOTE — PROGRESS NOTES
Patient arrive ambulatory for port draw and MD visit. Patient states she had to call EMS a few weeks ago due to low oxygen saturation. Denies other complaint or concern. Port accessed; specimen sent. Labs reviewed. MD in room, aware of patients vitals, ok to discharge patient. Port flushed and heparinized with intact smith needle removed per protocol.   Patient discharged with instructions to stop up front for AVS.

## 2022-09-13 NOTE — TELEPHONE ENCOUNTER
Tera Rayo MD VISIT & TX  IR for port removal  RV 3-4 months with labs at RV  IR FOR PORT REMOVAL [facility] 9/16/22 [facility] 11AM [facility] AR[facility] @ 10:30AM  MD VISIT 12/22/22 @ 2:15PM  AVS PRINTED W/ INSTRUCTIONS AND GIVEN TO PT ON EXIT

## 2022-09-15 ENCOUNTER — HOSPITAL ENCOUNTER (OUTPATIENT)
Dept: INTERVENTIONAL RADIOLOGY/VASCULAR | Age: 77
Discharge: HOME | End: 2022-09-17
Payer: COMMERCIAL

## 2022-09-15 ENCOUNTER — HOSPITAL ENCOUNTER (OUTPATIENT)
Age: 77
End: 2022-09-15
Payer: COMMERCIAL

## 2022-09-15 ENCOUNTER — HOSPITAL ENCOUNTER (OUTPATIENT)
Age: 77
Discharge: HOME | End: 2022-09-15
Payer: COMMERCIAL

## 2022-09-15 ENCOUNTER — HOSPITAL ENCOUNTER (OUTPATIENT)
Age: 77
Discharge: HOME | End: 2022-09-17
Payer: COMMERCIAL

## 2022-09-15 ENCOUNTER — HOSPITAL ENCOUNTER (OUTPATIENT)
Dept: GENERAL RADIOLOGY | Age: 77
Discharge: HOME | End: 2022-09-17
Payer: COMMERCIAL

## 2022-09-15 VITALS
HEART RATE: 76 BPM | RESPIRATION RATE: 16 BRPM | OXYGEN SATURATION: 99 % | DIASTOLIC BLOOD PRESSURE: 59 MMHG | SYSTOLIC BLOOD PRESSURE: 83 MMHG

## 2022-09-15 DIAGNOSIS — C85.80 MARGINAL ZONE B-CELL LYMPHOMA (HCC): ICD-10-CM

## 2022-09-15 DIAGNOSIS — J44.9 OBSTRUCTIVE CHRONIC BRONCHITIS WITHOUT EXACERBATION (HCC): ICD-10-CM

## 2022-09-15 LAB
ALBUMIN SERPL-MCNC: 3.8 G/DL (ref 3.5–5.2)
ALBUMIN/GLOBULIN RATIO: 1.3 (ref 1–2.5)
ALP BLD-CCNC: 66 U/L (ref 35–104)
ALT SERPL-CCNC: 13 U/L (ref 5–33)
ANION GAP SERPL CALCULATED.3IONS-SCNC: 16 MMOL/L (ref 9–17)
AST SERPL-CCNC: 19 U/L
BILIRUB SERPL-MCNC: 0.3 MG/DL (ref 0.3–1.2)
BILIRUBIN DIRECT: <0.1 MG/DL
BILIRUBIN, INDIRECT: NORMAL MG/DL (ref 0–1)
BUN BLDV-MCNC: 18 MG/DL (ref 8–23)
CALCIUM SERPL-MCNC: 9.4 MG/DL (ref 8.6–10.4)
CHLORIDE BLD-SCNC: 105 MMOL/L (ref 98–107)
CHOLESTEROL/HDL RATIO: 2.2
CHOLESTEROL: 162 MG/DL
CO2: 21 MMOL/L (ref 20–31)
CREAT SERPL-MCNC: 0.71 MG/DL (ref 0.5–0.9)
GFR AFRICAN AMERICAN: >60 ML/MIN
GFR NON-AFRICAN AMERICAN: >60 ML/MIN
GFR SERPL CREATININE-BSD FRML MDRD: ABNORMAL ML/MIN/{1.73_M2}
GLUCOSE BLD-MCNC: 100 MG/DL (ref 70–99)
HCT VFR BLD CALC: 37.6 % (ref 36.3–47.1)
HDLC SERPL-MCNC: 74 MG/DL
HEMOGLOBIN: 12.4 G/DL (ref 11.9–15.1)
LDL CHOLESTEROL: 60 MG/DL (ref 0–130)
MCH RBC QN AUTO: 32.5 PG (ref 25.2–33.5)
MCHC RBC AUTO-ENTMCNC: 33 G/DL (ref 28.4–34.8)
MCV RBC AUTO: 98.7 FL (ref 82.6–102.9)
NRBC AUTOMATED: 0 PER 100 WBC
PDW BLD-RTO: 13.6 % (ref 11.8–14.4)
PLATELET # BLD: 280 K/UL (ref 138–453)
PMV BLD AUTO: 9.6 FL (ref 8.1–13.5)
POTASSIUM SERPL-SCNC: 5.2 MMOL/L (ref 3.7–5.3)
RBC # BLD: 3.81 M/UL (ref 3.95–5.11)
SODIUM BLD-SCNC: 142 MMOL/L (ref 135–144)
TOTAL PROTEIN: 6.7 G/DL (ref 6.4–8.3)
TRIGL SERPL-MCNC: 141 MG/DL
TSH SERPL DL<=0.05 MIU/L-ACNC: 1.39 UIU/ML (ref 0.3–5)
VITAMIN B-12: 266 PG/ML (ref 232–1245)
WBC # BLD: 8.5 K/UL (ref 3.5–11.3)

## 2022-09-15 PROCEDURE — 71046 X-RAY EXAM CHEST 2 VIEWS: CPT

## 2022-09-15 PROCEDURE — 80061 LIPID PANEL: CPT

## 2022-09-15 PROCEDURE — 36415 COLL VENOUS BLD VENIPUNCTURE: CPT

## 2022-09-15 PROCEDURE — 2709999900 IR REMOVE TUNNELED CVAD W SQ PORT/PUMP INSERT

## 2022-09-15 PROCEDURE — 85027 COMPLETE CBC AUTOMATED: CPT

## 2022-09-15 PROCEDURE — 82607 VITAMIN B-12: CPT

## 2022-09-15 PROCEDURE — 77001 FLUOROGUIDE FOR VEIN DEVICE: CPT

## 2022-09-15 PROCEDURE — 36590 REMOVAL TUNNELED CV CATH: CPT

## 2022-09-15 PROCEDURE — 84443 ASSAY THYROID STIM HORMONE: CPT

## 2022-09-15 PROCEDURE — 80076 HEPATIC FUNCTION PANEL: CPT

## 2022-09-15 PROCEDURE — 80048 BASIC METABOLIC PNL TOTAL CA: CPT

## 2022-09-15 RX ORDER — ACETAMINOPHEN 325 MG/1
650 TABLET ORAL EVERY 4 HOURS PRN
Status: DISCONTINUED | OUTPATIENT
Start: 2022-09-15 | End: 2022-09-18 | Stop reason: HOSPADM

## 2022-09-15 NOTE — BRIEF OP NOTE
Brief Postoperative Note    Javier Dang  YOB: 1945  [id number]    Pre-operative Diagnosis: Lymphoma; finished chemotherapy    Post-operative Diagnosis: Same    Procedure: Port removal    Medications Given: none    Anesthesia: Local    Surgeons/Assistants: Isaac Martinez MD    Estimated Blood Loss: minimal    Complications: none    Specimens: were not obtained    Findings: Right IJ port removed without incident in the standard fashion; pt tolerated well.        Electronically signed by Isaac Martinez MD on 9/15/2022 at 12:47 PM

## 2022-09-15 NOTE — FLOWSHEET NOTE
Patient had a VAD port removed, tolerated procedure well site sutured and glued shut.  Vitals stable and charted

## 2022-10-19 ENCOUNTER — APPOINTMENT (OUTPATIENT)
Dept: GENERAL RADIOLOGY | Age: 77
End: 2022-10-19
Payer: COMMERCIAL

## 2022-10-19 ENCOUNTER — HOSPITAL ENCOUNTER (EMERGENCY)
Age: 77
Discharge: HOME | End: 2022-10-19
Attending: EMERGENCY MEDICINE
Payer: COMMERCIAL

## 2022-10-19 VITALS
HEART RATE: 96 BPM | TEMPERATURE: 97.5 F | WEIGHT: 157 LBS | BODY MASS INDEX: 33.97 KG/M2 | DIASTOLIC BLOOD PRESSURE: 63 MMHG | RESPIRATION RATE: 22 BRPM | OXYGEN SATURATION: 96 % | SYSTOLIC BLOOD PRESSURE: 119 MMHG

## 2022-10-19 DIAGNOSIS — J18.9 PNEUMONIA DUE TO INFECTIOUS ORGANISM, UNSPECIFIED LATERALITY, UNSPECIFIED PART OF LUNG: ICD-10-CM

## 2022-10-19 DIAGNOSIS — J44.1 CHRONIC OBSTRUCTIVE PULMONARY DISEASE WITH ACUTE EXACERBATION (HCC): Primary | ICD-10-CM

## 2022-10-19 LAB
ABSOLUTE EOS #: 0.07 K/UL (ref 0–0.44)
ABSOLUTE IMMATURE GRANULOCYTE: 0.04 K/UL (ref 0–0.3)
ABSOLUTE LYMPH #: 1.98 K/UL (ref 1.1–3.7)
ABSOLUTE MONO #: 0.92 K/UL (ref 0.1–1.2)
ALBUMIN SERPL-MCNC: 3.7 G/DL (ref 3.5–5.2)
ALP BLD-CCNC: 88 U/L (ref 35–104)
ALT SERPL-CCNC: 10 U/L (ref 5–33)
ANION GAP SERPL CALCULATED.3IONS-SCNC: 13 MMOL/L (ref 9–17)
AST SERPL-CCNC: 15 U/L
BASOPHILS # BLD: 1 % (ref 0–2)
BASOPHILS ABSOLUTE: 0.07 K/UL (ref 0–0.2)
BILIRUB SERPL-MCNC: 0.2 MG/DL (ref 0.3–1.2)
BUN BLDV-MCNC: 10 MG/DL (ref 8–23)
BUN/CREAT BLD: 19 (ref 9–20)
CALCIUM SERPL-MCNC: 9.5 MG/DL (ref 8.6–10.4)
CHLORIDE BLD-SCNC: 101 MMOL/L (ref 98–107)
CO2: 25 MMOL/L (ref 20–31)
CREAT SERPL-MCNC: 0.53 MG/DL (ref 0.5–0.9)
EOSINOPHILS RELATIVE PERCENT: 1 % (ref 1–4)
GFR SERPL CREATININE-BSD FRML MDRD: >60 ML/MIN/1.73M2
GLUCOSE BLD-MCNC: 103 MG/DL (ref 70–99)
HCT VFR BLD CALC: 42.2 % (ref 36.3–47.1)
HEMOGLOBIN: 12.9 G/DL (ref 11.9–15.1)
IMMATURE GRANULOCYTES: 1 %
LYMPHOCYTES # BLD: 25 % (ref 24–43)
MCH RBC QN AUTO: 30.2 PG (ref 25.2–33.5)
MCHC RBC AUTO-ENTMCNC: 30.6 G/DL (ref 28.4–34.8)
MCV RBC AUTO: 98.8 FL (ref 82.6–102.9)
MONOCYTES # BLD: 11 % (ref 3–12)
NRBC AUTOMATED: 0 PER 100 WBC
PDW BLD-RTO: 12.7 % (ref 11.8–14.4)
PLATELET # BLD: 361 K/UL (ref 138–453)
PMV BLD AUTO: 8.6 FL (ref 8.1–13.5)
POTASSIUM SERPL-SCNC: 4.1 MMOL/L (ref 3.7–5.3)
RBC # BLD: 4.27 M/UL (ref 3.95–5.11)
SARS-COV-2, RAPID: NOT DETECTED
SEG NEUTROPHILS: 61 % (ref 36–65)
SEGMENTED NEUTROPHILS ABSOLUTE COUNT: 4.98 K/UL (ref 1.5–8.1)
SODIUM BLD-SCNC: 139 MMOL/L (ref 135–144)
SPECIMEN DESCRIPTION: NORMAL
TOTAL PROTEIN: 7.2 G/DL (ref 6.4–8.3)
WBC # BLD: 8.1 K/UL (ref 3.5–11.3)

## 2022-10-19 PROCEDURE — 6360000002 HC RX W HCPCS: Performed by: EMERGENCY MEDICINE

## 2022-10-19 PROCEDURE — 99284 EMERGENCY DEPT VISIT MOD MDM: CPT

## 2022-10-19 PROCEDURE — 96374 THER/PROPH/DIAG INJ IV PUSH: CPT

## 2022-10-19 PROCEDURE — 85025 COMPLETE CBC W/AUTO DIFF WBC: CPT

## 2022-10-19 PROCEDURE — 87635 SARS-COV-2 COVID-19 AMP PRB: CPT

## 2022-10-19 PROCEDURE — 93005 ELECTROCARDIOGRAM TRACING: CPT

## 2022-10-19 PROCEDURE — 80053 COMPREHEN METABOLIC PANEL: CPT

## 2022-10-19 PROCEDURE — 94640 AIRWAY INHALATION TREATMENT: CPT

## 2022-10-19 PROCEDURE — 2700000000 HC OXYGEN THERAPY PER DAY

## 2022-10-19 PROCEDURE — 36415 COLL VENOUS BLD VENIPUNCTURE: CPT

## 2022-10-19 PROCEDURE — 6370000000 HC RX 637 (ALT 250 FOR IP): Performed by: EMERGENCY MEDICINE

## 2022-10-19 PROCEDURE — 94761 N-INVAS EAR/PLS OXIMETRY MLT: CPT

## 2022-10-19 PROCEDURE — 71045 X-RAY EXAM CHEST 1 VIEW: CPT

## 2022-10-19 RX ORDER — PREDNISONE 50 MG/1
50 TABLET ORAL DAILY
Qty: 4 TABLET | Refills: 0 | Status: SHIPPED | OUTPATIENT
Start: 2022-10-20 | End: 2022-10-24

## 2022-10-19 RX ORDER — AZITHROMYCIN 500 MG/1
500 TABLET, FILM COATED ORAL DAILY
Qty: 4 TABLET | Refills: 0 | Status: SHIPPED | OUTPATIENT
Start: 2022-10-20 | End: 2022-10-24

## 2022-10-19 RX ORDER — ALBUTEROL SULFATE 2.5 MG/3ML
2.5 SOLUTION RESPIRATORY (INHALATION) EVERY 6 HOURS PRN
Qty: 120 EACH | Refills: 0 | Status: SHIPPED | OUTPATIENT
Start: 2022-10-19

## 2022-10-19 RX ORDER — ALBUTEROL SULFATE 2.5 MG/3ML
2.5 SOLUTION RESPIRATORY (INHALATION) EVERY 6 HOURS PRN
Status: DISCONTINUED | OUTPATIENT
Start: 2022-10-19 | End: 2022-10-19 | Stop reason: HOSPADM

## 2022-10-19 RX ORDER — AZITHROMYCIN 250 MG/1
500 TABLET, FILM COATED ORAL ONCE
Status: COMPLETED | OUTPATIENT
Start: 2022-10-19 | End: 2022-10-19

## 2022-10-19 RX ORDER — PREDNISONE 20 MG/1
50 TABLET ORAL ONCE
Status: COMPLETED | OUTPATIENT
Start: 2022-10-19 | End: 2022-10-19

## 2022-10-19 RX ORDER — METHYLPREDNISOLONE SODIUM SUCCINATE 125 MG/2ML
125 INJECTION, POWDER, LYOPHILIZED, FOR SOLUTION INTRAMUSCULAR; INTRAVENOUS ONCE
Status: COMPLETED | OUTPATIENT
Start: 2022-10-19 | End: 2022-10-19

## 2022-10-19 RX ADMIN — METHYLPREDNISOLONE SODIUM SUCCINATE 125 MG: 125 INJECTION, POWDER, FOR SOLUTION INTRAMUSCULAR; INTRAVENOUS at 15:58

## 2022-10-19 RX ADMIN — AZITHROMYCIN MONOHYDRATE 500 MG: 250 TABLET ORAL at 18:37

## 2022-10-19 RX ADMIN — ALBUTEROL SULFATE 2.5 MG: 2.5 SOLUTION RESPIRATORY (INHALATION) at 16:51

## 2022-10-19 RX ADMIN — PREDNISONE 50 MG: 20 TABLET ORAL at 18:37

## 2022-10-19 NOTE — ED PROVIDER NOTES
EMERGENCY DEPARTMENT ENCOUNTER    Pt Name: Loki Gómez  MRN: [id number]  [place] 1945  Date of evaluation: 10/19/22  CHIEF COMPLAINT       Chief Complaint   Patient presents with    Respiratory Distress     Oxygen tank ran out    Chest Pain     Sent in due to chest pain and difficulty berathing     HISTORY OF PRESENT ILLNESS   Patient is a 51-year-old female with PMH of COPD, diabetes and hyperlipidemia who presents to the ED for evaluation cough, shortness of breath and chest pain. Symptoms started approximately 3 weeks ago and gradually worsened. Today she ran out of her oxygen. She typically wears 3 L nasal cannula. She also has runny nose and nasal congestion. No fevers. Chest pain is present only when she coughs. No abdominal pain. REVIEW OF SYSTEMS     Review of Systems   All other systems reviewed and are negative. PASTMEDICAL HISTORY     Past Medical History:   Diagnosis Date    COPD (chronic obstructive pulmonary disease) ([facility] [address]     Depression     Diabetes mellitus ([facility] [address]     Hyperlipidemia     PTSD (post-traumatic stress disorder)     Tubulovillous adenoma      SURGICAL HISTORY       Past Surgical History:   Procedure Laterality Date    COLONOSCOPY      colon polyps    COLONOSCOPY  06/07/2016    severe diverticulosis TUBULOVILLOUS ADENOMA.        HYSTERECTOMY (CERVIX STATUS UNKNOWN)      TUNNELED VENOUS PORT PLACEMENT  04/08/2019    chemo port to right chest     CURRENT MEDICATIONS       Discharge Medication List as of 10/19/2022  6:02 PM        CONTINUE these medications which have NOT CHANGED    Details   !! glucose monitoring (FREESTYLE FREEDOM) kit DAILY Starting Mon 6/20/2022, Disp-1 kit, R-0, Normal      NONFORMULARY Hemp gummiesHistorical Med      gabapentin (NEURONTIN) 600 MG tablet TAKE ONE TABLET BY MOUTH FOUR TIMES A DAY AS NEEDED FOR PAIN FOR UP TO 60 DAYS, Disp-120 tablet, R-1Normal      simvastatin (ZOCOR) 40 MG tablet TAKE ONE TABLET BY MOUTH ONCE NIGHTLY, Disp-90 tablet, R-1Normal      aspirin EC 81 MG EC tablet Take 1 tablet by mouth daily, Disp-90 tablet, R-1Normal      FLUoxetine (PROZAC) 20 MG capsule TAKE ONE CAPSULE BY MOUTH DAILY, Disp-90 capsule, R-1Normal      DULoxetine (CYMBALTA) 60 MG extended release capsule Take 1 capsule by mouth daily, Disp-90 capsule, R-1Normal      metFORMIN (GLUCOPHAGE-XR) 750 MG extended release tablet TAKE ONE TABLET BY MOUTH DAILY WITH BREAKFAST, Disp-90 tablet, R-1Normal      lisinopril (PRINIVIL;ZESTRIL) 10 MG tablet Take 1 tablet by mouth daily, Disp-90 tablet, R-1Normal      esomeprazole (NEXIUM) 40 MG delayed release capsule TAKE ONE CAPSULE BY MOUTH DAILY / taking PRN, Disp-90 capsule, R-3Normal      !! glucose monitoring (FREESTYLE FREEDOM) kit DAILY PRN Starting Tue 9/14/2021, Disp-1 kit, R-0, Normal      !! Lancets MISC 2 TIMES DAILY Starting Tue 9/14/2021, Disp-100 each, R-3, Normal      !! blood glucose test strips (ASCENSIA AUTODISC VI;ONE TOUCH ULTRA TEST VI) strip Disp-100 strip, R-5, NormalUse with associated glucose meter. Test one time daily and additional times if abnormal readings      !!  Lancets MISC DAILY Starting Tue 8/31/2021, Disp-100 each, R-5, Normal      Isopropyl Alcohol (ALCOHOL WIPES) 70 % MISC Use as directed, Disp-100 each, R-0Normal      Elastic Bandages & Supports (ABDOMINAL BINDER/ELASTIC MED) MISC Disp-1 each, R-0, NormalUse daily as much as possible for abdominal hernia      albuterol-ipratropium (COMBIVENT RESPIMAT)  MCG/ACT AERS inhaler INHALE ONE INHALATION BY MOUTH FOUR TIMES A DAY, Disp-1 Inhaler,R-2Normal      !! blood glucose monitor strips 1 strip by Other route TrueTest Strips     3 times a day & as needed for symptoms of irregular blood glucose., Other, EVELIN, Historical Med      OXYGEN Inhale 3 L into the lungs continuousHistorical Med      prochlorperazine (COMPAZINE) 10 MG tablet Take 1 tablet by mouth every 6 hours as needed (CHEMO INDUCED NAUSEA), Disp-120 tablet, R-3Phone In       !! - Potential duplicate medications found. Please discuss with provider. ALLERGIES     has No Known Allergies. FAMILY HISTORY     She indicated that the status of her mother is unknown. She indicated that the status of her brother is unknown. SOCIAL HISTORY       Social History     Tobacco Use    Smoking status: Former     Packs/day: 1.00     Years: 10.00     Pack years: 10.00     Types: Cigarettes     Quit date: 2007     Years since quittin.9    Smokeless tobacco: Never   Vaping Use    Vaping Use: Never used   Substance Use Topics    Alcohol use: No    Drug use: No     PHYSICAL EXAM     INITIAL VITALS: /63   Pulse 96   Temp 97.5 °F (36.4 °C)   Resp 22   Wt 157 lb (71.2 kg)   SpO2 96%   BMI 33.97 kg/m²    Physical Exam  Constitutional:       Appearance: Normal appearance. HENT:      Head: Normocephalic. Right Ear: External ear normal.      Left Ear: External ear normal.      Nose: Nose normal.   Eyes:      Conjunctiva/sclera: Conjunctivae normal.   Cardiovascular:      Rate and Rhythm: Regular rhythm. Heart sounds: Normal heart sounds. Pulmonary:      Breath sounds: Wheezing present. Abdominal:      General: There is no distension. Skin:     General: Skin is dry. Neurological:      Mental Status: She is alert. Mental status is at baseline. MEDICAL DECISION MAKING:   The patient is hemodynamically stable, afebrile, nontoxic-appearing. Physical exam notable for wheezes bilaterally. Based on history and exam likely COPD exacerbation. Also considered viral URI, COVID, pneumonia. ED plan for basic labs, breathing treatment, chest x-ray, reassess. CRITICAL CARE:     The 30 ml/kg fluid bolus is not ordered due to concern for fluid overload and/or heart failure.     PROCEDURES:    Procedures    DIAGNOSTIC RESULTS   EKG:All EKG's are interpreted by the Emergency Department Physician who either signs or Co-signs this chart in the absence of a cardiologist.        RADIOLOGY:All plain film, CT, MRI, and formal ultrasound images (except ED bedside ultrasound) are read by the radiologist, see reports below, unless otherwisenoted in MDM or here. XR CHEST PORTABLE   Final Result   Linear bibasilar opacities, atelectasis versus pneumonia. LABS: All lab results were reviewed by myself, and all abnormals are listed below. Labs Reviewed   CBC WITH AUTO DIFFERENTIAL - Abnormal; Notable for the following components:       Result Value    Immature Granulocytes 1 (*)     All other components within normal limits   COMPREHENSIVE METABOLIC PANEL - Abnormal; Notable for the following components:    Glucose 103 (*)     Total Bilirubin 0.2 (*)     All other components within normal limits   COVID-19, RAPID       EMERGENCY DEPARTMENTCOURSE:   Patient did well in the ED. Symptoms drastically improved with albuterol treatment. Labs reviewed:  Potassium 4.1  Creatinine 0.53  Glucose 103  Negative rapid COVID. Chest x-ray shows linear opacities concerning for pneumonia. Started on azithromycin. Given Rx for prednisone. Given Rx for azithromycin. Brother picked up new oxygen tank and brought to hospital before discharge. No further work-up indicated at this time. Nursing notes reviewed. At this time this is what I find, the patient appears well and does not appear sick or toxic. I gave my usual and customary discussion of the risks and benefits of discharge versus admission. I answered the patient's questions. I gave the patient strict return precautions. Patient expressed understanding of the discharge instructions.         Vitals:    Vitals:    10/19/22 1652 10/19/22 1656 10/19/22 1720 10/19/22 1742   BP:    119/63   Pulse: 97 96 96 96   Resp: 20 21 21 22   Temp:       SpO2: 96% 98%  96%   Weight:           The patient was given the following medications while in the emergency department:  Orders Placed This Encounter   Medications DISCONTD: albuterol (PROVENTIL) nebulizer solution 2.5 mg     Order Specific Question:   Initiate RT Bronchodilator Protocol     Answer:   No    methylPREDNISolone sodium (SOLU-MEDROL) injection 125 mg    predniSONE (DELTASONE) tablet 50 mg    azithromycin (ZITHROMAX) tablet 500 mg     Order Specific Question:   Antimicrobial Indications     Answer:   Pneumonia (CAP)    azithromycin (ZITHROMAX) 500 MG tablet     Sig: Take 1 tablet by mouth daily for 4 days     Dispense:  4 tablet     Refill:  0    predniSONE (DELTASONE) 50 MG tablet     Sig: Take 1 tablet by mouth daily for 4 days     Dispense:  4 tablet     Refill:  0    albuterol (PROVENTIL) (2.5 MG/3ML) 0.083% nebulizer solution     Sig: Take 3 mLs by nebulization every 6 hours as needed for Wheezing     Dispense:  120 each     Refill:  0     CONSULTS:  None    FINAL IMPRESSION      1. Chronic obstructive pulmonary disease with acute exacerbation ([facility] [address]    2.  Pneumonia due to infectious organism, unspecified laterality, unspecified part of lung          DISPOSITION/PLAN   DISPOSITION Decision To Discharge 10/19/2022 05:55:56 PM      PATIENT REFERRED TO:  Chris Suazo DO  [facility]  [phone]    In 2 days    DISCHARGE MEDICATIONS:  Discharge Medication List as of 10/19/2022  6:02 PM        START taking these medications    Details   azithromycin (ZITHROMAX) 500 MG tablet Take 1 tablet by mouth daily for 4 days, Disp-4 tablet, R-0Normal      predniSONE (DELTASONE) 50 MG tablet Take 1 tablet by mouth daily for 4 days, Disp-4 tablet, R-0Normal      albuterol (PROVENTIL) (2.5 MG/3ML) 0.083% nebulizer solution Take 3 mLs by nebulization every 6 hours as needed for Wheezing, Disp-120 each, R-0Normal           Julien Martin MD  Attending Emergency Physician                   Manny Schwartz MD  10/20/22 7916

## 2022-10-20 ENCOUNTER — CARE COORDINATION (OUTPATIENT)
Dept: CARE COORDINATION | Age: 77
End: 2022-10-20

## 2022-10-20 LAB
EKG ATRIAL RATE: 101 BPM
EKG P AXIS: 42 DEGREES
EKG P-R INTERVAL: 158 MS
EKG Q-T INTERVAL: 326 MS
EKG QRS DURATION: 68 MS
EKG QTC CALCULATION (BAZETT): 422 MS
EKG R AXIS: 60 DEGREES
EKG T AXIS: 65 DEGREES
EKG VENTRICULAR RATE: 101 BPM

## 2022-10-25 NOTE — CARE COORDINATION
ACM attempted outreaches for CC needs, ED follow up, COPD management, CC enrollment needs  No answer and LVM with call back information

## 2022-10-31 ENCOUNTER — CARE COORDINATION (OUTPATIENT)
Dept: CARE COORDINATION | Age: 77
End: 2022-10-31

## 2022-11-18 ENCOUNTER — CARE COORDINATION (OUTPATIENT)
Dept: CARE COORDINATION | Age: 77
End: 2022-11-18

## 2022-11-18 NOTE — CARE COORDINATION
ACM attempted outreach for CC needs, DM, CHF and COPD management, possible new provider  No answer and LVM with call back information

## 2022-12-05 DIAGNOSIS — N30.00 ACUTE CYSTITIS WITHOUT HEMATURIA: ICD-10-CM

## 2022-12-05 DIAGNOSIS — E11.8 TYPE 2 DIABETES MELLITUS WITH COMPLICATION, WITHOUT LONG-TERM CURRENT USE OF INSULIN (HCC): ICD-10-CM

## 2022-12-05 RX ORDER — NITROFURANTOIN 25; 75 MG/1; MG/1
CAPSULE ORAL
Qty: 10 CAPSULE | Refills: 0 | OUTPATIENT
Start: 2022-12-05

## 2022-12-06 ENCOUNTER — CARE COORDINATION (OUTPATIENT)
Dept: CARE COORDINATION | Age: 77
End: 2022-12-06

## 2022-12-06 NOTE — CARE COORDINATION
KIARA spoke with Kaleb Lopez and confirmed with Kaleb Lopez that she did establish with new PCP provider, Harjeet Salazar at [facility]. PERRYM will discharge patient from [address] [place] [address] at this time.

## 2023-01-31 ENCOUNTER — OFFICE VISIT (OUTPATIENT)
Dept: ONCOLOGY | Age: 78
End: 2023-01-31
Payer: COMMERCIAL

## 2023-01-31 ENCOUNTER — HOSPITAL ENCOUNTER (OUTPATIENT)
Age: 78
Setting detail: SPECIMEN
Discharge: HOME | End: 2023-01-31
Payer: COMMERCIAL

## 2023-01-31 ENCOUNTER — TELEPHONE (OUTPATIENT)
Dept: ONCOLOGY | Age: 78
End: 2023-01-31

## 2023-01-31 VITALS
DIASTOLIC BLOOD PRESSURE: 53 MMHG | TEMPERATURE: 97.8 F | HEART RATE: 87 BPM | RESPIRATION RATE: 18 BRPM | SYSTOLIC BLOOD PRESSURE: 117 MMHG

## 2023-01-31 DIAGNOSIS — C85.80 MARGINAL ZONE B-CELL LYMPHOMA (HCC): Primary | ICD-10-CM

## 2023-01-31 DIAGNOSIS — C85.80 MARGINAL ZONE B-CELL LYMPHOMA (HCC): ICD-10-CM

## 2023-01-31 LAB
ABSOLUTE EOS #: 0.07 K/UL (ref 0–0.44)
ABSOLUTE IMMATURE GRANULOCYTE: 0.03 K/UL (ref 0–0.3)
ABSOLUTE LYMPH #: 2.84 K/UL (ref 1.1–3.7)
ABSOLUTE MONO #: 0.66 K/UL (ref 0.1–1.2)
ALBUMIN SERPL-MCNC: 4 G/DL (ref 3.5–5.2)
ALP BLD-CCNC: 63 U/L (ref 35–104)
ALT SERPL-CCNC: 9 U/L (ref 5–33)
ANION GAP SERPL CALCULATED.3IONS-SCNC: 14 MMOL/L (ref 9–17)
AST SERPL-CCNC: 15 U/L
BASOPHILS # BLD: 1 % (ref 0–2)
BASOPHILS ABSOLUTE: 0.05 K/UL (ref 0–0.2)
BILIRUB SERPL-MCNC: 0.2 MG/DL (ref 0.3–1.2)
BUN BLDV-MCNC: 14 MG/DL (ref 8–23)
BUN/CREAT BLD: 18 (ref 9–20)
CALCIUM SERPL-MCNC: 9.6 MG/DL (ref 8.6–10.4)
CHLORIDE BLD-SCNC: 102 MMOL/L (ref 98–107)
CO2: 24 MMOL/L (ref 20–31)
CREAT SERPL-MCNC: 0.77 MG/DL (ref 0.5–0.9)
EOSINOPHILS RELATIVE PERCENT: 1 % (ref 1–4)
GFR SERPL CREATININE-BSD FRML MDRD: >60 ML/MIN/1.73M2
GLUCOSE BLD-MCNC: 117 MG/DL (ref 70–99)
HCT VFR BLD CALC: 43.7 % (ref 36.3–47.1)
HEMOGLOBIN: 13.5 G/DL (ref 11.9–15.1)
IMMATURE GRANULOCYTES: 0 %
LYMPHOCYTES # BLD: 29 % (ref 24–43)
MCH RBC QN AUTO: 30 PG (ref 25.2–33.5)
MCHC RBC AUTO-ENTMCNC: 30.9 G/DL (ref 28.4–34.8)
MCV RBC AUTO: 97.1 FL (ref 82.6–102.9)
MONOCYTES # BLD: 7 % (ref 3–12)
NRBC AUTOMATED: 0 PER 100 WBC
PDW BLD-RTO: 14 % (ref 11.8–14.4)
PLATELET # BLD: 266 K/UL (ref 138–453)
PMV BLD AUTO: 8.9 FL (ref 8.1–13.5)
POTASSIUM SERPL-SCNC: 4.5 MMOL/L (ref 3.7–5.3)
RBC # BLD: 4.5 M/UL (ref 3.95–5.11)
SEG NEUTROPHILS: 62 % (ref 36–65)
SEGMENTED NEUTROPHILS ABSOLUTE COUNT: 6 K/UL (ref 1.5–8.1)
SODIUM BLD-SCNC: 140 MMOL/L (ref 135–144)
TOTAL PROTEIN: 6.8 G/DL (ref 6.4–8.3)
WBC # BLD: 9.7 K/UL (ref 3.5–11.3)

## 2023-01-31 PROCEDURE — 99211 OFF/OP EST MAY X REQ PHY/QHP: CPT

## 2023-01-31 PROCEDURE — 1123F ACP DISCUSS/DSCN MKR DOCD: CPT | Performed by: INTERNAL MEDICINE

## 2023-01-31 PROCEDURE — 36415 COLL VENOUS BLD VENIPUNCTURE: CPT

## 2023-01-31 PROCEDURE — 80053 COMPREHEN METABOLIC PANEL: CPT

## 2023-01-31 PROCEDURE — 3074F SYST BP LT 130 MM HG: CPT | Performed by: INTERNAL MEDICINE

## 2023-01-31 PROCEDURE — 99214 OFFICE O/P EST MOD 30 MIN: CPT | Performed by: INTERNAL MEDICINE

## 2023-01-31 PROCEDURE — 85025 COMPLETE CBC W/AUTO DIFF WBC: CPT

## 2023-01-31 PROCEDURE — 3078F DIAST BP <80 MM HG: CPT | Performed by: INTERNAL MEDICINE

## 2023-01-31 RX ORDER — ALPRAZOLAM 1 MG/1
TABLET ORAL
COMMUNITY
Start: 2022-12-13

## 2023-01-31 RX ORDER — UMECLIDINIUM 62.5 UG/1
AEROSOL, POWDER ORAL
COMMUNITY
Start: 2023-01-02

## 2023-01-31 NOTE — TELEPHONE ENCOUNTER
[facility] MD VISIT  DR Anh Perez IN TO SEE PATIENT  ORDERS RECEIVED  RV 6 MONTHS WITH LABS AT RV  LABS CDP CMP 08/01/23  MD VISIT 08/010/23 @2:30PM  AVS PRINTED AND GIVEN TO PATIENT WITH INSTRUCTIONS  PATIENT DISCHARGED AMBULATORY

## 2023-01-31 NOTE — PATIENT INSTRUCTIONS
Printed after visit summary given to patient.  Patient will schedule colonoscopy for August   at May visit with Ms. Malone.     RV 6 months with labs at [facility]

## 2023-01-31 NOTE — PROGRESS NOTES
_           Chief Complaint   Patient presents with    Follow-up    Discuss Labs     DIAGNOSIS:        Marginal Zone lymphoma  Persistent leukocytosis  Persistent thrombocytopenia  Multiple comorbidities as listed     CURRENT THERAPY:         Started treatment with rituximab 5/2/2019. Week #8 June 21, 2019. Maintenance Rituxan every 2 months for 2 years started October 17, 2019. completed 2 years in August 2021. BRIEF CASE HISTORY:      Ms. Debbie Stock is a very pleasant 78 y.o. female with history of multiple comorbidities including COPD and diabetes. She quit smoking years ago. The patient was recently hospitalized with chest infection. The patient was noted to have leukocytosis. She also had persistent thrombocytopenia. She is referred for further evaluation. Patient denies any active bleeding. She has easy bruising. No fever or night sweats. No weight loss or decreased appetite. No palpable lymph nodes. No history of repeated infections. Patient has history of smoking for more than 50 years. Denies alcohol drinking  She had flow cytometry and bone marrow test. Results showed evidence of marginal zone lymphoma. Start the rituximab with good results. Due to increasing symptoms and further problems related to lymphoma, patient was started on single agent rituximab on 5/20/19. Patient received maintenance rituximab after induction. INTERIM HISTORY:   Patient is seen for follow up leukocytosis and marginal zone lymphoma. She had very good response to induction rituximab. She completed 2 years of maintenance rituximab. She is clinically stable. No weakness or fatigue. No dizziness. No fever or infections. No palpable lymph nodes. No recent infections. No recent hospitalizations. No weight loss or decreased appetite.        PAST MEDICAL HISTORY: has a past medical history of COPD (chronic obstructive pulmonary disease) ([facility] [address] Depression, Diabetes mellitus ([facility] [address] Hyperlipidemia, PTSD (post-traumatic stress disorder), and Tubulovillous adenoma. PAST SURGICAL HISTORY: has a past surgical history that includes Hysterectomy; Colonoscopy; Colonoscopy (06/07/2016); and Tunneled venous port placement (04/08/2019). CURRENT MEDICATIONS:  has a current medication list which includes the following prescription(s): albuterol, gabapentin, simvastatin, aspirin ec, fluoxetine, duloxetine, metformin, lisinopril, esomeprazole, albuterol-ipratropium, OXYGEN, incruse ellipta, alprazolam, glucose monitoring, NONFORMULARY, glucose monitoring, lancets, blood glucose test strips, lancets, alcohol wipes, abdominal binder/elastic med, and blood glucose test strips. ALLERGIES:  has No Known Allergies. FAMILY HISTORY: Negative for any hematological or oncological conditions. SOCIAL HISTORY:  reports that she quit smoking about 15 years ago. Her smoking use included cigarettes. She has a 10.00 pack-year smoking history. She has never used smokeless tobacco. She reports that she does not drink alcohol and does not use drugs. REVIEW OF SYSTEMS:     General: Positive for weakness and fatigue. No unanticipated weight loss or decreased appetite. No fever or chills. Eyes: No blurred vision, eye pain or double vision. Ears: No hearing problems or drainage. No tinnitus. Throat: No sore throat, problems with swallowing or dysphagia. Respiratory: No cough, sputum or hemoptysis. No shortness of breath. No pleuritic chest pain. Cardiovascular: No chest pain, orthopnea or PND. No lower extremity edema. No palpitation. Gastrointestinal: No problems with swallowing. No abdominal pain or bloating. No nausea or vomiting. No diarrhea or constipation. No GI bleeding. Genitourinary: No dysuria, hematuria, frequency or urgency. Musculoskeletal: No muscle aches or pains. No limitation of movement. No back pain. No gait disturbance, No joint complaints.  Dermatologic: No skin rashes or pruritus. No skin lesions or discolorations.   Psychiatric: No depression, anxiety, or stress or signs of schizophrenia. No change in mood or affect.    Hematologic: No history of bleeding tendency.  Easy bruises no ecchymosis. No history of clotting problems.  Infectious disease: No fever, chills or frequent infections.   Endocrine: No problems with obesity. No polydipsia or polyuria. No temperature intolerance.  Neurologic: No headaches or dizziness. No weakness or numbness of the extremities. No changes in balance, coordination,  memory, mentation, behavior.   Allergic/Immunologic: No nasal congestion or hives. No repeated infections.       PHYSICAL EXAM:  The patient is not in acute distress.  Vital signs: Blood pressure (!) 117/53, pulse 87, temperature 97.8 °F (36.6 °C), temperature source Temporal, resp. rate 18, not currently breastfeeding. HEENT:  Eyes are normal. Ears, nose and throat are normal.  Neck: Supple.  No lymph node enlargement.  No thyroid enlargement.  Trachea is centrally located.  Chest:  Clear to auscultation.  No wheezes or crepitations.  Heart: Regular sinus rhythm.  Abdomen: Soft, nontender.  No hepatosplenomegaly.  No masses.  Extremities:  With no edema.  Lymph Nodes:  No cervical, axillary or inguinal lymph node enlargement.  Neurologic:  Conscious and oriented.  No focal neurological deficits. Psychosocial: No depression, anxiety or stress. Skin: No rashes, bruises or ecchymoses.      Review of Diagnostic data:   Recent Labs     01/31/23  1455   WBC 9.7   HGB 13.5   HCT 43.7   MCV 97.1        Peripheral blood flow cytometry:  Peripheral blood flow cytometric immunophenotyping:         Peripheral blood is positive for a monoclonal B-cell population,   positive for CD19, CD20, CD22,   CD45, FMC7 and lambda light chain.     Comment:  Morphology and immunophenotype of circulating monoclonal   B-cells are  most consistent with a marginal zone lymphoma. Bone   marrow evaluation and/or lymph node biopsy if possible are recommended   for confirmation and additional characterization. Bone marrow test August 27, 2018: Final Diagnosis   PERIPHERAL BLOOD:   -LYMPHOCYTOSIS WITH \" VILLOUS\" LYMPHOCYTES. -MODERATE THROMBOCYTOPENIA. BONE MARROW BIOPSY, ASPIRATE SMEAR AND CLOT SECTION:   - MARGINAL ZONE LYMPHOMA. -NORMOCELLULAR TRILINEAGE HEMATOPOIETIC MARROW WITH MARKEDLY   DECREASED IRON STORES. Chemistry        Component Value Date/Time     01/31/2023 1455    K 4.5 01/31/2023 1455     01/31/2023 1455    CO2 24 01/31/2023 1455    BUN 14 01/31/2023 1455    CREATININE 0.77 01/31/2023 1455        Component Value Date/Time    CALCIUM 9.6 01/31/2023 1455    ALKPHOS 63 01/31/2023 1455    AST 15 01/31/2023 1455    ALT 9 01/31/2023 1455    BILITOT 0.2 (L) 01/31/2023 1455        Lab Results   Component Value Date    WBC 9.7 01/31/2023    HGB 13.5 01/31/2023    HCT 43.7 01/31/2023    MCV 97.1 01/31/2023     01/31/2023         IMPRESSION:  Marginal Zone lymphoma  Persistent leukocytosis  Persistent thrombocytopenia  Positive FANYN with elevated antihistone. Multiple comorbidities as listed  Acute renal failure, corrected. PLAN: I reviewed the labs as above and discussed with the patient. I explained the significance of these abnormalities in layman language. I explained to patient the nature of low grade NHL. Patient received immunotherapy using rituximab. She had good results induction rituximab treatment. She completed maintenance rituximab. KATHRIN improved. Normal kidney function. Return visit in 6 months with repeated labs. Patient's questions were answered to the best of her satisfaction and she verbalized full understanding and agreement.

## 2023-04-20 NOTE — TELEPHONE ENCOUNTER
Phone line busy Apt 5/23 The patient has been re-examined and I agree with the above assessment or I updated with my findings.

## 2023-07-31 DIAGNOSIS — C85.80 MARGINAL ZONE B-CELL LYMPHOMA (HCC): Primary | ICD-10-CM

## 2023-08-01 ENCOUNTER — TELEPHONE (OUTPATIENT)
Dept: ONCOLOGY | Age: 78
End: 2023-08-01

## 2023-08-01 ENCOUNTER — CLINICAL DOCUMENTATION (OUTPATIENT)
Dept: SPIRITUAL SERVICES | Age: 78
End: 2023-08-01

## 2023-08-01 ENCOUNTER — TELEPHONE (OUTPATIENT)
Dept: SPIRITUAL SERVICES | Age: 78
End: 2023-08-01

## 2023-08-01 ENCOUNTER — OFFICE VISIT (OUTPATIENT)
Dept: ONCOLOGY | Age: 78
End: 2023-08-01
Payer: MEDICARE

## 2023-08-01 ENCOUNTER — HOSPITAL ENCOUNTER (OUTPATIENT)
Age: 78
Setting detail: SPECIMEN
Discharge: HOME | End: 2023-08-01
Payer: MEDICARE

## 2023-08-01 VITALS
RESPIRATION RATE: 18 BRPM | DIASTOLIC BLOOD PRESSURE: 62 MMHG | TEMPERATURE: 97.3 F | HEART RATE: 89 BPM | SYSTOLIC BLOOD PRESSURE: 112 MMHG

## 2023-08-01 DIAGNOSIS — C85.80 MARGINAL ZONE B-CELL LYMPHOMA (HCC): ICD-10-CM

## 2023-08-01 DIAGNOSIS — C85.80 MARGINAL ZONE B-CELL LYMPHOMA (HCC): Primary | ICD-10-CM

## 2023-08-01 LAB
ALBUMIN SERPL-MCNC: 3.7 G/DL (ref 3.5–5.2)
ALP SERPL-CCNC: 67 U/L (ref 35–104)
ALT SERPL-CCNC: <5 U/L (ref 5–33)
ANION GAP SERPL CALCULATED.3IONS-SCNC: 13 MMOL/L (ref 9–17)
AST SERPL-CCNC: 19 U/L
BASOPHILS # BLD: 0.04 K/UL (ref 0–0.2)
BASOPHILS NFR BLD: 0 % (ref 0–2)
BILIRUB SERPL-MCNC: 0.3 MG/DL (ref 0.3–1.2)
BUN SERPL-MCNC: 13 MG/DL (ref 8–23)
BUN/CREAT SERPL: 19 (ref 9–20)
CALCIUM SERPL-MCNC: 8.8 MG/DL (ref 8.6–10.4)
CHLORIDE SERPL-SCNC: 103 MMOL/L (ref 98–107)
CO2 SERPL-SCNC: 23 MMOL/L (ref 20–31)
CREAT SERPL-MCNC: 0.7 MG/DL (ref 0.5–0.9)
EOSINOPHIL # BLD: 0.05 K/UL (ref 0–0.44)
EOSINOPHILS RELATIVE PERCENT: 0 % (ref 1–4)
ERYTHROCYTE [DISTWIDTH] IN BLOOD BY AUTOMATED COUNT: 13.4 % (ref 11.8–14.4)
GFR SERPL CREATININE-BSD FRML MDRD: >60 ML/MIN/1.73M2
GLUCOSE SERPL-MCNC: 139 MG/DL (ref 70–99)
HCT VFR BLD AUTO: 41.5 % (ref 36.3–47.1)
HGB BLD-MCNC: 13.1 G/DL (ref 11.9–15.1)
IMM GRANULOCYTES # BLD AUTO: 0.08 K/UL (ref 0–0.3)
IMM GRANULOCYTES NFR BLD: 1 %
LYMPHOCYTES NFR BLD: 2.15 K/UL (ref 1.1–3.7)
LYMPHOCYTES RELATIVE PERCENT: 14 % (ref 24–43)
MCH RBC QN AUTO: 30.6 PG (ref 25.2–33.5)
MCHC RBC AUTO-ENTMCNC: 31.6 G/DL (ref 28.4–34.8)
MCV RBC AUTO: 97 FL (ref 82.6–102.9)
MONOCYTES NFR BLD: 0.82 K/UL (ref 0.1–1.2)
MONOCYTES NFR BLD: 5 % (ref 3–12)
NEUTROPHILS NFR BLD: 80 % (ref 36–65)
NEUTS SEG NFR BLD: 12.3 K/UL (ref 1.5–8.1)
NRBC BLD-RTO: 0 PER 100 WBC
PLATELET # BLD AUTO: 232 K/UL (ref 138–453)
PMV BLD AUTO: 8.9 FL (ref 8.1–13.5)
POTASSIUM SERPL-SCNC: 4.5 MMOL/L (ref 3.7–5.3)
PROT SERPL-MCNC: 6.9 G/DL (ref 6.4–8.3)
RBC # BLD AUTO: 4.28 M/UL (ref 3.95–5.11)
SODIUM SERPL-SCNC: 139 MMOL/L (ref 135–144)
WBC OTHER # BLD: 15.4 K/UL (ref 3.5–11.3)

## 2023-08-01 PROCEDURE — 99211 OFF/OP EST MAY X REQ PHY/QHP: CPT

## 2023-08-01 PROCEDURE — 3078F DIAST BP <80 MM HG: CPT | Performed by: INTERNAL MEDICINE

## 2023-08-01 PROCEDURE — 85025 COMPLETE CBC W/AUTO DIFF WBC: CPT

## 2023-08-01 PROCEDURE — 99214 OFFICE O/P EST MOD 30 MIN: CPT | Performed by: INTERNAL MEDICINE

## 2023-08-01 PROCEDURE — 3074F SYST BP LT 130 MM HG: CPT | Performed by: INTERNAL MEDICINE

## 2023-08-01 PROCEDURE — 80053 COMPREHEN METABOLIC PANEL: CPT

## 2023-08-01 PROCEDURE — 1123F ACP DISCUSS/DSCN MKR DOCD: CPT | Performed by: INTERNAL MEDICINE

## 2023-08-01 PROCEDURE — 36415 COLL VENOUS BLD VENIPUNCTURE: CPT

## 2023-08-01 NOTE — FLOWSHEET NOTE
SPIRITUAL CARE PROGRESS NOTE: Outpatient Oncology Care at [address] [place] [address]     Spiritual Assessment: Patient was leaving the infusion clinic. Writer escorted Patient in her wheelchair. Patient shared how she was doing. She expressed gratitude for her new caregiver who visits her two times a week. She shared how she has overcome great odds, acknowledging that she is [place] miracle. \" She spoke of the doctor's praise of her attitude and her progress over the past three years of receiving chemotherapy. Intervention: Writer provided supportive presence and active listening. Writer inquired about Pt's coping and needs. Writer offered words of support and encouragement. Writer affirmed Pt's strengths. Outcome: Patient thanked Mane Murillo. Plan: Chaplains will remain available to provide emotional and spiritual support as needed.        08/01/23 1728   Encounter Summary   Service Provided For: Patient   Referral/Consult From: Nurse   Support System Family members   Last Encounter  08/24/21   Complexity of Encounter Moderate   Begin Time 1500   End Time  1510   Total Time Calculated 10 min   Encounter    Type Follow up   Spiritual/Emotional needs   Type Spiritual Support   Assessment/Intervention/Outcome   Assessment Calm;Coping   Intervention Active listening;Explored/Affirmed feelings, thoughts, concerns;Explored Coping Skills/Resources;Sustaining Presence/Ministry of presence   Outcome Engaged in conversation;Expressed feelings, needs, and concerns;Coping;Expressed Gratitude   Plan and Referrals   Plan/Referrals Continue Support (comment)       Electronically signed by Ivana Patel, Oncology Outpatient [facility] Department  [phone]  8/1/2023  5:30 PM

## 2023-08-01 NOTE — PROGRESS NOTES
medical history of COPD (chronic obstructive pulmonary disease) ([address] Depression, Diabetes mellitus ([address]), Hyperlipidemia, PTSD (post-traumatic stress disorder), and Tubulovillous adenoma. PAST SURGICAL HISTORY: has a past surgical history that includes Hysterectomy; Colonoscopy; Colonoscopy (06/07/2016); and Tunneled venous port placement (04/08/2019). CURRENT MEDICATIONS:  has a current medication list which includes the following prescription(s): incruse ellipta, alprazolam, albuterol, gabapentin, simvastatin, fluoxetine, duloxetine, metformin, lisinopril, esomeprazole, albuterol-ipratropium, OXYGEN, glucose monitoring, NONFORMULARY, aspirin ec, glucose monitoring, lancets, blood glucose test strips, lancets, alcohol wipes, abdominal binder/elastic med, and blood glucose test strips. ALLERGIES:  has No Known Allergies. FAMILY HISTORY: Negative for any hematological or oncological conditions. SOCIAL HISTORY:  reports that she quit smoking about 15 years ago. Her smoking use included cigarettes. She has a 10.00 pack-year smoking history. She has never used smokeless tobacco. She reports that she does not drink alcohol and does not use drugs. REVIEW OF SYSTEMS:     General: Positive for weakness and fatigue. No unanticipated weight loss or decreased appetite. No fever or chills. Eyes: No blurred vision, eye pain or double vision. Ears: No hearing problems or drainage. No tinnitus. Throat: No sore throat, problems with swallowing or dysphagia. Respiratory: No cough, sputum or hemoptysis. No shortness of breath. No pleuritic chest pain. Cardiovascular: No chest pain, orthopnea or PND. No lower extremity edema. No palpitation. Gastrointestinal: No problems with swallowing. No abdominal pain or bloating. No nausea or vomiting. No diarrhea or constipation. No GI bleeding. Genitourinary: No dysuria, hematuria, frequency or urgency. Musculoskeletal: No muscle aches or pains.  No

## 2023-08-01 NOTE — TELEPHONE ENCOUNTER
Luz Marina Marino MD VISIT  RV IN 6 MTHS W/ LABS @ RV  LABS CDP CMP 2/1/24  MD VISIT 2/1/24 @ 2:30PM  AVS PRINTED W/ INSTRUCTIONS AND GIVEN TO PT ON EXIT

## 2023-08-01 NOTE — TELEPHONE ENCOUNTER
This note is in error.     Electronically signed by Wei Ramos, [address] [place] [address]  [phone]  8/1/2023  5:38 PM

## 2023-08-30 DIAGNOSIS — C85.80 MARGINAL ZONE B-CELL LYMPHOMA (HCC): Primary | ICD-10-CM

## 2024-01-18 NOTE — TELEPHONE ENCOUNTER
Mohinder Echeverria is calling to request a refill on the following medication(s):    Medication Request:  Requested Prescriptions     Pending Prescriptions Disp Refills    gabapentin (NEURONTIN) 600 MG tablet 90 tablet 0     Sig: Take 1 tablet by mouth 3 times daily for 30 days.        Last Visit Date (If Applicable):  0/8/6554    Next Visit Date:    4/8/2021 Detail Level: Detailed Patient Specific Counseling (Will Not Stick From Patient To Patient): Pt is doing well about 3 months post MOhs with a graft in place.  \\nFBSE for skin ca surveillance is scheduled for April 2024. Patient Specific Counseling (Will Not Stick From Patient To Patient): Pt reassured about findings.  Use metronidazole 0.75 cream bid until clear and then reduce to qpm use.

## 2024-02-01 ENCOUNTER — HOSPITAL ENCOUNTER (OUTPATIENT)
Age: 79
Setting detail: SPECIMEN
Discharge: HOME | End: 2024-02-01
Payer: MEDICARE

## 2024-02-01 ENCOUNTER — OFFICE VISIT (OUTPATIENT)
Dept: ONCOLOGY | Age: 79
End: 2024-02-01
Payer: MEDICARE

## 2024-02-01 VITALS
RESPIRATION RATE: 18 BRPM | TEMPERATURE: 98.1 F | DIASTOLIC BLOOD PRESSURE: 70 MMHG | SYSTOLIC BLOOD PRESSURE: 121 MMHG | HEART RATE: 80 BPM

## 2024-02-01 DIAGNOSIS — C85.80 MARGINAL ZONE B-CELL LYMPHOMA (HCC): Primary | ICD-10-CM

## 2024-02-01 DIAGNOSIS — C85.80 MARGINAL ZONE B-CELL LYMPHOMA (HCC): ICD-10-CM

## 2024-02-01 LAB
ALBUMIN SERPL-MCNC: 4.3 G/DL (ref 3.5–5.2)
ALP SERPL-CCNC: 73 U/L (ref 35–104)
ALT SERPL-CCNC: 17 U/L (ref 5–33)
ANION GAP SERPL CALCULATED.3IONS-SCNC: 19 MMOL/L (ref 9–17)
AST SERPL-CCNC: 21 U/L
BASOPHILS # BLD: 0.05 K/UL (ref 0–0.2)
BASOPHILS NFR BLD: 1 % (ref 0–2)
BILIRUB SERPL-MCNC: 0.2 MG/DL (ref 0.3–1.2)
BUN SERPL-MCNC: 18 MG/DL (ref 8–23)
BUN/CREAT SERPL: 20 (ref 9–20)
CALCIUM SERPL-MCNC: 9.3 MG/DL (ref 8.6–10.4)
CHLORIDE SERPL-SCNC: 103 MMOL/L (ref 98–107)
CO2 SERPL-SCNC: 21 MMOL/L (ref 20–31)
CREAT SERPL-MCNC: 0.9 MG/DL (ref 0.5–0.9)
EOSINOPHIL # BLD: 0.09 K/UL (ref 0–0.44)
EOSINOPHILS RELATIVE PERCENT: 1 % (ref 1–4)
ERYTHROCYTE [DISTWIDTH] IN BLOOD BY AUTOMATED COUNT: 14.5 % (ref 11.8–14.4)
GFR SERPL CREATININE-BSD FRML MDRD: >60 ML/MIN/1.73M2
GLUCOSE SERPL-MCNC: 286 MG/DL (ref 70–99)
HCT VFR BLD AUTO: 48.2 % (ref 36.3–47.1)
HGB BLD-MCNC: 15 G/DL (ref 11.9–15.1)
IMM GRANULOCYTES # BLD AUTO: 0.03 K/UL (ref 0–0.3)
IMM GRANULOCYTES NFR BLD: 0 %
LYMPHOCYTES NFR BLD: 3.51 K/UL (ref 1.1–3.7)
LYMPHOCYTES RELATIVE PERCENT: 41 % (ref 24–43)
MCH RBC QN AUTO: 31.3 PG (ref 25.2–33.5)
MCHC RBC AUTO-ENTMCNC: 31.1 G/DL (ref 28.4–34.8)
MCV RBC AUTO: 100.6 FL (ref 82.6–102.9)
MONOCYTES NFR BLD: 0.38 K/UL (ref 0.1–1.2)
MONOCYTES NFR BLD: 5 % (ref 3–12)
NEUTROPHILS NFR BLD: 52 % (ref 36–65)
NEUTS SEG NFR BLD: 4.44 K/UL (ref 1.5–8.1)
NRBC BLD-RTO: 0 PER 100 WBC
PLATELET # BLD AUTO: 319 K/UL (ref 138–453)
PMV BLD AUTO: 9 FL (ref 8.1–13.5)
POTASSIUM SERPL-SCNC: 4.4 MMOL/L (ref 3.7–5.3)
PROT SERPL-MCNC: 7.4 G/DL (ref 6.4–8.3)
RBC # BLD AUTO: 4.79 M/UL (ref 3.95–5.11)
RBC # BLD: ABNORMAL 10*6/UL
SODIUM SERPL-SCNC: 143 MMOL/L (ref 135–144)
WBC OTHER # BLD: 8.5 K/UL (ref 3.5–11.3)

## 2024-02-01 PROCEDURE — 99214 OFFICE O/P EST MOD 30 MIN: CPT | Performed by: INTERNAL MEDICINE

## 2024-02-01 PROCEDURE — 3078F DIAST BP <80 MM HG: CPT | Performed by: INTERNAL MEDICINE

## 2024-02-01 PROCEDURE — 85025 COMPLETE CBC W/AUTO DIFF WBC: CPT

## 2024-02-01 PROCEDURE — 3074F SYST BP LT 130 MM HG: CPT | Performed by: INTERNAL MEDICINE

## 2024-02-01 PROCEDURE — 80053 COMPREHEN METABOLIC PANEL: CPT

## 2024-02-01 PROCEDURE — 1123F ACP DISCUSS/DSCN MKR DOCD: CPT | Performed by: INTERNAL MEDICINE

## 2024-02-01 PROCEDURE — 36415 COLL VENOUS BLD VENIPUNCTURE: CPT

## 2024-02-01 RX ORDER — FLUTICASONE FUROATE, UMECLIDINIUM BROMIDE AND VILANTEROL TRIFENATATE 100; 62.5; 25 UG/1; UG/1; UG/1
POWDER RESPIRATORY (INHALATION)
COMMUNITY
Start: 2023-12-06

## 2024-02-01 RX ORDER — MIRTAZAPINE 15 MG/1
TABLET, FILM COATED ORAL
COMMUNITY
Start: 2023-12-31

## 2024-02-01 NOTE — PROGRESS NOTES
_           Chief Complaint   Patient presents with    Follow-up    Discuss Labs     DIAGNOSIS:        Marginal Zone lymphoma  Persistent leukocytosis  Persistent thrombocytopenia  Multiple comorbidities as listed     CURRENT THERAPY:         Started treatment with rituximab 5/2/2019.  Week #8 June 21, 2019.  Maintenance Rituxan every 2 months for 2 years started October 17, 2019. completed 2 years in August 2021.    BRIEF CASE HISTORY:      Ms. Samara Smith is a very pleasant 78 y.o. female with history of multiple comorbidities including COPD and diabetes.  She quit smoking years ago.  The patient was recently hospitalized with chest infection.  The patient was noted to have leukocytosis.  She also had persistent thrombocytopenia.  She is referred for further evaluation.  Patient denies any active bleeding.  She has easy bruising.  No fever or night sweats.  No weight loss or decreased appetite.  No palpable lymph nodes.  No history of repeated infections.  Patient has history of smoking for more than 50 years.  Denies alcohol drinking  She had flow cytometry and bone marrow test. Results showed evidence of marginal zone lymphoma.  Start the rituximab with good results.  Due to increasing symptoms and further problems related to lymphoma, patient was started on single agent rituximab on 5/20/19.  Patient received maintenance rituximab after induction.    INTERIM HISTORY:   Patient is seen for follow up leukocytosis and marginal zone lymphoma.  She had very good response to induction rituximab.  She completed 2 years of maintenance rituximab.    She is clinically stable.  No weakness or fatigue.  No dizziness.  No fever or infections.  No palpable lymph nodes.  No recent infections.  No recent hospitalizations.  No weight loss or decreased appetite.       PAST MEDICAL HISTORY: has a past medical history of COPD (chronic

## 2024-02-26 DIAGNOSIS — C85.80 MARGINAL ZONE B-CELL LYMPHOMA (HCC): Primary | ICD-10-CM

## 2024-08-06 ENCOUNTER — HOSPITAL ENCOUNTER (OUTPATIENT)
Facility: MEDICAL CENTER | Age: 79
End: 2024-08-06

## 2024-09-03 NOTE — TELEPHONE ENCOUNTER
The PT was calling in to notify you that the patient had a missed visit today. She says she went to the patients home and she did not answer the door. She says she will make sure to inform the patient of the visits and make sure she understands. She will try and work her into her schedule later this week if she gets a cancellation.  Just an Ab[facility] Blood Self

## 2024-09-19 ENCOUNTER — OFFICE VISIT (OUTPATIENT)
Dept: ONCOLOGY | Age: 79
End: 2024-09-19
Payer: MEDICARE

## 2024-09-19 ENCOUNTER — HOSPITAL ENCOUNTER (OUTPATIENT)
Age: 79
Setting detail: SPECIMEN
Discharge: HOME | End: 2024-09-19
Payer: MEDICARE

## 2024-09-19 ENCOUNTER — TELEPHONE (OUTPATIENT)
Dept: ONCOLOGY | Age: 79
End: 2024-09-19

## 2024-09-19 VITALS
HEART RATE: 78 BPM | BODY MASS INDEX: 39.36 KG/M2 | WEIGHT: 181.9 LBS | SYSTOLIC BLOOD PRESSURE: 139 MMHG | DIASTOLIC BLOOD PRESSURE: 70 MMHG | TEMPERATURE: 97.2 F | RESPIRATION RATE: 16 BRPM

## 2024-09-19 DIAGNOSIS — C85.80 MARGINAL ZONE B-CELL LYMPHOMA (HCC): ICD-10-CM

## 2024-09-19 DIAGNOSIS — C85.80 MARGINAL ZONE B-CELL LYMPHOMA (HCC): Primary | ICD-10-CM

## 2024-09-19 LAB
ALBUMIN SERPL-MCNC: 3.9 G/DL (ref 3.5–5.2)
ALP SERPL-CCNC: 75 U/L (ref 35–104)
ALT SERPL-CCNC: 17 U/L (ref 5–33)
ANION GAP SERPL CALCULATED.3IONS-SCNC: 14 MMOL/L (ref 9–17)
AST SERPL-CCNC: 24 U/L
BASOPHILS # BLD: 0.04 K/UL (ref 0–0.2)
BASOPHILS NFR BLD: 1 % (ref 0–2)
BILIRUB SERPL-MCNC: 0.3 MG/DL (ref 0.3–1.2)
BUN SERPL-MCNC: 17 MG/DL (ref 8–23)
BUN/CREAT SERPL: 28 (ref 9–20)
CALCIUM SERPL-MCNC: 9.1 MG/DL (ref 8.6–10.4)
CHLORIDE SERPL-SCNC: 103 MMOL/L (ref 98–107)
CO2 SERPL-SCNC: 24 MMOL/L (ref 20–31)
CREAT SERPL-MCNC: 0.6 MG/DL (ref 0.5–0.9)
EOSINOPHIL # BLD: 0.08 K/UL (ref 0–0.44)
EOSINOPHILS RELATIVE PERCENT: 1 % (ref 1–4)
ERYTHROCYTE [DISTWIDTH] IN BLOOD BY AUTOMATED COUNT: 12.9 % (ref 11.8–14.4)
GFR, ESTIMATED: >90 ML/MIN/1.73M2
GLUCOSE SERPL-MCNC: 156 MG/DL (ref 70–99)
HCT VFR BLD AUTO: 40.5 % (ref 36.3–47.1)
HGB BLD-MCNC: 13 G/DL (ref 11.9–15.1)
IMM GRANULOCYTES # BLD AUTO: 0.01 K/UL (ref 0–0.3)
IMM GRANULOCYTES NFR BLD: 0 %
LYMPHOCYTES NFR BLD: 2.47 K/UL (ref 1.1–3.7)
LYMPHOCYTES RELATIVE PERCENT: 36 % (ref 24–43)
MCH RBC QN AUTO: 33.1 PG (ref 25.2–33.5)
MCHC RBC AUTO-ENTMCNC: 32.1 G/DL (ref 28.4–34.8)
MCV RBC AUTO: 103.1 FL (ref 82.6–102.9)
MONOCYTES NFR BLD: 0.56 K/UL (ref 0.1–1.2)
MONOCYTES NFR BLD: 8 % (ref 3–12)
NEUTROPHILS NFR BLD: 54 % (ref 36–65)
NEUTS SEG NFR BLD: 3.78 K/UL (ref 1.5–8.1)
NRBC BLD-RTO: 0 PER 100 WBC
PLATELET # BLD AUTO: 270 K/UL (ref 138–453)
PMV BLD AUTO: 9.4 FL (ref 8.1–13.5)
POTASSIUM SERPL-SCNC: 3.9 MMOL/L (ref 3.7–5.3)
PROT SERPL-MCNC: 6.9 G/DL (ref 6.4–8.3)
RBC # BLD AUTO: 3.93 M/UL (ref 3.95–5.11)
RBC # BLD: ABNORMAL 10*6/UL
SODIUM SERPL-SCNC: 141 MMOL/L (ref 135–144)
WBC OTHER # BLD: 6.9 K/UL (ref 3.5–11.3)

## 2024-09-19 PROCEDURE — 99214 OFFICE O/P EST MOD 30 MIN: CPT | Performed by: INTERNAL MEDICINE

## 2024-09-19 PROCEDURE — 36415 COLL VENOUS BLD VENIPUNCTURE: CPT

## 2024-09-19 PROCEDURE — 85025 COMPLETE CBC W/AUTO DIFF WBC: CPT

## 2024-09-19 PROCEDURE — 80053 COMPREHEN METABOLIC PANEL: CPT

## 2024-09-19 PROCEDURE — 3078F DIAST BP <80 MM HG: CPT | Performed by: INTERNAL MEDICINE

## 2024-09-19 PROCEDURE — 1123F ACP DISCUSS/DSCN MKR DOCD: CPT | Performed by: INTERNAL MEDICINE

## 2024-09-19 PROCEDURE — 3075F SYST BP GE 130 - 139MM HG: CPT | Performed by: INTERNAL MEDICINE

## 2025-03-13 ENCOUNTER — OFFICE VISIT (OUTPATIENT)
Dept: ONCOLOGY | Age: 80
End: 2025-03-13
Payer: MEDICARE

## 2025-03-13 ENCOUNTER — HOSPITAL ENCOUNTER (OUTPATIENT)
Age: 80
Setting detail: SPECIMEN
Discharge: HOME | End: 2025-03-13
Payer: MEDICARE

## 2025-03-13 ENCOUNTER — TELEPHONE (OUTPATIENT)
Dept: ONCOLOGY | Age: 80
End: 2025-03-13

## 2025-03-13 VITALS
RESPIRATION RATE: 16 BRPM | WEIGHT: 185.6 LBS | SYSTOLIC BLOOD PRESSURE: 120 MMHG | HEART RATE: 68 BPM | TEMPERATURE: 97.6 F | HEIGHT: 57 IN | BODY MASS INDEX: 40.04 KG/M2 | DIASTOLIC BLOOD PRESSURE: 82 MMHG

## 2025-03-13 DIAGNOSIS — C85.80 MARGINAL ZONE B-CELL LYMPHOMA (HCC): Primary | ICD-10-CM

## 2025-03-13 DIAGNOSIS — C85.80 MARGINAL ZONE B-CELL LYMPHOMA (HCC): ICD-10-CM

## 2025-03-13 LAB
ALBUMIN SERPL-MCNC: 3.8 G/DL (ref 3.5–5.2)
ALBUMIN/GLOB SERPL: 1.4 {RATIO} (ref 1–2.5)
ALP SERPL-CCNC: 88 U/L (ref 35–104)
ALT SERPL-CCNC: 19 U/L (ref 10–35)
ANION GAP SERPL CALCULATED.3IONS-SCNC: 12 MMOL/L (ref 9–16)
AST SERPL-CCNC: 22 U/L (ref 10–35)
BASOPHILS # BLD: 0.03 K/UL (ref 0–0.2)
BASOPHILS NFR BLD: 0 % (ref 0–2)
BILIRUB SERPL-MCNC: 0.3 MG/DL (ref 0–1.2)
BUN SERPL-MCNC: 19 MG/DL (ref 8–23)
CALCIUM SERPL-MCNC: 9.3 MG/DL (ref 8.8–10.2)
CHLORIDE SERPL-SCNC: 106 MMOL/L (ref 98–107)
CO2 SERPL-SCNC: 24 MMOL/L (ref 20–31)
CREAT SERPL-MCNC: 0.6 MG/DL (ref 0.5–0.9)
EOSINOPHIL # BLD: 0.15 K/UL (ref 0–0.44)
EOSINOPHILS RELATIVE PERCENT: 2 % (ref 1–4)
ERYTHROCYTE [DISTWIDTH] IN BLOOD BY AUTOMATED COUNT: 15.5 % (ref 11.8–14.4)
GFR, ESTIMATED: >90 ML/MIN/1.73M2
GLUCOSE SERPL-MCNC: 223 MG/DL (ref 82–115)
HCT VFR BLD AUTO: 40.5 % (ref 36.3–47.1)
HGB BLD-MCNC: 12.8 G/DL (ref 11.9–15.1)
IMM GRANULOCYTES # BLD AUTO: 0.02 K/UL (ref 0–0.3)
IMM GRANULOCYTES NFR BLD: 0 %
LYMPHOCYTES NFR BLD: 1.98 K/UL (ref 1.1–3.7)
LYMPHOCYTES RELATIVE PERCENT: 24 % (ref 24–43)
MCH RBC QN AUTO: 31 PG (ref 25.2–33.5)
MCHC RBC AUTO-ENTMCNC: 31.6 G/DL (ref 28.4–34.8)
MCV RBC AUTO: 98.1 FL (ref 82.6–102.9)
MONOCYTES NFR BLD: 0.38 K/UL (ref 0.1–1.2)
MONOCYTES NFR BLD: 5 % (ref 3–12)
NEUTROPHILS NFR BLD: 69 % (ref 36–65)
NEUTS SEG NFR BLD: 5.65 K/UL (ref 1.5–8.1)
NRBC BLD-RTO: 0 PER 100 WBC
PLATELET # BLD AUTO: 244 K/UL (ref 138–453)
PMV BLD AUTO: 9.3 FL (ref 8.1–13.5)
POTASSIUM SERPL-SCNC: 4.8 MMOL/L (ref 3.7–5.3)
PROT SERPL-MCNC: 6.5 G/DL (ref 6.6–8.7)
RBC # BLD AUTO: 4.13 M/UL (ref 3.95–5.11)
RBC # BLD: ABNORMAL 10*6/UL
SODIUM SERPL-SCNC: 142 MMOL/L (ref 136–145)
WBC OTHER # BLD: 8.2 K/UL (ref 3.5–11.3)

## 2025-03-13 PROCEDURE — 1123F ACP DISCUSS/DSCN MKR DOCD: CPT | Performed by: INTERNAL MEDICINE

## 2025-03-13 PROCEDURE — 3079F DIAST BP 80-89 MM HG: CPT | Performed by: INTERNAL MEDICINE

## 2025-03-13 PROCEDURE — 3074F SYST BP LT 130 MM HG: CPT | Performed by: INTERNAL MEDICINE

## 2025-03-13 PROCEDURE — 80053 COMPREHEN METABOLIC PANEL: CPT

## 2025-03-13 PROCEDURE — 1159F MED LIST DOCD IN RCRD: CPT | Performed by: INTERNAL MEDICINE

## 2025-03-13 PROCEDURE — 99214 OFFICE O/P EST MOD 30 MIN: CPT | Performed by: INTERNAL MEDICINE

## 2025-03-13 PROCEDURE — 99211 OFF/OP EST MAY X REQ PHY/QHP: CPT

## 2025-03-13 PROCEDURE — 85025 COMPLETE CBC W/AUTO DIFF WBC: CPT

## 2025-03-13 PROCEDURE — 36415 COLL VENOUS BLD VENIPUNCTURE: CPT

## 2025-03-13 RX ORDER — CLOPIDOGREL BISULFATE 75 MG/1
75 TABLET ORAL EVERY MORNING
COMMUNITY

## 2025-03-13 RX ORDER — ATORVASTATIN CALCIUM 80 MG/1
80 TABLET, FILM COATED ORAL DAILY
COMMUNITY
Start: 2024-08-01

## 2025-03-13 RX ORDER — BUDESONIDE 0.25 MG/2ML
INHALANT ORAL
COMMUNITY
Start: 2025-02-27

## 2025-03-13 RX ORDER — ACETAMINOPHEN 500 MG
500 TABLET ORAL EVERY MORNING
COMMUNITY

## 2025-03-13 NOTE — TELEPHONE ENCOUNTER
[place] HERE FOR FOLLOW UP   RV 6 months with labs at RV  LABS ORDERED: CBC CMP   MD VISIT: 9/11/25 @ 3:15PM   AVS PRINTED AND GIVEN ON EXIT

## 2025-03-13 NOTE — PROGRESS NOTES
blood is positive for a monoclonal B-cell population,   positive for CD19, CD20, CD22,   CD45, FMC7 and lambda light chain.     Comment:  Morphology and immunophenotype of circulating monoclonal   B-cells are most consistent with a marginal zone lymphoma.  Bone   marrow evaluation and/or lymph node biopsy if possible are recommended   for confirmation and additional characterization.     Bone marrow test August 27, 2018:    Final Diagnosis   PERIPHERAL BLOOD:   -LYMPHOCYTOSIS WITH \" VILLOUS\" LYMPHOCYTES.   -MODERATE THROMBOCYTOPENIA.     BONE MARROW BIOPSY, ASPIRATE SMEAR AND CLOT SECTION:   - MARGINAL ZONE LYMPHOMA.   -NORMOCELLULAR TRILINEAGE HEMATOPOIETIC MARROW WITH MARKEDLY   DECREASED IRON STORES.        Chemistry        Component Value Date/Time     03/13/2025 1411    K 4.8 03/13/2025 1411     03/13/2025 1411    CO2 24 03/13/2025 1411    BUN 19 03/13/2025 1411    CREATININE 0.6 03/13/2025 1411        Component Value Date/Time    CALCIUM 9.3 03/13/2025 1411    ALKPHOS 88 03/13/2025 1411    AST 22 03/13/2025 1411    ALT 19 03/13/2025 1411    BILITOT 0.3 03/13/2025 1411        Lab Results   Component Value Date    WBC 8.2 03/13/2025    HGB 12.8 03/13/2025    HCT 40.5 03/13/2025    MCV 98.1 03/13/2025     03/13/2025         IMPRESSION:  Marginal Zone lymphoma  Persistent leukocytosis  Persistent thrombocytopenia  Positive FANNY with elevated antihistone.   Multiple comorbidities as listed  Acute renal failure, corrected.     PLAN: I reviewed the labs as above and discussed with the patient. I explained the significance of these abnormalities in layman language. I explained to patient the nature of low grade NHL.   Patient received immunotherapy using rituximab.  She had good results induction rituximab treatment.  She completed maintenance rituximab.  KATHRIN improved.  Normal kidney function.  Return visit in 6 months with repeated labs.  Patient's questions were answered to the best of her